# Patient Record
Sex: FEMALE | Race: WHITE | NOT HISPANIC OR LATINO | Employment: OTHER | URBAN - METROPOLITAN AREA
[De-identification: names, ages, dates, MRNs, and addresses within clinical notes are randomized per-mention and may not be internally consistent; named-entity substitution may affect disease eponyms.]

---

## 2018-07-18 NOTE — PROGRESS NOTES
Madhuri Gregorio MD, [facility]  Electrophysiology  Additional office in [facility]  [phone]  [phone]  [facility].org/[facility]  The Heart [facility] Level  [address] [facility]  [place] [address]    Patient MRN: [id number]  Date: 2018  Description: Prog Notes ([facility]) Cardiology EP  Category: Progress Notes ([facility])rovider ID: [id number] ID: [id number]  [place] ID: 001  2018  Anton Muniz M.D.  [address] [place] [address]  & [facility].  [place] [address]    Office Visit  RE: ORLY COBIAN  : 1940  Dear Dr. Muniz:  I had the pleasure of seeing Ms. Orly Cobian for follow up in the office today for followup.  I last saw her for evaluation on 2018.    PROBLEM LIST:  1. Paroxysmal atrial fibrillation diagnosed on 10/11/2010, with a recent episode in May of  2017.  2. History of Jaycob-Parkinson-White syndrome, status post catheter ablation procedure  performed in , without recurrence of arrhythmia.  3. Chronic right bundle branch block.  4. 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  5. History of thyroid disease.  6. Dyslipidemia.  7. Stress echo obtained on 2017 revealing no evidence of myocardial ischemia with  normal left ventricular systolic function with a left ventricular ejection fraction of 65-  70%.  HISTORY OF PRESENT ILLNESS: Today she is very pleased to report that she continues without recurrence of symptoms of atrial fibrillation.  She has not needed to take flecainide which she is prescribed to be taken only as needed for an episode of sustained atrial fibrillation.  She states that she is scheduled to see that ENT specialist Dr. Brantley in August since she has been having trouble  "swallowing.  She accidentally hit her left leg and has a laceration in the pretibial area.  She is using Neosporin however it is getting red around the lesion.   She denies episodes of chest pain, dizziness, syncope, near syncope, paroxysmal nocturnal dyspnea, orthopnea or pedal edema.  No hospitalizations since last seen in January 2018.    CURRENT MEDICATIONS:   Current Outpatient Prescriptions:   •  alendronate (FOSAMAX) 70 mg tablet, Take 70 mg by mouth once a week. Take on an empty stomach and do not lie down for the next 30 min., Disp: , Rfl:   •  apixaban (ELIQUIS) 5 mg tablet, Take 1 tablet (5 mg total) by mouth 2 (two) times a day., Disp: 180 tablet, Rfl: 0  •  aspirin 81 MG oral suspension, 81 mg., Disp: , Rfl:   •  atorvastatin (LIPITOR) 10 mg tablet, 10 mg., Disp: , Rfl:   •  estradiol (ESTRACE) 0.01 % (0.1 mg/gram) vaginal cream, No SIG Entered, Disp: , Rfl:   •  flecainide (TAMBOCOR) 100 mg tablet, 100 mg., Disp: , Rfl:   •  latanoprost (XALATAN) 0.005 % ophthalmic solution, 0.005 %., Disp: , Rfl:   •  lutein-zeaxanthin (OCUVITE LUTEIN 25) 25-5 mg capsule, take 1 capsule po daily, Disp: , Rfl:   •  omega 3-dha-epa-fish oil (FISH OIL) capsule, 1,200 mg., Disp: , Rfl:   •  omeprazole (PriLOSEC) 10 mg capsule, 20 mg., Disp: , Rfl:     ALLERGIES: Erythromycin.    REVIEW OF SYSTEMS: As above in the History of Present Illness; no bleeding on  anticoagulation with Eliquis.  Difficulty swallowing.    Vitals:    07/19/18 1422   BP: 116/80   BP Location: Left upper arm   Patient Position: Sitting   Pulse: 68   Resp: 16   Weight: 78.8 kg (173 lb 11.2 oz)   Height: 1.651 m (5' 5\")       PHYSICAL EXAMINATION: Pleasant and alert, in no acute distress.  Neck: No jugular venous distention or carotid bruits. Lungs were clear to auscultation without  rales or wheezes. Cardiovascular exam revealed normal S1, S2, regular rhythm with a 2/6  systolic murmur best heard at the lower left sternal border. Abdomen was soft and " nontender.  Extremities revealed a left pretibial laceration with erythema surrounding the lesion.  No edema or cyanosis. She was alert and oriented x 3 with no focal deficits  on gross neurologic exam.    DATA REVIEW: I personally reviewed the 12-lead EKG performed today, which reveals sinus  rhythm with right bundle branch block . There were no acute changes as compared to her previous EKG.      IMPRESSION/RECOMMENDATIONS:  1. Paroxysmal atrial fibrillation -She has not had recurrence of atrial fibrillation since May 2017.  She has a prescription from flecainide to be taken as needed if she has a sustained episode of atrial fibrillation.  She will alsowill continue daily low dose beta blocker therapy with metoprolol succinate 25 mg 1 po once a day.  Her [id number] is 3. Therefore I recommend that she continues long term  anticoagulation with Eliquis 5 mg 1 po every 12 hours to prevent thromboembolic complications from atrial fibrillation.    2. History of Jaycob-Parkinson-White syndrome, status post catheter ablation procedure in  1997 - She has not had recurrence of supraventricular tachycardia.  3. 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   4. Chronic right bundle branch block - Stable  5. Dyslipidemia - She will continue on atorvastatin.  6. Skin laceration: I advised her to follow up with you. She may need antibiotics for cellulitis.    I will see Ms. Cobian for follow-up in the office in six months. Please do not hesitate to contact  me if I can be of further assistance in her management.  Sincerely,    Madhuri Cardoza M.D., [facility]    cc: Anton Muniz M.D., [address] [place] [address], & [address]., [place] [address]

## 2018-07-19 ENCOUNTER — OFFICE VISIT (OUTPATIENT)
Dept: CARDIOLOGY | Facility: CLINIC | Age: 78
End: 2018-07-19
Attending: INTERNAL MEDICINE
Payer: MEDICARE

## 2018-07-19 VITALS
WEIGHT: 173.7 LBS | HEIGHT: 65 IN | BODY MASS INDEX: 28.94 KG/M2 | HEART RATE: 68 BPM | SYSTOLIC BLOOD PRESSURE: 116 MMHG | RESPIRATION RATE: 16 BRPM | DIASTOLIC BLOOD PRESSURE: 80 MMHG

## 2018-07-19 DIAGNOSIS — I45.10 RBBB: ICD-10-CM

## 2018-07-19 DIAGNOSIS — I45.6 WPW (WOLFF-PARKINSON-WHITE SYNDROME): ICD-10-CM

## 2018-07-19 DIAGNOSIS — I37.1 NONRHEUMATIC PULMONARY VALVE INSUFFICIENCY: ICD-10-CM

## 2018-07-19 DIAGNOSIS — Q22.5 EBSTEIN'S ANOMALY OF TRICUSPID VALVE: ICD-10-CM

## 2018-07-19 DIAGNOSIS — I48.0 PAROXYSMAL ATRIAL FIBRILLATION (CMS/HCC): Primary | ICD-10-CM

## 2018-07-19 DIAGNOSIS — I36.1 NON-RHEUMATIC TRICUSPID VALVE INSUFFICIENCY: ICD-10-CM

## 2018-07-19 PROCEDURE — 93000 ELECTROCARDIOGRAM COMPLETE: CPT | Performed by: INTERNAL MEDICINE

## 2018-07-19 PROCEDURE — 99214 OFFICE O/P EST MOD 30 MIN: CPT | Performed by: INTERNAL MEDICINE

## 2018-07-19 RX ORDER — LATANOPROST 50 UG/ML
1 SOLUTION/ DROPS OPHTHALMIC NIGHTLY
COMMUNITY
Start: 2012-03-27

## 2018-07-19 RX ORDER — FISH OIL/DHA/EPA 1200-144MG
CAPSULE ORAL
COMMUNITY
Start: 2012-04-12 | End: 2018-07-22

## 2018-07-19 RX ORDER — LATANOPROST 50 UG/ML
SOLUTION/ DROPS OPHTHALMIC
Refills: 0 | COMMUNITY
Start: 2018-06-27 | End: 2018-07-22

## 2018-07-19 RX ORDER — LATANOPROST 50 UG/ML
0.01 SOLUTION/ DROPS OPHTHALMIC
COMMUNITY
Start: 2010-11-03 | End: 2018-07-22

## 2018-07-19 RX ORDER — FLECAINIDE ACETATE 100 MG/1
100 TABLET ORAL 2 TIMES DAILY PRN
COMMUNITY
Start: 2017-06-07 | End: 2019-02-07 | Stop reason: SDUPTHER

## 2018-07-19 RX ORDER — ESTRADIOL 0.1 MG/G
0.5 CREAM VAGINAL 2 TIMES WEEKLY
COMMUNITY
Start: 2012-03-27 | End: 2024-07-07

## 2018-07-19 RX ORDER — ZOSTER VACCINE RECOMBINANT, ADJUVANTED 50 MCG/0.5
KIT INTRAMUSCULAR
Refills: 0 | COMMUNITY
Start: 2018-04-25 | End: 2018-07-19

## 2018-07-19 RX ORDER — ALENDRONATE SODIUM 70 MG/1
70 TABLET ORAL
COMMUNITY
Start: 2012-03-27 | End: 2018-07-22

## 2018-07-19 RX ORDER — GLUCOSAMINE/CHONDR SU A SOD 750-600 MG
1 TABLET ORAL DAILY
COMMUNITY
Start: 2012-10-18

## 2018-07-19 RX ORDER — ATORVASTATIN CALCIUM 10 MG/1
10 TABLET, FILM COATED ORAL
COMMUNITY
Start: 2018-02-08 | End: 2018-08-30 | Stop reason: SDUPTHER

## 2018-07-19 RX ORDER — ATORVASTATIN CALCIUM 10 MG/1
TABLET, FILM COATED ORAL
Refills: 0 | COMMUNITY
Start: 2018-06-30 | End: 2018-07-22

## 2018-07-19 RX ORDER — OMEPRAZOLE 10 MG/1
20 CAPSULE, DELAYED RELEASE ORAL
COMMUNITY
Start: 2012-03-27 | End: 2019-02-07 | Stop reason: ALTCHOICE

## 2018-07-19 NOTE — LETTER
2018     Anton Muniz MD  [address] [place] [address]  [place] [address]    Patient: Orly Lopez   YOB: 1940   Date of Visit: 2018       Dear Dr. Muniz:    Thank you for referring Orly Lopez to me for evaluation. Below are my notes for this consultation.    If you have questions, please do not hesitate to call me. I look forward to following your patient along with you.         Sincerely,        Madhuri Gregorio MD        CC: No Recipients  Madhuri Gregorio MD  2018 10:14 PM  Sign at close encounter  Madhuri Gregorio MD, Located within [facility]  Electrophysiology  Additional office in [facility]  [phone]  [phone]  [facility]  The Heart [facility] Level  [address] [facility]  [place] [address]    Patient MRN: [id number]  Date: 2018  Description: Prog Notes ([facility]) Cardiology EP  Category: Progress Notes ([facility])rovider ID: [id number] ID: [id number]  [place] ID: 001  2018  Anton Muniz M.D.  [address] [place] [address]  & Grzegorz Cox.  [place] [address]    Office Visit  RE: ORLY LOPEZ  : 1940  Dear Dr. Muniz:  I had the pleasure of seeing Ms. Orly Lopez for follow up in the office today for followup.  I last saw her for evaluation on 2018.    PROBLEM LIST:  1. Paroxysmal atrial fibrillation diagnosed on 10/11/2010, with a recent episode in May of  2017.  2. History of Jaycob-Parkinson-White syndrome, status post catheter ablation procedure  performed in , without recurrence of arrhythmia.  3. Chronic right bundle branch block.  4. 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  5. History of thyroid disease.  6. Dyslipidemia.  7. Stress echo obtained on 2017  revealing no evidence of myocardial ischemia with  normal left ventricular systolic function with a left ventricular ejection fraction of 65-  70%.  HISTORY OF PRESENT ILLNESS: Today she is very pleased to report that she continues without recurrence of symptoms of atrial fibrillation.  She has not needed to take flecainide which she is prescribed to be taken only as needed for an episode of sustained atrial fibrillation.  She states that she is scheduled to see that ENT specialist Dr. Brantley in August since she has been having trouble swallowing.  She accidentally hit her left leg and has a laceration in the pretibial area.  She is using Neosporin however it is getting red around the lesion.   She denies episodes of chest pain, dizziness, syncope, near syncope, paroxysmal nocturnal dyspnea, orthopnea or pedal edema.  No hospitalizations since last seen in January 2018.    CURRENT MEDICATIONS:   Current Outpatient Prescriptions:   •  alendronate (FOSAMAX) 70 mg tablet, Take 70 mg by mouth once a week. Take on an empty stomach and do not lie down for the next 30 min., Disp: , Rfl:   •  apixaban (ELIQUIS) 5 mg tablet, Take 1 tablet (5 mg total) by mouth 2 (two) times a day., Disp: 180 tablet, Rfl: 0  •  aspirin 81 MG oral suspension, 81 mg., Disp: , Rfl:   •  atorvastatin (LIPITOR) 10 mg tablet, 10 mg., Disp: , Rfl:   •  estradiol (ESTRACE) 0.01 % (0.1 mg/gram) vaginal cream, No SIG Entered, Disp: , Rfl:   •  flecainide (TAMBOCOR) 100 mg tablet, 100 mg., Disp: , Rfl:   •  latanoprost (XALATAN) 0.005 % ophthalmic solution, 0.005 %., Disp: , Rfl:   •  lutein-zeaxanthin (OCUVITE LUTEIN 25) 25-5 mg capsule, take 1 capsule po daily, Disp: , Rfl:   •  omega 3-dha-epa-fish oil (FISH OIL) capsule, 1,200 mg., Disp: , Rfl:   •  omeprazole (PriLOSEC) 10 mg capsule, 20 mg., Disp: , Rfl:     ALLERGIES: Erythromycin.    REVIEW OF SYSTEMS: As above in the History of Present Illness; no bleeding on  anticoagulation with Eliquis.   "Difficulty swallowing.    Vitals:    07/19/18 1422   BP: 116/80   BP Location: Left upper arm   Patient Position: Sitting   Pulse: 68   Resp: 16   Weight: 78.8 kg (173 lb 11.2 oz)   Height: 1.651 m (5' 5\")       PHYSICAL EXAMINATION: Pleasant and alert, in no acute distress.  Neck: No jugular venous distention or carotid bruits. Lungs were clear to auscultation without  rales or wheezes. Cardiovascular exam revealed normal S1, S2, regular rhythm with a 2/6  systolic murmur best heard at the lower left sternal border. Abdomen was soft and nontender.  Extremities revealed a left pretibial laceration with erythema surrounding the lesion.  No edema or cyanosis. She was alert and oriented x 3 with no focal deficits  on gross neurologic exam.    DATA REVIEW: I personally reviewed the 12-lead EKG performed today, which reveals sinus  rhythm with right bundle branch block . There were no acute changes as compared to her previous EKG.      IMPRESSION/RECOMMENDATIONS:  1. Paroxysmal atrial fibrillation -She has not had recurrence of atrial fibrillation since May 2017.  She has a prescription from flecainide to be taken as needed if she has a sustained episode of atrial fibrillation.  She will alsowill continue daily low dose beta blocker therapy with metoprolol succinate 25 mg 1 po once a day.  Her [id number] is 3. Therefore I recommend that she continues long term  anticoagulation with Eliquis 5 mg 1 po every 12 hours to prevent thromboembolic complications from atrial fibrillation.    2. History of Jaycob-Parkinson-White syndrome, status post catheter ablation procedure in  1997 - She has not had recurrence of supraventricular tachycardia.  3. 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   4. Chronic right bundle branch block - Stable  5. Dyslipidemia - She will continue on atorvastatin.  6. Skin " laceration: I advised her to follow up with you. She may need antibiotics for cellulitis.    I will see Ms. Cobian for follow-up in the office in six months. Please do not hesitate to contact  me if I can be of further assistance in her management.  Sincerely,    Madhuri conklin M.D., [facility]    cc: Anton Muniz M.D., [address] [place] [address], & [address] [place] [address]

## 2018-07-22 PROBLEM — Q22.5 EBSTEIN'S ANOMALY OF TRICUSPID VALVE: Status: ACTIVE | Noted: 2018-07-22

## 2018-07-22 PROBLEM — I37.1 NONRHEUMATIC PULMONARY VALVE INSUFFICIENCY: Status: ACTIVE | Noted: 2018-07-22

## 2018-07-22 RX ORDER — ALENDRONATE SODIUM 70 MG/1
70 TABLET ORAL WEEKLY
COMMUNITY
End: 2019-02-07 | Stop reason: ALTCHOICE

## 2018-07-24 ENCOUNTER — TELEPHONE (OUTPATIENT)
Dept: CARDIOLOGY | Facility: CLINIC | Age: 78
End: 2018-07-24

## 2018-07-24 NOTE — TELEPHONE ENCOUNTER
----- Message from Madhuri Gregorio MD sent at 7/22/2018  9:36 PM EDT -----  [place] call patient and ask if she is taking Aspirin in addition to Eliquis for Af? Her old med list does not have it but new list has ASA.  Should only be on Eliquis.  Thanks

## 2018-07-25 ENCOUNTER — TELEPHONE (OUTPATIENT)
Dept: CARDIOLOGY | Facility: CLINIC | Age: 78
End: 2018-07-25

## 2018-07-25 NOTE — TELEPHONE ENCOUNTER
----- Message from Madhuri Gregorio MD sent at 7/22/2018  9:36 PM EDT -----  [place] call patient and ask if she is taking Aspirin in addition to Eliquis for Af? Her old med list does not have it but new list has ASA.  Should only be on Eliquis.  Thanks

## 2018-08-03 ENCOUNTER — APPOINTMENT (OUTPATIENT)
Dept: LAB | Facility: HOSPITAL | Age: 78
End: 2018-08-03
Attending: FAMILY MEDICINE
Payer: MEDICARE

## 2018-08-03 ENCOUNTER — TRANSCRIBE ORDERS (OUTPATIENT)
Dept: LAB | Facility: HOSPITAL | Age: 78
End: 2018-08-03

## 2018-08-03 DIAGNOSIS — L03.116 CELLULITIS OF LEFT LOWER EXTREMITY: Primary | ICD-10-CM

## 2018-08-03 DIAGNOSIS — L03.116 CELLULITIS OF LEFT LOWER EXTREMITY: ICD-10-CM

## 2018-08-03 DIAGNOSIS — I48.0 PAROXYSMAL ATRIAL FIBRILLATION (CMS/HCC): ICD-10-CM

## 2018-08-03 DIAGNOSIS — R60.9 EDEMA: ICD-10-CM

## 2018-08-03 LAB
ALBUMIN SERPL-MCNC: 3.7 G/DL (ref 3.4–5)
ALP SERPL-CCNC: 66 IU/L (ref 35–126)
ALT SERPL-CCNC: 18 IU/L (ref 11–54)
ANION GAP SERPL CALC-SCNC: 7 MEQ/L (ref 3–15)
AST SERPL-CCNC: 24 IU/L (ref 15–41)
BASOPHILS # BLD: 0.04 K/UL (ref 0.01–0.1)
BASOPHILS NFR BLD: 0.9 %
BILIRUB SERPL-MCNC: 0.8 MG/DL (ref 0.3–1.2)
BUN SERPL-MCNC: 15 MG/DL (ref 8–20)
CALCIUM SERPL-MCNC: 9.1 MG/DL (ref 8.9–10.3)
CHLORIDE SERPL-SCNC: 104 MMOL/L (ref 98–109)
CHOLEST SERPL-MCNC: 175 MG/DL
CO2 SERPL-SCNC: 27 MMOL/L (ref 22–32)
CREAT SERPL-MCNC: 0.7 MG/DL (ref 0.6–1.1)
DIFFERENTIAL METHOD BLD: NORMAL
EOSINOPHIL # BLD: 0.25 K/UL (ref 0.04–0.36)
EOSINOPHIL NFR BLD: 5.5 %
ERYTHROCYTE [DISTWIDTH] IN BLOOD BY AUTOMATED COUNT: 11.9 % (ref 11.7–14.4)
FT4I SERPL CALC-MCNC: 4.77 (ref 2.1–3.8)
GFR SERPL CREATININE-BSD FRML MDRD: >60 ML/MIN/1.73M*2
GLUCOSE SERPL-MCNC: 88 MG/DL (ref 70–99)
HCT VFR BLDCO AUTO: 41.6 % (ref 35–45)
HDLC SERPL-MCNC: 46 MG/DL
HDLC SERPL: 3.8 {RATIO}
HGB BLD-MCNC: 13.6 G/DL (ref 11.8–15.7)
IMM GRANULOCYTES # BLD AUTO: 0.03 K/UL (ref 0–0.08)
IMM GRANULOCYTES NFR BLD AUTO: 0.7 %
LDLC SERPL CALC-MCNC: 115 MG/DL
LYMPHOCYTES # BLD: 1.51 K/UL (ref 1.2–3.5)
LYMPHOCYTES NFR BLD: 33 %
MCH RBC QN AUTO: 31 PG (ref 28–33.2)
MCHC RBC AUTO-ENTMCNC: 32.7 G/DL (ref 32.2–35.5)
MCV RBC AUTO: 94.8 FL (ref 83–98)
MONOCYTES # BLD: 0.47 K/UL (ref 0.28–0.8)
MONOCYTES NFR BLD: 10.3 %
NEUTROPHILS # BLD: 2.27 K/UL (ref 1.7–7)
NEUTS SEG NFR BLD: 49.6 %
NONHDLC SERPL-MCNC: 129 MG/DL
NRBC BLD-RTO: 0 %
PDW BLD AUTO: 10.4 FL (ref 9.4–12.3)
PLATELET # BLD AUTO: 261 K/UL (ref 150–369)
POTASSIUM SERPL-SCNC: 4.2 MMOL/L (ref 3.6–5.1)
PROT SERPL-MCNC: 6.3 G/DL (ref 6–8.2)
RBC # BLD AUTO: 4.39 M/UL (ref 3.93–5.22)
SODIUM SERPL-SCNC: 138 MMOL/L (ref 136–144)
T4 SERPL-MCNC: 11.6 UG/DL (ref 5.9–12.2)
TRIGL SERPL-MCNC: 68 MG/DL (ref 30–149)
TSH SERPL DL<=0.05 MIU/L-ACNC: 6.72 MIU/ML (ref 0.34–5.6)
WBC # BLD AUTO: 4.57 K/UL (ref 3.8–10.5)

## 2018-08-03 PROCEDURE — 84436 ASSAY OF TOTAL THYROXINE: CPT

## 2018-08-03 PROCEDURE — 36415 COLL VENOUS BLD VENIPUNCTURE: CPT

## 2018-08-03 PROCEDURE — 84443 ASSAY THYROID STIM HORMONE: CPT

## 2018-08-03 PROCEDURE — 85025 COMPLETE CBC W/AUTO DIFF WBC: CPT

## 2018-08-03 PROCEDURE — 80053 COMPREHEN METABOLIC PANEL: CPT

## 2018-08-03 PROCEDURE — 80061 LIPID PANEL: CPT | Mod: GZ

## 2018-08-17 RX ORDER — METOPROLOL SUCCINATE 25 MG/1
25 TABLET, EXTENDED RELEASE ORAL DAILY
Qty: 30 TABLET | Refills: 6 | Status: SHIPPED | OUTPATIENT
Start: 2018-08-17 | End: 2019-03-20 | Stop reason: SDUPTHER

## 2018-08-30 RX ORDER — ATORVASTATIN CALCIUM 10 MG/1
10 TABLET, FILM COATED ORAL DAILY
Qty: 30 TABLET | Refills: 6 | Status: SHIPPED | OUTPATIENT
Start: 2018-08-30 | End: 2019-02-07 | Stop reason: SDUPTHER

## 2018-10-24 ENCOUNTER — TRANSCRIBE ORDERS (OUTPATIENT)
Dept: SCHEDULING | Age: 78
End: 2018-10-24

## 2018-10-24 DIAGNOSIS — R13.10 DYSPHAGIA: Primary | ICD-10-CM

## 2018-11-01 ENCOUNTER — HOSPITAL ENCOUNTER (OUTPATIENT)
Dept: RADIOLOGY | Facility: HOSPITAL | Age: 78
Discharge: HOME | End: 2018-11-01
Attending: INTERNAL MEDICINE
Payer: MEDICARE

## 2018-11-01 ENCOUNTER — TRANSCRIBE ORDERS (OUTPATIENT)
Dept: RADIOLOGY | Facility: HOSPITAL | Age: 78
End: 2018-11-01

## 2018-11-01 DIAGNOSIS — R13.10 DYSPHAGIA: ICD-10-CM

## 2018-11-01 PROCEDURE — 74220 X-RAY XM ESOPHAGUS 1CNTRST: CPT

## 2018-11-02 ENCOUNTER — TELEPHONE (OUTPATIENT)
Dept: SCHEDULING | Facility: CLINIC | Age: 78
End: 2018-11-02

## 2018-11-02 NOTE — TELEPHONE ENCOUNTER
Debi @ [facility] requesting most recent OVN & most recent echo (6/6/2017) faxed.  Pt having procedure there now.     P- [phone]  [phone]

## 2018-11-02 NOTE — TELEPHONE ENCOUNTER
Office note sent. No echo in chart. There is a stress test from outside that is unable to be printed.

## 2019-02-07 ENCOUNTER — OFFICE VISIT (OUTPATIENT)
Dept: CARDIOLOGY | Facility: CLINIC | Age: 79
End: 2019-02-07
Payer: MEDICARE

## 2019-02-07 VITALS
WEIGHT: 136.9 LBS | DIASTOLIC BLOOD PRESSURE: 72 MMHG | RESPIRATION RATE: 16 BRPM | HEART RATE: 64 BPM | BODY MASS INDEX: 22.81 KG/M2 | HEIGHT: 65 IN | SYSTOLIC BLOOD PRESSURE: 112 MMHG

## 2019-02-07 DIAGNOSIS — I45.6 WPW (WOLFF-PARKINSON-WHITE SYNDROME): ICD-10-CM

## 2019-02-07 DIAGNOSIS — I48.0 PAROXYSMAL ATRIAL FIBRILLATION (CMS/HCC): Primary | ICD-10-CM

## 2019-02-07 DIAGNOSIS — R55 VASOVAGAL SYNCOPE: ICD-10-CM

## 2019-02-07 DIAGNOSIS — E78.5 DYSLIPIDEMIA: ICD-10-CM

## 2019-02-07 DIAGNOSIS — I45.10 RBBB: ICD-10-CM

## 2019-02-07 PROCEDURE — 93000 ELECTROCARDIOGRAM COMPLETE: CPT | Performed by: NURSE PRACTITIONER

## 2019-02-07 PROCEDURE — 99214 OFFICE O/P EST MOD 30 MIN: CPT | Performed by: NURSE PRACTITIONER

## 2019-02-07 RX ORDER — FLECAINIDE ACETATE 100 MG/1
100 TABLET ORAL 2 TIMES DAILY PRN
Qty: 10 TABLET | Refills: 1 | Status: SHIPPED | OUTPATIENT
Start: 2019-02-07 | End: 2020-02-13

## 2019-02-07 RX ORDER — ATORVASTATIN CALCIUM 10 MG/1
10 TABLET, FILM COATED ORAL DAILY
Qty: 30 TABLET | Refills: 6 | Status: SHIPPED | OUTPATIENT
Start: 2019-02-07 | End: 2019-09-30 | Stop reason: SDUPTHER

## 2019-02-07 NOTE — LETTER
2019     Anton Muniz MD  [address] [facility]  [place] [address]    Patient: Orly Lopez   YOB: 1940   Date of Visit: 2019       Dear Dr. Muniz:    Thank you for referring Orly Lopez to me for evaluation. Below are my notes for this consultation.    If you have questions, please do not hesitate to call me. I look forward to following your patient along with you.         Sincerely,        TYLER Lopez        CC: No Recipients  Melony Rowe CRNP  2019  4:32 PM  Signed  Melony SCOTT  Electrophysiology    [facility]  [phone]  [phone]  [facility]/heart  [facility]  The Heart [facility] Level  [address] [facility]  [place] [address]    [place] MRN: [id number]  Date: 2018  Description: Prog Notes ([facility]) Cardiology EP  Category: Progress Notes ([facility])rovider ID: [id number]  [place] ID: 001    Anton Muniz M.D.  [address] [facility]  & [facility].  [place] [address]    Office Visit  RE: ORLY LOPEZ  : 1940    Dear Dr. Muniz:  I had the pleasure of seeing Ms. Orly Lopez for follow up in the office for continued evaluation of a history of paroxysmal atrial fibrillation with chronic right bundle branch block and history of dyslipidemia.     PROBLEM LIST:  1. Paroxysmal atrial fibrillation diagnosed on 10/11/2010, with a recent episode in May of  2017.  2. History of Jaycob-Parkinson-White syndrome, status post catheter ablation procedure  performed in , without recurrence of arrhythmia.  3. Chronic right bundle branch block.  4. 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  5. History of thyroid disease.  6. Dyslipidemia.  7. Stress echo obtained on 2017  revealing no evidence of myocardial ischemia with  normal left ventricular systolic function with a left ventricular ejection fraction of 65-  70%.      HISTORY OF PRESENT ILLNESS: She continues without recurrence of symptoms of atrial fibrillation.  She has not needed to take flecainide which she is prescribed to be taken only as needed for an episode of sustained atrial fibrillation.  She underwent a barium swallow and endoscopy in the setting of dysphagia whicht showed only a slight narrowing of the upper esophagus and a small hiatal hernia with no intervention needed.    Last summer while climbing out of a hot tub she had a syncopal episode but woke quickly.   Fortunately she has had no recurrent events..       She denies episodes of chest pain, dizziness, paroxysmal nocturnal dyspnea, orthopnea or pedal edema.      CURRENT MEDICATIONS:   Current Outpatient Prescriptions:   •  apixaban (ELIQUIS) 5 mg tablet, Take 1 tablet (5 mg total) by mouth 2 (two) times a day., Disp: 180 tablet, Rfl: 1  •  atorvastatin (LIPITOR) 10 mg tablet, Take 1 tablet (10 mg total) by mouth daily., Disp: 30 tablet, Rfl: 6  •  estradiol (ESTRACE) 0.01 % (0.1 mg/gram) vaginal cream, No SIG Entered, Disp: , Rfl:   •  flecainide (TAMBOCOR) 100 mg tablet, Take 1 tablet (100 mg total) by mouth 2 (two) times a day as needed (as needed for palpitations)., Disp: 10 tablet, Rfl: 1  •  latanoprost (XALATAN) 0.005 % ophthalmic solution, 0.005 %., Disp: , Rfl:   •  lutein-zeaxanthin (OCUVITE LUTEIN 25) 25-5 mg capsule, take 1 capsule po daily, Disp: , Rfl:   •  metoprolol succinate XL (TOPROL-XL) 25 mg 24 hr tablet, Take 1 tablet (25 mg total) by mouth daily., Disp: 30 tablet, Rfl: 6  •  omega 3-dha-epa-fish oil (FISH OIL) capsule, 1,200 mg., Disp: , Rfl:     ALLERGIES: Erythromycin.    REVIEW OF SYSTEMS: As above in the History of Present Illness; no bleeding on  anticoagulation with Eliquis.      Vitals:    02/07/19 1328   BP: 112/72   BP  "Location: Left upper arm   Patient Position: Sitting   Pulse: 64   Resp: 16   Weight: 62.1 kg (136 lb 14.4 oz)   Height: 1.651 m (5' 5\")       PHYSICAL EXAMINATION: Pleasant and alert, in no acute distress.  Neck: No jugular venous distention or carotid bruits. Lungs were clear to auscultation without  rales or wheezes. Cardiovascular exam revealed normal S1, S2, regular rhythm with a 2/6  systolic murmur best heard at the lower left sternal border. Abdomen was soft and nontender.  Extremities revealed a left pretibial laceration with erythema surrounding the lesion.  No edema or cyanosis. She was alert and oriented x 3 with no focal deficits on gross neurologic exam.    DATA REVIEW: I reviewed the 12-lead EKG performed today, which reveals sinus  rhythm with right bundle branch block . There were no acute changes as compared to her previous EKG.    Her most recent lab work from August showed a total cholesterol 175, HDL 46 and LDL of 115.  She is not anemic.  Her thyroid function studies are within normal range.  Potassium is 4.2 and her creatinine is 0.7 with normal liver function studies.    IMPRESSION/RECOMMENDATIONS:  1. Paroxysmal atrial fibrillation -She has not had recurrence of atrial fibrillation since May 2017.  She has a prescription for  flecainide to be taken as needed if she has a sustained episode of atrial fibrillation.  She will also continue daily low dose beta blocker therapy with metoprolol succinate 25 mg 1 po once a day.  Her [id number] is 3. Therefore I recommend that she continues long term  anticoagulation with Eliquis 5 mg 1 po every 12 hours to prevent thromboembolic complications from atrial fibrillation.    2. History of Jaycob-Parkinson-White syndrome, status post catheter ablation procedure in  1997 - She has not had recurrence of supraventricular tachycardia.    3. 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     4. Chronic right bundle branch block - Stable    5. Dyslipidemia - She will continue on her current dose of atorvastatin.    6.  Syncope.  I suspect the syncopal episode last summer was vasovagal in origin..  Fortunately she has had no recurrent episodes.  Advised her to stay well-hydrated and to avoid prolonged exposure and hot tubs.    She will return to see Dr Gregorio in 6 months.  . Please do not hesitate to contact  me if I can be of further assistance in her management.    Sincerely,    TYLER Lopez  2/7/2019

## 2019-02-07 NOTE — PROGRESS NOTES
Melony SCOTT  Electrophysiology    [facility]  [phone]  [phone]  [email or web address]  [facility]  The Heart [address]  [address]  [place] [address]    [place] MRN: [id number]  Date: 2018  Description: Prog Notes ([facility]) Cardiology EP  Category: Progress Notes ([facility])rovider ID: [id number]  [place] ID: [id number]    Anton Muniz M.D.  [address] [place] [address]  & [facility]  [place] [address]    Office Visit  RE: ORLY COBIAN  : 1940    Dear Dr. Muniz:  I had the pleasure of seeing Ms. Orlychantel Cobian for follow up in the office for continued evaluation of a history of paroxysmal atrial fibrillation with chronic right bundle branch block and history of dyslipidemia.     PROBLEM LIST:  1. Paroxysmal atrial fibrillation diagnosed on 10/11/2010, with a recent episode in May of  2017.  2. History of Jaycob-Parkinson-White syndrome, status post catheter ablation procedure  performed in , without recurrence of arrhythmia.  3. Chronic right bundle branch block.  4. 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  5. History of thyroid disease.  6. Dyslipidemia.  7. Stress echo obtained on 2017 revealing no evidence of myocardial ischemia with  normal left ventricular systolic function with a left ventricular ejection fraction of 65-  70%.      HISTORY OF PRESENT ILLNESS: She continues without recurrence of symptoms of atrial fibrillation.  She has not needed to take flecainide which she is prescribed to be taken only as needed for an episode of sustained atrial fibrillation.  She underwent a barium swallow and endoscopy in the setting of dysphagia whicht showed only a slight narrowing of the upper esophagus and a small  "hiatal hernia with no intervention needed.    Last summer while climbing out of a hot tub she had a syncopal episode but woke quickly.   Fortunately she has had no recurrent events..       She denies episodes of chest pain, dizziness, paroxysmal nocturnal dyspnea, orthopnea or pedal edema.      CURRENT MEDICATIONS:   Current Outpatient Prescriptions:   •  apixaban (ELIQUIS) 5 mg tablet, Take 1 tablet (5 mg total) by mouth 2 (two) times a day., Disp: 180 tablet, Rfl: 1  •  atorvastatin (LIPITOR) 10 mg tablet, Take 1 tablet (10 mg total) by mouth daily., Disp: 30 tablet, Rfl: 6  •  estradiol (ESTRACE) 0.01 % (0.1 mg/gram) vaginal cream, No SIG Entered, Disp: , Rfl:   •  flecainide (TAMBOCOR) 100 mg tablet, Take 1 tablet (100 mg total) by mouth 2 (two) times a day as needed (as needed for palpitations)., Disp: 10 tablet, Rfl: 1  •  latanoprost (XALATAN) 0.005 % ophthalmic solution, 0.005 %., Disp: , Rfl:   •  lutein-zeaxanthin (OCUVITE LUTEIN 25) 25-5 mg capsule, take 1 capsule po daily, Disp: , Rfl:   •  metoprolol succinate XL (TOPROL-XL) 25 mg 24 hr tablet, Take 1 tablet (25 mg total) by mouth daily., Disp: 30 tablet, Rfl: 6  •  omega 3-dha-epa-fish oil (FISH OIL) capsule, 1,200 mg., Disp: , Rfl:     ALLERGIES: Erythromycin.    REVIEW OF SYSTEMS: As above in the History of Present Illness; no bleeding on  anticoagulation with Eliquis.      Vitals:    02/07/19 1328   BP: 112/72   BP Location: Left upper arm   Patient Position: Sitting   Pulse: 64   Resp: 16   Weight: 62.1 kg (136 lb 14.4 oz)   Height: 1.651 m (5' 5\")       PHYSICAL EXAMINATION: Pleasant and alert, in no acute distress.  Neck: No jugular venous distention or carotid bruits. Lungs were clear to auscultation without  rales or wheezes. Cardiovascular exam revealed normal S1, S2, regular rhythm with a 2/6  systolic murmur best heard at the lower left sternal border. Abdomen was soft and nontender.  Extremities revealed a left pretibial laceration with " erythema surrounding the lesion.  No edema or cyanosis. She was alert and oriented x 3 with no focal deficits on gross neurologic exam.    DATA REVIEW: I reviewed the 12-lead EKG performed today, which reveals sinus  rhythm with right bundle branch block . There were no acute changes as compared to her previous EKG.    Her most recent lab work from August showed a total cholesterol 175, HDL 46 and LDL of 115.  She is not anemic.  Her thyroid function studies are within normal range.  Potassium is 4.2 and her creatinine is 0.7 with normal liver function studies.    IMPRESSION/RECOMMENDATIONS:  1. Paroxysmal atrial fibrillation -She has not had recurrence of atrial fibrillation since May 2017.  She has a prescription for  flecainide to be taken as needed if she has a sustained episode of atrial fibrillation.  She will also continue daily low dose beta blocker therapy with metoprolol succinate 25 mg 1 po once a day.  Her [id number] is 3. Therefore I recommend that she continues long term  anticoagulation with Eliquis 5 mg 1 po every 12 hours to prevent thromboembolic complications from atrial fibrillation.    2. History of Jaycob-Parkinson-White syndrome, status post catheter ablation procedure in  1997 - She has not had recurrence of supraventricular tachycardia.    3. Ebstein anomaly - She is asymptomatic without signs or symptoms of heart failure.She has moderate tricuspid regurgitation and moderate pulmonary regurgitation.     4. Chronic right bundle branch block - Stable    5. Dyslipidemia - She will continue on her current dose of atorvastatin.    6.  Syncope.  I suspect the syncopal episode last summer was vasovagal in origin..  Fortunately she has had no recurrent episodes.  Advised her to stay well-hydrated and to avoid prolonged exposure and hot tubs.    She will return to see Dr Gregorio in 6 months.  . Please do not hesitate to contact  me if I can be of further assistance in her  management.    Sincerely,    TYLER Lopez  2/7/2019

## 2019-02-23 ENCOUNTER — HOSPITAL ENCOUNTER (EMERGENCY)
Facility: HOSPITAL | Age: 79
Discharge: HOME | End: 2019-02-24
Attending: EMERGENCY MEDICINE | Admitting: EMERGENCY MEDICINE
Payer: MEDICARE

## 2019-02-23 DIAGNOSIS — I83.891 BLEEDING FROM VARICOSE VEINS OF RIGHT LOWER EXTREMITY: Primary | ICD-10-CM

## 2019-02-23 DIAGNOSIS — I83.899 BLEEDING FROM VARICOSE VEIN: ICD-10-CM

## 2019-02-23 LAB
ALBUMIN SERPL-MCNC: 3.5 G/DL (ref 3.4–5)
ALP SERPL-CCNC: 48 IU/L (ref 35–126)
ALT SERPL-CCNC: 16 IU/L (ref 11–54)
ANION GAP SERPL CALC-SCNC: 10 MEQ/L (ref 3–15)
AST SERPL-CCNC: 27 IU/L (ref 15–41)
BASOPHILS # BLD: 0.05 K/UL (ref 0.01–0.1)
BASOPHILS NFR BLD: 0.8 %
BILIRUB SERPL-MCNC: 0.4 MG/DL (ref 0.3–1.2)
BUN SERPL-MCNC: 28 MG/DL (ref 8–20)
CALCIUM SERPL-MCNC: 8.6 MG/DL (ref 8.9–10.3)
CHLORIDE SERPL-SCNC: 106 MEQ/L (ref 98–109)
CO2 SERPL-SCNC: 23 MEQ/L (ref 22–32)
CREAT SERPL-MCNC: 0.8 MG/DL
DIFFERENTIAL METHOD BLD: NORMAL
EOSINOPHIL # BLD: 0.19 K/UL (ref 0.04–0.36)
EOSINOPHIL NFR BLD: 3.1 %
ERYTHROCYTE [DISTWIDTH] IN BLOOD BY AUTOMATED COUNT: 12.1 % (ref 11.7–14.4)
GFR SERPL CREATININE-BSD FRML MDRD: >60 ML/MIN/1.73M*2
GLUCOSE SERPL-MCNC: 135 MG/DL (ref 70–99)
HCT VFR BLDCO AUTO: 35.1 %
HGB BLD-MCNC: 11.6 G/DL
IMM GRANULOCYTES # BLD AUTO: 0.04 K/UL (ref 0–0.08)
IMM GRANULOCYTES NFR BLD AUTO: 0.6 %
LYMPHOCYTES # BLD: 2.52 K/UL (ref 1.2–3.5)
LYMPHOCYTES NFR BLD: 40.5 %
MCH RBC QN AUTO: 31.4 PG (ref 28–33.2)
MCHC RBC AUTO-ENTMCNC: 33 G/DL (ref 32.2–35.5)
MCV RBC AUTO: 94.9 FL (ref 83–98)
MONOCYTES # BLD: 0.41 K/UL (ref 0.28–0.8)
MONOCYTES NFR BLD: 6.6 %
NEUTROPHILS # BLD: 3.01 K/UL (ref 1.7–7)
NEUTS SEG NFR BLD: 48.4 %
NRBC BLD-RTO: 0 %
PDW BLD AUTO: 10.1 FL (ref 9.4–12.3)
PLATELET # BLD AUTO: 245 K/UL
POTASSIUM SERPL-SCNC: 3.9 MEQ/L (ref 3.6–5.1)
PROT SERPL-MCNC: 5.9 G/DL (ref 6–8.2)
RBC # BLD AUTO: 3.7 M/UL (ref 3.93–5.22)
SODIUM SERPL-SCNC: 139 MEQ/L (ref 136–144)
WBC # BLD AUTO: 6.22 K/UL

## 2019-02-23 PROCEDURE — 93005 ELECTROCARDIOGRAM TRACING: CPT | Performed by: EMERGENCY MEDICINE

## 2019-02-23 PROCEDURE — 80053 COMPREHEN METABOLIC PANEL: CPT | Performed by: EMERGENCY MEDICINE

## 2019-02-23 PROCEDURE — 25800000 HC PHARMACY IV SOLUTIONS: Performed by: EMERGENCY MEDICINE

## 2019-02-23 PROCEDURE — 85014 HEMATOCRIT: CPT | Mod: 59 | Performed by: EMERGENCY MEDICINE

## 2019-02-23 PROCEDURE — 36415 COLL VENOUS BLD VENIPUNCTURE: CPT | Performed by: EMERGENCY MEDICINE

## 2019-02-23 PROCEDURE — 99284 EMERGENCY DEPT VISIT MOD MDM: CPT | Mod: 25

## 2019-02-23 PROCEDURE — 85025 COMPLETE CBC W/AUTO DIFF WBC: CPT | Performed by: EMERGENCY MEDICINE

## 2019-02-23 RX ADMIN — SODIUM CHLORIDE 1000 ML: 9 INJECTION, SOLUTION INTRAVENOUS at 21:19

## 2019-02-23 ASSESSMENT — ENCOUNTER SYMPTOMS
CHILLS: 0
DYSURIA: 0
SEIZURES: 0
EYE PAIN: 0
ABDOMINAL PAIN: 0
SORE THROAT: 0
SHORTNESS OF BREATH: 0
WOUND: 1
ARTHRALGIAS: 0
BACK PAIN: 0
FEVER: 0
COUGH: 0
COLOR CHANGE: 0
PALPITATIONS: 0
HEMATURIA: 0
VOMITING: 0

## 2019-02-24 VITALS
HEART RATE: 68 BPM | OXYGEN SATURATION: 98 % | HEIGHT: 65 IN | SYSTOLIC BLOOD PRESSURE: 120 MMHG | TEMPERATURE: 97.6 F | RESPIRATION RATE: 16 BRPM | DIASTOLIC BLOOD PRESSURE: 58 MMHG | WEIGHT: 136 LBS | BODY MASS INDEX: 22.66 KG/M2

## 2019-02-24 LAB
ATRIAL RATE: 63
HCT VFR BLDCO AUTO: 33.3 %
HGB BLD-MCNC: 11.1 G/DL
P AXIS: 40
PR INTERVAL: 140
QRS DURATION: 150
QT INTERVAL: 482
QTC CALCULATION(BAZETT): 493
R AXIS: 20
T WAVE AXIS: 34
VENTRICULAR RATE: 63

## 2019-02-24 NOTE — DISCHARGE INSTRUCTIONS
You were evaluated and treated in the ER today for a varicose vein bleed in your leg.  You had blood work drawn and repeated to ensure proper hemoglobin levels.  Please limit walking and exertion for the next few days to minimize a recurrence of the bleed that was cauterized today.    As discussed, it is possible for another vein to have the same type of bleeding occur.  Please follow-up with the vascular surgeon on your discharge papers for further evaluation.  Please follow-up with your primary care provider to ensure proper healing of this recent occurrence.    Please return to the ER for recurring or any worsening of symptoms.

## 2019-02-24 NOTE — ED PROVIDER NOTES
HPI     Chief Complaint   Patient presents with   • Vericose vein bleeding       78-year-old female with a pmh A. fib on Eliquis presents today via EMS for an active varicose vein bleed on her inner right ankle.  Patient states that she was in the shower just PTA and she noticed blood in the tub.  She admits she got out and applied pressure for a bit and once she removed it, blood was still actively squirting. When EMS arrived and attempted to stand and seat the patient on the toilet, she had a syncopal episode.  Patient admits she was very panicked about the amount of blood and had prodromal feelings of full body warmth prior to synopsizing. She denies any pain. She denies headache, dizziness, lightheadedness, nausea/vomiting, chest pain, shortness of breath, abdominal pain, or any other symptoms or concerns.             Patient History     Past Medical History:   Diagnosis Date   • Alcoholism (CMS/HCC) (HCC)    • Arrhythmia    • Chronic atrial fibrillation (CMS/HCC) (HCC)    • Hyperthyroidism    • Osteoporosis        Past Surgical History:   Procedure Laterality Date   • ABLATION OF DYSRHYTHMIC FOCUS     • CHOLECYSTECTOMY     • COSMETIC SURGERY     • EYE SURGERY     • SKIN BIOPSY         Family History   Problem Relation Age of Onset   • Heart disease Mother    • Heart disease Father    • Heart attack Father    • Heart failure Father    • Anemia Sister    • Clotting disorder Sister    • Fainting Sister    • Hypertension Sister        Social History   Substance Use Topics   • Smoking status: Former Smoker     Quit date: 1962   • Smokeless tobacco: Never Used   • Alcohol use No       Systems Reviewed from Nursing Triage:          Review of Systems     Review of Systems   Constitutional: Negative for chills and fever.   HENT: Negative for ear pain and sore throat.    Eyes: Negative for pain and visual disturbance.   Respiratory: Negative for cough and shortness of breath.    Cardiovascular: Negative for chest pain  "and palpitations.   Gastrointestinal: Negative for abdominal pain and vomiting.   Genitourinary: Negative for dysuria and hematuria.   Musculoskeletal: Negative for arthralgias and back pain.   Skin: Positive for wound (right leg varicose vein bleed). Negative for color change and rash.   Neurological: Positive for syncope. Negative for seizures.   All other systems reviewed and are negative.       Physical Exam     ED Triage Vitals   Temp Heart Rate Resp BP SpO2   02/23/19 2043 02/23/19 2206 02/23/19 2043 02/23/19 2043 02/23/19 2043   36.4 °C (97.6 °F) 68 16 127/61 97 %      Temp Source Heart Rate Source Patient Position BP Location FiO2 (%) (Set)   02/23/19 2043 02/23/19 2043 02/23/19 2043 02/23/19 2043 --   Oral Monitor Lying Left upper arm                      Patient Vitals for the past 24 hrs:   BP Temp Temp src Pulse Resp SpO2 Height Weight   02/23/19 2206 (!) 124/56 - - 68 16 - - -   02/23/19 2043 127/61 36.4 °C (97.6 °F) Oral - 16 97 % 1.651 m (5' 5\") 61.7 kg (136 lb)           Physical Exam   Constitutional: She appears well-developed and well-nourished. No distress.   HENT:   Head: Normocephalic and atraumatic.   Eyes: Conjunctivae are normal.   Neck: Neck supple.   Cardiovascular: Normal rate and regular rhythm.    No murmur heard.  Pulmonary/Chest: Effort normal and breath sounds normal. No respiratory distress.   Abdominal: Soft. There is no tenderness.   Musculoskeletal: She exhibits no edema.   Neurological: She is alert.   Skin: Skin is warm and dry.   1x1 mm bleeding vein on inner right ankle   Psychiatric: She has a normal mood and affect.   Nursing note and vitals reviewed.           Procedures    ED Course & MDM     Labs Reviewed   CBC - Abnormal        Result Value    WBC 6.22      RBC 3.70 (*)     Hemoglobin 11.6 (*)     Hematocrit 35.1      MCV 94.9      MCH 31.4      MCHC 33.0      RDW 12.1      Platelets 245      MPV 10.1     CBC AND DIFFERENTIAL    Narrative:     The following orders were " created for panel order CBC and differential.  Procedure                               Abnormality         Status                     ---------                               -----------         ------                     CBC[[id number]]                           Abnormal            Final result               Diff Count[[id number]]                                        Final result                 Please view results for these tests on the individual orders.   DIFF COUNT    Differential Type Auto      nRBC 0.0      Immature Granulocytes 0.6      Neutrophils 48.4      Lymphocytes 40.5      Monocytes 6.6      Eosinophils 3.1      Basophils 0.8      Immature Granulocytes, Absolute 0.04      Neutrophils, Absolute 3.01      Lymphocytes, Absolute 2.52      Monocytes, Absolute 0.41      Eosinophils, Absolute 0.19      Basophils, Absolute 0.05     COMPREHENSIVE METABOLIC PANEL       No orders to display               MDM  Number of Diagnoses or Management Options  Bleeding from varicose veins of right lower extremity:      Amount and/or Complexity of Data Reviewed  Clinical lab tests: reviewed  Discussion of test results with the performing providers: yes  Decide to obtain previous medical records or to obtain history from someone other than the patient: yes  Discuss the patient with other providers: yes             ED Course as of Feb 23 2221   Sat Feb 23, 2019 2103 Impression: 1x1mm active vein bleed on right inner ankle  Plan: silver nitrate cautery, cbc, IVF, observe patient for a 2 hours and redraw cbc for hemoglobin    Discussed pt and plan of care with attending Wade  [BB]   8733 Attending examined patient and applied silver nitrate to bleed; pt had no adverse reactions to tx. Pt family at bedside; educated about varicose veins and possibility of it happening again in the future and following up with out patient vascular doctor. They are okay to wait and have labs redrawn in a few hours.   [BB]   3230 CBC:  hemoglobin 11.6 (13.6 six months ago)  [BB]   2202 Will order CMP and redraw CBC in 2 hours  [BB]   2202 Wrapped patients wound - applied neosporin, adaptic, gauze, cling gauze, and ace bandage. Neurovascularly intact after placement.  [BB]   2220 Pt resting comfortably; signing patient out to attending  [BB]      ED Course User Index  [BB] Neelam Ruano PA C         Clinical Impressions as of Feb 23 2221   Bleeding from varicose veins of right lower extremity        Neelam Ruano PA C  02/23/19 2221

## 2019-02-24 NOTE — ED ATTESTATION NOTE
I have personally seen and examined the patient.  I reviewed and agree with physician assistant / nurse practitioner’s assessment and plan of care, with the following exceptions: None  My examination, assessment, and plan of care of Orly Cobian is as follows:    78F in bath, draining water noticed bleeding from right medial ankle. Spurting, lasted 5 minutes. Got syncopal when medics sat her up. Came by ems, now improved. Area of right ankle varicosity, slow ooze, treated with AgNO3 cautery effectively. Foot cleaned up. Pt not tachy. H/H 11, past was 13. Will give IVF and repeat Hb in few hours.     12:50 AM  Repeat hemoglobin is 11.1.  Patient has no recurrent bleeding.  She was ambulated in the emergency department and feels much better.  We will discharge her home and have her stay off her leg, keep the right leg elevated and dressing in place.  He will follow-up with her family doctor on Monday.            I was physically present for the key/critical portions of the following procedures: None         Cem Olvera, DO  02/23/19 9546       Cem Olvera, DO  02/24/19 0051

## 2019-02-26 ENCOUNTER — TRANSCRIBE ORDERS (OUTPATIENT)
Dept: REGISTRATION | Facility: HOSPITAL | Age: 79
End: 2019-02-26

## 2019-02-26 ENCOUNTER — APPOINTMENT (OUTPATIENT)
Dept: LAB | Facility: HOSPITAL | Age: 79
End: 2019-02-26
Attending: FAMILY MEDICINE
Payer: MEDICARE

## 2019-02-26 DIAGNOSIS — I83.899 VARICOSE VEINS OF UNSPECIFIED LOWER EXTREMITY WITH OTHER COMPLICATIONS: Primary | ICD-10-CM

## 2019-02-26 DIAGNOSIS — I83.899 VARICOSE VEINS OF UNSPECIFIED LOWER EXTREMITY WITH OTHER COMPLICATIONS: ICD-10-CM

## 2019-02-26 LAB
BASOPHILS # BLD: 0.05 K/UL (ref 0.01–0.1)
BASOPHILS NFR BLD: 0.7 %
DIFFERENTIAL METHOD BLD: NORMAL
EOSINOPHIL # BLD: 0.17 K/UL (ref 0.04–0.36)
EOSINOPHIL NFR BLD: 2.4 %
ERYTHROCYTE [DISTWIDTH] IN BLOOD BY AUTOMATED COUNT: 12.4 % (ref 11.7–14.4)
HCT VFR BLDCO AUTO: 35.1 %
HGB BLD-MCNC: 11.5 G/DL
IMM GRANULOCYTES # BLD AUTO: 0.03 K/UL (ref 0–0.08)
IMM GRANULOCYTES NFR BLD AUTO: 0.4 %
LYMPHOCYTES # BLD: 2.23 K/UL (ref 1.2–3.5)
LYMPHOCYTES NFR BLD: 31.1 %
MCH RBC QN AUTO: 31.4 PG (ref 28–33.2)
MCHC RBC AUTO-ENTMCNC: 32.8 G/DL (ref 32.2–35.5)
MCV RBC AUTO: 95.9 FL (ref 83–98)
MONOCYTES # BLD: 0.6 K/UL (ref 0.28–0.8)
MONOCYTES NFR BLD: 8.4 %
NEUTROPHILS # BLD: 4.08 K/UL (ref 1.7–7)
NEUTS SEG NFR BLD: 57 %
NRBC BLD-RTO: 0 %
PDW BLD AUTO: 10.4 FL (ref 9.4–12.3)
PLATELET # BLD AUTO: 259 K/UL
RBC # BLD AUTO: 3.66 M/UL (ref 3.93–5.22)
WBC # BLD AUTO: 7.16 K/UL

## 2019-02-26 PROCEDURE — 36415 COLL VENOUS BLD VENIPUNCTURE: CPT

## 2019-02-26 PROCEDURE — 85025 COMPLETE CBC W/AUTO DIFF WBC: CPT

## 2019-03-20 ENCOUNTER — TELEPHONE (OUTPATIENT)
Dept: CARDIOLOGY | Facility: CLINIC | Age: 79
End: 2019-03-20

## 2019-03-20 RX ORDER — METOPROLOL SUCCINATE 25 MG/1
25 TABLET, EXTENDED RELEASE ORAL DAILY
Qty: 30 TABLET | Refills: 11 | Status: SHIPPED | OUTPATIENT
Start: 2019-03-20 | End: 2019-10-24 | Stop reason: SDUPTHER

## 2019-08-08 ENCOUNTER — OFFICE VISIT (OUTPATIENT)
Dept: CARDIOLOGY | Facility: CLINIC | Age: 79
End: 2019-08-08
Payer: MEDICARE

## 2019-08-08 VITALS
RESPIRATION RATE: 15 BRPM | WEIGHT: 133.13 LBS | DIASTOLIC BLOOD PRESSURE: 60 MMHG | SYSTOLIC BLOOD PRESSURE: 108 MMHG | BODY MASS INDEX: 22.18 KG/M2 | HEART RATE: 60 BPM | HEIGHT: 65 IN

## 2019-08-08 DIAGNOSIS — I36.1 NON-RHEUMATIC TRICUSPID VALVE INSUFFICIENCY: ICD-10-CM

## 2019-08-08 DIAGNOSIS — I37.1 NONRHEUMATIC PULMONARY VALVE INSUFFICIENCY: ICD-10-CM

## 2019-08-08 DIAGNOSIS — I48.0 PAF (PAROXYSMAL ATRIAL FIBRILLATION) (CMS/HCC): Primary | ICD-10-CM

## 2019-08-08 DIAGNOSIS — Q22.5 EBSTEIN'S ANOMALY OF TRICUSPID VALVE: ICD-10-CM

## 2019-08-08 DIAGNOSIS — I45.10 RBBB: ICD-10-CM

## 2019-08-08 PROCEDURE — 99214 OFFICE O/P EST MOD 30 MIN: CPT | Performed by: INTERNAL MEDICINE

## 2019-08-08 PROCEDURE — 93000 ELECTROCARDIOGRAM COMPLETE: CPT | Performed by: INTERNAL MEDICINE

## 2019-08-08 NOTE — LETTER
2019     Anton Muniz MD  [address] [facility] [place] [address]    Patient: Orly Lopez  YOB: 1940  Date of Visit: 2019      Dear Dr. Muniz:    Thank you for referring Orly Lopez to me for evaluation. Below are my notes for this consultation.    If you have questions, please do not hesitate to call me. I look forward to following your patient along with you.         Sincerely,        Madhuri Gregorio MD        CC: Madhuri Feliciano MD  2019  6:33 PM  Signed        [facility]  [phone]  [phone]  [email or web address]  [facility]  The Heart [facility] Level  [address] [facility] [address] [facility] [address] [address]    [place] MRN: [id number]  Date: 2018  Description: Prog Notes ([facility]) Cardiology EP  Category: Progress Notes ([facility])rovider ID: [id number]  [place] ID: [id number]    Anton Muniz M.D.  [address] [facility]  & [facility].  [place] [address]      2019  Office Visit  RE: ORLY LOPEZ  : 1940    Dear Dr. Muniz:    I had the pleasure of seeing Ms. Orly Lopez for follow up in the office for follow up for a history of paroxysmal atrial fibrillation with chronic right bundle branch block and history of dyslipidemia.  She is on long-term antiarrhythmic drug therapy with flecainide and anticoagulation with Eliquis.    PROBLEM LIST:  1. Paroxysmal atrial fibrillation diagnosed on 10/11/2010, with a recent episode in May of  2017.  2. History of Jaycob-Parkinson-White syndrome, status post catheter ablation procedure  performed in , without recurrence of arrhythmia.  3. Chronic right bundle branch block.  4. 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  5. History of thyroid  disease.  6. Dyslipidemia.  7. Stress echo obtained on June 6, 2017 revealing no evidence of myocardial ischemia with  normal left ventricular systolic function with a left ventricular ejection fraction of 65-  70%.  8. Vasovagal syncope      HISTORY OF PRESENT ILLNESS:    Since last seen in the office in February 2019 she has not felt any symptoms of recurrence of atrial fibrillation.  At the end of February she was taken to [facility] emergency department after she had a spontaneous bleed from a varicose vein in the right leg.  She was in the bathtub and noted blood in.  She got out of the bathtub and the vein was squirting and there was blood all over.  Her  called 911 and while there were there she had a brief episode of loss of consciousness.  In the emergency room she did her vein was treated and was referred to a vascular surgery vein specialist.  The vein was cauterized and she has not had any more problems.  Otherwise she did not has not had any cardiac symptoms and no other episodes of bleeding.No palpitations, dizziness, near syncope or syncope.  No chest pain. No dyspnea on exertion, PND, orthopnea or pedal edema.      CURRENT MEDICATIONS:   Current Outpatient Prescriptions:   •  apixaban (ELIQUIS) 5 mg tablet, Take 1 tablet (5 mg total) by mouth 2 (two) times a day., Disp: 180 tablet, Rfl: 1  •  atorvastatin (LIPITOR) 10 mg tablet, Take 1 tablet (10 mg total) by mouth daily., Disp: 30 tablet, Rfl: 6  •  estradiol (ESTRACE) 0.01 % (0.1 mg/gram) vaginal cream, No SIG Entered, Disp: , Rfl:   •  flecainide (TAMBOCOR) 100 mg tablet, Take 1 tablet (100 mg total) by mouth 2 (two) times a day as needed (as needed for palpitations)., Disp: 10 tablet, Rfl: 1  •  latanoprost (XALATAN) 0.005 % ophthalmic solution, 0.005 %., Disp: , Rfl:   •  lutein-zeaxanthin (OCUVITE LUTEIN 25) 25-5 mg capsule, take 1 capsule po daily, Disp: , Rfl:   •  metoprolol succinate XL (TOPROL-XL) 25 mg 24 hr tablet, Take 1  "tablet (25 mg total) by mouth daily., Disp: 30 tablet, Rfl: 11  •  omega 3-dha-epa-fish oil (FISH OIL) capsule, 1,200 mg., Disp: , Rfl:     ALLERGIES: Erythromycin.    REVIEW OF SYSTEMS: As above in the History of Present Illness.    Vitals:    08/08/19 1400   BP: 108/60   BP Location: Left upper arm   Patient Position: Sitting   Pulse: 60   Resp: 15   Weight: 60.4 kg (133 lb 2 oz)   Height: 1.651 m (5' 5\")       PHYSICAL EXAMINATION: Pleasant and alert, in no acute distress.  Neck: No jugular venous distention or carotid bruits. Lungs were clear to auscultation without  rales or wheezes. Cardiovascular exam revealed normal S1, S2, regular rhythm with a 2/6  systolic murmur best heard at the lower left sternal border. Abdomen was soft and nontender.  Extremities revealed .  No edema or cyanosis. She was alert and oriented x 3 with no focal deficits on gross neurologic exam.    DATA REVIEW: I reviewed the 12-lead EKG performed today, which reveals sinus  rhythm with right bundle branch block . There were no acute changes as compared to her previous EKG.    Lab Results   Component Value Date    WBC 7.16 02/26/2019    HGB 11.5 (L) 02/26/2019    HCT 35.1 02/26/2019     02/26/2019    CHOL 175 08/03/2018    TRIG 68 08/03/2018    HDL 46 (L) 08/03/2018    ALT 16 02/23/2019    AST 27 02/23/2019     02/23/2019    K 3.9 02/23/2019     02/23/2019    CREATININE 0.8 02/23/2019    BUN 28 (H) 02/23/2019    CO2 23 02/23/2019    TSH 6.72 (H) 08/03/2018           IMPRESSION/RECOMMENDATIONS:  1. Paroxysmal atrial fibrillation : No recurrence.  She has a prescription for  flecainide to be taken as needed if she has a sustained episode of atrial fibrillation. She had a stress echocardiogram in 2017 which revealed normal LV function and no myocardial ischemia. She will also continue metoprolol succinate 25 mg 1 po once a day. I recommend that she continues long term anticoagulation with Eliquis 5 mg 1 po every 12 hours " to prevent thromboembolic complications from atrial fibrillation. Her [id number] is 3.    2. History of Jaycob-Parkinson-White syndrome, status post catheter ablation procedure in  1997 - She has not had recurrence of supraventricular tachycardia.    3. 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 She has moderate tricuspid regurgitation and moderate pulmonary regurgitation.     4. Chronic right bundle branch block - Stable without progresison of conduction disease.    5.  Syncope: The clinical presentation is consistent with vasovagal syncope in the setting of bleeding.          I do not recommend further evaluation.    Thank you for allowing me to participate in the care of your patient.  Please do not hesitate to contact me if you have any questions regarding my recommendations and management.    Sincerely;    Madhuri Cardoza MD [facility]    This document was generated utilizing voice recognition technology. A reasonable attempt at proofreading has been made to minimize errors but please excuse any typographical errors which may be present. Please call with any questions.  .

## 2019-08-11 NOTE — PROGRESS NOTES
[facility] HEART GROUP  [phone]  [phone]  [email or web address]  [facility]  The Heart [facility] [address] [facility]  [address] [facility] [place] [address]    [place] MRN: [id number]  Date: 2018  Description: Prog Notes ([facility]) Cardiology EP  Category: Progress Notes ([facility])rovider ID: [id number]  [place] ID: [id number]    Anton Muniz M.D.  [address] [place] [address]  & [place] [address]      2019  Office Visit  RE: ORLY COBIAN  : 1940    Dear Dr. Muniz:    I had the pleasure of seeing Ms. Orly Cobian for follow up in the office for follow up for a history of paroxysmal atrial fibrillation with chronic right bundle branch block and history of dyslipidemia.  She is on long-term antiarrhythmic drug therapy with flecainide and anticoagulation with Eliquis.    PROBLEM LIST:  1. Paroxysmal atrial fibrillation diagnosed on 10/11/2010, with a recent episode in May of  2017.  2. History of Jaycob-Parkinson-White syndrome, status post catheter ablation procedure  performed in , without recurrence of arrhythmia.  3. Chronic right bundle branch block.  4. 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  5. History of thyroid disease.  6. Dyslipidemia.  7. Stress echo obtained on 2017 revealing no evidence of myocardial ischemia with  normal left ventricular systolic function with a left ventricular ejection fraction of 65-  70%.  8. Vasovagal syncope      HISTORY OF PRESENT ILLNESS:    Since last seen in the office in 2019 she has not felt any symptoms of recurrence of atrial fibrillation.  At the end of February she was taken to [facility] emergency department after she had a spontaneous bleed from a varicose vein in the  "right leg.  She was in the bathtub and noted blood in.  She got out of the bathtub and the vein was squirting and there was blood all over.  Her  called 911 and while there were there she had a brief episode of loss of consciousness.  In the emergency room she did her vein was treated and was referred to a vascular surgery vein specialist.  The vein was cauterized and she has not had any more problems.  Otherwise she did not has not had any cardiac symptoms and no other episodes of bleeding.No palpitations, dizziness, near syncope or syncope.  No chest pain. No dyspnea on exertion, PND, orthopnea or pedal edema.      CURRENT MEDICATIONS:   Current Outpatient Prescriptions:   •  apixaban (ELIQUIS) 5 mg tablet, Take 1 tablet (5 mg total) by mouth 2 (two) times a day., Disp: 180 tablet, Rfl: 1  •  atorvastatin (LIPITOR) 10 mg tablet, Take 1 tablet (10 mg total) by mouth daily., Disp: 30 tablet, Rfl: 6  •  estradiol (ESTRACE) 0.01 % (0.1 mg/gram) vaginal cream, No SIG Entered, Disp: , Rfl:   •  flecainide (TAMBOCOR) 100 mg tablet, Take 1 tablet (100 mg total) by mouth 2 (two) times a day as needed (as needed for palpitations)., Disp: 10 tablet, Rfl: 1  •  latanoprost (XALATAN) 0.005 % ophthalmic solution, 0.005 %., Disp: , Rfl:   •  lutein-zeaxanthin (OCUVITE LUTEIN 25) 25-5 mg capsule, take 1 capsule po daily, Disp: , Rfl:   •  metoprolol succinate XL (TOPROL-XL) 25 mg 24 hr tablet, Take 1 tablet (25 mg total) by mouth daily., Disp: 30 tablet, Rfl: 11  •  omega 3-dha-epa-fish oil (FISH OIL) capsule, 1,200 mg., Disp: , Rfl:     ALLERGIES: Erythromycin.    REVIEW OF SYSTEMS: As above in the History of Present Illness.    Vitals:    08/08/19 1400   BP: 108/60   BP Location: Left upper arm   Patient Position: Sitting   Pulse: 60   Resp: 15   Weight: 60.4 kg (133 lb 2 oz)   Height: 1.651 m (5' 5\")       PHYSICAL EXAMINATION: Pleasant and alert, in no acute distress.  Neck: No jugular venous distention or carotid " bruits. Lungs were clear to auscultation without  rales or wheezes. Cardiovascular exam revealed normal S1, S2, regular rhythm with a 2/6  systolic murmur best heard at the lower left sternal border. Abdomen was soft and nontender.  Extremities revealed .  No edema or cyanosis. She was alert and oriented x 3 with no focal deficits on gross neurologic exam.    DATA REVIEW: I reviewed the 12-lead EKG performed today, which reveals sinus  rhythm with right bundle branch block . There were no acute changes as compared to her previous EKG.    Lab Results   Component Value Date    WBC 7.16 02/26/2019    HGB 11.5 (L) 02/26/2019    HCT 35.1 02/26/2019     02/26/2019    CHOL 175 08/03/2018    TRIG 68 08/03/2018    HDL 46 (L) 08/03/2018    ALT 16 02/23/2019    AST 27 02/23/2019     02/23/2019    K 3.9 02/23/2019     02/23/2019    CREATININE 0.8 02/23/2019    BUN 28 (H) 02/23/2019    CO2 23 02/23/2019    TSH 6.72 (H) 08/03/2018           IMPRESSION/RECOMMENDATIONS:  1. Paroxysmal atrial fibrillation : No recurrence.  She has a prescription for  flecainide to be taken as needed if she has a sustained episode of atrial fibrillation. She had a stress echocardiogram in 2017 which revealed normal LV function and no myocardial ischemia. She will also continue metoprolol succinate 25 mg 1 po once a day. I recommend that she continues long term anticoagulation with Eliquis 5 mg 1 po every 12 hours to prevent thromboembolic complications from atrial fibrillation. Her [id number] is 3.    2. History of Jaycob-Parkinson-White syndrome, status post catheter ablation procedure in  1997 - She has not had recurrence of supraventricular tachycardia.    3. 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 She has moderate tricuspid regurgitation and moderate pulmonary regurgitation.     4. Chronic right bundle branch block - Stable without progresison of conduction disease.    5.   Syncope: The clinical presentation is consistent with vasovagal syncope in the setting of bleeding.          I do not recommend further evaluation.    Thank you for allowing me to participate in the care of your patient.  Please do not hesitate to contact me if you have any questions regarding my recommendations and management.    Sincerely;    Madhuri Cardoza MD [facility]    This document was generated utilizing voice recognition technology. A reasonable attempt at proofreading has been made to minimize errors but please excuse any typographical errors which may be present. Please call with any questions.  .

## 2019-08-27 ENCOUNTER — TELEPHONE (OUTPATIENT)
Dept: SCHEDULING | Facility: CLINIC | Age: 79
End: 2019-08-27

## 2019-08-27 ENCOUNTER — DOCUMENTATION (OUTPATIENT)
Dept: CARDIOLOGY | Facility: CLINIC | Age: 79
End: 2019-08-27

## 2019-08-27 NOTE — TELEPHONE ENCOUNTER
Pt scheduled for 9/18 colonoscopy and inquires if dr muniz approves her ceasing in 5 days prior.Please call to discuss, [phone].    Surgeon Dr. Tariq Rocha   p    f

## 2019-08-27 NOTE — TELEPHONE ENCOUNTER
I spoke with patient.  Typically would recommend holding Eliquis 2 days prior to procedure.  I will be sending requested clearance letter today.

## 2019-09-16 NOTE — TELEPHONE ENCOUNTER
Medicine Refill Request    Last Office Visit: 8/8/2019  Next Office Visit: 2/13/2020        Current Outpatient Prescriptions:   •  apixaban (ELIQUIS) 5 mg tablet, Take 1 tablet (5 mg total) by mouth 2 (two) times a day., Disp: 180 tablet, Rfl: 1  •  atorvastatin (LIPITOR) 10 mg tablet, Take 1 tablet (10 mg total) by mouth daily., Disp: 30 tablet, Rfl: 6  •  estradiol (ESTRACE) 0.01 % (0.1 mg/gram) vaginal cream, No SIG Entered, Disp: , Rfl:   •  flecainide (TAMBOCOR) 100 mg tablet, Take 1 tablet (100 mg total) by mouth 2 (two) times a day as needed (as needed for palpitations)., Disp: 10 tablet, Rfl: 1  •  latanoprost (XALATAN) 0.005 % ophthalmic solution, 0.005 %., Disp: , Rfl:   •  lutein-zeaxanthin (OCUVITE LUTEIN 25) 25-5 mg capsule, take 1 capsule po daily, Disp: , Rfl:   •  metoprolol succinate XL (TOPROL-XL) 25 mg 24 hr tablet, Take 1 tablet (25 mg total) by mouth daily., Disp: 30 tablet, Rfl: 11  •  omega 3-dha-epa-fish oil (FISH OIL) capsule, 1,200 mg., Disp: , Rfl:       BP Readings from Last 3 Encounters:   08/08/19 108/60   02/24/19 (!) 120/58   02/07/19 112/72       Recent Lab results:  Lab Results   Component Value Date    CHOL 175 08/03/2018   ,   Lab Results   Component Value Date    HDL 46 (L) 08/03/2018   ,   Lab Results   Component Value Date    LDLCALC 115 (H) 08/03/2018   ,   Lab Results   Component Value Date    TRIG 68 08/03/2018        Lab Results   Component Value Date    GLUCOSE 135 (H) 02/23/2019   , No results found for: HGBA1C      Lab Results   Component Value Date    CREATININE 0.8 02/23/2019       Lab Results   Component Value Date    TSH 6.72 (H) 08/03/2018

## 2019-09-30 RX ORDER — ATORVASTATIN CALCIUM 10 MG/1
10 TABLET, FILM COATED ORAL DAILY
Qty: 30 TABLET | Refills: 6 | Status: SHIPPED | OUTPATIENT
Start: 2019-09-30 | End: 2020-04-29 | Stop reason: SDUPTHER

## 2019-10-24 RX ORDER — METOPROLOL SUCCINATE 25 MG/1
25 TABLET, EXTENDED RELEASE ORAL DAILY
Qty: 90 TABLET | Refills: 2 | Status: SHIPPED | OUTPATIENT
Start: 2019-10-24 | End: 2020-07-13 | Stop reason: SDUPTHER

## 2019-10-24 NOTE — TELEPHONE ENCOUNTER
Medicine Refill Request    Last Office Visit: Visit date not found  Next Office Visit: Visit date not found    #90 refill metoprolol succinate XL (TOPROL-XL) 25 mg 24 hr tablet    Current Outpatient Medications:   •  apixaban (ELIQUIS) 5 mg tablet, Take 1 tablet (5 mg total) by mouth 2 (two) times a day., Disp: 180 tablet, Rfl: 1  •  atorvastatin (LIPITOR) 10 mg tablet, Take 1 tablet (10 mg total) by mouth daily., Disp: 30 tablet, Rfl: 6  •  estradiol (ESTRACE) 0.01 % (0.1 mg/gram) vaginal cream, No SIG Entered, Disp: , Rfl:   •  flecainide (TAMBOCOR) 100 mg tablet, Take 1 tablet (100 mg total) by mouth 2 (two) times a day as needed (as needed for palpitations)., Disp: 10 tablet, Rfl: 1  •  latanoprost (XALATAN) 0.005 % ophthalmic solution, 0.005 %., Disp: , Rfl:   •  lutein-zeaxanthin (OCUVITE LUTEIN 25) 25-5 mg capsule, take 1 capsule po daily, Disp: , Rfl:   •  metoprolol succinate XL (TOPROL-XL) 25 mg 24 hr tablet, Take 1 tablet (25 mg total) by mouth daily., Disp: 30 tablet, Rfl: 11  •  omega 3-dha-epa-fish oil (FISH OIL) capsule, 1,200 mg., Disp: , Rfl:       BP Readings from Last 3 Encounters:   08/08/19 108/60   02/24/19 (!) 120/58   02/07/19 112/72       Recent Lab results:  Lab Results   Component Value Date    CHOL 175 08/03/2018   ,   Lab Results   Component Value Date    HDL 46 (L) 08/03/2018   ,   Lab Results   Component Value Date    LDLCALC 115 (H) 08/03/2018   ,   Lab Results   Component Value Date    TRIG 68 08/03/2018        Lab Results   Component Value Date    GLUCOSE 135 (H) 02/23/2019   , No results found for: HGBA1C      Lab Results   Component Value Date    CREATININE 0.8 02/23/2019       Lab Results   Component Value Date    TSH 6.72 (H) 08/03/2018

## 2020-02-13 ENCOUNTER — OFFICE VISIT (OUTPATIENT)
Dept: CARDIOLOGY | Facility: CLINIC | Age: 80
End: 2020-02-13
Payer: MEDICARE

## 2020-02-13 VITALS
SYSTOLIC BLOOD PRESSURE: 122 MMHG | WEIGHT: 131.7 LBS | DIASTOLIC BLOOD PRESSURE: 64 MMHG | HEIGHT: 65 IN | BODY MASS INDEX: 21.94 KG/M2

## 2020-02-13 DIAGNOSIS — I36.1 NON-RHEUMATIC TRICUSPID VALVE INSUFFICIENCY: ICD-10-CM

## 2020-02-13 DIAGNOSIS — Z79.899 LONG TERM CURRENT USE OF ANTIARRHYTHMIC DRUG: ICD-10-CM

## 2020-02-13 DIAGNOSIS — I48.0 PAROXYSMAL ATRIAL FIBRILLATION (CMS/HCC): Primary | ICD-10-CM

## 2020-02-13 DIAGNOSIS — Q22.5 EBSTEIN'S ANOMALY OF TRICUSPID VALVE: ICD-10-CM

## 2020-02-13 DIAGNOSIS — I37.1 NONRHEUMATIC PULMONARY VALVE INSUFFICIENCY: ICD-10-CM

## 2020-02-13 DIAGNOSIS — I45.10 RBBB: ICD-10-CM

## 2020-02-13 PROCEDURE — 93000 ELECTROCARDIOGRAM COMPLETE: CPT | Performed by: INTERNAL MEDICINE

## 2020-02-13 PROCEDURE — 99214 OFFICE O/P EST MOD 30 MIN: CPT | Performed by: INTERNAL MEDICINE

## 2020-02-13 RX ORDER — FLECAINIDE ACETATE 100 MG/1
100 TABLET ORAL EVERY 12 HOURS
Qty: 60 TABLET | Refills: 6 | Status: SHIPPED | OUTPATIENT
Start: 2020-02-13 | End: 2020-07-13 | Stop reason: SDUPTHER

## 2020-02-13 NOTE — LETTER
"2020     Anton Muniz MD  [address] [facility] [address]    Patient: Orly Lopez  YOB: 1940  Date of Visit: 2020      Dear Dr. Muniz:    Thank you for referring Orly Lopez to me for evaluation. Below are my notes for this consultation.    If you have questions, please do not hesitate to call me. I look forward to following your patient along with you.         Sincerely,        Madhuri Gregorio MD        CC: Madhuri Feliciano MD  2020  5:21 PM  Signed        [facility] HEART GROUP  [phone]  [phone]  [email or web address]  [facility]  The Heart [facility] Level  [address] [place] [address]      Anton Muniz M.D.  [address] [facility]  & [facility].  [place] [address]      2020    Office Visit    RE: ORLY LOPEZ  : 1940    Dear Dr. Muniz:    I had the pleasure of seeing Ms. Orly Lopez for follow up in the office for follow up for a history of paroxysmal atrial fibrillation with chronic right bundle branch block and history of dyslipidemia.  She is on long-term antiarrhythmic drug therapy with flecainide and anticoagulation with Eliquis.    In 2019, she woke up in the middle of the night with an episode of atrial fibrillation.  She states that her heart was beating very fast.  She took a flecainide and the episode subsided several hours later.  Since that event, she has been feeling frequent \"extra beats\".  She sometimes will take flecainide for these episodes as well and it does help. She wants to know if it is time to take daily flecainide for prevention. She continues to take Eliquis twice daily.  She denies chest pain, shortness of breath, dizziness, or syncope.  She has not been hospitalized.    PROBLEM LIST:  1. Paroxysmal atrial fibrillation diagnosed on 10/11/2010, with a recent episode in May of  2017.  2. History of Jaycob-Parkinson-White " syndrome, status post catheter ablation procedure  performed in 1997, without recurrence of arrhythmia.  3. Chronic right bundle branch block.  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  5. History of thyroid disease.  6. Dyslipidemia.  7. Stress echo obtained on June 6, 2017 revealing no evidence of myocardial ischemia with  normal left ventricular systolic function with a left ventricular ejection fraction of 65-  70%.  8. Vasovagal syncope    Past Medical History:   Diagnosis Date   • Alcoholism (CMS/HCC)    • Arrhythmia    • Chronic atrial fibrillation    • Hyperthyroidism    • Osteoporosis      Past Surgical History:   Procedure Laterality Date   • ABLATION OF DYSRHYTHMIC FOCUS     • CHOLECYSTECTOMY     • COSMETIC SURGERY     • EYE SURGERY     • SKIN BIOPSY       CURRENT MEDICATIONS:   Current Outpatient Medications:   •  apixaban (ELIQUIS) 5 mg tablet, Take 1 tablet (5 mg total) by mouth 2 (two) times a day., Disp: 180 tablet, Rfl: 1  •  atorvastatin (LIPITOR) 10 mg tablet, Take 1 tablet (10 mg total) by mouth daily., Disp: 30 tablet, Rfl: 6  •  estradiol (ESTRACE) 0.01 % (0.1 mg/gram) vaginal cream, No SIG Entered, Disp: , Rfl:   •  flecainide (TAMBOCOR) 100 mg tablet, Take 1 tablet (100 mg total) by mouth as needed for atrial fibrillation.    •  latanoprost (XALATAN) 0.005 % ophthalmic solution, 0.005 %., Disp: , Rfl:   •  lutein-zeaxanthin (OCUVITE LUTEIN 25) 25-5 mg capsule, take 1 capsule po daily, Disp: , Rfl:   •  metoprolol succinate XL (TOPROL-XL) 25 mg 24 hr tablet, Take 1 tablet (25 mg total) by mouth daily., Disp: 90 tablet, Rfl: 2  •  omega 3-dha-epa-fish oil (FISH OIL) capsule, 1,200 mg., Disp: , Rfl:     ALLERGIES:Erythromycin base and Erythromycin    REVIEW OF SYSTEMS: As above in the History of Present  "Illness.    Vitals:    02/13/20 1356   BP: 122/64   BP Location: Left upper arm   Patient Position: Sitting   Weight: 59.7 kg (131 lb 11.2 oz)   Height: 1.651 m (5' 5\")       PHYSICAL EXAMINATION: Pleasant and alert, in no acute distress.  Neck: No jugular venous distention or carotid bruits. Lungs were clear to auscultation without  rales or wheezes. Cardiovascular exam revealed normal S1, S2, regular rhythm with a 2/6  systolic murmur best heard at the lower left sternal border. Abdomen was soft and nontender.  Extremities revealed .  No edema or cyanosis. She was alert and oriented x 3 with no focal deficits on gross neurologic exam.    DATA REVIEW: I reviewed the 12-lead EKG performed today, which reveals sinus  rhythm with right bundle branch block at 59 bpm.    Lab Results   Component Value Date    WBC 7.16 02/26/2019    HGB 11.5 (L) 02/26/2019    HCT 35.1 02/26/2019     02/26/2019    CHOL 175 08/03/2018    TRIG 68 08/03/2018    HDL 46 (L) 08/03/2018    ALT 16 02/23/2019    AST 27 02/23/2019     02/23/2019    K 3.9 02/23/2019     02/23/2019    CREATININE 0.8 02/23/2019    BUN 28 (H) 02/23/2019    CO2 23 02/23/2019    TSH 6.72 (H) 08/03/2018     IMPRESSION/RECOMMENDATIONS:  1. Paroxysmal atrial fibrillation : She recently had a recurrent episode in December that woke her up in the middle of the night.  She states that her heart was beating very fast.  The episode lasted several hours in duration.  She did take a flecainide.  Since that episode, she has been feeling frequent symptoms of \"extra beats\".  She has been taking flecainide for these and it helps.  I discussed with her switching to daily flecainide for prevention of her arrhythmias.  She is agrees to try this and will take 100 mg twice daily.  She may take an extra 100 mg x 1 during an episode of atrial fibrillation to see if it will convert her.  She will continue on Eliquis twice daily. I ordered an EKG to be obtained next week on " daily flecainide.     2. History of Jaycob-Parkinson-White syndrome, status post catheter ablation procedure in  1997 - She has not had recurrence of supraventricular tachycardia.    3. 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 She has moderate tricuspid regurgitation and moderate pulmonary regurgitation.     4. Chronic right bundle branch block - Stable without progresison of conduction disease. I will repeat EKG on daily flecainide as noted above.    Thank you for allowing me to participate in the care of your patient.  Please do not hesitate to contact me if you have any questions regarding my recommendations and management.    Sincerely;    Madhuri Cardoza MD [facility]    Crescencio CHINO PA-C, am scribing for, and in the presence of, Madhuri Cardoza MD.    I, Madhuri Cardoza MD, personally performed the services described in this documentation as described by Crescencio Santana in my presence, and it is both accurate and complete. I have seen and examined the patient.  I have reviewed the PA note and supporting documentation. The note has been amended as appropriate.

## 2020-02-13 NOTE — PROGRESS NOTES
"      [facility] HEART GROUP  [phone]  [phone]  [email or web address]  [facility]  The Heart [address]  [address] [facility] [place] [address]      Anton Muniz M.D.  [address] [place] [address]  & [facility].  [place] [address]      2020    Office Visit    RE: ORLY COBIAN  : 1940    Dear Dr. Muniz:    I had the pleasure of seeing Ms. Orly Cobian for follow up in the office for follow up for a history of paroxysmal atrial fibrillation with chronic right bundle branch block and history of dyslipidemia.  She is on long-term antiarrhythmic drug therapy with flecainide and anticoagulation with Eliquis.    In 2019, she woke up in the middle of the night with an episode of atrial fibrillation.  She states that her heart was beating very fast.  She took a flecainide and the episode subsided several hours later.  Since that event, she has been feeling frequent \"extra beats\".  She sometimes will take flecainide for these episodes as well and it does help. She wants to know if it is time to take daily flecainide for prevention. She continues to take Eliquis twice daily.  She denies chest pain, shortness of breath, dizziness, or syncope.  She has not been hospitalized.    PROBLEM LIST:  1. Paroxysmal atrial fibrillation diagnosed on 10/11/2010, with a recent episode in May of  2017.  2. History of Jaycob-Parkinson-White syndrome, status post catheter ablation procedure  performed in , without recurrence of arrhythmia.  3. Chronic right bundle branch block.  4. 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  5. History of thyroid disease.  6. Dyslipidemia.  7. Stress echo obtained on 2017 revealing no evidence of myocardial ischemia with  normal left ventricular " "systolic function with a left ventricular ejection fraction of 65-  70%.  8. Vasovagal syncope    Past Medical History:   Diagnosis Date   • Alcoholism (CMS/HCC)    • Arrhythmia    • Chronic atrial fibrillation    • Hyperthyroidism    • Osteoporosis      Past Surgical History:   Procedure Laterality Date   • ABLATION OF DYSRHYTHMIC FOCUS     • CHOLECYSTECTOMY     • COSMETIC SURGERY     • EYE SURGERY     • SKIN BIOPSY       CURRENT MEDICATIONS:   Current Outpatient Medications:   •  apixaban (ELIQUIS) 5 mg tablet, Take 1 tablet (5 mg total) by mouth 2 (two) times a day., Disp: 180 tablet, Rfl: 1  •  atorvastatin (LIPITOR) 10 mg tablet, Take 1 tablet (10 mg total) by mouth daily., Disp: 30 tablet, Rfl: 6  •  estradiol (ESTRACE) 0.01 % (0.1 mg/gram) vaginal cream, No SIG Entered, Disp: , Rfl:   •  flecainide (TAMBOCOR) 100 mg tablet, Take 1 tablet (100 mg total) by mouth as needed for atrial fibrillation.    •  latanoprost (XALATAN) 0.005 % ophthalmic solution, 0.005 %., Disp: , Rfl:   •  lutein-zeaxanthin (OCUVITE LUTEIN 25) 25-5 mg capsule, take 1 capsule po daily, Disp: , Rfl:   •  metoprolol succinate XL (TOPROL-XL) 25 mg 24 hr tablet, Take 1 tablet (25 mg total) by mouth daily., Disp: 90 tablet, Rfl: 2  •  omega 3-dha-epa-fish oil (FISH OIL) capsule, 1,200 mg., Disp: , Rfl:     ALLERGIES:Erythromycin base and Erythromycin    REVIEW OF SYSTEMS: As above in the History of Present Illness.    Vitals:    02/13/20 1356   BP: 122/64   BP Location: Left upper arm   Patient Position: Sitting   Weight: 59.7 kg (131 lb 11.2 oz)   Height: 1.651 m (5' 5\")       PHYSICAL EXAMINATION: Pleasant and alert, in no acute distress.  Neck: No jugular venous distention or carotid bruits. Lungs were clear to auscultation without  rales or wheezes. Cardiovascular exam revealed normal S1, S2, regular rhythm with a 2/6  systolic murmur best heard at the lower left sternal border. Abdomen was soft and nontender.  Extremities revealed .  No " "edema or cyanosis. She was alert and oriented x 3 with no focal deficits on gross neurologic exam.    DATA REVIEW: I reviewed the 12-lead EKG performed today, which reveals sinus  rhythm with right bundle branch block at 59 bpm.    Lab Results   Component Value Date    WBC 7.16 02/26/2019    HGB 11.5 (L) 02/26/2019    HCT 35.1 02/26/2019     02/26/2019    CHOL 175 08/03/2018    TRIG 68 08/03/2018    HDL 46 (L) 08/03/2018    ALT 16 02/23/2019    AST 27 02/23/2019     02/23/2019    K 3.9 02/23/2019     02/23/2019    CREATININE 0.8 02/23/2019    BUN 28 (H) 02/23/2019    CO2 23 02/23/2019    TSH 6.72 (H) 08/03/2018     IMPRESSION/RECOMMENDATIONS:  1. Paroxysmal atrial fibrillation : She recently had a recurrent episode in December that woke her up in the middle of the night.  She states that her heart was beating very fast.  The episode lasted several hours in duration.  She did take a flecainide.  Since that episode, she has been feeling frequent symptoms of \"extra beats\".  She has been taking flecainide for these and it helps.  I discussed with her switching to daily flecainide for prevention of her arrhythmias.  She is agrees to try this and will take 100 mg twice daily.  She may take an extra 100 mg x 1 during an episode of atrial fibrillation to see if it will convert her.  She will continue on Eliquis twice daily. I ordered an EKG to be obtained next week on daily flecainide.     2. History of Jaycob-Parkinson-White syndrome, status post catheter ablation procedure in  1997 - She has not had recurrence of supraventricular tachycardia.    3. 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 She has moderate tricuspid regurgitation and moderate pulmonary regurgitation.     4. Chronic right bundle branch block - Stable without progresison of conduction disease. I will repeat EKG on daily flecainide as noted above.    Thank you for allowing me to participate in the " care of your patient.  Please do not hesitate to contact me if you have any questions regarding my recommendations and management.    Sincerely;    Madhuri Cardoza MD [facility]    Crescencio CIHNO PA-C, am scribing for, and in the presence of, Madhuri Cardoza MD.    I, Madhuri Cardoza MD, personally performed the services described in this documentation as described by Crescencio Santana in my presence, and it is both accurate and complete. I have seen and examined the patient.  I have reviewed the PA note and supporting documentation. The note has been amended as appropriate.

## 2020-04-29 RX ORDER — ATORVASTATIN CALCIUM 10 MG/1
10 TABLET, FILM COATED ORAL DAILY
Qty: 90 TABLET | Refills: 3 | Status: SHIPPED | OUTPATIENT
Start: 2020-04-29 | End: 2020-07-29 | Stop reason: SDUPTHER

## 2020-04-29 NOTE — TELEPHONE ENCOUNTER
Medicine Refill Request    Last Office Visit: Visit date not found  Next Office Visit: Visit date not found    Atorvastatin 10 mg 90 days w refills    Current Outpatient Medications:   •  apixaban (ELIQUIS) 5 mg tablet, Take 1 tablet (5 mg total) by mouth 2 (two) times a day., Disp: 180 tablet, Rfl: 1  •  atorvastatin (LIPITOR) 10 mg tablet, Take 1 tablet (10 mg total) by mouth daily., Disp: 30 tablet, Rfl: 6  •  estradiol (ESTRACE) 0.01 % (0.1 mg/gram) vaginal cream, No SIG Entered, Disp: , Rfl:   •  flecainide (TAMBOCOR) 100 mg tablet, Take 1 tablet (100 mg total) by mouth every 12 (twelve) hours., Disp: 60 tablet, Rfl: 6  •  latanoprost (XALATAN) 0.005 % ophthalmic solution, 0.005 %., Disp: , Rfl:   •  lutein-zeaxanthin (OCUVITE LUTEIN 25) 25-5 mg capsule, take 1 capsule po daily, Disp: , Rfl:   •  metoprolol succinate XL (TOPROL-XL) 25 mg 24 hr tablet, Take 1 tablet (25 mg total) by mouth daily., Disp: 90 tablet, Rfl: 2  •  omega 3-dha-epa-fish oil (FISH OIL) capsule, 1,200 mg., Disp: , Rfl:       BP Readings from Last 3 Encounters:   02/13/20 122/64   08/08/19 108/60   02/24/19 (!) 120/58       Recent Lab results:  Lab Results   Component Value Date    CHOL 175 08/03/2018   ,   Lab Results   Component Value Date    HDL 46 (L) 08/03/2018   ,   Lab Results   Component Value Date    LDLCALC 115 (H) 08/03/2018   ,   Lab Results   Component Value Date    TRIG 68 08/03/2018        Lab Results   Component Value Date    GLUCOSE 135 (H) 02/23/2019   , No results found for: HGBA1C      Lab Results   Component Value Date    CREATININE 0.8 02/23/2019       Lab Results   Component Value Date    TSH 6.72 (H) 08/03/2018

## 2020-05-01 ENCOUNTER — CLINICAL SUPPORT (OUTPATIENT)
Dept: OTHER | Facility: CLINIC | Age: 80
End: 2020-05-01
Attending: NURSE PRACTITIONER
Payer: MEDICARE

## 2020-05-01 DIAGNOSIS — R50.9 FEVER, UNSPECIFIED FEVER CAUSE: Primary | ICD-10-CM

## 2020-05-01 DIAGNOSIS — R50.9 FEVER, UNSPECIFIED FEVER CAUSE: ICD-10-CM

## 2020-05-01 NOTE — PROGRESS NOTES
Patient was processed today at [facility] drive-up clinic.  Pt denies any sort of medical distress today.  After identifying the patient, a nasopharyngeal sample was obtained and placed in viral transport medium.  Pt was given a post-collection education sheet and encouraged to self-isolate until they receive the results.  Pt directed to [facility] website for [facility] Privacy Practices.  Sample sent to the lab noted on the order and requisition.  Pt was without additional questions or concerns and was dispositioned from the testing area to home.

## 2020-05-03 LAB — SARS-COV-2 RNA RESP QL NAA+PROBE: NOT DETECTED

## 2020-05-04 NOTE — RESULT ENCOUNTER NOTE
Contacted patient and they were notified COVID-19 testing is negative. Patient verbalized understanding.

## 2020-07-13 RX ORDER — METOPROLOL SUCCINATE 25 MG/1
25 TABLET, EXTENDED RELEASE ORAL DAILY
Qty: 90 TABLET | Refills: 3 | Status: SHIPPED | OUTPATIENT
Start: 2020-07-13 | End: 2021-07-06 | Stop reason: SDUPTHER

## 2020-07-13 RX ORDER — FLECAINIDE ACETATE 100 MG/1
100 TABLET ORAL EVERY 12 HOURS
Qty: 180 TABLET | Refills: 3 | Status: SHIPPED | OUTPATIENT
Start: 2020-07-13 | End: 2020-07-14 | Stop reason: SDUPTHER

## 2020-07-13 NOTE — TELEPHONE ENCOUNTER
Medicine Refill Request    Last Office Visit: Visit date not found  Last Telemedicine Visit: Visit date not found    Next Office Visit: Visit date not found  Next Telemedicine Visit: Visit date not found         Current Outpatient Medications:   •  apixaban (ELIQUIS) 5 mg tablet, Take 1 tablet (5 mg total) by mouth 2 (two) times a day., Disp: 180 tablet, Rfl: 1  •  atorvastatin (LIPITOR) 10 mg tablet, Take 1 tablet (10 mg total) by mouth daily., Disp: 90 tablet, Rfl: 3  •  estradiol (ESTRACE) 0.01 % (0.1 mg/gram) vaginal cream, No SIG Entered, Disp: , Rfl:   •  flecainide (TAMBOCOR) 100 mg tablet, Take 1 tablet (100 mg total) by mouth every 12 (twelve) hours., Disp: 60 tablet, Rfl: 6  •  latanoprost (XALATAN) 0.005 % ophthalmic solution, 0.005 %., Disp: , Rfl:   •  lutein-zeaxanthin (OCUVITE LUTEIN 25) 25-5 mg capsule, take 1 capsule po daily, Disp: , Rfl:   •  metoprolol succinate XL (TOPROL-XL) 25 mg 24 hr tablet, Take 1 tablet (25 mg total) by mouth daily., Disp: 90 tablet, Rfl: 2  •  omega 3-dha-epa-fish oil (FISH OIL) capsule, 1,200 mg., Disp: , Rfl:       BP Readings from Last 3 Encounters:   02/13/20 122/64   08/08/19 108/60   02/24/19 (!) 120/58       Recent Lab results:  Lab Results   Component Value Date    CHOL 175 08/03/2018   ,   Lab Results   Component Value Date    HDL 46 (L) 08/03/2018   ,   Lab Results   Component Value Date    LDLCALC 115 (H) 08/03/2018   ,   Lab Results   Component Value Date    TRIG 68 08/03/2018        Lab Results   Component Value Date    GLUCOSE 135 (H) 02/23/2019   , No results found for: HGBA1C      Lab Results   Component Value Date    CREATININE 0.8 02/23/2019       Lab Results   Component Value Date    TSH 6.72 (H) 08/03/2018

## 2020-07-14 RX ORDER — FLECAINIDE ACETATE 100 MG/1
100 TABLET ORAL EVERY 12 HOURS
Qty: 180 TABLET | Refills: 3 | Status: SHIPPED | OUTPATIENT
Start: 2020-07-14 | End: 2021-07-06 | Stop reason: SDUPTHER

## 2020-07-14 NOTE — TELEPHONE ENCOUNTER
Medicine Refill Request    Last Office Visit: 2/13/2020  Last Telemedicine Visit: Visit date not found    Next Office Visit: 8/20/2020  Next Telemedicine Visit: Visit date not found     =Tambocor 100mg=    Current Outpatient Medications:   •  apixaban (ELIQUIS) 5 mg tablet, Take 1 tablet (5 mg total) by mouth 2 (two) times a day., Disp: 180 tablet, Rfl: 1  •  atorvastatin (LIPITOR) 10 mg tablet, Take 1 tablet (10 mg total) by mouth daily., Disp: 90 tablet, Rfl: 3  •  estradiol (ESTRACE) 0.01 % (0.1 mg/gram) vaginal cream, No SIG Entered, Disp: , Rfl:   •  flecainide (TAMBOCOR) 100 mg tablet, Take 1 tablet (100 mg total) by mouth every 12 (twelve) hours., Disp: 180 tablet, Rfl: 3  •  latanoprost (XALATAN) 0.005 % ophthalmic solution, 0.005 %., Disp: , Rfl:   •  lutein-zeaxanthin (OCUVITE LUTEIN 25) 25-5 mg capsule, take 1 capsule po daily, Disp: , Rfl:   •  metoprolol succinate XL (TOPROL-XL) 25 mg 24 hr tablet, Take 1 tablet (25 mg total) by mouth daily., Disp: 90 tablet, Rfl: 3  •  omega 3-dha-epa-fish oil (FISH OIL) capsule, 1,200 mg., Disp: , Rfl:       BP Readings from Last 3 Encounters:   02/13/20 122/64   08/08/19 108/60   02/24/19 (!) 120/58       Recent Lab results:  Lab Results   Component Value Date    CHOL 175 08/03/2018   ,   Lab Results   Component Value Date    HDL 46 (L) 08/03/2018   ,   Lab Results   Component Value Date    LDLCALC 115 (H) 08/03/2018   ,   Lab Results   Component Value Date    TRIG 68 08/03/2018        Lab Results   Component Value Date    GLUCOSE 135 (H) 02/23/2019   , No results found for: HGBA1C      Lab Results   Component Value Date    CREATININE 0.8 02/23/2019       Lab Results   Component Value Date    TSH 6.72 (H) 08/03/2018

## 2020-09-01 ENCOUNTER — OFFICE VISIT (OUTPATIENT)
Dept: CARDIOLOGY | Facility: CLINIC | Age: 80
End: 2020-09-01
Payer: MEDICARE

## 2020-09-01 VITALS
SYSTOLIC BLOOD PRESSURE: 136 MMHG | DIASTOLIC BLOOD PRESSURE: 77 MMHG | BODY MASS INDEX: 21.69 KG/M2 | OXYGEN SATURATION: 97 % | HEIGHT: 65 IN | RESPIRATION RATE: 17 BRPM | TEMPERATURE: 98.4 F | HEART RATE: 55 BPM | WEIGHT: 130.2 LBS

## 2020-09-01 DIAGNOSIS — I36.1 NON-RHEUMATIC TRICUSPID VALVE INSUFFICIENCY: ICD-10-CM

## 2020-09-01 DIAGNOSIS — I48.0 PAROXYSMAL ATRIAL FIBRILLATION (CMS/HCC): Primary | ICD-10-CM

## 2020-09-01 DIAGNOSIS — Z79.899 LONG TERM CURRENT USE OF ANTIARRHYTHMIC DRUG: ICD-10-CM

## 2020-09-01 DIAGNOSIS — I37.1 NONRHEUMATIC PULMONARY VALVE INSUFFICIENCY: ICD-10-CM

## 2020-09-01 DIAGNOSIS — Q22.5 EBSTEIN'S ANOMALY OF TRICUSPID VALVE: ICD-10-CM

## 2020-09-01 DIAGNOSIS — I45.6 WPW (WOLFF-PARKINSON-WHITE SYNDROME): ICD-10-CM

## 2020-09-01 PROCEDURE — 93000 ELECTROCARDIOGRAM COMPLETE: CPT | Performed by: INTERNAL MEDICINE

## 2020-09-01 PROCEDURE — 99214 OFFICE O/P EST MOD 30 MIN: CPT | Performed by: INTERNAL MEDICINE

## 2020-09-01 NOTE — LETTER
"2020     Anton Muniz MD  [address] [facility] [address]    Patient: Orly Lopez  YOB: 1940  Date of Visit: 2020      Dear Dr. Muniz:    Thank you for referring Orly Lopez to me for evaluation. Below are my notes for this consultation.    If you have questions, please do not hesitate to call me. I look forward to following your patient along with you.         Sincerely,        Madhuri Gregorio MD        CC: Madhuri Feliciano MD  2020 10:27 PM  Signed        [facility]  [phone]  [phone]  [email or web address]  [facility]  The Heart [facility] [address]  [address] [place] [address]      Anton Muniz M.D.  [address] [facility]  & [facility].  [place] [address]      2020    Office Visit    RE: ORLY LOPEZ  : 1940    Dear Dr. Muniz:    I had the pleasure of seeing Ms. Orly Lopez for follow up in the office for follow up for a history of paroxysmal atrial fibrillation with chronic right bundle branch block and history of dyslipidemia.  She is on long-term antiarrhythmic drug therapy with flecainide and anticoagulation with Eliquis.    In 2019, she woke up in the middle of the night with an episode of atrial fibrillation.  She stated that her heart was beating very fast. She took a flecainide and the episode subsided several hours later.  When I last saw her in the office on 2019, she had been feeling frequent \"extra beats\".  Therefore the patient agreed to start taking flecainide 100 mg p.o. every 12 hours and to continue to take an extra dose of flecainide as needed.    Today, she is doing generally well with no cardiac complaints. She has recently been able to go on walks outside her home. She also states that she is extremely happy and has been feeling very well since I started her on fleccanide.  No recurrence of atrial fibrillation.  She " reports she had a fall in June, 2020 resulting in a fractured nose.  No loss of consciousness.  She also injured her knee; however, the x-ray showed no evidence of a break or displacement, only arthritis and swelling. She also saw you last week for hip pain. No palpitations, dizziness, near syncope or syncope.  No chest pain. No dyspnea on exertion, PND, orthopnea or pedal edema. No bleeding on anticoagulation medication.    PROBLEM LIST:  1. Paroxysmal atrial fibrillation diagnosed on 10/11/2010, with a recent episode in May of  2017.  2. History of Jaycob-Parkinson-White syndrome, status post catheter ablation procedure  performed in 1997, without recurrence of arrhythmia.  3. Chronic right bundle branch block.  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  5. History of thyroid disease.  6. Dyslipidemia.  7. Stress echo obtained on June 6, 2017 revealing no evidence of myocardial ischemia with  normal left ventricular systolic function with a left ventricular ejection fraction of 65-  70%.  8. Vasovagal syncope    Patient Active Problem List   Diagnosis   • Paroxysmal atrial fibrillation (CMS/HCC)   • RBBB   • Non-rheumatic tricuspid valve insufficiency   • WPW (Jaycob-Parkinson-White syndrome)   • Ebstein's anomaly of tricuspid valve   • Nonrheumatic pulmonary valve insufficiency   • Vasovagal syncope   • Dyslipidemia       Past Medical History:   Diagnosis Date   • Alcoholism (CMS/HCC)    • Arrhythmia    • Chronic atrial fibrillation (CMS/HCC)    • Hyperthyroidism    • Osteoporosis      Past Surgical History:   Procedure Laterality Date   • ABLATION OF DYSRHYTHMIC FOCUS     • CHOLECYSTECTOMY     • COSMETIC SURGERY     • EYE SURGERY     • SKIN BIOPSY       Current Outpatient Medications   Medication Sig Dispense Refill   • atorvastatin  "(LIPITOR) 10 mg tablet Take 1 tablet (10 mg total) by mouth daily. 90 tablet 3   • estradiol (ESTRACE) 0.01 % (0.1 mg/gram) vaginal cream No SIG Entered     • flecainide (TAMBOCOR) 100 mg tablet Take 1 tablet (100 mg total) by mouth every 12 (twelve) hours. 180 tablet 3   • latanoprost (XALATAN) 0.005 % ophthalmic solution 0.005 %.     • lutein-zeaxanthin (OCUVITE LUTEIN 25) 25-5 mg capsule take 1 capsule po daily     • metoprolol succinate XL (TOPROL-XL) 25 mg 24 hr tablet Take 1 tablet (25 mg total) by mouth daily. 90 tablet 3   • omega 3-dha-epa-fish oil (FISH OIL) capsule 1,200 mg.     • apixaban (ELIQUIS) 5 mg tablet Take 1 tablet (5 mg total) by mouth 2 (two) times a day. 180 tablet 1     No current facility-administered medications for this visit.      ALLERGIES:Erythromycin base and Erythromycin    REVIEW OF SYSTEMS:     As in HPI.  All other other systems were reviewed and are negative.    Constitution: Negative for fever and malaise/fatigue.   HENT: Negative for hearing loss.    Eyes: Negative for visual disturbance.   Cardiovascular: Negative for chest pain, dyspnea on exertion, near-syncope, palpitations and syncope.   Respiratory: Negative for cough and wheezing.    Hematologic/Lymphatic: Does not bruise/bleed easily. No bleeding.  Skin: Negative for rash.   Musculoskeletal: Positive for left hip pain.  Gastrointestinal: Negative for abdominal pain and change in bowel habit.   Neurological: Negative for headaches and light-headedness.       Vitals:    09/01/20 1317   BP: 136/77   BP Location: Right upper arm   Patient Position: Sitting   Pulse: (!) 55   Resp: 17   Temp: 36.9 °C (98.4 °F)   TempSrc: Oral   SpO2: 97%   Weight: 59.1 kg (130 lb 3.2 oz)   Height: 1.651 m (5' 5\")       PHYSICAL EXAMINATION: Pleasant and alert, in no acute distress.  Neck: No jugular venous distention or carotid bruits. Lungs were clear to auscultation without rales or wheezes. Cardiovascular exam revealed normal S1, S2, " regular rhythm with a 2/6 systolic murmur best heard at the lower left sternal border. Abdomen was soft and nontender.Extremities revealed no edema or cyanosis. She was alert and oriented x 3 with no focal deficits on gross neurologic exam.    DATA REVIEW:    ECG 9/1/2020: I personally reviewed her 12-lead EKG performed today which reveals sinus rhythm with right bundle branch block .Prolonged QTc      Lab Results   Component Value Date    WBC 7.16 02/26/2019    HGB 11.5 (L) 02/26/2019    HCT 35.1 02/26/2019     02/26/2019    CHOL 175 08/03/2018    TRIG 68 08/03/2018    HDL 46 (L) 08/03/2018    ALT 16 02/23/2019    AST 27 02/23/2019     02/23/2019    K 3.9 02/23/2019     02/23/2019    CREATININE 0.8 02/23/2019    BUN 28 (H) 02/23/2019    CO2 23 02/23/2019    TSH 6.72 (H) 08/03/2018     Assessment/Plan     WPW (Jaycob-Parkinson-White syndrome)  Her status post catheter ablation procedure in  1997 - She has not had recurrence of supraventricular tachycardia.    Paroxysmal atrial fibrillation (CMS/HCC) (HCC)  No recurrence of atrial fibrillation since she was started on flecainide 100 mg p.o. every 12 hours in February 2020.  I recommend that she continue the current dose of flecainide.  She is tolerating the medication without side effects.  She may take an extra 100 mg x 1 during an episode of atrial fibrillation. She will continue on Eliquis 5 mg p.o. every 12 hours.    I ordered a CBC, Comprehensive metabolic, Magnesium, and TSH blood test since she has not had recent blood tests..      Ebstein's anomaly of tricuspid valve  She is asymptomatic without signs or symptoms of heart failure. She has moderate tricuspid regurgitation and moderate pulmonary regurgitation.       RBBB  Stable without progresison of conduction disease.  Her QRS duration has increased after adding flecainide which increased her QTc interval.  I will continue to monitor.    I will see her for follow-up in the office in 6  months.  I will contact her with the results of the blood tests.    Thank you for allowing me to participate in the care of your patient.  Please do not hesitate to contact me if you have any questions regarding my recommendations and management.    Sincerely;    Madhuri Cardoza MD [facility]    By signing my name below, I, Macey Mckenzie, attest that this documentation has been prepared under the direction and in the presence of Madhuri Gregorio MD Electronically signed: Pippa Dang. 9/1/2020 12:57 PM     I, Madhuri Gregorio MD, personally performed the services described in this documentation. All medical record entries made by the scribe were at my direction and in my presence. I have reviewed the chart and agree that the record reflects my personal performance and is accurate and complete. Madhuri Gregorio MD. 9/1/2020. 12:57 PM.

## 2020-09-01 NOTE — ASSESSMENT & PLAN NOTE
Her status post catheter ablation procedure in  1997 - She has not had recurrence of supraventricular tachycardia.

## 2020-09-01 NOTE — ASSESSMENT & PLAN NOTE
No recurrence of atrial fibrillation since she was started on flecainide 100 mg p.o. every 12 hours in February 2020.  I recommend that she continue the current dose of flecainide.  She is tolerating the medication without side effects.  She may take an extra 100 mg x 1 during an episode of atrial fibrillation. She will continue on Eliquis 5 mg p.o. every 12 hours.    I ordered a CBC, Comprehensive metabolic, Magnesium, and TSH blood test since she has not had recent blood tests..

## 2020-09-01 NOTE — ASSESSMENT & PLAN NOTE
She is asymptomatic without signs or symptoms of heart failure. She has moderate tricuspid regurgitation and moderate pulmonary regurgitation.

## 2020-09-01 NOTE — ASSESSMENT & PLAN NOTE
Stable without progresison of conduction disease.  Her QRS duration has increased after adding flecainide which increased her QTc interval.  I will continue to monitor.

## 2020-09-01 NOTE — PROGRESS NOTES
"      [facility] HEART GROUP  [phone]  [phone]  [email or web address]  [facility]  The Heart [address]  [address] [facility] [place] [address]      Anton Muniz M.D.  [address] [place] [address]  & [place] [facility].  [place] [address]      2020    Office Visit    RE: ORLY COBIAN  : 1940    Dear Dr. Muniz:    I had the pleasure of seeing Ms. Orly Cobian for follow up in the office for follow up for a history of paroxysmal atrial fibrillation with chronic right bundle branch block and history of dyslipidemia.  She is on long-term antiarrhythmic drug therapy with flecainide and anticoagulation with Eliquis.    In 2019, she woke up in the middle of the night with an episode of atrial fibrillation.  She stated that her heart was beating very fast. She took a flecainide and the episode subsided several hours later.  When I last saw her in the office on 2019, she had been feeling frequent \"extra beats\".  Therefore the patient agreed to start taking flecainide 100 mg p.o. every 12 hours and to continue to take an extra dose of flecainide as needed.    Today, she is doing generally well with no cardiac complaints. She has recently been able to go on walks outside her home. She also states that she is extremely happy and has been feeling very well since I started her on fleccanide.  No recurrence of atrial fibrillation.  She reports she had a fall in  resulting in a fractured nose.  No loss of consciousness.  She also injured her knee; however, the x-ray showed no evidence of a break or displacement, only arthritis and swelling. She also saw you last week for hip pain. No palpitations, dizziness, near syncope or syncope.  No chest pain. No dyspnea on exertion, PND, orthopnea or pedal edema. No bleeding on anticoagulation medication.    PROBLEM LIST:  1. Paroxysmal atrial fibrillation diagnosed on 10/11/2010, with a recent episode in May " of 2017.  2. History of Jaycob-Parkinson-White syndrome, status post catheter ablation procedure  performed in 1997, without recurrence of arrhythmia.  3. Chronic right bundle branch block.  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  5. History of thyroid disease.  6. Dyslipidemia.  7. Stress echo obtained on June 6, 2017 revealing no evidence of myocardial ischemia with  normal left ventricular systolic function with a left ventricular ejection fraction of 65-  70%.  8. Vasovagal syncope    Patient Active Problem List   Diagnosis   • Paroxysmal atrial fibrillation (CMS/HCC)   • RBBB   • Non-rheumatic tricuspid valve insufficiency   • WPW (Jaycob-Parkinson-White syndrome)   • Ebstein's anomaly of tricuspid valve   • Nonrheumatic pulmonary valve insufficiency   • Vasovagal syncope   • Dyslipidemia       Past Medical History:   Diagnosis Date   • Alcoholism (CMS/HCC)    • Arrhythmia    • Chronic atrial fibrillation (CMS/HCC)    • Hyperthyroidism    • Osteoporosis      Past Surgical History:   Procedure Laterality Date   • ABLATION OF DYSRHYTHMIC FOCUS     • CHOLECYSTECTOMY     • COSMETIC SURGERY     • EYE SURGERY     • SKIN BIOPSY       Current Outpatient Medications   Medication Sig Dispense Refill   • atorvastatin (LIPITOR) 10 mg tablet Take 1 tablet (10 mg total) by mouth daily. 90 tablet 3   • estradiol (ESTRACE) 0.01 % (0.1 mg/gram) vaginal cream No SIG Entered     • flecainide (TAMBOCOR) 100 mg tablet Take 1 tablet (100 mg total) by mouth every 12 (twelve) hours. 180 tablet 3   • latanoprost (XALATAN) 0.005 % ophthalmic solution 0.005 %.     • lutein-zeaxanthin (OCUVITE LUTEIN 25) 25-5 mg capsule take 1 capsule po daily     • metoprolol succinate XL (TOPROL-XL) 25 mg 24 hr tablet Take 1 tablet (25 mg total) by mouth daily. 90 tablet 3  "  • omega 3-dha-epa-fish oil (FISH OIL) capsule 1,200 mg.     • apixaban (ELIQUIS) 5 mg tablet Take 1 tablet (5 mg total) by mouth 2 (two) times a day. 180 tablet 1     No current facility-administered medications for this visit.      ALLERGIES:Erythromycin base and Erythromycin    REVIEW OF SYSTEMS:     As in HPI.  All other other systems were reviewed and are negative.    Constitution: Negative for fever and malaise/fatigue.   HENT: Negative for hearing loss.    Eyes: Negative for visual disturbance.   Cardiovascular: Negative for chest pain, dyspnea on exertion, near-syncope, palpitations and syncope.   Respiratory: Negative for cough and wheezing.    Hematologic/Lymphatic: Does not bruise/bleed easily. No bleeding.  Skin: Negative for rash.   Musculoskeletal: Positive for left hip pain.  Gastrointestinal: Negative for abdominal pain and change in bowel habit.   Neurological: Negative for headaches and light-headedness.       Vitals:    09/01/20 1317   BP: 136/77   BP Location: Right upper arm   Patient Position: Sitting   Pulse: (!) 55   Resp: 17   Temp: 36.9 °C (98.4 °F)   TempSrc: Oral   SpO2: 97%   Weight: 59.1 kg (130 lb 3.2 oz)   Height: 1.651 m (5' 5\")       PHYSICAL EXAMINATION: Pleasant and alert, in no acute distress.  Neck: No jugular venous distention or carotid bruits. Lungs were clear to auscultation without rales or wheezes. Cardiovascular exam revealed normal S1, S2, regular rhythm with a 2/6 systolic murmur best heard at the lower left sternal border. Abdomen was soft and nontender.Extremities revealed no edema or cyanosis. She was alert and oriented x 3 with no focal deficits on gross neurologic exam.    DATA REVIEW:    ECG 9/1/2020: I personally reviewed her 12-lead EKG performed today which reveals sinus rhythm with right bundle branch block .Prolonged QTc      Lab Results   Component Value Date    WBC 7.16 02/26/2019    HGB 11.5 (L) 02/26/2019    HCT 35.1 02/26/2019     02/26/2019    " CHOL 175 08/03/2018    TRIG 68 08/03/2018    HDL 46 (L) 08/03/2018    ALT 16 02/23/2019    AST 27 02/23/2019     02/23/2019    K 3.9 02/23/2019     02/23/2019    CREATININE 0.8 02/23/2019    BUN 28 (H) 02/23/2019    CO2 23 02/23/2019    TSH 6.72 (H) 08/03/2018     Assessment/Plan     WPW (Jaycob-Parkinson-White syndrome)  Her status post catheter ablation procedure in  1997 - She has not had recurrence of supraventricular tachycardia.    Paroxysmal atrial fibrillation (CMS/HCC) (HCC)  No recurrence of atrial fibrillation since she was started on flecainide 100 mg p.o. every 12 hours in February 2020.  I recommend that she continue the current dose of flecainide.  She is tolerating the medication without side effects.  She may take an extra 100 mg x 1 during an episode of atrial fibrillation. She will continue on Eliquis 5 mg p.o. every 12 hours.    I ordered a CBC, Comprehensive metabolic, Magnesium, and TSH blood test since she has not had recent blood tests..      Ebstein's anomaly of tricuspid valve  She is asymptomatic without signs or symptoms of heart failure. She has moderate tricuspid regurgitation and moderate pulmonary regurgitation.       RBBB  Stable without progresison of conduction disease.  Her QRS duration has increased after adding flecainide which increased her QTc interval.  I will continue to monitor.    I will see her for follow-up in the office in 6 months.  I will contact her with the results of the blood tests.    Thank you for allowing me to participate in the care of your patient.  Please do not hesitate to contact me if you have any questions regarding my recommendations and management.    Sincerely;    Madhuri Cardoza MD [facility]    By signing my name below, I, Macey Mckenzie, attest that this documentation has been prepared under the direction and in the presence of Madhuri Gregorio MD Electronically signed: Pippa Dang. 9/1/2020 12:57 PM     Madhuri CHINO  MD, personally performed the services described in this documentation. All medical record entries made by the scribe were at my direction and in my presence. I have reviewed the chart and agree that the record reflects my personal performance and is accurate and complete. Madhuri Gregorio MD. 9/1/2020. 12:57 PM.

## 2020-09-09 NOTE — TELEPHONE ENCOUNTER
Medicine Refill Request    Last Office Visit: Visit date not found  Last Telemedicine Visit: Visit date not found    Next Office Visit: Visit date not found  Next Telemedicine Visit: Visit date not found         Current Outpatient Medications:   •  apixaban (ELIQUIS) 5 mg tablet, Take 1 tablet (5 mg total) by mouth 2 (two) times a day., Disp: 180 tablet, Rfl: 1  •  atorvastatin (LIPITOR) 10 mg tablet, Take 1 tablet (10 mg total) by mouth daily., Disp: 90 tablet, Rfl: 3  •  estradiol (ESTRACE) 0.01 % (0.1 mg/gram) vaginal cream, No SIG Entered, Disp: , Rfl:   •  flecainide (TAMBOCOR) 100 mg tablet, Take 1 tablet (100 mg total) by mouth every 12 (twelve) hours., Disp: 180 tablet, Rfl: 3  •  latanoprost (XALATAN) 0.005 % ophthalmic solution, 0.005 %., Disp: , Rfl:   •  lutein-zeaxanthin (OCUVITE LUTEIN 25) 25-5 mg capsule, take 1 capsule po daily, Disp: , Rfl:   •  metoprolol succinate XL (TOPROL-XL) 25 mg 24 hr tablet, Take 1 tablet (25 mg total) by mouth daily., Disp: 90 tablet, Rfl: 3  •  omega 3-dha-epa-fish oil (FISH OIL) capsule, 1,200 mg., Disp: , Rfl:       BP Readings from Last 3 Encounters:   09/01/20 136/77   02/13/20 122/64   08/08/19 108/60       Recent Lab results:  Lab Results   Component Value Date    CHOL 175 08/03/2018   ,   Lab Results   Component Value Date    HDL 46 (L) 08/03/2018   ,   Lab Results   Component Value Date    LDLCALC 115 (H) 08/03/2018   ,   Lab Results   Component Value Date    TRIG 68 08/03/2018        Lab Results   Component Value Date    GLUCOSE 135 (H) 02/23/2019   , No results found for: HGBA1C      Lab Results   Component Value Date    CREATININE 0.8 02/23/2019       Lab Results   Component Value Date    TSH 6.72 (H) 08/03/2018

## 2020-09-18 ENCOUNTER — APPOINTMENT (OUTPATIENT)
Dept: LAB | Facility: HOSPITAL | Age: 80
End: 2020-09-18
Attending: INTERNAL MEDICINE
Payer: MEDICARE

## 2020-09-18 DIAGNOSIS — I48.0 PAROXYSMAL ATRIAL FIBRILLATION (CMS/HCC): ICD-10-CM

## 2020-09-18 LAB
ALBUMIN SERPL-MCNC: 4 G/DL (ref 3.4–5)
ALP SERPL-CCNC: 63 IU/L (ref 35–126)
ALT SERPL-CCNC: 26 IU/L (ref 11–54)
ANION GAP SERPL CALC-SCNC: 11 MEQ/L (ref 3–15)
AST SERPL-CCNC: 28 IU/L (ref 15–41)
BASOPHILS # BLD: 0.05 K/UL (ref 0.01–0.1)
BASOPHILS NFR BLD: 1 %
BILIRUB SERPL-MCNC: 1.1 MG/DL (ref 0.3–1.2)
BUN SERPL-MCNC: 17 MG/DL (ref 8–20)
CALCIUM SERPL-MCNC: 9.5 MG/DL (ref 8.9–10.3)
CHLORIDE SERPL-SCNC: 103 MEQ/L (ref 98–109)
CO2 SERPL-SCNC: 25 MEQ/L (ref 22–32)
CREAT SERPL-MCNC: 0.7 MG/DL (ref 0.6–1.1)
DIFFERENTIAL METHOD BLD: ABNORMAL
EOSINOPHIL # BLD: 0.19 K/UL (ref 0.04–0.36)
EOSINOPHIL NFR BLD: 3.6 %
ERYTHROCYTE [DISTWIDTH] IN BLOOD BY AUTOMATED COUNT: 12.6 % (ref 11.7–14.4)
GFR SERPL CREATININE-BSD FRML MDRD: >60 ML/MIN/1.73M*2
GLUCOSE SERPL-MCNC: 86 MG/DL (ref 70–99)
HCT VFR BLDCO AUTO: 41.4 % (ref 35–45)
HGB BLD-MCNC: 13.4 G/DL (ref 11.8–15.7)
IMM GRANULOCYTES # BLD AUTO: 0.02 K/UL (ref 0–0.08)
IMM GRANULOCYTES NFR BLD AUTO: 0.4 %
LYMPHOCYTES # BLD: 1.5 K/UL (ref 1.2–3.5)
LYMPHOCYTES NFR BLD: 28.5 %
MAGNESIUM SERPL-MCNC: 2 MG/DL (ref 1.8–2.5)
MCH RBC QN AUTO: 32.3 PG (ref 28–33.2)
MCHC RBC AUTO-ENTMCNC: 32.4 G/DL (ref 32.2–35.5)
MCV RBC AUTO: 99.8 FL (ref 83–98)
MONOCYTES # BLD: 0.5 K/UL (ref 0.28–0.8)
MONOCYTES NFR BLD: 9.5 %
NEUTROPHILS # BLD: 3 K/UL (ref 1.7–7)
NEUTS SEG NFR BLD: 57 %
NRBC BLD-RTO: 0 %
PDW BLD AUTO: 10.3 FL (ref 9.4–12.3)
PLATELET # BLD AUTO: 254 K/UL (ref 150–369)
POTASSIUM SERPL-SCNC: 4.5 MEQ/L (ref 3.6–5.1)
PROT SERPL-MCNC: 6.6 G/DL (ref 6–8.2)
RBC # BLD AUTO: 4.15 M/UL (ref 3.93–5.22)
SODIUM SERPL-SCNC: 139 MEQ/L (ref 136–144)
T4 FREE SERPL-MCNC: 0.84 NG/DL (ref 0.58–1.64)
TSH SERPL DL<=0.05 MIU/L-ACNC: 7.56 MIU/L (ref 0.34–5.6)
WBC # BLD AUTO: 5.26 K/UL (ref 3.8–10.5)

## 2020-09-18 PROCEDURE — 80053 COMPREHEN METABOLIC PANEL: CPT

## 2020-09-18 PROCEDURE — 85025 COMPLETE CBC W/AUTO DIFF WBC: CPT

## 2020-09-18 PROCEDURE — 36415 COLL VENOUS BLD VENIPUNCTURE: CPT

## 2020-09-18 PROCEDURE — 84443 ASSAY THYROID STIM HORMONE: CPT

## 2020-09-18 PROCEDURE — 83735 ASSAY OF MAGNESIUM: CPT

## 2020-09-18 PROCEDURE — 84439 ASSAY OF FREE THYROXINE: CPT

## 2020-09-23 ENCOUNTER — TELEPHONE (OUTPATIENT)
Dept: CARDIOLOGY | Facility: CLINIC | Age: 80
End: 2020-09-23

## 2020-09-23 NOTE — TELEPHONE ENCOUNTER
Please call the patient.  Her blood tests are normal except for slightly elevated TSH but normal free T4.  I will asked the office staff to send a copy of her blood test results to her primary care physician Dr. Muniz.

## 2021-01-22 ENCOUNTER — IMMUNIZATIONS (OUTPATIENT)
Dept: FAMILY MEDICINE CLINIC | Facility: HOSPITAL | Age: 81
End: 2021-01-22

## 2021-01-22 DIAGNOSIS — Z23 ENCOUNTER FOR IMMUNIZATION: Primary | ICD-10-CM

## 2021-01-22 PROCEDURE — 0001A SARS-COV-2 / COVID-19 MRNA VACCINE (PFIZER-BIONTECH) 30 MCG: CPT

## 2021-01-22 PROCEDURE — 91300 SARS-COV-2 / COVID-19 MRNA VACCINE (PFIZER-BIONTECH) 30 MCG: CPT

## 2021-02-11 ENCOUNTER — IMMUNIZATIONS (OUTPATIENT)
Dept: FAMILY MEDICINE CLINIC | Facility: HOSPITAL | Age: 81
End: 2021-02-11

## 2021-02-11 DIAGNOSIS — Z23 ENCOUNTER FOR IMMUNIZATION: Primary | ICD-10-CM

## 2021-02-11 PROCEDURE — 91300 SARS-COV-2 / COVID-19 MRNA VACCINE (PFIZER-BIONTECH) 30 MCG: CPT

## 2021-02-11 PROCEDURE — 0002A SARS-COV-2 / COVID-19 MRNA VACCINE (PFIZER-BIONTECH) 30 MCG: CPT

## 2021-03-02 ENCOUNTER — OFFICE VISIT (OUTPATIENT)
Dept: CARDIOLOGY | Facility: CLINIC | Age: 81
End: 2021-03-02
Payer: MEDICARE

## 2021-03-02 VITALS
SYSTOLIC BLOOD PRESSURE: 130 MMHG | HEART RATE: 63 BPM | HEIGHT: 65 IN | WEIGHT: 126.8 LBS | DIASTOLIC BLOOD PRESSURE: 80 MMHG | RESPIRATION RATE: 16 BRPM | BODY MASS INDEX: 21.13 KG/M2

## 2021-03-02 DIAGNOSIS — Z79.899 LONG TERM CURRENT USE OF ANTIARRHYTHMIC DRUG: ICD-10-CM

## 2021-03-02 DIAGNOSIS — I45.6 WPW (WOLFF-PARKINSON-WHITE SYNDROME): ICD-10-CM

## 2021-03-02 DIAGNOSIS — I45.10 RBBB: ICD-10-CM

## 2021-03-02 DIAGNOSIS — I48.0 PAROXYSMAL ATRIAL FIBRILLATION (CMS/HCC): Primary | ICD-10-CM

## 2021-03-02 DIAGNOSIS — I36.1 NON-RHEUMATIC TRICUSPID VALVE INSUFFICIENCY: ICD-10-CM

## 2021-03-02 DIAGNOSIS — Q22.5 EBSTEIN'S ANOMALY OF TRICUSPID VALVE: ICD-10-CM

## 2021-03-02 DIAGNOSIS — E78.5 DYSLIPIDEMIA: ICD-10-CM

## 2021-03-02 PROCEDURE — 99214 OFFICE O/P EST MOD 30 MIN: CPT | Performed by: INTERNAL MEDICINE

## 2021-03-02 PROCEDURE — 93000 ELECTROCARDIOGRAM COMPLETE: CPT | Performed by: INTERNAL MEDICINE

## 2021-03-02 NOTE — LETTER
"2021     Anton Mnuiz MD  [address] [facility] [address]    Patient: Orly Lopez  YOB: 1940  Date of Visit: 3/2/2021      Dear Dr. Muniz:    Thank you for referring Orly Lopez to me for evaluation. Below are my notes for this consultation.    If you have questions, please do not hesitate to call me. I look forward to following your patient along with you.         Sincerely,        Madhuri Gregorio MD        CC: Madhuri Feliciano MD  3/8/2021  4:33 PM  Signed   Madhuri Gregorio MD, [facility]  Cardiac Electrophysiology    [facility]  [facility]  [address] [place] [address]    TEL  [phone]  [facility]     2021    Anton Muniz M.D.  [address] [facility]  & [facility].  [place] [address]    Electrophysiology Progress Note    RE: ORLY LOPEZ  : 1940    Dear Dr. Muniz:    Orly Lopez is a 80 y.o. female who comes to the office today for follow up for a history of paroxysmal atrial fibrillation with chronic right bundle branch block and history of dyslipidemia.  She is on long-term antiarrhythmic drug therapy with flecainide and anticoagulation with Eliquis.    In 2019, she woke up in the middle of the night with an episode of atrial fibrillation.  She stated that her heart was beating very fast. She took a flecainide and the episode subsided several hours later.  When I last saw her in the office on 2019, she had been feeling frequent \"extra beats\". Therefore, the patient agreed to start taking flecainide 100 mg p.o. every 12 hours and to continue to take an extra dose of flecainide as needed.    At her last office visit on 2020 she had been feeling well from a cardiovascular standpoint.     Today, she is feeling generally well. She reports that she is feeling very well from a cardiovascular standpoint and has not had symptoms " of recurrence of atrial fibrillation. She states that she received both doses of the COVID-19 vaccine. She notes that she has been stressed with her 's recent medical problems. No recent hospitalizations. No new medical problems. No palpitations, dizziness, near syncope or syncope. No chest pain. No dyspnea on exertion, PND, orthopnea or pedal edema. No bleeding complications on anticoagulation medication with Eliquis.    PROBLEM LIST:  1. Paroxysmal atrial fibrillation diagnosed on 10/11/2010, with a recent episode in May of  2017.  2. History of Jaycob-Parkinson-White syndrome, status post catheter ablation procedure  performed in 1997, without recurrence of arrhythmia.  3. Chronic right bundle branch block.  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  5. History of thyroid disease.  6. Dyslipidemia.  7. Stress echo obtained on June 6, 2017 revealing no evidence of myocardial ischemia with  normal left ventricular systolic function with a left ventricular ejection fraction of 65-  70%.  8. Vasovagal syncope    Patient Active Problem List   Diagnosis   • Paroxysmal atrial fibrillation (CMS/HCC)   • RBBB   • Non-rheumatic tricuspid valve insufficiency   • WPW (Jaycob-Parkinson-White syndrome)   • Ebstein's anomaly of tricuspid valve   • Nonrheumatic pulmonary valve insufficiency   • Vasovagal syncope   • Dyslipidemia   • Long term current use of antiarrhythmic drug     Past Medical History:   Diagnosis Date   • Alcoholism (CMS/HCC)    • Arrhythmia    • Chronic atrial fibrillation (CMS/HCC)    • Hyperthyroidism    • Osteoporosis      Past Surgical History:   Procedure Laterality Date   • ABLATION OF DYSRHYTHMIC FOCUS     • CHOLECYSTECTOMY     • COSMETIC SURGERY     • EYE SURGERY     • SKIN BIOPSY       Current Outpatient Medications  "  Medication Sig Dispense Refill   • apixaban (ELIQUIS) 5 mg tablet Take 1 tablet (5 mg total) by mouth 2 (two) times a day. 180 tablet 3   • atorvastatin (LIPITOR) 10 mg tablet Take 1 tablet (10 mg total) by mouth daily. 90 tablet 3   • estradiol (ESTRACE) 0.01 % (0.1 mg/gram) vaginal cream No SIG Entered     • flecainide (TAMBOCOR) 100 mg tablet Take 1 tablet (100 mg total) by mouth every 12 (twelve) hours. 180 tablet 3   • latanoprost (XALATAN) 0.005 % ophthalmic solution 0.005 %.     • lutein-zeaxanthin (OCUVITE LUTEIN 25) 25-5 mg capsule take 1 capsule po daily     • metoprolol succinate XL (TOPROL-XL) 25 mg 24 hr tablet Take 1 tablet (25 mg total) by mouth daily. 90 tablet 3   • omega 3-dha-epa-fish oil (FISH OIL) capsule 1,200 mg.       No current facility-administered medications for this visit.      ALLERGIES:Erythromycin base and Erythromycin    REVIEW OF SYSTEMS:     As in HPI.  All other other systems were reviewed and are negative.    Constitution: Negative for fever and malaise/fatigue.   HENT: Negative for hearing loss.    Eyes: Negative for visual disturbance.   Cardiovascular: Negative for chest pain, dyspnea on exertion, near-syncope, palpitations and syncope.   Respiratory: Negative for cough and wheezing.    Hematologic/Lymphatic: Does not bruise/bleed easily. No bleeding.  Skin: Negative for rash.   Musculoskeletal: Positive for left hip pain.  Gastrointestinal: Negative for abdominal pain and change in bowel habit.   Neurological: Negative for headaches and light-headedness.     Vitals:    03/02/21 1304   BP: 130/80   BP Location: Left upper arm   Patient Position: Sitting   Pulse: 63   Resp: 16   Weight: 57.5 kg (126 lb 12.8 oz)   Height: 1.651 m (5' 5\")     PHYSICAL EXAMINATION: Pleasant and alert, in no acute distress.  Neck: No jugular venous distention or carotid bruits. Lungs were clear to auscultation without rales or wheezes. Cardiovascular exam revealed normal S1, S2, regular rhythm " with a 2/6 systolic murmur best heard at the lower left sternal border. Abdomen was soft and nontender.Extremities revealed no edema or cyanosis. She was alert and oriented x 3 with no focal deficits on gross neurologic exam.    DATA REVIEW:    ECG 3/2/2021: I personally reviewed her 12-lead EKG performed today which reveals Sinus  Rhythm   -Right bundle branch block.    -Left atrial enlargement. Prolonged QTc     ECG 9/1/2020: I personally reviewed her 12-lead EKG performed today which reveals sinus rhythm with right bundle branch block .Prolonged QTc      Lab Results   Component Value Date    WBC 5.26 09/18/2020    HGB 13.4 09/18/2020    HCT 41.4 09/18/2020     09/18/2020    CHOL 175 08/03/2018    TRIG 68 08/03/2018    HDL 46 (L) 08/03/2018    ALT 26 09/18/2020    AST 28 09/18/2020     09/18/2020    K 4.5 09/18/2020     09/18/2020    CREATININE 0.7 09/18/2020    BUN 17 09/18/2020    CO2 25 09/18/2020    TSH 7.56 (H) 09/18/2020     Assessment/Plan     Paroxysmal atrial fibrillation (CMS/HCC) (HCC)  She is asymptomatic and without  recurrence of atrial fibrillation  on flecainide 100 mg p.o. every 12 hours in February 2020.  I recommend that she continue the current dose of flecainide.  She is tolerating the medication without side effects.    She may take an extra 100 mg x 1 during an episode of atrial fibrillation.   She will continue on Eliquis 5 mg p.o. every 12 hours. The LDL3CA0-XUYk score is 3.  I order routine blood tests including CBC, comprehensive metabolic panel, TSH and magnesium level.        WPW (Jaycob-Parkinson-White syndrome)  Her status post catheter ablation procedure in  1997 - She has not had recurrence of supraventricular tachycardia.    Ebstein's anomaly of tricuspid valve  She is asymptomatic without signs or symptoms of heart failure. She has moderate tricuspid regurgitation and moderate pulmonary regurgitation.   I will order an echocardiogram to evaluate her tricuspid  valve.    RBBB  It remains stable without progresison of conduction disease.  Her QRS duration has increased after adding flecainide which increased her QTc interval.    I will continue to monitor.    Dyslipidemia  She will continue atorvastatin 10 mg p.o. once a day.  I ordered a fasting lipid profile.    Long term current use of antiarrhythmic drug  She is on long-term antiarrhythmic drug therapy with flecainide.  Is tolerating the medication without side effects.  I ordered routine blood test to check her renal function and electrolytes.    Return in about 6 months (around 9/2/2021).      Thank you for allowing me to participate in the care of your patient.  Please do not hesitate to contact me if you have any questions regarding my recommendations and management.    Sincerely;    Madhuri Cardoza MD [facility]    By signing my name below, I, Macey Mckenzie, attest that this documentation has been prepared under the direction and in the presence of Madhuri Gregorio MD Electronically signed: Pippa Dang. 3/2/2021 1:00 PM     I, Madhuri Gregorio MD, personally performed the services described in this documentation. All medical record entries made by the scribe were at my direction and in my presence. I have reviewed the chart and agree that the record reflects my personal performance and is accurate and complete. Madhuri Gregorio MD. 3/2/2021. 1:00 PM.

## 2021-03-02 NOTE — ASSESSMENT & PLAN NOTE
She is asymptomatic and without  recurrence of atrial fibrillation  on flecainide 100 mg p.o. every 12 hours in February 2020.  I recommend that she continue the current dose of flecainide.  She is tolerating the medication without side effects.    She may take an extra 100 mg x 1 during an episode of atrial fibrillation.   She will continue on Eliquis 5 mg p.o. every 12 hours. The LBE1RO0-HPSe score is 3.  I order routine blood tests including CBC, comprehensive metabolic panel, TSH and magnesium level.

## 2021-03-02 NOTE — ASSESSMENT & PLAN NOTE
She is asymptomatic without signs or symptoms of heart failure. She has moderate tricuspid regurgitation and moderate pulmonary regurgitation.   I will order an echocardiogram to evaluate her tricuspid valve.

## 2021-03-02 NOTE — ASSESSMENT & PLAN NOTE
It remains stable without progresison of conduction disease.  Her QRS duration has increased after adding flecainide which increased her QTc interval.    I will continue to monitor.

## 2021-03-02 NOTE — PROGRESS NOTES
" Madhuri Gregorio MD, [facility]  Cardiac Electrophysiology    [facility]    [facility]  The [facility] [address] Level  [address] [place] [address]    TEL  [phone]  [facility]     2021    Anton Muniz M.D.  [address] [facility]  [facility].  [place] [address]    Electrophysiology Progress Note    RE: ORLY COBIAN  : 1940    Dear Dr. Muniz:    Orly Cobian is a 80 y.o. female who comes to the office today for follow up for a history of paroxysmal atrial fibrillation with chronic right bundle branch block and history of dyslipidemia.  She is on long-term antiarrhythmic drug therapy with flecainide and anticoagulation with Eliquis.    In 2019, she woke up in the middle of the night with an episode of atrial fibrillation.  She stated that her heart was beating very fast. She took a flecainide and the episode subsided several hours later.  When I last saw her in the office on 2019, she had been feeling frequent \"extra beats\". Therefore, the patient agreed to start taking flecainide 100 mg p.o. every 12 hours and to continue to take an extra dose of flecainide as needed.    At her last office visit on 2020 she had been feeling well from a cardiovascular standpoint.     Today, she is feeling generally well. She reports that she is feeling very well from a cardiovascular standpoint and has not had symptoms of recurrence of atrial fibrillation. She states that she received both doses of the COVID-19 vaccine. She notes that she has been stressed with her 's recent medical problems. No recent hospitalizations. No new medical problems. No palpitations, dizziness, near syncope or syncope. No chest pain. No dyspnea on exertion, PND, orthopnea or pedal edema. No bleeding complications on anticoagulation medication with Eliquis.    PROBLEM LIST:  1. Paroxysmal atrial fibrillation diagnosed on 10/11/2010, with a recent " episode in May of  2017.  2. History of Jaycob-Parkinson-White syndrome, status post catheter ablation procedure  performed in 1997, without recurrence of arrhythmia.  3. Chronic right bundle branch block.  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  5. History of thyroid disease.  6. Dyslipidemia.  7. Stress echo obtained on June 6, 2017 revealing no evidence of myocardial ischemia with  normal left ventricular systolic function with a left ventricular ejection fraction of 65-  70%.  8. Vasovagal syncope    Patient Active Problem List   Diagnosis   • Paroxysmal atrial fibrillation (CMS/HCC)   • RBBB   • Non-rheumatic tricuspid valve insufficiency   • WPW (Jaycob-Parkinson-White syndrome)   • Ebstein's anomaly of tricuspid valve   • Nonrheumatic pulmonary valve insufficiency   • Vasovagal syncope   • Dyslipidemia   • Long term current use of antiarrhythmic drug     Past Medical History:   Diagnosis Date   • Alcoholism (CMS/HCC)    • Arrhythmia    • Chronic atrial fibrillation (CMS/HCC)    • Hyperthyroidism    • Osteoporosis      Past Surgical History:   Procedure Laterality Date   • ABLATION OF DYSRHYTHMIC FOCUS     • CHOLECYSTECTOMY     • COSMETIC SURGERY     • EYE SURGERY     • SKIN BIOPSY       Current Outpatient Medications   Medication Sig Dispense Refill   • apixaban (ELIQUIS) 5 mg tablet Take 1 tablet (5 mg total) by mouth 2 (two) times a day. 180 tablet 3   • atorvastatin (LIPITOR) 10 mg tablet Take 1 tablet (10 mg total) by mouth daily. 90 tablet 3   • estradiol (ESTRACE) 0.01 % (0.1 mg/gram) vaginal cream No SIG Entered     • flecainide (TAMBOCOR) 100 mg tablet Take 1 tablet (100 mg total) by mouth every 12 (twelve) hours. 180 tablet 3   • latanoprost (XALATAN) 0.005 % ophthalmic solution 0.005 %.     • lutein-zeaxanthin (OCUVITE  "LUTEIN 25) 25-5 mg capsule take 1 capsule po daily     • metoprolol succinate XL (TOPROL-XL) 25 mg 24 hr tablet Take 1 tablet (25 mg total) by mouth daily. 90 tablet 3   • omega 3-dha-epa-fish oil (FISH OIL) capsule 1,200 mg.       No current facility-administered medications for this visit.      ALLERGIES:Erythromycin base and Erythromycin    REVIEW OF SYSTEMS:     As in HPI.  All other other systems were reviewed and are negative.    Constitution: Negative for fever and malaise/fatigue.   HENT: Negative for hearing loss.    Eyes: Negative for visual disturbance.   Cardiovascular: Negative for chest pain, dyspnea on exertion, near-syncope, palpitations and syncope.   Respiratory: Negative for cough and wheezing.    Hematologic/Lymphatic: Does not bruise/bleed easily. No bleeding.  Skin: Negative for rash.   Musculoskeletal: Positive for left hip pain.  Gastrointestinal: Negative for abdominal pain and change in bowel habit.   Neurological: Negative for headaches and light-headedness.     Vitals:    03/02/21 1304   BP: 130/80   BP Location: Left upper arm   Patient Position: Sitting   Pulse: 63   Resp: 16   Weight: 57.5 kg (126 lb 12.8 oz)   Height: 1.651 m (5' 5\")     PHYSICAL EXAMINATION: Pleasant and alert, in no acute distress.  Neck: No jugular venous distention or carotid bruits. Lungs were clear to auscultation without rales or wheezes. Cardiovascular exam revealed normal S1, S2, regular rhythm with a 2/6 systolic murmur best heard at the lower left sternal border. Abdomen was soft and nontender.Extremities revealed no edema or cyanosis. She was alert and oriented x 3 with no focal deficits on gross neurologic exam.    DATA REVIEW:    ECG 3/2/2021: I personally reviewed her 12-lead EKG performed today which reveals Sinus  Rhythm   -Right bundle branch block.    -Left atrial enlargement. Prolonged QTc     ECG 9/1/2020: I personally reviewed her 12-lead EKG performed today which reveals sinus rhythm with right " bundle branch block .Prolonged QTc      Lab Results   Component Value Date    WBC 5.26 09/18/2020    HGB 13.4 09/18/2020    HCT 41.4 09/18/2020     09/18/2020    CHOL 175 08/03/2018    TRIG 68 08/03/2018    HDL 46 (L) 08/03/2018    ALT 26 09/18/2020    AST 28 09/18/2020     09/18/2020    K 4.5 09/18/2020     09/18/2020    CREATININE 0.7 09/18/2020    BUN 17 09/18/2020    CO2 25 09/18/2020    TSH 7.56 (H) 09/18/2020     Assessment/Plan     Paroxysmal atrial fibrillation (CMS/HCC) (HCC)  She is asymptomatic and without  recurrence of atrial fibrillation  on flecainide 100 mg p.o. every 12 hours in February 2020.  I recommend that she continue the current dose of flecainide.  She is tolerating the medication without side effects.    She may take an extra 100 mg x 1 during an episode of atrial fibrillation.   She will continue on Eliquis 5 mg p.o. every 12 hours. The DEI1CQ0-HAJa score is 3.  I order routine blood tests including CBC, comprehensive metabolic panel, TSH and magnesium level.        WPW (Jaycob-Parkinson-White syndrome)  Her status post catheter ablation procedure in  1997 - She has not had recurrence of supraventricular tachycardia.    Ebstein's anomaly of tricuspid valve  She is asymptomatic without signs or symptoms of heart failure. She has moderate tricuspid regurgitation and moderate pulmonary regurgitation.   I will order an echocardiogram to evaluate her tricuspid valve.    RBBB  It remains stable without progresison of conduction disease.  Her QRS duration has increased after adding flecainide which increased her QTc interval.    I will continue to monitor.    Dyslipidemia  She will continue atorvastatin 10 mg p.o. once a day.  I ordered a fasting lipid profile.    Long term current use of antiarrhythmic drug  She is on long-term antiarrhythmic drug therapy with flecainide.  Is tolerating the medication without side effects.  I ordered routine blood test to check her renal  function and electrolytes.    Return in about 6 months (around 9/2/2021).      Thank you for allowing me to participate in the care of your patient.  Please do not hesitate to contact me if you have any questions regarding my recommendations and management.    Sincerely;    Madhuri Cardoza MD [facility]    By signing my name below, I, Macey Mckenzie, attest that this documentation has been prepared under the direction and in the presence of Madhuri Gregorio MD Electronically signed: Pippa Dang. 3/2/2021 1:00 PM     I, Madhuri Gregorio MD, personally performed the services described in this documentation. All medical record entries made by the scribe were at my direction and in my presence. I have reviewed the chart and agree that the record reflects my personal performance and is accurate and complete. Madhuri Gregorio MD. 3/2/2021. 1:00 PM.

## 2021-03-08 ENCOUNTER — TELEPHONE (OUTPATIENT)
Dept: CARDIOLOGY | Facility: CLINIC | Age: 81
End: 2021-03-08

## 2021-03-08 NOTE — TELEPHONE ENCOUNTER
The patient was seen at  [facility] office March 2, 2020.  Please call the patient and let her know that reviewing her records show that she has not had an echocardiogram in almost 5 years.  I order an echocardiogram to evaluate her tricuspid valve since she has a history of Ebstein TV Anomaly and tricuspid regurgitation.  I recommend that the echocardiogram is performed at [facility] in the next few months.  Once the echocardiogram is completed I will contact her with the results.

## 2021-03-08 NOTE — ASSESSMENT & PLAN NOTE
She is on long-term antiarrhythmic drug therapy with flecainide.  Is tolerating the medication without side effects.  I ordered routine blood test to check her renal function and electrolytes.

## 2021-03-10 ENCOUNTER — APPOINTMENT (OUTPATIENT)
Dept: LAB | Facility: HOSPITAL | Age: 81
End: 2021-03-10
Attending: INTERNAL MEDICINE
Payer: MEDICARE

## 2021-03-10 DIAGNOSIS — Z79.899 LONG TERM CURRENT USE OF ANTIARRHYTHMIC DRUG: ICD-10-CM

## 2021-03-10 DIAGNOSIS — I48.0 PAROXYSMAL ATRIAL FIBRILLATION (CMS/HCC): ICD-10-CM

## 2021-03-10 DIAGNOSIS — E78.5 DYSLIPIDEMIA: ICD-10-CM

## 2021-03-10 LAB
ALBUMIN SERPL-MCNC: 4.1 G/DL (ref 3.4–5)
ALP SERPL-CCNC: 63 IU/L (ref 35–126)
ALT SERPL-CCNC: 26 IU/L (ref 11–54)
ANION GAP SERPL CALC-SCNC: 11 MEQ/L (ref 3–15)
AST SERPL-CCNC: 28 IU/L (ref 15–41)
BASOPHILS # BLD: 0.07 K/UL (ref 0.01–0.1)
BASOPHILS NFR BLD: 1.3 %
BILIRUB SERPL-MCNC: 1 MG/DL (ref 0.3–1.2)
BUN SERPL-MCNC: 16 MG/DL (ref 8–20)
CALCIUM SERPL-MCNC: 9.5 MG/DL (ref 8.9–10.3)
CHLORIDE SERPL-SCNC: 104 MEQ/L (ref 98–109)
CHOLEST SERPL-MCNC: 157 MG/DL
CO2 SERPL-SCNC: 26 MEQ/L (ref 22–32)
CREAT SERPL-MCNC: 0.8 MG/DL (ref 0.6–1.1)
DIFFERENTIAL METHOD BLD: ABNORMAL
EOSINOPHIL # BLD: 0.18 K/UL (ref 0.04–0.36)
EOSINOPHIL NFR BLD: 3.4 %
ERYTHROCYTE [DISTWIDTH] IN BLOOD BY AUTOMATED COUNT: 12.3 % (ref 11.7–14.4)
GFR SERPL CREATININE-BSD FRML MDRD: >60 ML/MIN/1.73M*2
GLUCOSE SERPL-MCNC: 83 MG/DL (ref 70–99)
HCT VFR BLDCO AUTO: 42.8 % (ref 35–45)
HDLC SERPL-MCNC: 63 MG/DL
HDLC SERPL: 2.5 {RATIO}
HGB BLD-MCNC: 14.2 G/DL (ref 11.8–15.7)
IMM GRANULOCYTES # BLD AUTO: 0.02 K/UL (ref 0–0.08)
IMM GRANULOCYTES NFR BLD AUTO: 0.4 %
LDLC SERPL CALC-MCNC: 81 MG/DL
LYMPHOCYTES # BLD: 1.73 K/UL (ref 1.2–3.5)
LYMPHOCYTES NFR BLD: 32.9 %
MAGNESIUM SERPL-MCNC: 2.1 MG/DL (ref 1.8–2.5)
MCH RBC QN AUTO: 32.6 PG (ref 28–33.2)
MCHC RBC AUTO-ENTMCNC: 33.2 G/DL (ref 32.2–35.5)
MCV RBC AUTO: 98.4 FL (ref 83–98)
MONOCYTES # BLD: 0.44 K/UL (ref 0.28–0.8)
MONOCYTES NFR BLD: 8.4 %
NEUTROPHILS # BLD: 2.82 K/UL (ref 1.7–7)
NEUTS SEG NFR BLD: 53.6 %
NONHDLC SERPL-MCNC: 94 MG/DL
NRBC BLD-RTO: 0 %
PDW BLD AUTO: 10.9 FL (ref 9.4–12.3)
PLATELET # BLD AUTO: 238 K/UL (ref 150–369)
POTASSIUM SERPL-SCNC: 4.2 MEQ/L (ref 3.6–5.1)
PROT SERPL-MCNC: 7 G/DL (ref 6–8.2)
RBC # BLD AUTO: 4.35 M/UL (ref 3.93–5.22)
SODIUM SERPL-SCNC: 141 MEQ/L (ref 136–144)
T4 FREE SERPL-MCNC: 0.84 NG/DL (ref 0.58–1.64)
TRIGL SERPL-MCNC: 67 MG/DL (ref 30–149)
TSH SERPL DL<=0.05 MIU/L-ACNC: 11.77 MIU/L (ref 0.34–5.6)
WBC # BLD AUTO: 5.26 K/UL (ref 3.8–10.5)

## 2021-03-10 PROCEDURE — 36415 COLL VENOUS BLD VENIPUNCTURE: CPT

## 2021-03-10 PROCEDURE — 84439 ASSAY OF FREE THYROXINE: CPT

## 2021-03-10 PROCEDURE — 83735 ASSAY OF MAGNESIUM: CPT

## 2021-03-10 PROCEDURE — 85025 COMPLETE CBC W/AUTO DIFF WBC: CPT

## 2021-03-10 PROCEDURE — 84443 ASSAY THYROID STIM HORMONE: CPT

## 2021-03-10 PROCEDURE — 80053 COMPREHEN METABOLIC PANEL: CPT

## 2021-03-10 PROCEDURE — 80061 LIPID PANEL: CPT

## 2021-03-11 ENCOUNTER — TELEPHONE (OUTPATIENT)
Dept: CARDIOLOGY | Facility: CLINIC | Age: 81
End: 2021-03-11

## 2021-03-11 NOTE — TELEPHONE ENCOUNTER
Please call the patient and let her know that her blood tests are within normal limits except that her TSH level is elevated and.  She needs follow-up with either Dr. Muniz or an endocrinologist if she sees an endocrinologist.  Her fasting lipid profile shows that her LDL level is mildly elevated as compared to her previous.  She should follow a low carbohydrate low-cholesterol diet.  I will asked the front office staff to send her primary care a copy of the blood tests.

## 2021-03-12 ENCOUNTER — TELEPHONE (OUTPATIENT)
Dept: CARDIOLOGY | Facility: CLINIC | Age: 81
End: 2021-03-12

## 2021-04-14 ENCOUNTER — HOSPITAL ENCOUNTER (OUTPATIENT)
Dept: CARDIOLOGY | Facility: HOSPITAL | Age: 81
Discharge: HOME | End: 2021-04-14
Attending: INTERNAL MEDICINE
Payer: MEDICARE

## 2021-04-14 VITALS
BODY MASS INDEX: 20.99 KG/M2 | WEIGHT: 126 LBS | SYSTOLIC BLOOD PRESSURE: 116 MMHG | HEIGHT: 65 IN | DIASTOLIC BLOOD PRESSURE: 80 MMHG | HEART RATE: 50 BPM

## 2021-04-14 DIAGNOSIS — Q22.5 EBSTEIN'S ANOMALY OF TRICUSPID VALVE: ICD-10-CM

## 2021-04-14 DIAGNOSIS — I36.1 NON-RHEUMATIC TRICUSPID VALVE INSUFFICIENCY: ICD-10-CM

## 2021-04-14 LAB
AORTIC ROOT ANNULUS - M-MODE: 2.86 CM
BSA FOR ECHO PROCEDURE: 1.62 M2
CUSP SEPARATION: 1.76 CM
DOP CALC LVOT STROKE VOLUME: 57.4 ML
DOP CALC RVOT VTI: 18.33 CM
E WAVE DECELERATION TIME: 175.69 MS
E/A RATIO: 1.2
E/E' RATIO: 11.61
E/LAT E' RATIO: 8.89
EDV (BP): 65.5 MILLILITER
EF (A4C): 62.09 PERCENT
EF A2C: 51.57 PERCENT
EJECTION FRACTION: 56.96 PERCENT
EST RIGHT VENT SYSTOLIC PRESSURE BY TRICUSPID REGURGITATION JET: 30 MMHG
ESV (BP): 28.19 MILLILITER
INTERVENTRICULAR SEPTUM: 0.97 CM
LA ESV (BP): 63.22 MILLILITER
LAAS-AP2: 18.67 SQUARE CENTIMETER
LAAS-AP4: 23.14 SQUARE CENTIMETER
LAD 2D - M-MODE: 3.5 CM
LAV-S: 50.9 MILLILITER
LEFT ATRIUM VOLUME: 77.75 MILLILITER
LEFT INTERNAL DIMENSION IN SYSTOLE: 2.41 CM (ref 2.32–3.51)
LEFT VENTRICLE DIASTOLIC VOLUME: 67 MILLILITER
LEFT VENTRICLE SYSTOLIC VOLUME: 25.4 MILLILITER
LEFT VENTRICULAR INTERNAL DIMENSION IN DIASTOLE: 4.11 CM (ref 3.9–5.42)
LEFT VENTRICULAR MASS: 112.98 GRAM
LEFT VENTRICULAR POSTERIOR WALL IN END DIASTOLE: 0.82 CM (ref 0.51–0.94)
LV DIASTOLIC VOLUME: 60.5 MILLILITER
LV ESV (APICAL 2 CHAMBER): 29.3 MILLILITER
LVCI: 1.27 LITERS PER MINUTE PER SQUARE METER
LVCO: 2.07 LITERS PER MINUTE
LVOT 2D: 1.88 CM
LVOT A: 2.77 SQUARE CENTIMETER
LVOT MG: 1.35 MMHG
LVOT MV: 0.54 METERS PER SECOND
LVOT PEAK VELOCITY: 0.82 METERS PER SECOND
LVOT VTI: 20.69 CM
MV E'TISSUE VEL-LAT: 0.08 METERS PER SECOND
MV E'TISSUE VEL-MED: 0.06 METERS PER SECOND
MV PEAK A VEL: 0.57 METERS PER SECOND
MV PEAK E VEL: 0.68 METERS PER SECOND
MV VALVE AREA P 1/2 METHOD: 4.32 SQUARE CENTIMETER
PI PEAK VELOCITY: 1.98 METERS PER SECOND
PULM VEIN S/D RATIO: 1.21
PULMONARY REGURGITATION LATE DIASTOLIC VELOCITY: 0.9 METERS PER SECOND
PV PEAK D VEL: 0.39 METERS PER SECOND
PV PEAK GRADIENT: 3.24 MMHG
PV PEAK S VEL: 0.48 METERS PER SECOND
TRICUSPID VALVE PEAK REGURGITATION VELOCITY: 2.53 METERS PER SECOND
Z-SCORE OF LEFT VENTRICULAR DIMENSION IN END DIASTOLE: -1.12
Z-SCORE OF LEFT VENTRICULAR DIMENSION IN END SYSTOLE: -1.33
Z-SCORE OF LEFT VENTRICULAR POSTERIOR WALL IN END DIASTOLE: 0.91

## 2021-04-14 PROCEDURE — 93306 TTE W/DOPPLER COMPLETE: CPT

## 2021-04-14 PROCEDURE — 93306 TTE W/DOPPLER COMPLETE: CPT | Mod: 26 | Performed by: INTERNAL MEDICINE

## 2021-04-15 ENCOUNTER — TELEPHONE (OUTPATIENT)
Dept: CARDIOLOGY | Facility: CLINIC | Age: 81
End: 2021-04-15

## 2021-04-15 NOTE — TELEPHONE ENCOUNTER
Please call the patient.  Her echocardiogram shows normal left ventricular systolic function and tricuspid regurgitation remains moderate, has not progressed.  She has a history of Ebstein anomaly of the tricuspid valve which is stable.

## 2021-04-16 ENCOUNTER — TRANSCRIBE ORDERS (OUTPATIENT)
Dept: REGISTRATION | Facility: HOSPITAL | Age: 81
End: 2021-04-16

## 2021-04-16 ENCOUNTER — APPOINTMENT (OUTPATIENT)
Dept: LAB | Facility: HOSPITAL | Age: 81
End: 2021-04-16
Attending: FAMILY MEDICINE
Payer: MEDICARE

## 2021-04-16 DIAGNOSIS — E03.9 HYPOTHYROIDISM, UNSPECIFIED: Primary | ICD-10-CM

## 2021-04-16 DIAGNOSIS — E03.9 HYPOTHYROIDISM, UNSPECIFIED: ICD-10-CM

## 2021-04-16 LAB
FT4I SERPL CALC-MCNC: 4.3 (ref 2.1–3.8)
T3RU NFR SERPL: 45.3 % (ref 32–48.4)
T4 FREE SERPL-MCNC: 0.78 NG/DL (ref 0.58–1.64)
TSH SERPL DL<=0.05 MIU/L-ACNC: 12.24 MIU/L (ref 0.34–5.6)

## 2021-04-16 PROCEDURE — 84479 ASSAY OF THYROID (T3 OR T4): CPT

## 2021-04-16 PROCEDURE — 84439 ASSAY OF FREE THYROXINE: CPT

## 2021-04-16 PROCEDURE — 36415 COLL VENOUS BLD VENIPUNCTURE: CPT

## 2021-04-16 PROCEDURE — 84443 ASSAY THYROID STIM HORMONE: CPT

## 2021-04-16 PROCEDURE — 84442 ASSAY OF THYROID ACTIVITY: CPT

## 2021-04-20 LAB — T4BG SERPL-MCNC: 30.2 MCG/ML (ref 13.5–30.9)

## 2021-04-27 ENCOUNTER — HOSPITAL ENCOUNTER (EMERGENCY)
Facility: HOSPITAL | Age: 81
Discharge: HOME | End: 2021-04-27
Attending: EMERGENCY MEDICINE
Payer: MEDICARE

## 2021-04-27 ENCOUNTER — APPOINTMENT (EMERGENCY)
Dept: RADIOLOGY | Facility: HOSPITAL | Age: 81
End: 2021-04-27
Attending: STUDENT IN AN ORGANIZED HEALTH CARE EDUCATION/TRAINING PROGRAM
Payer: MEDICARE

## 2021-04-27 VITALS
DIASTOLIC BLOOD PRESSURE: 82 MMHG | OXYGEN SATURATION: 98 % | BODY MASS INDEX: 21.16 KG/M2 | TEMPERATURE: 98.5 F | HEIGHT: 65 IN | SYSTOLIC BLOOD PRESSURE: 190 MMHG | HEART RATE: 61 BPM | WEIGHT: 127 LBS | RESPIRATION RATE: 18 BRPM
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
## 2021-04-27 PROCEDURE — 73130 X-RAY EXAM OF HAND: CPT | Mod: LT

## 2021-04-27 PROCEDURE — 63700000 HC SELF-ADMINISTRABLE DRUG: Performed by: STUDENT IN AN ORGANIZED HEALTH CARE EDUCATION/TRAINING PROGRAM

## 2021-04-27 PROCEDURE — 99283 EMERGENCY DEPT VISIT LOW MDM: CPT | Mod: 25

## 2021-04-27 RX ORDER — IBUPROFEN 600 MG/1
600 TABLET ORAL ONCE
Status: COMPLETED | OUTPATIENT
Start: 2021-04-27 | End: 2021-04-27

## 2021-04-27 RX ORDER — LEVOTHYROXINE SODIUM 75 UG/1
75 TABLET ORAL
COMMUNITY

## 2021-04-27 RX ADMIN — IBUPROFEN 600 MG: 600 TABLET, FILM COATED ORAL at 16:40

## 2021-04-27 ASSESSMENT — ENCOUNTER SYMPTOMS
WEAKNESS: 0
VOMITING: 0
ABDOMINAL PAIN: 0
FEVER: 0
CHILLS: 0
WOUND: 1
NUMBNESS: 0
SHORTNESS OF BREATH: 0

## 2021-04-27 NOTE — DISCHARGE INSTRUCTIONS
You were evaluated in the Emergency Department (ED) for your complaint of cat bite, hand and arm pain. Imaging were done in addition to your physical examination, and we did not find a life-threatening condition that would require hospitalization, new medication, or specific treatment such as a procedure done at this point.    10 you to take your Augmentin twice a day for the full course.  You can take ibuprofen 600 mg every 6 hours (or no more than 3,200 mg  total in 24 hours), or acetaminophen 1,00 mg (no more than 4,000 mg total in 24 hours) for your pain.    Please follow-up with your primary care physician (PCP) for further evaluation. If you do not have one, please call the  number for [facility] number provided below to set-up an appointment.    If your condition worsens after 24 to 48 hours of starting antibiotics, please return to the ED.

## 2021-04-27 NOTE — ED PROVIDER NOTES
Emergency Medicine Note  HPI   HISTORY OF PRESENT ILLNESS     HPI     81-year-old female with PMH of atrial fibrillation on Eliquis, hypothyroidism presenting with cat bite this morning now with worsening left hand pain.  Patient's cat who is fully immunized bit her this morning, patient went to urgent care and was prescribed Augmentin.  Patient took Augmentin first dose this morning, however patient's pain has worsened and has started to radiate to her left forearm.  She has taken Tylenol 1000 mg 2 hours ago without relief so she went to the ED.  Patient denies any fevers.      Patient History   PAST HISTORY     Reviewed from Nursing Triage:      Past Medical History:   Diagnosis Date   • Alcoholism (CMS/HCC)    • Arrhythmia    • Chronic atrial fibrillation (CMS/HCC)    • Hypothyroidism    • Osteoporosis        Past Surgical History:   Procedure Laterality Date   • ABLATION OF DYSRHYTHMIC FOCUS     • CHOLECYSTECTOMY     • COSMETIC SURGERY     • EYE SURGERY     • SKIN BIOPSY         Family History   Problem Relation Age of Onset   • Heart disease Biological Mother    • Heart disease Biological Father    • Heart attack Biological Father    • Heart failure Biological Father    • Anemia Biological Sister    • Clotting disorder Biological Sister    • Fainting Biological Sister    • Hypertension Biological Sister        Social History     Tobacco Use   • Smoking status: Former Smoker     Quit date:      Years since quittin.3   • Smokeless tobacco: Never Used   Vaping Use   • Vaping Use: Never used   Substance Use Topics   • Alcohol use: No   • Drug use: No         Review of Systems   REVIEW OF SYSTEMS     Review of Systems   Constitutional: Negative for chills and fever.   Respiratory: Negative for shortness of breath.    Cardiovascular: Negative for chest pain.   Gastrointestinal: Negative for abdominal pain and vomiting.   Skin: Positive for wound.   Neurological: Negative for weakness and numbness.   All  other systems reviewed and are negative.        VITALS     ED Vitals    Date/Time Temp Pulse Resp BP SpO2 [facility]   04/27/21 1601 36.9 °C (98.5 °F) 61 18 190/82 98 % [facility]                       Physical Exam   PHYSICAL EXAM     Physical Exam  Vitals and nursing note reviewed.   Constitutional:       General: She is not in acute distress.     Appearance: She is well-developed. She is not ill-appearing.   HENT:      Head: Normocephalic and atraumatic.   Eyes:      Conjunctiva/sclera: Conjunctivae normal.   Cardiovascular:      Rate and Rhythm: Normal rate and regular rhythm.      Heart sounds: No murmur heard.        Comments: Radial pulses symmetric, 2+  Pulmonary:      Effort: Pulmonary effort is normal. No respiratory distress.      Breath sounds: Normal breath sounds.   Abdominal:      Palpations: Abdomen is soft.      Tenderness: There is no abdominal tenderness.   Musculoskeletal:      Cervical back: Neck supple.      Comments: Swelling of left dorsal hand with 2 puncture wounds.   strength difficult to assess given pain elicited however strength is distally intact.  Sensation grossly intact.   Skin:     General: Skin is warm and dry.   Neurological:      Mental Status: She is alert and oriented to person, place, and time.           PROCEDURES     Procedures     DATA     Results     None          Imaging Results    None         No orders to display       Scoring tools                                 ED Course & MDM   MDM / ED COURSE and CLINICAL IMPRESSIONS     MDM    81-year-old female with PMH of atrial fibrillation on Eliquis, hypothyroidism presenting with cat bite this morning now with worsening left hand pain.  Patient already on Augmentin.  Will get x-ray of left hand to ensure no retained foreign object from the cat bite fractures, give patient ibuprofen.  Renetta VALDERRAMA.    Please refer to work-up tab for further MDM.    Supervised by Dr. Weaver.    ABNER Talbert, PGY2      ED Course as of Apr 27 2145   Tue  Apr 27, 2021   1721 IMPRESSION:  Osteopenia.  No acute fracture or dislocation identified about the left hand.  Soft tissue swelling overlying the dorsum of the hand and wrist.  No radiopaque  foreign body appreciated.   X-RAY HAND LEFT 3+ VIEWS [JL]      ED Course User Index  [JL] Louis Talbert MD         Clinical Impressions as of Apr 27 2145   Cat bite, initial encounter            Louis Talbert MD  Resident  04/27/21 2145

## 2021-04-27 NOTE — ED ATTESTATION NOTE
The patient was evaluated and managed by the PA/NP.  I have discussed the case with the PA/NP and I have reviewed the patients imaging studies.  I am in agreement with the ED workup and treatment plan.  I will continue to be available for consultation as needed.     Godshall, Duane K, MD  04/27/21 8623

## 2021-06-04 ENCOUNTER — APPOINTMENT (OUTPATIENT)
Dept: LAB | Facility: HOSPITAL | Age: 81
End: 2021-06-04
Attending: FAMILY MEDICINE
Payer: MEDICARE

## 2021-06-04 ENCOUNTER — TRANSCRIBE ORDERS (OUTPATIENT)
Dept: REGISTRATION | Facility: HOSPITAL | Age: 81
End: 2021-06-04

## 2021-06-04 DIAGNOSIS — E03.9 HYPOTHYROIDISM, UNSPECIFIED: ICD-10-CM

## 2021-06-04 DIAGNOSIS — E03.9 HYPOTHYROIDISM, UNSPECIFIED: Primary | ICD-10-CM

## 2021-06-04 LAB
FT4I SERPL CALC-MCNC: 4.92 (ref 2.1–3.8)
T4 SERPL-MCNC: 11.4 UG/DL (ref 5.9–12.2)
TSH SERPL DL<=0.05 MIU/L-ACNC: 8.1 MIU/L (ref 0.34–5.6)

## 2021-06-04 PROCEDURE — 84443 ASSAY THYROID STIM HORMONE: CPT

## 2021-06-04 PROCEDURE — 84436 ASSAY OF TOTAL THYROXINE: CPT

## 2021-06-04 PROCEDURE — 36415 COLL VENOUS BLD VENIPUNCTURE: CPT

## 2021-07-02 ENCOUNTER — OFFICE VISIT (OUTPATIENT)
Dept: SURGERY | Facility: CLINIC | Age: 81
End: 2021-07-02
Payer: MEDICARE

## 2021-07-02 VITALS
OXYGEN SATURATION: 98 % | TEMPERATURE: 98.2 F | HEIGHT: 65 IN | WEIGHT: 123.6 LBS | SYSTOLIC BLOOD PRESSURE: 120 MMHG | HEART RATE: 86 BPM | BODY MASS INDEX: 20.59 KG/M2 | DIASTOLIC BLOOD PRESSURE: 60 MMHG

## 2021-07-02 DIAGNOSIS — N63.20 MASS OF LEFT BREAST: Primary | ICD-10-CM

## 2021-07-02 PROCEDURE — 99203 OFFICE O/P NEW LOW 30 MIN: CPT | Mod: 25 | Performed by: SURGERY

## 2021-07-02 PROCEDURE — 88361 TUMOR IMMUNOHISTOCHEM/COMPUT: CPT | Mod: 59 | Performed by: SURGERY

## 2021-07-02 PROCEDURE — 19083 BX BREAST 1ST LESION US IMAG: CPT | Performed by: SURGERY

## 2021-07-02 ASSESSMENT — ENCOUNTER SYMPTOMS
EYES NEGATIVE: 1
GASTROINTESTINAL NEGATIVE: 1
PALPITATIONS: 1
NEUROLOGICAL NEGATIVE: 1
RESPIRATORY NEGATIVE: 1
ENDOCRINE NEGATIVE: 1
MUSCULOSKELETAL NEGATIVE: 1
PSYCHIATRIC NEGATIVE: 1
CONSTITUTIONAL NEGATIVE: 1
BRUISES/BLEEDS EASILY: 1

## 2021-07-02 NOTE — PROGRESS NOTES
Patient ID: Orly Cobian                              : 1940    Visit Date: 2021  Referring Provider: Anton Muniz MD  PCP: Anton Muniz MD  GYN: No care team member to display      Chief Complaint: Breast Pain    Orly Cobian is a 81 y.o. old female presenting today for left breast pain.  Marine states about 3 weeks ago, after starting her levothyroxine, she started feeling left breast pain.  The pain is more lateral and sometimes medial to her nipple.  It is getting worse.  She thought it was related to her levothyroxine, but her endocrinologist recommended she see a breast surgeon.  She states that on her self-exam, she feels that her breast tissue is thicker in the lateral and retroareolar region.  No nipple discharge, no skin changes.    She has had 2 prior biopsies in the left breast in  as well as early s, both were benign.    She does have a history of Jaycob-Parkinson-White syndrome and A. fib, currently on Eliquis.    Breast Health:  Age at menarche: 10  Age at first live birth: 28   Age at menopause: 48  Breastfeeding? yes, 6 weeks  HRT: Was on hormone replacement therapy for about 5 years, and currently using estrogen cream twice weekly for 6 years.  Fertility Tx: no  OCP: no     Medications: has a current medication list which includes the following prescription(s): apixaban, atorvastatin, estradiol, flecainide, latanoprost, levothyroxine, lutein-zeaxanthin, metoprolol succinate xl, and omega 3-dha-epa-fish oil.    Allergies: is allergic to erythromycin base and erythromycin.    Family History: family history includes Anemia in her biological sister; Clotting disorder in her biological sister; Fainting in her biological sister; Heart attack in her biological father; Heart disease in her biological father and biological mother; Heart failure in her biological father; Hypertension in her biological sister.  Past Medical History:  has a past medical history of  "Alcoholism (CMS/HCC), Arrhythmia, Chronic atrial fibrillation (CMS/HCC), Hypothyroidism, and Osteoporosis. She also has no past medical history of Hyperthyroidism.  Past Surgical History:  has a past surgical history that includes Cholecystectomy; Cosmetic surgery; Eye surgery; Skin biopsy; Ablation of dysrhythmic focus; and Breast biopsy.  Social History:  reports that she quit smoking about 59 years ago. She has never used smokeless tobacco. She reports that she does not drink alcohol and does not use drugs.       Review of Systems   Constitutional: Negative.    HENT: Negative.    Eyes: Negative.    Respiratory: Negative.    Cardiovascular: Positive for palpitations.   Gastrointestinal: Negative.    Endocrine: Negative.    Genitourinary: Negative.    Musculoskeletal: Negative.    Skin: Negative.    Neurological: Negative.    Hematological: Bruises/bleeds easily.   Psychiatric/Behavioral: Negative.    All other systems reviewed and are negative.    The following have been reviewed and updated as appropriate in this visit:         Objective   Vitals:   Visit Vitals  /60   Pulse 86   Temp 36.8 °C (98.2 °F)   Ht 1.651 m (5' 5\")   Wt 56.1 kg (123 lb 9.6 oz)   SpO2 98%   BMI 20.57 kg/m²       Physical Exam  Vitals reviewed.   Constitutional:       Appearance: Normal appearance. She is normal weight.   HENT:      Head: Normocephalic and atraumatic.      Nose: Nose normal.      Mouth/Throat:      Mouth: Mucous membranes are moist.      Pharynx: Oropharynx is clear.   Eyes:      Extraocular Movements: Extraocular movements intact.      Conjunctiva/sclera: Conjunctivae normal.      Pupils: Pupils are equal, round, and reactive to light.   Cardiovascular:      Rate and Rhythm: Normal rate and regular rhythm.      Pulses: Normal pulses.      Heart sounds: Normal heart sounds.   Pulmonary:      Effort: Pulmonary effort is normal.      Breath sounds: Normal breath sounds.   Chest:      Breasts: Breasts are symmetrical.    "      Right: Normal. No inverted nipple, mass, nipple discharge, skin change or tenderness.         Left: Mass present. No inverted nipple, nipple discharge, skin change or tenderness.          Comments: There is a large palpable mass in the left lateral breast extending to the retroareolar region.  On external exam there is some skin dimpling in the inferior region.  On ultrasound there is a irregular mass mesenteric measuring about 3 cm at the 3 o'clock position approximately 4 cm from the nipple..  Abdominal:      General: Abdomen is flat. Bowel sounds are normal.      Palpations: Abdomen is soft.   Musculoskeletal:         General: Normal range of motion.      Cervical back: Normal range of motion and neck supple.   Lymphadenopathy:      Upper Body:      Right upper body: No supraclavicular, axillary or pectoral adenopathy.      Left upper body: No supraclavicular, axillary or pectoral adenopathy.   Skin:     General: Skin is warm and dry.      Capillary Refill: Capillary refill takes less than 2 seconds.   Neurological:      General: No focal deficit present.      Mental Status: She is alert and oriented to person, place, and time.   Psychiatric:         Mood and Affect: Mood normal.         Behavior: Behavior normal.            Breast Imaging:   Screening mammogram in January 2021 done at [facility] showed no suspicious findings.    Ultrasound done in the office today showed a very irregular mass of around 3cm located at the 3 o'clock position approximately 4 cm from the nipple.    Assessment/Plan   Problem List Items Addressed This Visit        Other    Mass of left breast - Primary        The films were reviewed personally in the office, and the options for management were discussed with the patient.   The mass on ultrasound is very concerning for malignancy. An u/s guided vacuum assisted core biopsy was recommended and the patient was comfortable with this option. The procedure, risks and  benefits were discussed. Risks including bleeding, infection, hematoma, miss biopsy, bruising, discomfort, and numbness were discussed. We reviewed that on a rare occasion operative intervention is needed for persistent bleeding from the biopsy site. The patient was made aware of the marker that would be placed to jeimy the site of the biopsy. The marker is made of titanium or titanium and bovine collagen. The patient is aware that this marker is used in the event that further surgical excision is recommended based on biopsy results. Potential indications for further surgical excision include cancer, precancerous lesions, indeterminate lesions and discordant biopsy results. All of her questions and concerns were addressed and she will be scheduled as soon as possible.     All of the questions were answered.  The patient is in agreement with the treatment plan.      Iglesia Avila MD

## 2021-07-02 NOTE — PROGRESS NOTES
The left breast was scanned with ultrasound in the area of concern at 3:00, 4 cm from the nipple.  A lesion was identified that measured approximately 3cm.     With the patient's permission, we proceeded with an ultrasound-guided core biopsy.  The skin was prepped with alcohol, infiltrated with  and a 12 [place] vacuum assisted core biopsy needle was advanced up to the mass.  The biopsy needle was fired through the lesion under ultrasound guidance and the tissue sample was removed.  This was repeated 3 times and the pathology sent to the lab in formalin.    There was no significant bleeding at the end of the procedure.  A clip was deployed within the mass under direct ultrasound visualization.  Benzoin and Steri-Strips were applied to the mammotomy.  Standard postprocedure instructions were given to the patient.

## 2021-07-04 RX ORDER — TRAMADOL HYDROCHLORIDE 50 MG/1
50 TABLET ORAL EVERY 6 HOURS PRN
Qty: 4 TABLET | Refills: 0 | Status: SHIPPED | OUTPATIENT
Start: 2021-07-04 | End: 2021-07-14

## 2021-07-04 RX ORDER — AMOXICILLIN AND CLAVULANATE POTASSIUM 875; 125 MG/1; MG/1
1 TABLET, FILM COATED ORAL
Qty: 14 TABLET | Refills: 0 | Status: SHIPPED | OUTPATIENT
Start: 2021-07-04 | End: 2021-07-11

## 2021-07-04 NOTE — PROGRESS NOTES
Spoke to Orly over the phone. She states she spiked a fever of 100.5. She states her breast is still soft but it is a little warm to touch and slightly red. I ordered some augmentin. I instructed Orly to go to ED if her fevers persist on tylenol, or if her breasts get more red/hot, or gets hard to the touch.

## 2021-07-04 NOTE — PROGRESS NOTES
Patient called for pain at biopsy site. She denies any swelling of the breast, denies increased redness/bruising, denies any hardness to the breast, denies fevers, denies bleeding from biopsy site. She does state that she has had pain all night and could not sleep and got chills this morning. She takes tylenol, but cannot take motrin due to blood thinner.  Since she cannot take motrin, I will call in a small dose of tramadol for pain control. I only called in four pills. If she still needs pain meds by Tuesday, I instructed her to call the office and come in to be seen. She is concerned about covid symptoms. I instructed her that if the chills does not resolve with the pain control, she should go into ED to get tested.

## 2021-07-06 RX ORDER — FLECAINIDE ACETATE 100 MG/1
100 TABLET ORAL EVERY 12 HOURS
Qty: 180 TABLET | Refills: 3 | Status: SHIPPED | OUTPATIENT
Start: 2021-07-06 | End: 2022-07-05 | Stop reason: SDUPTHER

## 2021-07-06 RX ORDER — METOPROLOL SUCCINATE 25 MG/1
25 TABLET, EXTENDED RELEASE ORAL DAILY
Qty: 90 TABLET | Refills: 3 | Status: SHIPPED | OUTPATIENT
Start: 2021-07-06 | End: 2022-06-17 | Stop reason: ENTERED-IN-ERROR

## 2021-07-06 NOTE — TELEPHONE ENCOUNTER
Medicine Refill Request    Last Office Visit: Visit date not found  Last Telemedicine Visit: Visit date not found    Next Office Visit: Visit date not found  Next Telemedicine Visit: Visit date not found     Pt requesting refill for metoprolol and flecainide. #90 with refills      Current Outpatient Medications:   •  amoxicillin-pot clavulanate (AUGMENTIN) 875-125 mg per tablet, Take 1 tablet by mouth every 12 (twelve) hours for 7 days., Disp: 14 tablet, Rfl: 0  •  apixaban (ELIQUIS) 5 mg tablet, Take 1 tablet (5 mg total) by mouth 2 (two) times a day., Disp: 180 tablet, Rfl: 3  •  atorvastatin (LIPITOR) 10 mg tablet, Take 1 tablet (10 mg total) by mouth daily., Disp: 90 tablet, Rfl: 3  •  estradiol (ESTRACE) 0.01 % (0.1 mg/gram) vaginal cream, No SIG Entered, Disp: , Rfl:   •  flecainide (TAMBOCOR) 100 mg tablet, Take 1 tablet (100 mg total) by mouth every 12 (twelve) hours., Disp: 180 tablet, Rfl: 3  •  latanoprost (XALATAN) 0.005 % ophthalmic solution, 0.005 %., Disp: , Rfl:   •  levothyroxine (SYNTHROID) 25 mcg tablet, Take 25 mcg by mouth daily., Disp: , Rfl:   •  lutein-zeaxanthin (OCUVITE LUTEIN 25) 25-5 mg capsule, take 1 capsule po daily, Disp: , Rfl:   •  metoprolol succinate XL (TOPROL-XL) 25 mg 24 hr tablet, Take 1 tablet (25 mg total) by mouth daily., Disp: 90 tablet, Rfl: 3  •  omega 3-dha-epa-fish oil (FISH OIL) capsule, 1,200 mg., Disp: , Rfl:   •  traMADoL (ULTRAM) 50 mg tablet, Take 1 tablet (50 mg total) by mouth every 6 (six) hours as needed for moderate pain for up to 10 days., Disp: 4 tablet, Rfl: 0      BP Readings from Last 3 Encounters:   07/02/21 120/60   04/27/21 (!) 190/82   04/14/21 116/80       Recent Lab results:  Lab Results   Component Value Date    CHOL 157 03/10/2021   ,   Lab Results   Component Value Date    HDL 63 03/10/2021   ,   Lab Results   Component Value Date    LDLCALC 81 03/10/2021   ,   Lab Results   Component Value Date    TRIG 67 03/10/2021        Lab Results    Component Value Date    GLUCOSE 83 03/10/2021   , No results found for: HGBA1C      Lab Results   Component Value Date    CREATININE 0.8 03/10/2021       Lab Results   Component Value Date    TSH 8.10 (H) 06/04/2021

## 2021-07-07 PROBLEM — C50.112 MALIGNANT NEOPLASM OF CENTRAL PORTION OF LEFT FEMALE BREAST (CMS/HCC): Status: ACTIVE | Noted: 2021-07-07

## 2021-07-08 ENCOUNTER — TELEPHONE (OUTPATIENT)
Dept: SURGERY | Facility: CLINIC | Age: 81
End: 2021-07-08

## 2021-07-08 NOTE — TELEPHONE ENCOUNTER
10 : 17 AM - Per patient's request I have faxed mammogram and ultrasound script to [facility] - outpatient imaging .     Fax # [phone]

## 2021-07-12 ENCOUNTER — TELEPHONE (OUTPATIENT)
Dept: SURGERY | Facility: CLINIC | Age: 81
End: 2021-07-12

## 2021-07-12 NOTE — TELEPHONE ENCOUNTER
I faxed 's office note & path report to Pilar from [facility] @ [place]. Patient is scheduled to have mammogram & ultrasound there . [facility] was requesting films from biopsy however I explained that it was done under ultrasound in office therefore we do not have imaging only office note & path report.     Fax # [phone]

## 2021-07-14 ENCOUNTER — CONSULT (OUTPATIENT)
Dept: SURGERY | Facility: CLINIC | Age: 81
End: 2021-07-14
Payer: MEDICARE

## 2021-07-14 VITALS
BODY MASS INDEX: 20.43 KG/M2 | OXYGEN SATURATION: 98 % | SYSTOLIC BLOOD PRESSURE: 132 MMHG | HEIGHT: 65 IN | WEIGHT: 122.6 LBS | TEMPERATURE: 98.2 F | HEART RATE: 61 BPM | DIASTOLIC BLOOD PRESSURE: 78 MMHG

## 2021-07-14 DIAGNOSIS — Z17.0 MALIGNANT NEOPLASM OF CENTRAL PORTION OF LEFT BREAST IN FEMALE, ESTROGEN RECEPTOR POSITIVE (CMS/HCC): Primary | ICD-10-CM

## 2021-07-14 DIAGNOSIS — C50.112 MALIGNANT NEOPLASM OF CENTRAL PORTION OF LEFT BREAST IN FEMALE, ESTROGEN RECEPTOR POSITIVE (CMS/HCC): Primary | ICD-10-CM

## 2021-07-14 PROCEDURE — 99215 OFFICE O/P EST HI 40 MIN: CPT | Performed by: SURGERY

## 2021-07-17 NOTE — PROGRESS NOTES
Patient ID: Orly Cobian                              : 1940    Visit Date: 2021  Referring Provider: No ref. provider found  PCP: Anton Muniz MD  GYN: No care team member to display    Subjective     Chief Complaint: Pre-op Exam      Orly Cobian is a 81 y.o. old female presenting today for discussion of treatment options for left breast cancer.  50 minutes was spent in reviewing her pathology, medical history, imaging and developing a treatment plan.    The patient presented with the left breast pain.  Initially she thought this may be related to initiation of levothyroxine therapy.  When the pain persisted more than a few days she presented for clinical evaluation.  In the office there was a 2 and half centimeter palpable mass in the retroareolar position of the left breast.  Ultrasound the office suggested a regular hypoechoic shadowing mass for which core needle biopsy was recommended and subsequently performed.  Pathology revealed invasive ductal carcinoma.  This was ER positive at 99%, and IA positive at 95%, HER-2 negative (0), and Ki-67 at 2%.    We had ordered diagnostic imaging including ultrasound which performed at [place].  I did not have access to the images but the radiology report identified a 1.6 x 1.7 x 2.1 cm at the 3:00 4 cm from the nipple.  The biopsy clip is identified.  Right breast showed no suspicious findings and there were no enlarged axillary lymph nodes to suggest metastatic disease.    We described her tumor as low proliferative index and potentially low risk cancer based on her pathology profile.  We discussed the treatment options.  While a mastectomy would be appropriate, she would be a candidate for a lumpectomy.  The tumor is close to the skin at the 3 o'clock position and to improve margin small amount of skin would be taken en bloc with the specimen, but there is no clinical indication of dermal involvement.    With a negative clinical exam and no  enlargement of lymph nodes by ultrasound, I have suggested that ask her sample could be avoided and I have not recommended a sentinel lymph node biopsy.  As the tumor is T2N0 (clinical) and close to the skin, consultation with postoperative radiation therapy would be indicated.  Radiation may be targeted partial breast radiation lasting 1 week or hypofractionated whole breast radiation over 4 weeks.    Lastly we talked about systemic therapy.  She would be candidate for aromatase inhibitor therapy but I do not think she would be a candidate for postoperative adjuvant chemotherapy.  We did describe Oncotype DX testing as the standard but with her presentation and demographic I believe the aromatase inhibitor therapy would be adequate and appropriate.    She does have a history of A. fib after ablation for Jaycob-Parkinson-White, but otherwise should tolerate a lumpectomy without difficulty.       Review of Systems    Oncology History    No history exists.        Medications: has a current medication list which includes the following prescription(s): apixaban, atorvastatin, estradiol, flecainide, latanoprost, levothyroxine, lutein-zeaxanthin, metoprolol succinate xl, and omega 3-dha-epa-fish oil.      Allergies: is allergic to erythromycin base and erythromycin.    Family History:   Family History   Problem Relation Age of Onset   • Heart disease Biological Mother    • Heart disease Biological Father    • Heart attack Biological Father    • Heart failure Biological Father    • Anemia Biological Sister    • Clotting disorder Biological Sister    • Fainting Biological Sister    • Hypertension Biological Sister        Past Medical History:  has a past medical history of Alcoholism (CMS/HCC), Arrhythmia, Chronic atrial fibrillation (CMS/HCC), Hypothyroidism, and Osteoporosis. She also has no past medical history of Hyperthyroidism.    Past Surgical History:  has a past surgical history that includes Cholecystectomy;  "Cosmetic surgery; Eye surgery; Skin biopsy; Ablation of dysrhythmic focus; and Breast biopsy.    Social History:  reports that she quit smoking about 59 years ago. She has never used smokeless tobacco. She reports that she does not drink alcohol and does not use drugs.       The following have been reviewed and updated as appropriate in this visit:  Allergies  Meds  Problems         Objective   Vitals:   Visit Vitals  /78   Pulse 61   Temp 36.8 °C (98.2 °F)   Ht 1.651 m (5' 5\")   Wt 55.6 kg (122 lb 9.6 oz)   SpO2 98%   BMI 20.40 kg/m²       Physical Exam         Assessment/Plan   Problem List Items Addressed This Visit        Other    Malignant neoplasm of central portion of left female breast (CMS/HCC) - Primary        We described the recommendation for a left breast lumpectomy with a margin of skin as primary treatment.  This should be followed with consideration for radiotherapy and aromatase inhibitor antiestrogen therapy for minimum of 5 years.    She will be obtain a second opinion from [facility].  We stand ready to assist if requested.       No follow-ups on file.      Nitin Caballero MD          "

## 2021-07-19 ENCOUNTER — SOCIAL WORK (OUTPATIENT)
Dept: HEMATOLOGY/ONCOLOGY | Facility: HOSPITAL | Age: 81
End: 2021-07-19

## 2021-07-19 NOTE — PROGRESS NOTES
Distress Screening Follow Up Note    Orly ZAK Mango  1940    July 19, 2021    Distress Score: (!) 5 (7/19/2021  1:32 PM)  Practical Concerns: Treatment decisions (7/19/2021  1:32 PM)  Family Concerns: None (7/19/2021  1:32 PM)  Emotional Concerns: Nervousness/anxiety;Worry/fears (7/19/2021  1:32 PM)  Spiritual/Caodaism Concerns: No (7/19/2021  1:32 PM)  Physical Concerns: Concentration/memory (7/19/2021  1:32 PM)      Please see above results of Distress Screening Tool.  The following plan will be implemented based on the results of the screening:    I contacted pt to discuss distress screening. Left a message with my name and contact information and requested pt call back at her convenience.

## 2021-07-22 ENCOUNTER — TELEPHONE (OUTPATIENT)
Dept: SCHEDULING | Facility: CLINIC | Age: 81
End: 2021-07-22

## 2021-07-22 LAB
CASE RPRT: NORMAL
CLINICAL INFO: NORMAL
PATH REPORT.ADDENDUM SPEC: NORMAL
PATH REPORT.ADDENDUM SPEC: NORMAL
PATH REPORT.FINAL DX SPEC: NORMAL
PATH REPORT.FINAL DX SPEC: NORMAL
PATH REPORT.GROSS SPEC: NORMAL

## 2021-07-22 NOTE — TELEPHONE ENCOUNTER
Cardiac Clearance     Name of caller: Misty    Relationship to patient: Dr Joe-[facility]     Name of patient: Orly Cobian    Name of physician: Madhuri Gregorio MD    Date of Surgery: 8/3/21    Type of Surgery: left breast partial mastectomy     Name of surgeon: Dr Joe    Office contact number: [phone]    Office fax number: [phone]    Addendums  Is patient able to be cleared based on last appt on 3/2/21    Additional notes: Misty states they will also be faxing CCV form to our office

## 2021-07-23 NOTE — TELEPHONE ENCOUNTER
Pls let pt know Dr Gregorio recommends holding Eliquis 2 days prior to surgery with restart ASAP at the discretion of her surgeon.

## 2021-07-23 NOTE — TELEPHONE ENCOUNTER
Patient received message that cc letter has been faxed, but wants to know when she should d/c [facility]    [phone]

## 2021-07-26 ENCOUNTER — APPOINTMENT (OUTPATIENT)
Dept: LAB | Facility: HOSPITAL | Age: 81
End: 2021-07-26
Attending: FAMILY MEDICINE
Payer: MEDICARE

## 2021-07-26 ENCOUNTER — TRANSCRIBE ORDERS (OUTPATIENT)
Dept: LAB | Facility: HOSPITAL | Age: 81
End: 2021-07-26

## 2021-07-26 DIAGNOSIS — E03.9 HYPOTHYROIDISM, UNSPECIFIED: Primary | ICD-10-CM

## 2021-07-26 DIAGNOSIS — E03.9 HYPOTHYROIDISM, UNSPECIFIED: ICD-10-CM

## 2021-07-26 LAB
T4 FREE SERPL-MCNC: 1.19 NG/DL (ref 0.58–1.64)
TSH SERPL DL<=0.05 MIU/L-ACNC: 4.54 MIU/L (ref 0.34–5.6)

## 2021-07-26 PROCEDURE — 84439 ASSAY OF FREE THYROXINE: CPT

## 2021-07-26 PROCEDURE — 36415 COLL VENOUS BLD VENIPUNCTURE: CPT

## 2021-07-26 PROCEDURE — 84443 ASSAY THYROID STIM HORMONE: CPT

## 2021-07-26 RX ORDER — ATORVASTATIN CALCIUM 10 MG/1
10 TABLET, FILM COATED ORAL DAILY
Qty: 90 TABLET | Refills: 3 | Status: SHIPPED | OUTPATIENT
Start: 2021-07-26 | End: 2022-06-17 | Stop reason: ENTERED-IN-ERROR

## 2021-07-26 NOTE — TELEPHONE ENCOUNTER
Medicine Refill Request    Last Office Visit: Visit date not found  Last Telemedicine Visit: Visit date not found    Next Office Visit: Visit date not found  Next Telemedicine Visit: Visit date not found     atorvastatin (LIPITOR) 10 mg tablet        Current Outpatient Medications:   •  apixaban (ELIQUIS) 5 mg tablet, Take 1 tablet (5 mg total) by mouth 2 (two) times a day., Disp: 180 tablet, Rfl: 3  •  atorvastatin (LIPITOR) 10 mg tablet, Take 1 tablet (10 mg total) by mouth daily., Disp: 90 tablet, Rfl: 3  •  estradiol (ESTRACE) 0.01 % (0.1 mg/gram) vaginal cream, No SIG Entered, Disp: , Rfl:   •  flecainide (TAMBOCOR) 100 mg tablet, Take 1 tablet (100 mg total) by mouth every 12 (twelve) hours., Disp: 180 tablet, Rfl: 3  •  latanoprost (XALATAN) 0.005 % ophthalmic solution, 0.005 %., Disp: , Rfl:   •  levothyroxine (SYNTHROID) 25 mcg tablet, Take 25 mcg by mouth daily., Disp: , Rfl:   •  lutein-zeaxanthin (OCUVITE LUTEIN 25) 25-5 mg capsule, take 1 capsule po daily, Disp: , Rfl:   •  metoprolol succinate XL (TOPROL-XL) 25 mg 24 hr tablet, Take 1 tablet (25 mg total) by mouth daily., Disp: 90 tablet, Rfl: 3      BP Readings from Last 3 Encounters:   07/14/21 132/78   07/02/21 120/60   04/27/21 (!) 190/82       Recent Lab results:  Lab Results   Component Value Date    CHOL 157 03/10/2021   ,   Lab Results   Component Value Date    HDL 63 03/10/2021   ,   Lab Results   Component Value Date    LDLCALC 81 03/10/2021   ,   Lab Results   Component Value Date    TRIG 67 03/10/2021        Lab Results   Component Value Date    GLUCOSE 83 03/10/2021   , No results found for: HGBA1C      Lab Results   Component Value Date    CREATININE 0.8 03/10/2021       Lab Results   Component Value Date    TSH 8.10 (H) 06/04/2021

## 2021-09-07 ENCOUNTER — OFFICE VISIT (OUTPATIENT)
Dept: CARDIOLOGY | Facility: CLINIC | Age: 81
End: 2021-09-07
Payer: MEDICARE

## 2021-09-07 VITALS
DIASTOLIC BLOOD PRESSURE: 78 MMHG | BODY MASS INDEX: 20.16 KG/M2 | SYSTOLIC BLOOD PRESSURE: 100 MMHG | HEIGHT: 65 IN | RESPIRATION RATE: 14 BRPM | TEMPERATURE: 97.4 F | HEART RATE: 59 BPM | OXYGEN SATURATION: 99 % | WEIGHT: 121.02 LBS

## 2021-09-07 DIAGNOSIS — I48.0 PAROXYSMAL ATRIAL FIBRILLATION (CMS/HCC): Primary | ICD-10-CM

## 2021-09-07 DIAGNOSIS — Z79.899 LONG TERM CURRENT USE OF ANTIARRHYTHMIC DRUG: ICD-10-CM

## 2021-09-07 DIAGNOSIS — I36.1 NON-RHEUMATIC TRICUSPID VALVE INSUFFICIENCY: ICD-10-CM

## 2021-09-07 DIAGNOSIS — I45.6 WPW (WOLFF-PARKINSON-WHITE SYNDROME): ICD-10-CM

## 2021-09-07 DIAGNOSIS — I45.10 RBBB: ICD-10-CM

## 2021-09-07 DIAGNOSIS — Q22.5 EBSTEIN'S ANOMALY OF TRICUSPID VALVE: ICD-10-CM

## 2021-09-07 DIAGNOSIS — Z79.01 CURRENT USE OF LONG TERM ANTICOAGULATION: ICD-10-CM

## 2021-09-07 PROCEDURE — 93000 ELECTROCARDIOGRAM COMPLETE: CPT | Performed by: INTERNAL MEDICINE

## 2021-09-07 PROCEDURE — 99214 OFFICE O/P EST MOD 30 MIN: CPT | Performed by: INTERNAL MEDICINE

## 2021-09-07 RX ORDER — ANASTROZOLE 1 MG/1
1 TABLET ORAL
COMMUNITY
Start: 2021-08-27

## 2021-09-07 NOTE — LETTER
"September 7, 2021     Anton Muniz MD  [address] [facility] [address]    Patient: Orly Cobian  YOB: 1940  Date of Visit: 9/7/2021      Dear Dr. Muniz:    Thank you for referring Orly Cobian to me for evaluation. Below are my notes for this consultation.    If you have questions, please do not hesitate to call me. I look forward to following your patient along with you.         Sincerely,        Madhuri Gregorio MD        CC: Madhuri Feliciano MD  9/11/2021 12:39 PM  Signed   Madhuri Gregorio MD, [facility]  Cardiac Electrophysiology    [facility]  [facility]  [address] [place] [address]    TEL  [phone]  [facility]           Electrophysiology Progress Note               9/7/2021    Dear Dr. Muniz:    Orly Cobian is a 81 y.o. female who comes to the office today for follow up for a history of paroxysmal atrial fibrillation with chronic right bundle branch block and history of dyslipidemia.  She is on long-term antiarrhythmic drug therapy with flecainide and anticoagulation with Eliquis.    In December 2019, she woke up in the middle of the night with an episode of atrial fibrillation.  She stated that her heart was beating very fast. She took a flecainide and the episode subsided several hours later.  When I last saw her in the office on 2/13/2019, she had been feeling frequent \"extra beats\". Therefore, the patient agreed to start taking flecainide 100 mg p.o. every 12 hours and to continue to take an extra dose of flecainide as needed.    At her last office visit on 3/2/2021 she reported that she was feeling very well from a cardiovascular standpoint and has not had symptoms of recurrence of atrial fibrillation.     Today, she is feeling generally well. She reports that she is feeling very well from a cardiovascular standpoint. She states that she underwent a left breast " lumpectomy for stage II breast cancer on August 3, 2021. Fortunately, she is now cancer free and does not require further treatment with chemotherapy or radiation. She states that she walks on the treadmill frequently for exercise. No palpitations, dizziness, near syncope or syncope. No chest pain. No dyspnea on exertion, PND, orthopnea or pedal edema. No bleeding complications on anticoagulation medication with Eliquis.    PROBLEM LIST:  1. Paroxysmal atrial fibrillation diagnosed on 10/11/2010, with a recent episode in May of  2017.  2. History of Jaycob-Parkinson-White syndrome, status post catheter ablation procedure  performed in 1997, without recurrence of arrhythmia.  3. Chronic right bundle branch block.  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  5. History of thyroid disease.  6. Dyslipidemia.  7. Stress echo obtained on June 6, 2017 revealing no evidence of myocardial ischemia with  normal left ventricular systolic function with a left ventricular ejection fraction of 65-  70%.  8. Vasovagal syncope    Patient Active Problem List   Diagnosis   • Paroxysmal atrial fibrillation (CMS/HCC)   • RBBB   • Non-rheumatic tricuspid valve insufficiency   • WPW (Jaycob-Parkinson-White syndrome)   • Ebstein's anomaly of tricuspid valve   • Nonrheumatic pulmonary valve insufficiency   • Vasovagal syncope   • Dyslipidemia   • Long term current use of antiarrhythmic drug   • Mass of left breast   • Malignant neoplasm of central portion of left female breast (CMS/HCC)   • Current use of long term anticoagulation     Past Medical History:   Diagnosis Date   • Alcoholism (CMS/HCC)    • Arrhythmia    • Chronic atrial fibrillation (CMS/HCC)    • Hypothyroidism    • Osteoporosis      Past Surgical History:   Procedure Laterality Date   • ABLATION OF  "DYSRHYTHMIC FOCUS     • BREAST BIOPSY      2 breast biospy benign   • BREAST LUMPECTOMY Left 08/03/2021   • CHOLECYSTECTOMY     • COSMETIC SURGERY     • EYE SURGERY     • SKIN BIOPSY       Current Outpatient Medications   Medication Sig Dispense Refill   • anastrozole (ARIMIDEX) 1 mg tablet Take  1 mg once daily     • apixaban (ELIQUIS) 5 mg tablet Take 1 tablet (5 mg total) by mouth 2 (two) times a day. 180 tablet 3   • atorvastatin (LIPITOR) 10 mg tablet Take 1 tablet (10 mg total) by mouth daily. 90 tablet 3   • estradiol (ESTRACE) 0.01 % (0.1 mg/gram) vaginal cream No SIG Entered     • flecainide (TAMBOCOR) 100 mg tablet Take 1 tablet (100 mg total) by mouth every 12 (twelve) hours. 180 tablet 3   • latanoprost (XALATAN) 0.005 % ophthalmic solution 0.005 %.     • levothyroxine (SYNTHROID) 25 mcg tablet Take 25 mcg by mouth daily.     • lutein-zeaxanthin (OCUVITE LUTEIN 25) 25-5 mg capsule take 1 capsule po daily     • metoprolol succinate XL (TOPROL-XL) 25 mg 24 hr tablet Take 1 tablet (25 mg total) by mouth daily. 90 tablet 3     No current facility-administered medications for this visit.     ALLERGIES:Erythromycin base and Erythromycin    REVIEW OF SYSTEMS:     As in HPI.  All other other systems were reviewed and are negative.    Vitals:    09/07/21 1249   BP: 100/78   BP Location: Left upper arm   Patient Position: Sitting   Pulse: (!) 59   Resp: 14   Temp: 36.3 °C (97.4 °F)   TempSrc: Temporal   SpO2: 99%   Weight: 54.9 kg (121 lb 0.3 oz)   Height: 1.651 m (5' 5\")     PHYSICAL EXAMINATION: Pleasant and alert, in no acute distress.  Neck: No jugular venous distention or carotid bruits. Lungs were clear to auscultation without rales or wheezes. Cardiovascular exam revealed normal S1, S2, regular rhythm with a 2/6 systolic murmur best heard at the lower left sternal border. Abdomen was soft and nontender.Extremities revealed no edema or cyanosis. She was alert and oriented x 3 with no focal deficits on gross " neurologic exam.    DATA REVIEW:    ECG 9/7/2021: I personally reviewed her 12-lead EKG performed today which reveals Sinus  Bradycardia   -Right bundle branch block.   QTc 508 msec    ECG 3/2/2021: I personally reviewed her 12-lead EKG performed today which reveals Sinus  Rhythm   -Right bundle branch block.    -Left atrial enlargement. Prolonged QTc    Echocardiogram:   Cardiac Imaging    TRANSTHORACIC ECHO (TTE) COMPLETE 04/14/2021    Interpretation Summary  · Normal-sized LV. Normal LV wall thickness. Preserved LV systolic function. Estimated EF 60%. No regional wall motion abnormalities.  · Tricuspid aortic valve. Sclerotic aortic valve leaflets. Trace aortic valve regurgitation. Aortic root sclerotic.  · Trace mitral valve regurgitation. Moderately dilated LA.  · Mildly dilated RV. Normal RV systolic function. Mild to moderate tricuspid valve regurgitation. Estimated RVSP = 30 mmHg. Moderately dilated RA.  · Normal-sized IVC. IVC collapses >50% during inspiration.  · Compared with June 2017, the RV is now mildly dilated.      Lab Results   Component Value Date    WBC 5.26 03/10/2021    HGB 14.2 03/10/2021    HCT 42.8 03/10/2021     03/10/2021    CHOL 157 03/10/2021    TRIG 67 03/10/2021    HDL 63 03/10/2021    ALT 26 03/10/2021    AST 28 03/10/2021     03/10/2021    K 4.2 03/10/2021     03/10/2021    CREATININE 0.8 03/10/2021    BUN 16 03/10/2021    CO2 26 03/10/2021    TSH 4.54 07/26/2021    LDLCALC 81 03/10/2021     Assessment/Plan     Paroxysmal atrial fibrillation (CMS/HCC) (HCC)  No recurrence of atrial fibrillation  on flecainide 100 mg p.o. every 12 hours.  I recommend that she continue the current dose of flecainide.  She is tolerating the medication without side effects.   She may take an extra 100 mg x 1 during an episode of atrial fibrillation.   She will continue on Eliquis 5 mg p.o. every 12 hours. The [id number] score is 3.      RBBB  It remains stable without progresison of  conduction disease.  Her QRS duration has increased after adding flecainide which increased her QTc interval. I will continue to monitor.    WPW (Jaycob-Parkinson-White syndrome)  Her status post catheter ablation procedure in 1997 - She has not had recurrence of supraventricular tachycardia.    Ebstein's anomaly of tricuspid valve  She is asymptomatic without signs or symptoms of heart failure.   Her most recent echcoadiogram in April 2021 revealed mild to  moderate tricuspid regurgitation. Stable.     Dyslipidemia  She will continue atorvastatin 10 mg p.o. once a day.    Long term current use of antiarrhythmic drug  She is on long-term antiarrhythmic drug therapy with flecainide. She is tolerating the medication without side effects. Her electrolytes and renal function are normal.    Non-rheumatic tricuspid valve insufficiency  Mild to moderate. Asymptomatic.    Current use of long term anticoagulation  She is tolerating anticoagulaltion with Eliquis without bleeding complications. Her hemoglobin level is normal.    Return in about 6 months (around 3/7/2022).    Thank you for allowing me to participate in the care of your patient.  Please do not hesitate to contact me if you have any questions regarding my recommendations and management.    Sincerely;    Madhuri Cardoza MD [facility]    By signing my name below, I, Macey Mckenzie, attest that this documentation has been prepared under the direction and in the presence of Madhuri Gregorio MD Electronically signed: Pippa Dang. 9/7/2021 12:55 PM     IMadhuri MD, personally performed the services described in this documentation. All medical record entries made by the scribe were at my direction and in my presence. I have reviewed the chart and agree that the record reflects my personal performance and is accurate and complete. Madhuri Gregorio MD. 9/7/2021. 12:55 PM.

## 2021-09-07 NOTE — ASSESSMENT & PLAN NOTE
She is on long-term antiarrhythmic drug therapy with flecainide. She is tolerating the medication without side effects. Her electrolytes and renal function are normal.

## 2021-09-07 NOTE — ASSESSMENT & PLAN NOTE
She is asymptomatic without signs or symptoms of heart failure.   Her most recent echcoadiogram in April 2021 revealed mild to  moderate tricuspid regurgitation. Stable.

## 2021-09-07 NOTE — ASSESSMENT & PLAN NOTE
No recurrence of atrial fibrillation  on flecainide 100 mg p.o. every 12 hours.  I recommend that she continue the current dose of flecainide.  She is tolerating the medication without side effects.   She may take an extra 100 mg x 1 during an episode of atrial fibrillation.   She will continue on Eliquis 5 mg p.o. every 12 hours. The [id number] score is 3.

## 2021-09-07 NOTE — PROGRESS NOTES
" Madhuri Gregorio MD, [facility]  Cardiac Electrophysiology    [facility]    [facility]  [facility]  [address] [place] [address]    TEL  [phone]  [facility]           Electrophysiology Progress Note               9/7/2021    Dear Dr. Muniz:    Orly Cobian is a 81 y.o. female who comes to the office today for follow up for a history of paroxysmal atrial fibrillation with chronic right bundle branch block and history of dyslipidemia.  She is on long-term antiarrhythmic drug therapy with flecainide and anticoagulation with Eliquis.    In December 2019, she woke up in the middle of the night with an episode of atrial fibrillation.  She stated that her heart was beating very fast. She took a flecainide and the episode subsided several hours later.  When I last saw her in the office on 2/13/2019, she had been feeling frequent \"extra beats\". Therefore, the patient agreed to start taking flecainide 100 mg p.o. every 12 hours and to continue to take an extra dose of flecainide as needed.    At her last office visit on 3/2/2021 she reported that she was feeling very well from a cardiovascular standpoint and has not had symptoms of recurrence of atrial fibrillation.     Today, she is feeling generally well. She reports that she is feeling very well from a cardiovascular standpoint. She states that she underwent a left breast lumpectomy for stage II breast cancer on August 3, 2021. Fortunately, she is now cancer free and does not require further treatment with chemotherapy or radiation. She states that she walks on the treadmill frequently for exercise. No palpitations, dizziness, near syncope or syncope. No chest pain. No dyspnea on exertion, PND, orthopnea or pedal edema. No bleeding complications on anticoagulation medication with Eliquis.    PROBLEM LIST:  1. Paroxysmal atrial fibrillation diagnosed on 10/11/2010, with a recent episode in " May of  2017.  2. History of Jaycob-Parkinson-White syndrome, status post catheter ablation procedure  performed in 1997, without recurrence of arrhythmia.  3. Chronic right bundle branch block.  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  5. History of thyroid disease.  6. Dyslipidemia.  7. Stress echo obtained on June 6, 2017 revealing no evidence of myocardial ischemia with  normal left ventricular systolic function with a left ventricular ejection fraction of 65-  70%.  8. Vasovagal syncope    Patient Active Problem List   Diagnosis   • Paroxysmal atrial fibrillation (CMS/HCC)   • RBBB   • Non-rheumatic tricuspid valve insufficiency   • WPW (Jaycob-Parkinson-White syndrome)   • Ebstein's anomaly of tricuspid valve   • Nonrheumatic pulmonary valve insufficiency   • Vasovagal syncope   • Dyslipidemia   • Long term current use of antiarrhythmic drug   • Mass of left breast   • Malignant neoplasm of central portion of left female breast (CMS/HCC)   • Current use of long term anticoagulation     Past Medical History:   Diagnosis Date   • Alcoholism (CMS/HCC)    • Arrhythmia    • Chronic atrial fibrillation (CMS/HCC)    • Hypothyroidism    • Osteoporosis      Past Surgical History:   Procedure Laterality Date   • ABLATION OF DYSRHYTHMIC FOCUS     • BREAST BIOPSY      2 breast biospy benign   • BREAST LUMPECTOMY Left 08/03/2021   • CHOLECYSTECTOMY     • COSMETIC SURGERY     • EYE SURGERY     • SKIN BIOPSY       Current Outpatient Medications   Medication Sig Dispense Refill   • anastrozole (ARIMIDEX) 1 mg tablet Take  1 mg once daily     • apixaban (ELIQUIS) 5 mg tablet Take 1 tablet (5 mg total) by mouth 2 (two) times a day. 180 tablet 3   • atorvastatin (LIPITOR) 10 mg tablet Take 1 tablet (10 mg total) by mouth daily. 90 tablet 3   •  "estradiol (ESTRACE) 0.01 % (0.1 mg/gram) vaginal cream No SIG Entered     • flecainide (TAMBOCOR) 100 mg tablet Take 1 tablet (100 mg total) by mouth every 12 (twelve) hours. 180 tablet 3   • latanoprost (XALATAN) 0.005 % ophthalmic solution 0.005 %.     • levothyroxine (SYNTHROID) 25 mcg tablet Take 25 mcg by mouth daily.     • lutein-zeaxanthin (OCUVITE LUTEIN 25) 25-5 mg capsule take 1 capsule po daily     • metoprolol succinate XL (TOPROL-XL) 25 mg 24 hr tablet Take 1 tablet (25 mg total) by mouth daily. 90 tablet 3     No current facility-administered medications for this visit.     ALLERGIES:Erythromycin base and Erythromycin    REVIEW OF SYSTEMS:     As in HPI.  All other other systems were reviewed and are negative.    Vitals:    09/07/21 1249   BP: 100/78   BP Location: Left upper arm   Patient Position: Sitting   Pulse: (!) 59   Resp: 14   Temp: 36.3 °C (97.4 °F)   TempSrc: Temporal   SpO2: 99%   Weight: 54.9 kg (121 lb 0.3 oz)   Height: 1.651 m (5' 5\")     PHYSICAL EXAMINATION: Pleasant and alert, in no acute distress.  Neck: No jugular venous distention or carotid bruits. Lungs were clear to auscultation without rales or wheezes. Cardiovascular exam revealed normal S1, S2, regular rhythm with a 2/6 systolic murmur best heard at the lower left sternal border. Abdomen was soft and nontender.Extremities revealed no edema or cyanosis. She was alert and oriented x 3 with no focal deficits on gross neurologic exam.    DATA REVIEW:    ECG 9/7/2021: I personally reviewed her 12-lead EKG performed today which reveals Sinus  Bradycardia   -Right bundle branch block.   QTc 508 msec    ECG 3/2/2021: I personally reviewed her 12-lead EKG performed today which reveals Sinus  Rhythm   -Right bundle branch block.    -Left atrial enlargement. Prolonged QTc    Echocardiogram:   Cardiac Imaging    TRANSTHORACIC ECHO (TTE) COMPLETE 04/14/2021    Interpretation Summary  · Normal-sized LV. Normal LV wall thickness. Preserved " LV systolic function. Estimated EF 60%. No regional wall motion abnormalities.  · Tricuspid aortic valve. Sclerotic aortic valve leaflets. Trace aortic valve regurgitation. Aortic root sclerotic.  · Trace mitral valve regurgitation. Moderately dilated LA.  · Mildly dilated RV. Normal RV systolic function. Mild to moderate tricuspid valve regurgitation. Estimated RVSP = 30 mmHg. Moderately dilated RA.  · Normal-sized IVC. IVC collapses >50% during inspiration.  · Compared with June 2017, the RV is now mildly dilated.      Lab Results   Component Value Date    WBC 5.26 03/10/2021    HGB 14.2 03/10/2021    HCT 42.8 03/10/2021     03/10/2021    CHOL 157 03/10/2021    TRIG 67 03/10/2021    HDL 63 03/10/2021    ALT 26 03/10/2021    AST 28 03/10/2021     03/10/2021    K 4.2 03/10/2021     03/10/2021    CREATININE 0.8 03/10/2021    BUN 16 03/10/2021    CO2 26 03/10/2021    TSH 4.54 07/26/2021    LDLCALC 81 03/10/2021     Assessment/Plan     Paroxysmal atrial fibrillation (CMS/HCC) (HCC)  No recurrence of atrial fibrillation  on flecainide 100 mg p.o. every 12 hours.  I recommend that she continue the current dose of flecainide.  She is tolerating the medication without side effects.   She may take an extra 100 mg x 1 during an episode of atrial fibrillation.   She will continue on Eliquis 5 mg p.o. every 12 hours. The [id number] score is 3.      RBBB  It remains stable without progresison of conduction disease.  Her QRS duration has increased after adding flecainide which increased her QTc interval. I will continue to monitor.    WPW (Jaycob-Parkinson-White syndrome)  Her status post catheter ablation procedure in 1997 - She has not had recurrence of supraventricular tachycardia.    Ebstein's anomaly of tricuspid valve  She is asymptomatic without signs or symptoms of heart failure.   Her most recent echcoadiogram in April 2021 revealed mild to  moderate tricuspid regurgitation. Stable.      Dyslipidemia  She will continue atorvastatin 10 mg p.o. once a day.    Long term current use of antiarrhythmic drug  She is on long-term antiarrhythmic drug therapy with flecainide. She is tolerating the medication without side effects. Her electrolytes and renal function are normal.    Non-rheumatic tricuspid valve insufficiency  Mild to moderate. Asymptomatic.    Current use of long term anticoagulation  She is tolerating anticoagulaltion with Eliquis without bleeding complications. Her hemoglobin level is normal.    Return in about 6 months (around 3/7/2022).    Thank you for allowing me to participate in the care of your patient.  Please do not hesitate to contact me if you have any questions regarding my recommendations and management.    Sincerely;    Madhuri Cardoza MD Located within [facility]    By signing my name below, I, Macey Mckenzie, attest that this documentation has been prepared under the direction and in the presence of Madhuri Gregorio MD Electronically signed: Pippa Dang. 9/7/2021 12:55 PM     IMadhuri MD, personally performed the services described in this documentation. All medical record entries made by the scribe were at my direction and in my presence. I have reviewed the chart and agree that the record reflects my personal performance and is accurate and complete. Madhuri Gregorio MD. 9/7/2021. 12:55 PM.

## 2021-09-11 PROBLEM — Z79.01 CURRENT USE OF LONG TERM ANTICOAGULATION: Status: ACTIVE | Noted: 2021-09-11

## 2021-09-11 ASSESSMENT — CHADS2 SCORE
SEX: FEMALE (+1PT.)
DIABETES: NO
AGE: 75+ (+2 PT.)
CHF: NO
HYPERTENSION: NO
PRIOR STROKE OR TIA OR THROMBOEMBOLISM: NO
VASCULAR DISEASE: NO
CHADS2 SCORE: 3

## 2021-09-11 NOTE — ASSESSMENT & PLAN NOTE
She is tolerating anticoagulaltion with Eliquis without bleeding complications. Her hemoglobin level is normal.

## 2022-03-15 ENCOUNTER — OFFICE VISIT (OUTPATIENT)
Dept: CARDIOLOGY | Facility: CLINIC | Age: 82
End: 2022-03-15
Payer: MEDICARE

## 2022-03-15 VITALS
WEIGHT: 113 LBS | HEIGHT: 65 IN | DIASTOLIC BLOOD PRESSURE: 100 MMHG | RESPIRATION RATE: 16 BRPM | HEART RATE: 63 BPM | SYSTOLIC BLOOD PRESSURE: 160 MMHG | OXYGEN SATURATION: 99 % | BODY MASS INDEX: 18.83 KG/M2

## 2022-03-15 DIAGNOSIS — Z79.01 CURRENT USE OF LONG TERM ANTICOAGULATION: ICD-10-CM

## 2022-03-15 DIAGNOSIS — I36.1 NON-RHEUMATIC TRICUSPID VALVE INSUFFICIENCY: ICD-10-CM

## 2022-03-15 DIAGNOSIS — Z79.899 LONG TERM CURRENT USE OF ANTIARRHYTHMIC DRUG: ICD-10-CM

## 2022-03-15 DIAGNOSIS — I48.0 PAROXYSMAL ATRIAL FIBRILLATION (CMS/HCC): Primary | ICD-10-CM

## 2022-03-15 DIAGNOSIS — I45.6 WPW (WOLFF-PARKINSON-WHITE SYNDROME): ICD-10-CM

## 2022-03-15 DIAGNOSIS — I45.10 RBBB: ICD-10-CM

## 2022-03-15 DIAGNOSIS — Q22.5 EBSTEIN'S ANOMALY OF TRICUSPID VALVE: ICD-10-CM

## 2022-03-15 DIAGNOSIS — R03.0 SINGLE EPISODE OF ELEVATED BLOOD PRESSURE: ICD-10-CM

## 2022-03-15 DIAGNOSIS — J90 PLEURAL EFFUSION ON RIGHT: ICD-10-CM

## 2022-03-15 PROCEDURE — 99215 OFFICE O/P EST HI 40 MIN: CPT | Performed by: INTERNAL MEDICINE

## 2022-03-15 PROCEDURE — 93000 ELECTROCARDIOGRAM COMPLETE: CPT | Performed by: INTERNAL MEDICINE

## 2022-03-15 RX ORDER — DORZOLAMIDE HCL 20 MG/ML
1 SOLUTION/ DROPS OPHTHALMIC 2 TIMES DAILY
COMMUNITY
Start: 2022-02-02

## 2022-03-15 NOTE — ASSESSMENT & PLAN NOTE
She remains stable without recurrence of atrial fibrillation  on flecainide 100 mg p.o. every 12 hours.  I recommend that she continue the current dose of flecainide.  She is tolerating the medication without side effects.     She may take an extra 100 mg x 1 during an episode of atrial fibrillation.   She will continue on Eliquis 5 mg p.o. every 12 hours.   The [id number] score is 3.

## 2022-03-15 NOTE — ASSESSMENT & PLAN NOTE
She is status post catheter ablation procedure in 1997 - She has not had recurrence of supraventricular tachycardia.

## 2022-03-15 NOTE — ASSESSMENT & PLAN NOTE
Her most recent echcoadiogram in April 2021 revealed mild to  moderate tricuspid regurgitation. Stable. Since she has a new right pleural effusion, I korey repeat her echocardiogram to evlaute severity of tricuspid regurgitation.  .She is asymptomatic without signs or symptoms of heart failure.

## 2022-03-15 NOTE — PROGRESS NOTES
" Madhuri Gregorio MD, [facility]  Cardiac Electrophysiology    [facility]    [facility]  The Heart [address]  [address] [place] [address]    TEL  [phone]  [facility]         Electrophysiology Progress Note           March 15, 2022  Dear Dr. Muniz:    Orly Cobian is a 82 y.o. female who comes to the office today for follow up for a history of paroxysmal atrial fibrillation with chronic right bundle branch block and history of dyslipidemia.  She is on long-term antiarrhythmic drug therapy with flecainide and anticoagulation with Eliquis.    In December 2019, she woke up in the middle of the night with an episode of atrial fibrillation.  She stated that her heart was beating very fast. She took a flecainide and the episode subsided several hours later.  When I last saw her in the office on 2/13/2019, she had been feeling frequent \"extra beats\". Therefore, the patient agreed to start taking flecainide 100 mg p.o. every 12 hours and to continue to take an extra dose of flecainide as needed.    At her last office visit on 9/7/2021 she reported that she was feeling very well from a cardiovascular standpoint.     Today, she is feeling generally well. She reports that she is feeling very well from a cardiovascular standpoint. No symptoms of recurrence of atrial fibrillation. She reports that since undergoing the lumpectomy procedure she has had elevated blood pressure. She notes that she has been under a significant amount of stress in her personal life. She completed a CT scan of her chest at [facility] due to a reflex cough that she was having at night when she would reach to turn the light off in bed. The CT scan with contrast revealed that she had fluid build up around the right lung. She notes that she has not had significant symptoms of shortness of breath. She is scheduled to have the fluid removed at [facility]. She " states that she continues to cough when she bends over or turns to the side. She states that she does not have difficulty breathing while lying down. She states that she walks on the treadmill frequently for exercise. No palpitations, dizziness, near syncope or syncope. No chest pain. No dyspnea on exertion, PND, orthopnea or pedal edema. No bleeding complications on anticoagulation medication with Eliquis.    PROBLEM LIST:  1. Paroxysmal atrial fibrillation diagnosed on 10/11/2010, with a recent episode in May of  2017.  2. History of Jaycob-Parkinson-White syndrome, status post catheter ablation procedure  performed in 1997, without recurrence of arrhythmia.  3. Chronic right bundle branch block.  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  5. History of thyroid disease.  6. Dyslipidemia.  7. Stress echo obtained on June 6, 2017 revealing no evidence of myocardial ischemia with  normal left ventricular systolic function with a left ventricular ejection fraction of 65-  70%.  8. Vasovagal syncope    Patient Active Problem List   Diagnosis   • Paroxysmal atrial fibrillation (CMS/HCC)   • RBBB   • Non-rheumatic tricuspid valve insufficiency   • WPW (Jaycob-Parkinson-White syndrome)   • Ebstein's anomaly of tricuspid valve   • Nonrheumatic pulmonary valve insufficiency   • Vasovagal syncope   • Dyslipidemia   • Long term current use of antiarrhythmic drug   • Mass of left breast   • Malignant neoplasm of central portion of left female breast (CMS/HCC)   • Current use of long term anticoagulation   • Pleural effusion on right   • Single episode of elevated blood pressure     Past Medical History:   Diagnosis Date   • Alcoholism (CMS/HCC)    • Arrhythmia    • Chronic atrial fibrillation (CMS/HCC)    • Hypothyroidism    • Osteoporosis      Past  "Surgical History:   Procedure Laterality Date   • ABLATION OF DYSRHYTHMIC FOCUS     • BREAST BIOPSY      2 breast biospy benign   • BREAST LUMPECTOMY Left 08/03/2021   • CHOLECYSTECTOMY     • COSMETIC SURGERY     • EYE SURGERY     • SKIN BIOPSY       Current Outpatient Medications   Medication Sig Dispense Refill   • anastrozole (ARIMIDEX) 1 mg tablet Take  1 mg once daily     • atorvastatin (LIPITOR) 10 mg tablet Take 1 tablet (10 mg total) by mouth daily. 90 tablet 3   • dorzolamide (TRUSOPT) 2 % ophthalmic solution      • estradiol (ESTRACE) 0.01 % (0.1 mg/gram) vaginal cream No SIG Entered     • flecainide (TAMBOCOR) 100 mg tablet Take 1 tablet (100 mg total) by mouth every 12 (twelve) hours. 180 tablet 3   • latanoprost (XALATAN) 0.005 % ophthalmic solution 0.005 %.     • levothyroxine (SYNTHROID) 25 mcg tablet Take 25 mcg by mouth daily.     • lutein-zeaxanthin (OCUVITE LUTEIN 25) 25-5 mg capsule take 1 capsule po daily     • metoprolol succinate XL (TOPROL-XL) 25 mg 24 hr tablet Take 1 tablet (25 mg total) by mouth daily. 90 tablet 3   • apixaban (ELIQUIS) 5 mg tablet Take 1 tablet (5 mg total) by mouth 2 (two) times a day. 180 tablet 1     No current facility-administered medications for this visit.     ALLERGIES:Erythromycin base and Erythromycin    REVIEW OF SYSTEMS: As in HPI.  All other other systems were reviewed and are negative.    Vitals:    03/15/22 1234   BP: (!) 160/100   BP Location: Left upper arm   Patient Position: Sitting   Pulse: 63   Resp: 16   SpO2: 99%   Weight: 51.3 kg (113 lb)   Height: 1.651 m (5' 5\")   Blood Pressure Left Upper Arm Sitting (taken by me): 160/90    PHYSICAL EXAMINATION: Pleasant and alert, in no acute distress.  Neck: No jugular venous distention or carotid bruits. Lungs were clear to auscultation without rales or wheezes. Cardiovascular exam revealed normal S1, S2, regular rhythm with a 2/6 systolic murmur best heard at the lower left sternal border. Abdomen was soft " and nontender.Extremities revealed no edema or cyanosis. She was alert and oriented x 3 with no focal deficits on gross neurologic exam.    DATA REVIEW:    ECG 3/15/2022: I personally reviewed her 12-lead EKG performed today which reveals Sinus rhythm RBBB    ECG 9/7/2021: I personally reviewed her 12-lead EKG performed today which reveals Sinus  Bradycardia   -Right bundle branch block.   QTc 508 msec    Chest Xray PA and Lateral March 1, 2022-[facility]    IMPRESSION:  Small-moderate right pleural effusion.    Echocardiogram:   Cardiac Imaging    TRANSTHORACIC ECHO (TTE) COMPLETE 04/14/2021    Interpretation Summary  · Normal-sized LV. Normal LV wall thickness. Preserved LV systolic function. Estimated EF 60%. No regional wall motion abnormalities.  · Tricuspid aortic valve. Sclerotic aortic valve leaflets. Trace aortic valve regurgitation. Aortic root sclerotic.  · Trace mitral valve regurgitation. Moderately dilated LA.  · Mildly dilated RV. Normal RV systolic function. Mild to moderate tricuspid valve regurgitation. Estimated RVSP = 30 mmHg. Moderately dilated RA.  · Normal-sized IVC. IVC collapses >50% during inspiration.  · Compared with June 2017, the RV is now mildly dilated.    Lab Results   Component Value Date    WBC 5.26 03/10/2021    HGB 14.2 03/10/2021    HCT 42.8 03/10/2021     03/10/2021    CHOL 157 03/10/2021    TRIG 67 03/10/2021    HDL 63 03/10/2021    ALT 26 03/10/2021    AST 28 03/10/2021     03/10/2021    K 4.2 03/10/2021     03/10/2021    CREATININE 0.8 03/10/2021    BUN 16 03/10/2021    CO2 26 03/10/2021    TSH 4.54 07/26/2021    LDLCALC 81 03/10/2021     Assessment/Plan     Paroxysmal atrial fibrillation (CMS/HCC) (HCC)  She remains stable without recurrence of atrial fibrillation  on flecainide 100 mg p.o. every 12 hours.  I recommend that she continue the current dose of flecainide.  She is tolerating the medication without side effects.     She may take an extra 100 mg x 1  during an episode of atrial fibrillation.   She will continue on Eliquis 5 mg p.o. every 12 hours.   The [id number] score is 3.    Non-rheumatic tricuspid valve insufficiency  Mild to moderate. Asymptomatic.  I will order an echocardiogram to monitor severity.    RBBB  Stable without progresison of conduction disease.  Her QRS duration has increased after adding flecainide which increased her QTc interval.   I will continue to monitor.    WPW (Jaycob-Parkinson-White syndrome)  She is status post catheter ablation procedure in 1997 - She has not had recurrence of supraventricular tachycardia.    Dyslipidemia  She will continue atorvastatin 10 mg p.o. once a day.    Current use of long term anticoagulation  She is tolerating anticoagulaltion with Eliquis without bleeding complications.    Long term current use of antiarrhythmic drug  She is on long-term antiarrhythmic drug therapy with flecainide. She is tolerating the medication without side effects. Her electrolytes and renal function are normal.    Ebstein's anomaly of tricuspid valve    Her most recent echcoadiogram in April 2021 revealed mild to  moderate tricuspid regurgitation. Stable. Since she has a new right pleural effusion, I korey repeat her echocardiogram to evlaute severity of tricuspid regurgitation.  .She is asymptomatic without signs or symptoms of heart failure.     Pleural effusion on right  She has a history of carcinoma of the breast. No signs or symptoms of heart failure.  I ordered an echocardiogram to evaluate severity of tricuspid regurgitation and LV function.    Single episode of elevated blood pressure  I advise her to recheck her blood pressure when not at time of stress and follow up eith you.  I also couuneled her regarding low salt diet.        Return in about 6 months (around 9/15/2022).    I will call the patient with the results of the echocardiogram upon completion.     Thank you for allowing me to participate in the care of your  patient. Please do not hesitate to contact me if you have any questions regarding my recommendations and management.    Sincerely;    Madhuri Cardoza MD [facility]    By signing my name below, I, Macey Mckenzie, attest that this documentation has been prepared under the direction and in the presence of Madhuri Gregorio MD Electronically signed: Pippa Dang. 3/15/2022 12:41 PM     I, Madhuri Gregorio MD, personally performed the services described in this documentation. All medical record entries made by the scribe were at my direction and in my presence. I have reviewed the chart and agree that the record reflects my personal performance and is accurate and complete. Madhuri Gregorio MD. 3/15/2022. 12:41 PM.

## 2022-03-15 NOTE — LETTER
"March 11, 2022     Anton Muniz MD  [address] [facility] [address]    Patient: Orly Cobian  YOB: 1940  Date of Visit: 3/15/2022      Dear Dr. Muniz:    Thank you for referring Orly Cobian to me for evaluation. Below are my notes for this consultation.    If you have questions, please do not hesitate to call me. I look forward to following your patient along with you.         Sincerely,        Madhuri Gregorio MD        CC: Madhuri Feliciano MD  3/27/2022  2:52 PM  Signed   Madhuri Gregorio MD, [facility]  Cardiac Electrophysiology    [facility]  The Heart [facility] Level  [address] [place] [address]    TEL  [phone]  [facility]         Electrophysiology Progress Note           March 15, 2022  Dear Dr. Muniz:    Orly Cobian is a 82 y.o. female who comes to the office today for follow up for a history of paroxysmal atrial fibrillation with chronic right bundle branch block and history of dyslipidemia.  She is on long-term antiarrhythmic drug therapy with flecainide and anticoagulation with Eliquis.    In December 2019, she woke up in the middle of the night with an episode of atrial fibrillation.  She stated that her heart was beating very fast. She took a flecainide and the episode subsided several hours later.  When I last saw her in the office on 2/13/2019, she had been feeling frequent \"extra beats\". Therefore, the patient agreed to start taking flecainide 100 mg p.o. every 12 hours and to continue to take an extra dose of flecainide as needed.    At her last office visit on 9/7/2021 she reported that she was feeling very well from a cardiovascular standpoint.     Today, she is feeling generally well. She reports that she is feeling very well from a cardiovascular standpoint. No symptoms of recurrence of atrial fibrillation. She reports that since undergoing the lumpectomy procedure " she has had elevated blood pressure. She notes that she has been under a significant amount of stress in her personal life. She completed a CT scan of her chest at [facility] due to a reflex cough that she was having at night when she would reach to turn the light off in bed. The CT scan with contrast revealed that she had fluid build up around the right lung. She notes that she has not had significant symptoms of shortness of breath. She is scheduled to have the fluid removed at [facility]. She states that she continues to cough when she bends over or turns to the side. She states that she does not have difficulty breathing while lying down. She states that she walks on the treadmill frequently for exercise. No palpitations, dizziness, near syncope or syncope. No chest pain. No dyspnea on exertion, PND, orthopnea or pedal edema. No bleeding complications on anticoagulation medication with Eliquis.    PROBLEM LIST:  1. Paroxysmal atrial fibrillation diagnosed on 10/11/2010, with a recent episode in May of  2017.  2. History of Jaycob-Parkinson-White syndrome, status post catheter ablation procedure  performed in 1997, without recurrence of arrhythmia.  3. Chronic right bundle branch block.  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  5. History of thyroid disease.  6. Dyslipidemia.  7. Stress echo obtained on June 6, 2017 revealing no evidence of myocardial ischemia with  normal left ventricular systolic function with a left ventricular ejection fraction of 65-  70%.  8. Vasovagal syncope    Patient Active Problem List   Diagnosis   • Paroxysmal atrial fibrillation (CMS/HCC)   • RBBB   • Non-rheumatic tricuspid valve insufficiency   • WPW (Jaycob-Parkinson-White syndrome)   • Ebstein's anomaly of tricuspid valve   •  Nonrheumatic pulmonary valve insufficiency   • Vasovagal syncope   • Dyslipidemia   • Long term current use of antiarrhythmic drug   • Mass of left breast   • Malignant neoplasm of central portion of left female breast (CMS/HCC)   • Current use of long term anticoagulation   • Pleural effusion on right   • Single episode of elevated blood pressure     Past Medical History:   Diagnosis Date   • Alcoholism (CMS/HCC)    • Arrhythmia    • Chronic atrial fibrillation (CMS/HCC)    • Hypothyroidism    • Osteoporosis      Past Surgical History:   Procedure Laterality Date   • ABLATION OF DYSRHYTHMIC FOCUS     • BREAST BIOPSY      2 breast biospy benign   • BREAST LUMPECTOMY Left 08/03/2021   • CHOLECYSTECTOMY     • COSMETIC SURGERY     • EYE SURGERY     • SKIN BIOPSY       Current Outpatient Medications   Medication Sig Dispense Refill   • anastrozole (ARIMIDEX) 1 mg tablet Take  1 mg once daily     • atorvastatin (LIPITOR) 10 mg tablet Take 1 tablet (10 mg total) by mouth daily. 90 tablet 3   • dorzolamide (TRUSOPT) 2 % ophthalmic solution      • estradiol (ESTRACE) 0.01 % (0.1 mg/gram) vaginal cream No SIG Entered     • flecainide (TAMBOCOR) 100 mg tablet Take 1 tablet (100 mg total) by mouth every 12 (twelve) hours. 180 tablet 3   • latanoprost (XALATAN) 0.005 % ophthalmic solution 0.005 %.     • levothyroxine (SYNTHROID) 25 mcg tablet Take 25 mcg by mouth daily.     • lutein-zeaxanthin (OCUVITE LUTEIN 25) 25-5 mg capsule take 1 capsule po daily     • metoprolol succinate XL (TOPROL-XL) 25 mg 24 hr tablet Take 1 tablet (25 mg total) by mouth daily. 90 tablet 3   • apixaban (ELIQUIS) 5 mg tablet Take 1 tablet (5 mg total) by mouth 2 (two) times a day. 180 tablet 1     No current facility-administered medications for this visit.     ALLERGIES:Erythromycin base and Erythromycin    REVIEW OF SYSTEMS: As in HPI.  All other other systems were reviewed and are negative.    Vitals:    03/15/22 1234   BP: (!) 160/100   BP  "Location: Left upper arm   Patient Position: Sitting   Pulse: 63   Resp: 16   SpO2: 99%   Weight: 51.3 kg (113 lb)   Height: 1.651 m (5' 5\")   Blood Pressure Left Upper Arm Sitting (taken by me): 160/90    PHYSICAL EXAMINATION: Pleasant and alert, in no acute distress.  Neck: No jugular venous distention or carotid bruits. Lungs were clear to auscultation without rales or wheezes. Cardiovascular exam revealed normal S1, S2, regular rhythm with a 2/6 systolic murmur best heard at the lower left sternal border. Abdomen was soft and nontender.Extremities revealed no edema or cyanosis. She was alert and oriented x 3 with no focal deficits on gross neurologic exam.    DATA REVIEW:    ECG 3/15/2022: I personally reviewed her 12-lead EKG performed today which reveals Sinus rhythm RBBB    ECG 9/7/2021: I personally reviewed her 12-lead EKG performed today which reveals Sinus  Bradycardia   -Right bundle branch block.   QTc 508 msec    Chest Xray PA and Lateral March 1, 2022-[facility]    IMPRESSION:  Small-moderate right pleural effusion.    Echocardiogram:   Cardiac Imaging    TRANSTHORACIC ECHO (TTE) COMPLETE 04/14/2021    Interpretation Summary  · Normal-sized LV. Normal LV wall thickness. Preserved LV systolic function. Estimated EF 60%. No regional wall motion abnormalities.  · Tricuspid aortic valve. Sclerotic aortic valve leaflets. Trace aortic valve regurgitation. Aortic root sclerotic.  · Trace mitral valve regurgitation. Moderately dilated LA.  · Mildly dilated RV. Normal RV systolic function. Mild to moderate tricuspid valve regurgitation. Estimated RVSP = 30 mmHg. Moderately dilated RA.  · Normal-sized IVC. IVC collapses >50% during inspiration.  · Compared with June 2017, the RV is now mildly dilated.    Lab Results   Component Value Date    WBC 5.26 03/10/2021    HGB 14.2 03/10/2021    HCT 42.8 03/10/2021     03/10/2021    CHOL 157 03/10/2021    TRIG 67 03/10/2021    HDL 63 03/10/2021    ALT 26 03/10/2021    " AST 28 03/10/2021     03/10/2021    K 4.2 03/10/2021     03/10/2021    CREATININE 0.8 03/10/2021    BUN 16 03/10/2021    CO2 26 03/10/2021    TSH 4.54 07/26/2021    LDLCALC 81 03/10/2021     Assessment/Plan     Paroxysmal atrial fibrillation (CMS/HCC) (HCC)  She remains stable without recurrence of atrial fibrillation  on flecainide 100 mg p.o. every 12 hours.  I recommend that she continue the current dose of flecainide.  She is tolerating the medication without side effects.     She may take an extra 100 mg x 1 during an episode of atrial fibrillation.   She will continue on Eliquis 5 mg p.o. every 12 hours.   The [id number] score is 3.    Non-rheumatic tricuspid valve insufficiency  Mild to moderate. Asymptomatic.  I will order an echocardiogram to monitor severity.    RBBB  Stable without progresison of conduction disease.  Her QRS duration has increased after adding flecainide which increased her QTc interval.   I will continue to monitor.    WPW (Jaycob-Parkinson-White syndrome)  She is status post catheter ablation procedure in 1997 - She has not had recurrence of supraventricular tachycardia.    Dyslipidemia  She will continue atorvastatin 10 mg p.o. once a day.    Current use of long term anticoagulation  She is tolerating anticoagulaltion with Eliquis without bleeding complications.    Long term current use of antiarrhythmic drug  She is on long-term antiarrhythmic drug therapy with flecainide. She is tolerating the medication without side effects. Her electrolytes and renal function are normal.    Ebstein's anomaly of tricuspid valve    Her most recent echcoadiogram in April 2021 revealed mild to  moderate tricuspid regurgitation. Stable. Since she has a new right pleural effusion, I korey repeat her echocardiogram to evlaute severity of tricuspid regurgitation.  .She is asymptomatic without signs or symptoms of heart failure.     Pleural effusion on right  She has a history of carcinoma of the  breast. No signs or symptoms of heart failure.  I ordered an echocardiogram to evaluate severity of tricuspid regurgitation and LV function.    Single episode of elevated blood pressure  I advise her to recheck her blood pressure when not at time of stress and follow up eith you.  I also couuneled her regarding low salt diet.        Return in about 6 months (around 9/15/2022).    I will call the patient with the results of the echocardiogram upon completion.     Thank you for allowing me to participate in the care of your patient. Please do not hesitate to contact me if you have any questions regarding my recommendations and management.    Sincerely;    Madhuri Cardoza MD [facility]    By signing my name below, I, Macey Mckenzie, attest that this documentation has been prepared under the direction and in the presence of Madhuri Gregorio MD Electronically signed: Pippa Dang. 3/15/2022 12:41 PM     I, Madhuri Gregorio MD, personally performed the services described in this documentation. All medical record entries made by the scribe were at my direction and in my presence. I have reviewed the chart and agree that the record reflects my personal performance and is accurate and complete. Madhuri Gregorio MD. 3/15/2022. 12:41 PM.

## 2022-03-16 ENCOUNTER — TELEPHONE (OUTPATIENT)
Dept: SCHEDULING | Facility: CLINIC | Age: 82
End: 2022-03-16
Payer: MEDICARE

## 2022-03-16 DIAGNOSIS — I48.0 PAROXYSMAL ATRIAL FIBRILLATION (CMS/HCC): ICD-10-CM

## 2022-03-16 NOTE — TELEPHONE ENCOUNTER
Medicine Refill Request    Last Office: Visit date not found   Last Consult Visit: Visit date not found  Last Telemedicine Visit: Visit date not found    Next Appointment: Visit date not found    Eliquis 5 mg  OUT OF MEDS      Current Outpatient Medications:   •  anastrozole (ARIMIDEX) 1 mg tablet, Take  1 mg once daily, Disp: , Rfl:   •  apixaban (ELIQUIS) 5 mg tablet, Take 1 tablet (5 mg total) by mouth 2 (two) times a day., Disp: 180 tablet, Rfl: 3  •  atorvastatin (LIPITOR) 10 mg tablet, Take 1 tablet (10 mg total) by mouth daily., Disp: 90 tablet, Rfl: 3  •  dorzolamide (TRUSOPT) 2 % ophthalmic solution, , Disp: , Rfl:   •  estradiol (ESTRACE) 0.01 % (0.1 mg/gram) vaginal cream, No SIG Entered, Disp: , Rfl:   •  flecainide (TAMBOCOR) 100 mg tablet, Take 1 tablet (100 mg total) by mouth every 12 (twelve) hours., Disp: 180 tablet, Rfl: 3  •  latanoprost (XALATAN) 0.005 % ophthalmic solution, 0.005 %., Disp: , Rfl:   •  levothyroxine (SYNTHROID) 25 mcg tablet, Take 25 mcg by mouth daily., Disp: , Rfl:   •  lutein-zeaxanthin (OCUVITE LUTEIN 25) 25-5 mg capsule, take 1 capsule po daily, Disp: , Rfl:   •  metoprolol succinate XL (TOPROL-XL) 25 mg 24 hr tablet, Take 1 tablet (25 mg total) by mouth daily., Disp: 90 tablet, Rfl: 3      BP Readings from Last 3 Encounters:   03/15/22 (!) 160/100   09/07/21 100/78   07/14/21 132/78       Recent Lab results:  Lab Results   Component Value Date    CHOL 157 03/10/2021   ,   Lab Results   Component Value Date    HDL 63 03/10/2021   ,   Lab Results   Component Value Date    LDLCALC 81 03/10/2021   ,   Lab Results   Component Value Date    TRIG 67 03/10/2021        Lab Results   Component Value Date    GLUCOSE 83 03/10/2021   , No results found for: HGBA1C      Lab Results   Component Value Date    CREATININE 0.8 03/10/2021       Lab Results   Component Value Date    TSH 4.54 07/26/2021

## 2022-03-21 ENCOUNTER — HOSPITAL ENCOUNTER (OUTPATIENT)
Dept: CARDIOLOGY | Facility: CLINIC | Age: 82
Discharge: HOME | End: 2022-03-21
Payer: MEDICARE

## 2022-03-21 ENCOUNTER — TELEPHONE (OUTPATIENT)
Dept: CARDIOLOGY | Facility: CLINIC | Age: 82
End: 2022-03-21
Payer: MEDICARE

## 2022-03-21 VITALS
SYSTOLIC BLOOD PRESSURE: 160 MMHG | WEIGHT: 113 LBS | BODY MASS INDEX: 18.83 KG/M2 | HEIGHT: 65 IN | DIASTOLIC BLOOD PRESSURE: 90 MMHG

## 2022-03-21 DIAGNOSIS — I48.0 PAROXYSMAL ATRIAL FIBRILLATION (CMS/HCC): ICD-10-CM

## 2022-03-21 DIAGNOSIS — I36.1 NON-RHEUMATIC TRICUSPID VALVE INSUFFICIENCY: ICD-10-CM

## 2022-03-21 LAB
AORTIC ROOT ANNULUS: 2.7 CM
ASCENDING AORTA: 3.2 CM
AV PEAK GRADIENT: 7 MMHG
AV PEAK VELOCITY-S: 1.33 M/S
AV VALVE AREA: 1.72 CM2
BSA FOR ECHO PROCEDURE: 1.53 M2
CUSP SEPARATION: 1.4 CM
E WAVE DECELERATION TIME: 285 MS
E/A RATIO: 1.1
E/E' RATIO: 14.3
E/LAT E' RATIO: 12.7
EDV (BP): 70.8 CM3
EF (A4C): 60.8 %
EF A2C: 73.3 %
EJECTION FRACTION: 67.9 %
ESV (BP): 22.7 CM3
FRACTIONAL SHORTENING: 37.7 %
INTERVENTRICULAR SEPTUM: 0.82 CM
LA ESV (BP): 36.6 CM3
LA ESV INDEX (A2C): 20.39 CM3/M2
LA ESV INDEX (BP): 23.92 CM3/M2
LA/AORTA RATIO: 1.11
LAAS-AP2: 14.5 CM2
LAAS-AP4: 16.8 CM2
LAD 2D: 3 CM
LALD A4C: 5.16 CM
LALD A4C: 5.44 CM
LAV-S: 31.2 CM3
LEFT ATRIUM VOLUME INDEX: 27.06 CM3/M2
LEFT ATRIUM VOLUME: 41.4 CM3
LEFT INTERNAL DIMENSION IN SYSTOLE: 2.51 CM (ref 2.26–3.43)
LEFT VENTRICLE DIASTOLIC VOLUME INDEX: 50.72 CM3/M2
LEFT VENTRICLE DIASTOLIC VOLUME: 77.6 CM3
LEFT VENTRICLE SYSTOLIC VOLUME INDEX: 19.87 CM3/M2
LEFT VENTRICLE SYSTOLIC VOLUME: 30.4 CM3
LEFT VENTRICULAR INTERNAL DIMENSION IN DIASTOLE: 4.03 CM (ref 3.81–5.29)
LEFT VENTRICULAR POSTERIOR WALL IN END DIASTOLE: 0.59 CM (ref 0.49–0.91)
LV DIASTOLIC VOLUME: 60.6 CM3
LV ESV (APICAL 2 CHAMBER): 16.2 CM3
LVAD-AP2: 21.4 CM2
LVAD-AP4: 25.9 CM2
LVAS-AP2: 9.71 CM2
LVAS-AP4: 13.6 CM2
LVEDVI(A2C): 39.61 CM3/M2
LVEDVI(BP): 46.27 CM3/M2
LVESVI(A2C): 10.59 CM3/M2
LVESVI(BP): 14.84 CM3/M2
LVLD-AP2: 6.49 CM
LVLD-AP4: 6.97 CM
LVLS-AP2: 4.99 CM
LVLS-AP4: 5.26 CM
LVOT 2D: 2 CM
LVOT A: 3.14 CM2
LVOT PEAK VELOCITY: 0.73 M/S
MV E'TISSUE VEL-LAT: 0.05 M/S
MV E'TISSUE VEL-MED: 0.05 M/S
MV PEAK A VEL: 0.61 M/S
MV PEAK E VEL: 0.69 M/S
MV VALVE AREA P 1/2 METHOD: 2.62 CM2
POSTERIOR WALL: 0.59 CM
PULMONARY REGURGITATION LATE DIASTOLIC VELOCITY: 1.35 M/S
PV PEAK GRADIENT: 3 MMHG
PV PV: 0.85 M/S
RVOT VMAX: 0.6 M/S
SEPTAL TISSUE DOPPLER FREE WALL LATE DIA VELOCITY (APICAL 4 CHAMBER VIEW): 0.12 M/S
TR MAX PG: 32 MMHG
TRICUSPID VALVE PEAK REGURGITATION VELOCITY: 2.82 M/S
Z-SCORE OF LEFT VENTRICULAR DIMENSION IN END DIASTOLE: -1.07
Z-SCORE OF LEFT VENTRICULAR DIMENSION IN END SYSTOLE: -0.82
Z-SCORE OF LEFT VENTRICULAR POSTERIOR WALL IN END DIASTOLE: -0.62

## 2022-03-21 PROCEDURE — 93306 TTE W/DOPPLER COMPLETE: CPT | Performed by: INTERNAL MEDICINE

## 2022-03-21 NOTE — TELEPHONE ENCOUNTER
Please call the patient. Echocardiogram shows normal LV function. No change from previous echocardiogram.

## 2022-03-27 ENCOUNTER — TELEPHONE (OUTPATIENT)
Dept: CARDIOLOGY | Facility: CLINIC | Age: 82
End: 2022-03-27
Payer: MEDICARE

## 2022-03-27 PROBLEM — J90 PLEURAL EFFUSION ON RIGHT: Status: ACTIVE | Noted: 2022-03-27

## 2022-03-27 PROBLEM — R03.0 SINGLE EPISODE OF ELEVATED BLOOD PRESSURE: Status: ACTIVE | Noted: 2022-03-27

## 2022-03-27 NOTE — ASSESSMENT & PLAN NOTE
She has a history of carcinoma of the breast. No signs or symptoms of heart failure.  I ordered an echocardiogram to evaluate severity of tricuspid regurgitation and LV function.

## 2022-03-27 NOTE — TELEPHONE ENCOUNTER
Please call the patient to find out if she had the right pleural effusion drained and etiology for the fluid.

## 2022-03-27 NOTE — ASSESSMENT & PLAN NOTE
I advise her to recheck her blood pressure when not at time of stress and follow up eith you.  I also couuneled her regarding low salt diet.

## 2022-03-28 NOTE — TELEPHONE ENCOUNTER
I spoke with patient.  She states that she is scheduled on Wednesday at [facility].  She will keep us posted on results.

## 2022-06-17 ENCOUNTER — APPOINTMENT (INPATIENT)
Dept: RADIOLOGY | Facility: HOSPITAL | Age: 82
DRG: 551 | End: 2022-06-17
Attending: PHYSICIAN ASSISTANT
Payer: MEDICARE

## 2022-06-17 ENCOUNTER — APPOINTMENT (EMERGENCY)
Dept: RADIOLOGY | Facility: HOSPITAL | Age: 82
DRG: 551 | End: 2022-06-17
Attending: EMERGENCY MEDICINE
Payer: MEDICARE

## 2022-06-17 ENCOUNTER — HOSPITAL ENCOUNTER (INPATIENT)
Facility: HOSPITAL | Age: 82
LOS: 1 days | Discharge: HOME HEALTH | DRG: 551 | End: 2022-06-17
Attending: EMERGENCY MEDICINE | Admitting: SURGERY
Payer: MEDICARE

## 2022-06-17 VITALS
TEMPERATURE: 99.3 F | WEIGHT: 110 LBS | DIASTOLIC BLOOD PRESSURE: 81 MMHG | OXYGEN SATURATION: 97 % | HEART RATE: 62 BPM | SYSTOLIC BLOOD PRESSURE: 194 MMHG | HEIGHT: 65 IN | BODY MASS INDEX: 18.33 KG/M2 | RESPIRATION RATE: 15 BRPM
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
## 2022-06-17 DIAGNOSIS — U07.1 COVID-19: ICD-10-CM

## 2022-06-17 DIAGNOSIS — S12.101A CLOSED NONDISPLACED FRACTURE OF SECOND CERVICAL VERTEBRA, UNSPECIFIED FRACTURE MORPHOLOGY, INITIAL ENCOUNTER (CMS/HCC): ICD-10-CM

## 2022-06-17 DIAGNOSIS — S12.100A TRAUMATIC CLOSED FRACTURE OF C2 VERTEBRA WITH MINIMAL DISPLACEMENT, INITIAL ENCOUNTER (CMS/HCC): Primary | ICD-10-CM

## 2022-06-17 LAB
ABO + RH BLD: NORMAL
ALBUMIN SERPL-MCNC: 3.9 G/DL (ref 3.4–5)
ALP SERPL-CCNC: 68 IU/L (ref 35–126)
ALT SERPL-CCNC: 22 IU/L (ref 11–54)
ANION GAP SERPL CALC-SCNC: 12 MEQ/L (ref 3–15)
ANION GAP SERPL CALC-SCNC: 9 MEQ/L (ref 3–15)
AST SERPL-CCNC: 28 IU/L (ref 15–41)
BASOPHILS # BLD: 0.02 K/UL (ref 0.01–0.1)
BASOPHILS # BLD: 0.02 K/UL (ref 0.01–0.1)
BASOPHILS NFR BLD: 0.3 %
BASOPHILS NFR BLD: 0.3 %
BILIRUB SERPL-MCNC: 1.3 MG/DL (ref 0.3–1.2)
BLD GP AB SCN SERPL QL: NEGATIVE
BUN SERPL-MCNC: 10 MG/DL (ref 8–20)
BUN SERPL-MCNC: 11 MG/DL (ref 8–20)
CALCIUM SERPL-MCNC: 8.2 MG/DL (ref 8.9–10.3)
CALCIUM SERPL-MCNC: 8.4 MG/DL (ref 8.9–10.3)
CHLORIDE SERPL-SCNC: 90 MEQ/L (ref 98–109)
CHLORIDE SERPL-SCNC: 91 MEQ/L (ref 98–109)
CO2 SERPL-SCNC: 22 MEQ/L (ref 22–32)
CO2 SERPL-SCNC: 24 MEQ/L (ref 22–32)
CREAT SERPL-MCNC: 0.5 MG/DL (ref 0.6–1.1)
CREAT SERPL-MCNC: 0.5 MG/DL (ref 0.6–1.1)
D AG BLD QL: POSITIVE
DIFFERENTIAL METHOD BLD: ABNORMAL
DIFFERENTIAL METHOD BLD: ABNORMAL
EOSINOPHIL # BLD: 0 K/UL (ref 0.04–0.36)
EOSINOPHIL # BLD: 0 K/UL (ref 0.04–0.36)
EOSINOPHIL NFR BLD: 0 %
EOSINOPHIL NFR BLD: 0 %
ERYTHROCYTE [DISTWIDTH] IN BLOOD BY AUTOMATED COUNT: 12.2 % (ref 11.7–14.4)
ERYTHROCYTE [DISTWIDTH] IN BLOOD BY AUTOMATED COUNT: 12.3 % (ref 11.7–14.4)
GFR SERPL CREATININE-BSD FRML MDRD: >60 ML/MIN/1.73M*2
GFR SERPL CREATININE-BSD FRML MDRD: >60 ML/MIN/1.73M*2
GLUCOSE SERPL-MCNC: 103 MG/DL (ref 70–99)
GLUCOSE SERPL-MCNC: 108 MG/DL (ref 70–99)
HCT VFR BLDCO AUTO: 38 % (ref 35–45)
HCT VFR BLDCO AUTO: 38.3 % (ref 35–45)
HGB BLD-MCNC: 13.3 G/DL (ref 11.8–15.7)
HGB BLD-MCNC: 13.3 G/DL (ref 11.8–15.7)
IMM GRANULOCYTES # BLD AUTO: 0.03 K/UL (ref 0–0.08)
IMM GRANULOCYTES # BLD AUTO: 0.05 K/UL (ref 0–0.08)
IMM GRANULOCYTES NFR BLD AUTO: 0.5 %
IMM GRANULOCYTES NFR BLD AUTO: 0.7 %
LABORATORY COMMENT REPORT: NORMAL
LYMPHOCYTES # BLD: 0.88 K/UL (ref 1.2–3.5)
LYMPHOCYTES # BLD: 0.91 K/UL (ref 1.2–3.5)
LYMPHOCYTES NFR BLD: 11.9 %
LYMPHOCYTES NFR BLD: 13.9 %
MCH RBC QN AUTO: 31.9 PG (ref 28–33.2)
MCH RBC QN AUTO: 32.2 PG (ref 28–33.2)
MCHC RBC AUTO-ENTMCNC: 34.7 G/DL (ref 32.2–35.5)
MCHC RBC AUTO-ENTMCNC: 35 G/DL (ref 32.2–35.5)
MCV RBC AUTO: 91.8 FL (ref 83–98)
MCV RBC AUTO: 92 FL (ref 83–98)
MONOCYTES # BLD: 0.58 K/UL (ref 0.28–0.8)
MONOCYTES # BLD: 0.67 K/UL (ref 0.28–0.8)
MONOCYTES NFR BLD: 8.8 %
MONOCYTES NFR BLD: 9.1 %
NEUTROPHILS # BLD: 4.84 K/UL (ref 1.7–7)
NEUTROPHILS # BLD: 5.99 K/UL (ref 1.7–7)
NEUTS SEG NFR BLD: 76.2 %
NEUTS SEG NFR BLD: 78.3 %
NRBC BLD-RTO: 0 %
NRBC BLD-RTO: 0 %
PDW BLD AUTO: 10 FL (ref 9.4–12.3)
PDW BLD AUTO: 9.6 FL (ref 9.4–12.3)
PLATELET # BLD AUTO: 219 K/UL (ref 150–369)
PLATELET # BLD AUTO: 232 K/UL (ref 150–369)
POTASSIUM SERPL-SCNC: 3.6 MEQ/L (ref 3.6–5.1)
POTASSIUM SERPL-SCNC: 3.6 MEQ/L (ref 3.6–5.1)
PROT SERPL-MCNC: 7 G/DL (ref 6–8.2)
RBC # BLD AUTO: 4.13 M/UL (ref 3.93–5.22)
RBC # BLD AUTO: 4.17 M/UL (ref 3.93–5.22)
SODIUM SERPL-SCNC: 124 MEQ/L (ref 136–144)
SODIUM SERPL-SCNC: 124 MEQ/L (ref 136–144)
SPECIMEN EXP DATE BLD: NORMAL
WBC # BLD AUTO: 6.35 K/UL (ref 3.8–10.5)
WBC # BLD AUTO: 7.64 K/UL (ref 3.8–10.5)

## 2022-06-17 PROCEDURE — 63700000 HC SELF-ADMINISTRABLE DRUG: Performed by: PHYSICIAN ASSISTANT

## 2022-06-17 PROCEDURE — 80048 BASIC METABOLIC PNL TOTAL CA: CPT | Performed by: PHYSICIAN ASSISTANT

## 2022-06-17 PROCEDURE — 63600105 HC IODINE BASED CONTRAST: Mod: JW | Performed by: PHYSICIAN ASSISTANT

## 2022-06-17 PROCEDURE — 86850 RBC ANTIBODY SCREEN: CPT

## 2022-06-17 PROCEDURE — 85025 COMPLETE CBC W/AUTO DIFF WBC: CPT | Performed by: EMERGENCY MEDICINE

## 2022-06-17 PROCEDURE — 12000000 HC ROOM AND CARE MED/SURG

## 2022-06-17 PROCEDURE — 72125 CT NECK SPINE W/O DYE: CPT | Mod: ME

## 2022-06-17 PROCEDURE — 85025 COMPLETE CBC W/AUTO DIFF WBC: CPT | Mod: 91 | Performed by: PHYSICIAN ASSISTANT

## 2022-06-17 PROCEDURE — 71045 X-RAY EXAM CHEST 1 VIEW: CPT

## 2022-06-17 PROCEDURE — 97162 PT EVAL MOD COMPLEX 30 MIN: CPT | Mod: GP

## 2022-06-17 PROCEDURE — 99221 1ST HOSP IP/OBS SF/LOW 40: CPT | Performed by: NEUROLOGICAL SURGERY

## 2022-06-17 PROCEDURE — 99284 EMERGENCY DEPT VISIT MOD MDM: CPT | Mod: 25

## 2022-06-17 PROCEDURE — 25800000 HC PHARMACY IV SOLUTIONS: Performed by: PHYSICIAN ASSISTANT

## 2022-06-17 PROCEDURE — G1004 CDSM NDSC: HCPCS

## 2022-06-17 PROCEDURE — 63600000 HC DRUGS/DETAIL CODE: Performed by: PHYSICIAN ASSISTANT

## 2022-06-17 PROCEDURE — 80053 COMPREHEN METABOLIC PANEL: CPT | Performed by: EMERGENCY MEDICINE

## 2022-06-17 PROCEDURE — 36415 COLL VENOUS BLD VENIPUNCTURE: CPT | Performed by: EMERGENCY MEDICINE

## 2022-06-17 PROCEDURE — 93005 ELECTROCARDIOGRAM TRACING: CPT | Performed by: EMERGENCY MEDICINE

## 2022-06-17 PROCEDURE — 70498 CT ANGIOGRAPHY NECK: CPT | Mod: ME

## 2022-06-17 PROCEDURE — 70450 CT HEAD/BRAIN W/O DYE: CPT | Mod: ME

## 2022-06-17 RX ORDER — IBUPROFEN 200 MG
16-32 TABLET ORAL AS NEEDED
Status: DISCONTINUED | OUTPATIENT
Start: 2022-06-17 | End: 2022-06-17 | Stop reason: HOSPADM

## 2022-06-17 RX ORDER — POTASSIUM CHLORIDE 750 MG/1
20 TABLET, EXTENDED RELEASE ORAL AS NEEDED
Status: DISCONTINUED | OUTPATIENT
Start: 2022-06-17 | End: 2022-06-17 | Stop reason: HOSPADM

## 2022-06-17 RX ORDER — ASPIRIN 81 MG/1
81 TABLET ORAL DAILY
Qty: 30 TABLET | Refills: 0 | Status: SHIPPED | OUTPATIENT
Start: 2022-06-18 | End: 2023-07-11

## 2022-06-17 RX ORDER — DEXTROSE 40 %
15-30 GEL (GRAM) ORAL AS NEEDED
Status: DISCONTINUED | OUTPATIENT
Start: 2022-06-17 | End: 2022-06-17 | Stop reason: HOSPADM

## 2022-06-17 RX ORDER — ASPIRIN 81 MG/1
81 TABLET ORAL DAILY
Status: DISCONTINUED | OUTPATIENT
Start: 2022-06-18 | End: 2022-06-17 | Stop reason: HOSPADM

## 2022-06-17 RX ORDER — TRAMADOL HYDROCHLORIDE 50 MG/1
25 TABLET ORAL EVERY 6 HOURS PRN
Status: DISCONTINUED | OUTPATIENT
Start: 2022-06-17 | End: 2022-06-17 | Stop reason: HOSPADM

## 2022-06-17 RX ORDER — ASPIRIN 325 MG
325 TABLET, DELAYED RELEASE (ENTERIC COATED) ORAL DAILY
Status: DISCONTINUED | OUTPATIENT
Start: 2022-06-17 | End: 2022-06-17

## 2022-06-17 RX ORDER — LEVOTHYROXINE SODIUM 50 UG/1
25 TABLET ORAL DAILY
Status: DISCONTINUED | OUTPATIENT
Start: 2022-06-17 | End: 2022-06-17

## 2022-06-17 RX ORDER — HYDROMORPHONE HYDROCHLORIDE 1 MG/ML
0.25 INJECTION, SOLUTION INTRAMUSCULAR; INTRAVENOUS; SUBCUTANEOUS
Status: DISCONTINUED | OUTPATIENT
Start: 2022-06-17 | End: 2022-06-17

## 2022-06-17 RX ORDER — DIPHENHYDRAMINE HCL 25 MG
25 CAPSULE ORAL EVERY 6 HOURS PRN
Status: DISCONTINUED | OUTPATIENT
Start: 2022-06-17 | End: 2022-06-17 | Stop reason: HOSPADM

## 2022-06-17 RX ORDER — TRAMADOL HYDROCHLORIDE 50 MG/1
25 TABLET ORAL EVERY 6 HOURS PRN
Qty: 10 TABLET | Refills: 0 | Status: SHIPPED | OUTPATIENT
Start: 2022-06-17 | End: 2022-06-22

## 2022-06-17 RX ORDER — AMOXICILLIN 250 MG
1 CAPSULE ORAL 2 TIMES DAILY
Status: DISCONTINUED | OUTPATIENT
Start: 2022-06-17 | End: 2022-06-17 | Stop reason: HOSPADM

## 2022-06-17 RX ORDER — ACETAMINOPHEN 325 MG/1
650 TABLET ORAL EVERY 6 HOURS
Status: DISCONTINUED | OUTPATIENT
Start: 2022-06-17 | End: 2022-06-17 | Stop reason: HOSPADM

## 2022-06-17 RX ORDER — DEXTROSE 50 % IN WATER (D50W) INTRAVENOUS SYRINGE
25 AS NEEDED
Status: DISCONTINUED | OUTPATIENT
Start: 2022-06-17 | End: 2022-06-17 | Stop reason: HOSPADM

## 2022-06-17 RX ORDER — IODIXANOL 320 MG/ML
75 INJECTION, SOLUTION INTRAVASCULAR ONCE
Status: COMPLETED | OUTPATIENT
Start: 2022-06-17 | End: 2022-06-17

## 2022-06-17 RX ORDER — ATORVASTATIN CALCIUM 10 MG/1
10 TABLET, FILM COATED ORAL
Status: DISCONTINUED | OUTPATIENT
Start: 2022-06-17 | End: 2022-06-17 | Stop reason: HOSPADM

## 2022-06-17 RX ORDER — ATORVASTATIN CALCIUM 10 MG/1
10 TABLET, FILM COATED ORAL EVERY EVENING
COMMUNITY
End: 2022-07-05 | Stop reason: SDUPTHER

## 2022-06-17 RX ORDER — ONDANSETRON HYDROCHLORIDE 2 MG/ML
4 INJECTION, SOLUTION INTRAVENOUS EVERY 8 HOURS PRN
Status: DISCONTINUED | OUTPATIENT
Start: 2022-06-17 | End: 2022-06-17 | Stop reason: HOSPADM

## 2022-06-17 RX ORDER — FLECAINIDE ACETATE 100 MG/1
100 TABLET ORAL EVERY 12 HOURS
Status: DISCONTINUED | OUTPATIENT
Start: 2022-06-17 | End: 2022-06-17 | Stop reason: HOSPADM

## 2022-06-17 RX ORDER — CALCIUM GLUCONATE 20 MG/ML
1 INJECTION, SOLUTION INTRAVENOUS AS NEEDED
Status: DISCONTINUED | OUTPATIENT
Start: 2022-06-17 | End: 2022-06-17 | Stop reason: HOSPADM

## 2022-06-17 RX ORDER — METOPROLOL SUCCINATE 25 MG/1
25 TABLET, EXTENDED RELEASE ORAL DAILY
Status: DISCONTINUED | OUTPATIENT
Start: 2022-06-17 | End: 2022-06-17 | Stop reason: HOSPADM

## 2022-06-17 RX ORDER — ANASTROZOLE 1 MG/1
1 TABLET ORAL
Status: DISCONTINUED | OUTPATIENT
Start: 2022-06-17 | End: 2022-06-17 | Stop reason: HOSPADM

## 2022-06-17 RX ORDER — SODIUM CHLORIDE 9 MG/ML
INJECTION, SOLUTION INTRAVENOUS CONTINUOUS
Status: DISCONTINUED | OUTPATIENT
Start: 2022-06-17 | End: 2022-06-17 | Stop reason: HOSPADM

## 2022-06-17 RX ORDER — CHOLECALCIFEROL (VITAMIN D3) 25 MCG
1000 TABLET ORAL
COMMUNITY

## 2022-06-17 RX ORDER — POTASSIUM CHLORIDE 750 MG/1
40 TABLET, EXTENDED RELEASE ORAL AS NEEDED
Status: DISCONTINUED | OUTPATIENT
Start: 2022-06-17 | End: 2022-06-17 | Stop reason: HOSPADM

## 2022-06-17 RX ORDER — METOPROLOL SUCCINATE 25 MG/1
25 TABLET, EXTENDED RELEASE ORAL EVERY EVENING
COMMUNITY
End: 2022-07-11

## 2022-06-17 RX ORDER — LEVOTHYROXINE SODIUM 50 UG/1
50 TABLET ORAL DAILY
Status: DISCONTINUED | OUTPATIENT
Start: 2022-06-17 | End: 2022-06-17 | Stop reason: HOSPADM

## 2022-06-17 RX ORDER — DORZOLAMIDE HCL 20 MG/ML
1 SOLUTION/ DROPS OPHTHALMIC 3 TIMES DAILY
Status: DISCONTINUED | OUTPATIENT
Start: 2022-06-17 | End: 2022-06-17 | Stop reason: HOSPADM

## 2022-06-17 RX ADMIN — DORZOLAMIDE HYDROCHLORIDE 1 DROP: 20 SOLUTION/ DROPS OPHTHALMIC at 10:33

## 2022-06-17 RX ADMIN — LEVOTHYROXINE SODIUM 50 MCG: 0.05 TABLET ORAL at 13:34

## 2022-06-17 RX ADMIN — ACETAMINOPHEN 650 MG: 325 TABLET ORAL at 06:07

## 2022-06-17 RX ADMIN — IODIXANOL 75 ML: 320 INJECTION, SOLUTION INTRAVASCULAR at 03:56

## 2022-06-17 RX ADMIN — HYDROMORPHONE HYDROCHLORIDE 0.25 MG: 1 INJECTION, SOLUTION INTRAMUSCULAR; INTRAVENOUS; SUBCUTANEOUS at 06:07

## 2022-06-17 RX ADMIN — FLECAINIDE ACETATE 100 MG: 100 TABLET ORAL at 13:34

## 2022-06-17 RX ADMIN — SODIUM CHLORIDE: 9 INJECTION, SOLUTION INTRAVENOUS at 03:29

## 2022-06-17 RX ADMIN — ANASTROZOLE 1 MG: 1 TABLET, COATED ORAL at 13:35

## 2022-06-17 RX ADMIN — ASPIRIN 325 MG: 325 TABLET, COATED ORAL at 13:35

## 2022-06-17 RX ADMIN — METOPROLOL SUCCINATE 25 MG: 25 TABLET, EXTENDED RELEASE ORAL at 13:34

## 2022-06-17 ASSESSMENT — COGNITIVE AND FUNCTIONAL STATUS - GENERAL
STANDING UP FROM CHAIR USING ARMS: 3 - A LITTLE
AFFECT: WFL
CLIMB 3 TO 5 STEPS WITH RAILING: 3 - A LITTLE
WALKING IN HOSPITAL ROOM: 3 - A LITTLE
MOVING TO AND FROM BED TO CHAIR: 3 - A LITTLE

## 2022-06-17 NOTE — HOSPITAL COURSE
Orly is a 82 y.o. female admitted on 6/17/2022 with Traumatic closed fracture of C2 vertebra with minimal displacement, initial encounter (CMS/[facility]) [S12.100A]. Principal problem is Traumatic closed fracture of C2 vertebra with minimal displacement, initial encounter (CMS/[facility]).    Past Medical History  Orly has a past medical history of Alcoholism (CMS/[facility]), Arrhythmia, Breast cancer (CMS/[facility]), Chronic atrial fibrillation (CMS/[facility]), Hypothyroidism, and Osteoporosis.    History of Present Illness   Fall resulting in C2 fx; hard cervical collar at all times; WBAT BUE/ BLE

## 2022-06-17 NOTE — ED PROVIDER NOTES
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 Denies LOC. Reports L sided neck pain. Did not try to ambulate since fall. No CP or SOB. Recently diagnosed with COVID a few days ago. + cough. No numbness or weakness or vision changes. Tier 3 trauma activated. GCS=15 on arrival.             Patient History   PAST HISTORY     Reviewed from Nursing Triage:         Past Medical History:   Diagnosis Date   • Alcoholism (CMS/HCC)    • Arrhythmia    • Breast cancer (CMS/HCC)    • Chronic atrial fibrillation (CMS/HCC)    • Hypothyroidism    • Osteoporosis        Past Surgical History:   Procedure Laterality Date   • ABLATION OF DYSRHYTHMIC FOCUS     • BREAST BIOPSY      2 breast biospy benign   • BREAST LUMPECTOMY Left 2021   • CHOLECYSTECTOMY     • COSMETIC SURGERY     • EYE SURGERY     • SKIN BIOPSY         Family History   Problem Relation Age of Onset   • Heart disease Biological Mother    • Heart disease Biological Father    • Heart attack Biological Father    • Heart failure Biological Father    • Anemia Biological Sister    • Clotting disorder Biological Sister    • Fainting Biological Sister    • Hypertension Biological Sister        Social History     Tobacco Use   • Smoking status: Former Smoker     Quit date:      Years since quittin.4   • Smokeless tobacco: Never Used   Vaping Use   • Vaping Use: Never used   Substance Use Topics   • Alcohol use: No   • Drug use: No         Review of Systems   REVIEW OF SYSTEMS     Review of Systems   Constitutional: Negative for fever.   Eyes: Negative for visual disturbance.   Respiratory: Positive for cough.    Cardiovascular: Negative.    Musculoskeletal: Positive for neck pain.   Neurological: Negative for weakness and numbness.   All other systems reviewed and are negative.        VITALS     ED Vitals    Date/Time Temp  Pulse Resp BP SpO2 [facility]   06/17/22 0659 -- 56 20 181/82 98 % [facility]   06/17/22 0615 -- 72 -- -- -- [facility]   06/17/22 0515 -- 70 20 191/83 -- [facility]   06/17/22 0339 -- 70 20 194/95 98 % [facility]   06/17/22 0329 -- 70 21 193/91 98 % [facility]   06/17/22 0159 -- 78 20 -- 98 % [facility]   06/17/22 0026 37.4 °C (99.3 °F) 69 18 185/89 97 % hospitals                       Physical Exam   PHYSICAL EXAM     Physical Exam  Vitals and nursing note reviewed.   Constitutional:       General: She is in acute distress.      Appearance: Normal appearance. She is normal weight. She is not toxic-appearing.   HENT:      Head: Normocephalic.      Mouth/Throat:      Mouth: Mucous membranes are moist.   Eyes:      Extraocular Movements: Extraocular movements intact.      Pupils: Pupils are equal, round, and reactive to light.   Neck:      Comments: + C-spine TTP, no stepoff, C-collar in place  Cardiovascular:      Rate and Rhythm: Normal rate and regular rhythm.      Pulses: Normal pulses.      Heart sounds: Normal heart sounds. No murmur heard.  Pulmonary:      Effort: Pulmonary effort is normal.      Breath sounds: Normal breath sounds.   Abdominal:      General: Abdomen is flat.      Palpations: Abdomen is soft.      Tenderness: There is no abdominal tenderness. There is no guarding or rebound.   Musculoskeletal:         General: No deformity. Normal range of motion.      Cervical back: Tenderness present.   Skin:     General: Skin is warm and dry.      Capillary Refill: Capillary refill takes less than 2 seconds.   Neurological:      General: No focal deficit present.      Mental Status: She is alert and oriented to person, place, and time. Mental status is at baseline.   Psychiatric:         Mood and Affect: Mood normal.         Behavior: Behavior normal.           PROCEDURES     Procedures     DATA     Results     Procedure Component Value Units Date/Time    Type and screen [[id number]] Collected: 06/17/22 0236    Specimen: Blood, Venous Updated: 06/17/22 0335      Specimen Expiration 06/20/2022     Antibody Screen Negative     ABO O     Rh Factor Positive     History Check No type on file    Comprehensive metabolic panel [[id number]]  (Abnormal) Collected: 06/17/22 0236    Specimen: Blood, Venous Updated: 06/17/22 0328     Sodium 124 mEQ/L      Potassium 3.6 mEQ/L      Comment: Results obtained on plasma. Plasma Potassium values may be up to 0.4 mEQ/L less than serum values. The differences may be greater for patients with high platelet or white cell counts.        Chloride 91 mEQ/L      CO2 24 mEQ/L      BUN 11 mg/dL      Creatinine 0.5 mg/dL      Glucose 108 mg/dL      Calcium 8.4 mg/dL      AST (SGOT) 28 IU/L      ALT (SGPT) 22 IU/L      Alkaline Phosphatase 68 IU/L      Total Protein 7.0 g/dL      Comment: Test performed on plasma which typically contains approximately 0.4 g/dL more protein than serum.        Albumin 3.9 g/dL      Bilirubin, Total 1.3 mg/dL      eGFR >60.0 mL/min/1.73m*2      Anion Gap 9 mEQ/L     CBC and differential [[id number]]  (Abnormal) Collected: 06/17/22 0236    Specimen: Blood, Venous Updated: 06/17/22 0244     WBC 7.64 K/uL      RBC 4.17 M/uL      Hemoglobin 13.3 g/dL      Hematocrit 38.3 %      MCV 91.8 fL      MCH 31.9 pg      MCHC 34.7 g/dL      RDW 12.3 %      Platelets 232 K/uL      MPV 9.6 fL      Differential Type Auto     nRBC 0.0 %      Immature Granulocytes 0.7 %      Neutrophils 78.3 %      Lymphocytes 11.9 %      Monocytes 8.8 %      Eosinophils 0.0 %      Basophils 0.3 %      Immature Granulocytes, Absolute 0.05 K/uL      Neutrophils, Absolute 5.99 K/uL      Lymphocytes, Absolute 0.91 K/uL      Monocytes, Absolute 0.67 K/uL      Eosinophils, Absolute 0.00 K/uL      Basophils, Absolute 0.02 K/uL     RAINBOW LT BLUE [[id number]] Collected: 06/17/22 0236    Specimen: Blood, Venous Updated: 06/17/22 0240    RAINBOW RED [[id number]] Collected: 06/17/22 0236    Specimen: Blood, Venous Updated: 06/17/22 0240    [place] Draw Panel [[id number]]  Collected: 06/17/22 0236    Specimen: Blood, Venous Updated: 06/17/22 0240    Narrative:      The following orders were created for panel order [place] Draw Panel.  Procedure                               Abnormality         Status                     ---------                               -----------         ------                     RAINBOW RED[[id number]]                                      In process                 RAINBOW LT BLUE[[id number]]                                  In process                 RAINBOW KILEY[[id number]]                                     In process                   Please view results for these tests on the individual orders.    RAINBOW GOLD [[id number]] Collected: 06/17/22 0236    Specimen: Blood, Venous Updated: 06/17/22 0240          Imaging Results    None         No orders to display       Scoring tools                                 ED Course & MDM   MDM / ED COURSE / CLINICAL IMPRESSIONS / DISPO     MDM  Number of Diagnoses or Management Options  Closed nondisplaced fracture of second cervical vertebra, unspecified fracture morphology, initial encounter (CMS/[facility])  COVID-19  Fall, initial encounter  Diagnosis management comments: 83 yo F with history of WPW, afib on eliquis p/w mechanical fall/ head trauma. GCS=15 on arrival. ABCs intact. + c-spine TTP. nonfocal neuro exam. Mod susp for head/neck injury, fracture. Doubt cord compression, CVA, syncope. Plan Head/ c-spine CT, CXR. Consider d/c if trauma workup is wnl and pt can ambulate safely. Otherwise , pt may warrant admission. Will reassess once workup is complete.       Amount and/or Complexity of Data Reviewed  Clinical lab tests: reviewed  Tests in the radiology section of CPT®: reviewed        ED Course as of 06/19/22 2328 Fri Jun 17, 2022 0215 CT showing C2 fracture. Trauma team made aware and will see pt.  [JAN]      ED Course User Index  [JAN] Jasen Nick MD         Clinical Impressions as of 06/19/22 2328    Fall, initial encounter   Closed nondisplaced fracture of second cervical vertebra, unspecified fracture morphology, initial encounter (CMS/[facility])   COVID-19     Admit / Observation         Jasen Nick MD  06/17/22 2432       Jasen Nick MD  06/19/22 0147

## 2022-06-17 NOTE — H&P
TRAUMA SURGERY HISTORY & PHYSICAL     PATIENT NAME:  Orly Cobian YOB: 1940    AGE:  82 y.o.  GENDER: female   MRN:  [id number]  PATIENT #: [id number]     HISTORY OF PRESENT ILLNESS/INJURY     The patient is a 82 y.o. female s/p mechanical fall. Denies LOC. Patient remembers the accident, she denies any dizziness or lighteners prior the fall but she state that she feels weak because she hasn't been eating much due to loss appetite. GCS 15, moving 4 extremities     Pharmacological Risk Factors:   · Oral Anti-Coagulation: Eliquis   · Antiplatelet Therapy: NONE    REVIEW OF SYSTEMS     Poor appetite   Feels weak  PAST MEDICAL HISTORY     Past Medical History:   Diagnosis Date   • Alcoholism (CMS/HCC)    • Arrhythmia    • Breast cancer (CMS/HCC)    • Chronic atrial fibrillation (CMS/HCC)    • Hypothyroidism    • Osteoporosis        PAST SURGICAL HISTORY     Past Surgical History:   Procedure Laterality Date   • ABLATION OF DYSRHYTHMIC FOCUS     • BREAST BIOPSY      2 breast biospy benign   • BREAST LUMPECTOMY Left 2021   • CHOLECYSTECTOMY     • COSMETIC SURGERY     • EYE SURGERY     • SKIN BIOPSY          FAMILY HISTORY     Non-contributory    SOCIAL HISTORY     Social History     Socioeconomic History   • Marital status:      Spouse name: Not on file   • Number of children: Not on file   • Years of education: Not on file   • Highest education level: Not on file   Occupational History   • Not on file   Tobacco Use   • Smoking status: Former Smoker     Quit date:      Years since quittin.4   • Smokeless tobacco: Never Used   Vaping Use   • Vaping Use: Never used   Substance and Sexual Activity   • Alcohol use: No   • Drug use: No   • Sexual activity: Defer   Other Topics Concern   • Not on file   Social History Narrative   • Not on file     Social Determinants of Health     Financial Resource Strain: Not on file   Food Insecurity: No Food Insecurity   • Worried About Running Out  of Food in the Last Year: Never true   • Ran Out of Food in the Last Year: Never true   Transportation Needs: Not on file   Physical Activity: Not on file   Stress: Not on file   Social Connections: Not on file   Intimate Partner Violence: Not on file   Housing Stability: Not on file       HOME MEDICATIONS     Not in a hospital admission.    ALLERGIES     Allergies   Allergen Reactions   • Erythromycin Base Anaphylaxis     Erythromycin   • Erythromycin      Imported from external source.       PRIMARY CARE PHYSICIAN     Anton Muniz MD    PRIMARY SURVEY   Airway: Patent  Breathing: Spontaneous, unlabored  Circulation: Central and peripheral pulses present. NSR.  Disability: GCS 15; Alert and moving all extremities      SECONDARY SURVEY     Vitals:    06/17/22 0659   BP: (!) 181/82   Pulse: (!) 56   Resp: 20   Temp:    SpO2: 98%       NEURO: A&Ox3, No focal neuro deficit.   HEENT: Face symmetric  SPINE: . C collar in place. T/L/S spine nontender, no stepoffs/deformities.   CHEST: Symmetric expansion, nontender, no crepitus  LUNGS: Clear to auscultation bilaterally. No use of accessory muscles or respiratory distress.   HEART: rate controlkled  ABDOMEN: Soft, nontender, nondistended.  PELVIS: Stable, nontender.   : Genitalia unremarkable.    RECTAL:Anal sensation intact.  EXTREMITIES: No gross deformities.SKIN: warm, dry        DIAGNOSTIC DATA     LABS:  Recent Results (from the past 24 hour(s))   CBC and differential    Collection Time: 06/17/22  2:36 AM   Result Value Ref Range    WBC 7.64 3.80 - 10.50 K/uL    RBC 4.17 3.93 - 5.22 M/uL    Hemoglobin 13.3 11.8 - 15.7 g/dL    Hematocrit 38.3 35.0 - 45.0 %    MCV 91.8 83.0 - 98.0 fL    MCH 31.9 28.0 - 33.2 pg    MCHC 34.7 32.2 - 35.5 g/dL    RDW 12.3 11.7 - 14.4 %    Platelets 232 150 - 369 K/uL    MPV 9.6 9.4 - 12.3 fL    Differential Type Auto     nRBC 0.0 <=0.0 %    Immature Granulocytes 0.7 %    Neutrophils 78.3 %    Lymphocytes 11.9 %    Monocytes 8.8 %     Eosinophils 0.0 %    Basophils 0.3 %    Immature Granulocytes, Absolute 0.05 0.00 - 0.08 K/uL    Neutrophils, Absolute 5.99 1.70 - 7.00 K/uL    Lymphocytes, Absolute 0.91 (L) 1.20 - 3.50 K/uL    Monocytes, Absolute 0.67 0.28 - 0.80 K/uL    Eosinophils, Absolute 0.00 (L) 0.04 - 0.36 K/uL    Basophils, Absolute 0.02 0.01 - 0.10 K/uL   Comprehensive metabolic panel    Collection Time: 06/17/22  2:36 AM   Result Value Ref Range    Sodium 124 (LL) 136 - 144 mEQ/L    Potassium 3.6 3.6 - 5.1 mEQ/L    Chloride 91 (L) 98 - 109 mEQ/L    CO2 24 22 - 32 mEQ/L    BUN 11 8 - 20 mg/dL    Creatinine 0.5 (L) 0.6 - 1.1 mg/dL    Glucose 108 (H) 70 - 99 mg/dL    Calcium 8.4 (L) 8.9 - 10.3 mg/dL    AST (SGOT) 28 15 - 41 IU/L    ALT (SGPT) 22 11 - 54 IU/L    Alkaline Phosphatase 68 35 - 126 IU/L    Total Protein 7.0 6.0 - 8.2 g/dL    Albumin 3.9 3.4 - 5.0 g/dL    Bilirubin, Total 1.3 (H) 0.3 - 1.2 mg/dL    eGFR >60.0 >=60.0 mL/min/1.73m*2    Anion Gap 9 3 - 15 mEQ/L   Type and screen    Collection Time: 06/17/22  2:36 AM   Result Value Ref Range    Specimen Expiration 06/20/2022     Antibody Screen Negative     ABO O     Rh Factor Positive     History Check No type on file    CBC and differential    Collection Time: 06/17/22  5:28 AM   Result Value Ref Range    WBC 6.35 3.80 - 10.50 K/uL    RBC 4.13 3.93 - 5.22 M/uL    Hemoglobin 13.3 11.8 - 15.7 g/dL    Hematocrit 38.0 35.0 - 45.0 %    MCV 92.0 83.0 - 98.0 fL    MCH 32.2 28.0 - 33.2 pg    MCHC 35.0 32.2 - 35.5 g/dL    RDW 12.2 11.7 - 14.4 %    Platelets 219 150 - 369 K/uL    MPV 10.0 9.4 - 12.3 fL    Differential Type Auto     nRBC 0.0 <=0.0 %    Immature Granulocytes 0.5 %    Neutrophils 76.2 %    Lymphocytes 13.9 %    Monocytes 9.1 %    Eosinophils 0.0 %    Basophils 0.3 %    Immature Granulocytes, Absolute 0.03 0.00 - 0.08 K/uL    Neutrophils, Absolute 4.84 1.70 - 7.00 K/uL    Lymphocytes, Absolute 0.88 (L) 1.20 - 3.50 K/uL    Monocytes, Absolute 0.58 0.28 - 0.80 K/uL    Eosinophils,  Absolute 0.00 (L) 0.04 - 0.36 K/uL    Basophils, Absolute 0.02 0.01 - 0.10 K/uL   Basic metabolic panel    Collection Time: 06/17/22  5:28 AM   Result Value Ref Range    Sodium 124 (LL) 136 - 144 mEQ/L    Potassium 3.6 3.6 - 5.1 mEQ/L    Chloride 90 (L) 98 - 109 mEQ/L    CO2 22 22 - 32 mEQ/L    BUN 10 8 - 20 mg/dL    Creatinine 0.5 (L) 0.6 - 1.1 mg/dL    Glucose 103 (H) 70 - 99 mg/dL    Calcium 8.2 (L) 8.9 - 10.3 mg/dL    eGFR >60.0 >=60.0 mL/min/1.73m*2    Anion Gap 12 3 - 15 mEQ/L        Serum creatinine: 0.5 mg/dL (L) 06/17/22 0528  Estimated creatinine clearance: 41.2 mL/min (A)    IMAGINGS:      IMPRESSION/PLAN     Patient is a 82 y.o. female s/p mechanical fall     CT 2 fracture   Follow final read of CTA  C collar in place  Neurosurgery eval     Hyponatremia   Most likely hypovolemic hyponatremia   NS hydration   F/u NA in the am     Hold Eliquis         DISPOSITION:  PCU     Emi Tobin MD

## 2022-06-17 NOTE — PROGRESS NOTES
Patient: Orly Cobian  Location:  [facility] Emergency Department ED15  MRN:  [id number]  Today's date:  6/17/2022  Discussed with RN prior to therapy session and RN reports pt medically stable to participate. Pt left in bed alarmed w/ phone and call bell w/in reach. RN informed of pts function and aware of pt current status.    Orly is a 82 y.o. female admitted on 6/17/2022 with Traumatic closed fracture of C2 vertebra with minimal displacement, initial encounter (CMS/[facility]) [S12.100A]. Principal problem is Traumatic closed fracture of C2 vertebra with minimal displacement, initial encounter (CMS/[facility]).    Past Medical History  Orly has a past medical history of Alcoholism (CMS/[facility]), Arrhythmia, Breast cancer (CMS/[facility]), Chronic atrial fibrillation (CMS/[facility]), Hypothyroidism, and Osteoporosis.    History of Present Illness   Fall resulting in C2 fx; hard cervical collar at all times; WBAT BUE/ BLE      PT Vitals    Date/Time Pulse HR Source SpO2 Pt Activity O2 Therapy BP BP Location BP Method Pt Position [facility]   06/17/22 1558 61 Monitor 99 % At rest None (Room air) 169/96 Right upper arm Automatic Lying NM   06/17/22 1606 62 Monitor 99 % Walking None (Room air) 195/81 Right upper arm Automatic Lying NM      PT Pain    Date/Time Pain Type Acceptable Pain Level Rating: [facility]   06/17/22 1558 Pain Assessment 0 0 NM          Prior Living Environment    Flowsheet Row Most Recent Value   Current Living Arrangements home   Living Environment Comment pt reports living in 3SH w/ . no JOSH. able to do 1st fl setup. 7+7 steps w/ B rail to each floor. walkin shower w/ SC and grab bars.        Prior Level of Function    Flowsheet Row Most Recent Value   Dominant Hand left   Ambulation independent   Transferring independent   Toileting independent   Bathing independent   Dressing independent   Eating independent   Communication understands/communicates without difficulty   Prior Level of Function Comment pt  reports amb IND w/o AD, IND in ADLs. (+) .   Assistive Device Currently Used at Home none           PT Evaluation and Treatment - 06/17/22 1556        PT Time Calculation    Start Time 1556     Stop Time 1608     Time Calculation (min) 12 min        Session Details    Document Type initial evaluation     Mode of Treatment physical therapy        General Information    Patient Profile Reviewed yes     Patient/Family/Caregiver Comments/Observations RN cleared     General Observations of Patient pt rec'd in ED bed, agreeable to therapy session     Existing Precautions/Restrictions fall;contact;droplet;brace worn at all times;weight bearing   COVID (+), hard cervical collar at all times    Limitations/Impairments safety/cognitive        Weight-bearing Status    Left UE Weight-Bearing Status weight-bearing as tolerated (WBAT)     Right UE Weight-Bearing Status weight-bearing as tolerated (WBAT)     Left LE Weight-Bearing Status weight-bearing as tolerated (WBAT)     Right LE Weight-Bearing Status weight-bearing as tolerated (WBAT)        Cognition/Psychosocial    Affect/Mental Status (Cognition) WFL     Orientation Status (Cognition) oriented x 3     Follows Commands (Cognition) WFL     Cognitive Function WFL     Comment, Cognition cooperative t/o session        Sensory    Hearing Status WFL        Vision Assessment/Intervention    Visual Impairment/Limitations corrective lenses for distance        Sensory Assessment (Somatosensory)    Sensory Assessment (Somatosensory) LE sensation intact;bilateral LE   pt denies BLE N/T       Range of Motion (ROM)    Range of Motion ROM is WFL;bilateral lower extremities        Strength (Manual Muscle Testing)    Strength (Manual Muscle Testing) strength is WFL;bilateral lower extremities   grossly 4-/5       Bed Mobility    [place], Supine to Sit supervision     [place], Sit to Supine supervision     Assistive Device head of bed elevated     Comment (Bed Mobility) OOB  to left. HOB 30 deg.        Sit to Stand Transfer    Hand, Sit to Stand Transfer close supervision;verbal cues     Verbal Cues safety;technique     Assistive Device none     Comment to EOB        Stand to Sit Transfer    Hand, Stand to Sit Transfer close supervision;verbal cues     Verbal Cues preparatory posture;proper use of assistive device;safety;technique     Assistive Device walker, front-wheeled     Comment to EOB        Gait Training    Hand, Gait close supervision;minimum assist (75% or more patient effort);1 person assist;verbal cues     Assistive Device none;walker, front-wheeled     Distance in Feet 45 feet     Pattern (Gait) step-through     Deviations/Abnormal Patterns (Gait) base of support, narrow;gait speed decreased;step length decreased;stride length decreased;weight shifting decreased     Maintains Weight-bearing Status (Gait) able to maintain     Comment (Gait/Stairs) pt amb w/in room 45 ft. initially using no AD and req CS-Brandi d/t drifting to R side. pt reports she is unaware. trialed use of RW and pt with improved balance req decreased assist, however req vcs due to still demonstrating R drift during gait        Stairs Training    Hand, Stairs not tested     Comment pt deferred, stating she is going to stay on 1st fl setup upon return home and has no JOSH.        Safety Issues, Functional Mobility    Safety Issues Affecting Function (Mobility) insight into deficits/self-awareness;judgment;problem-solving     Impairments Affecting Function (Mobility) balance;endurance/activity tolerance;strength        Balance    Balance Assessment sit to stand dynamic balance     Static Sitting Balance WFL     Dynamic Sitting Balance mild impairment     Sit to Stand Dynamic Balance mild impairment     Static Standing Balance mild impairment     Dynamic Standing Balance mild impairment     Comment, Balance supported initially w/o AD, improved balance w/ RW. PT educated pt on PT  rec of use of RW upon return home.        Motor Skills    Functional Endurance good(-), elevated BP post ambulation. RN informed and aware. pt asymptomatic        Orthotics & Prosthetics Management    Orthosis Location spinal orthosis        Spinal Orthosis Management    Type (Spinal Orthosis) cervical collar, hard     Functional Design (Spinal Orthosis) static orthosis     Therapeutic Indications Spinal Orthosis stabilization and support;fracture immobilization     Wearing Schedule (Spinal Orthosis) wear full-time     Orthosis Training (Spinal Orthosis) patient;donning/doffing orthosis;orthosis adjustment;purpose/goals of orthosis;wearing schedule;able to verbalize training        AM-PAC (TM) - Mobility (Current Function)    Turning from your back to your side while in a flat bed without using bedrails? 4 - None     Moving from lying on your back to sitting on the side of a flat bed without using bedrails? 4 - None     Moving to and from a bed to a chair? 3 - A Little     Standing up from a chair using your arms? 3 - A Little     To walk in a hospital room? 3 - A Little     Climbing 3-5 steps with a railing? 3 - A Little     AM-PAC (TM) Mobility Score 20        Assessment/Plan (PT)    Daily Outcome Statement PT eval complete. pt supervision for bed mobility, CS for STS, and CS-Brandi for ambulation. pt with improved balance w/ use of RW, only req CS. PT educated pt on PT recommendation of use of RW at this time. PT also educated pt on PT rec of staying on 1st fl setup upon return home. pt verbalized understanding and agreement. rec d/c home w/ home health and assist.     Rehab Potential good, to achieve stated therapy goals     Therapy Frequency 3-5 times/wk     Planned Therapy Interventions balance training;bed mobility training;gait training;stair training;strengthening;transfer training               PT Assessment/Plan    Flowsheet Row Most Recent Value   PT Recommended Discharge Disposition home with home health,  home with assistance at 06/17/2022 1556   Anticipated Equipment Needs at Discharge (PT) none  [pt reports owning RW] at 06/17/2022 1556   Patient/Family Therapy Goals Statement go home at 06/17/2022 1556                    Education Documentation  Joint Mobility/Strength, taught by Elisa Ann, PT at 6/17/2022  4:21 PM.  Learner: Patient  Readiness: Acceptance  Method: Explanation  Response: Verbalizes Understanding  Comment: pt educated on PT role, POC, safety during transfers/ambulation, proper use of RW, WBAT BUE/BLE, hard cervical collar at all times    Home Safety, taught by Elisa Ann, PT at 6/17/2022  4:21 PM.  Learner: Patient  Readiness: Acceptance  Method: Explanation  Response: Verbalizes Understanding  Comment: pt educated on PT role, POC, safety during transfers/ambulation, proper use of RW, WBAT BUE/BLE, hard cervical collar at all times          PT Goals    Flowsheet Row Most Recent Value   Bed Mobility Goal 1    Activity/Assistive Device bed mobility activities, all at 06/17/2022 1556   [place] independent at 06/17/2022 1556   Time Frame by discharge at 06/17/2022 1556   Progress/Outcome goal ongoing at 06/17/2022 1556   Transfer Goal 1    Activity/Assistive Device sit-to-stand/stand-to-sit, bed-to-chair/chair-to-bed, walker, front-wheeled at 06/17/2022 1556   [place] modified independence at 06/17/2022 1556   Time Frame by discharge at 06/17/2022 1556   Progress/Outcome goal ongoing at 06/17/2022 1556   Gait Training Goal 1    Activity/Assistive Device gait (walking locomotion), walker, front-wheeled at 06/17/2022 1556   [place] modified independence at 06/17/2022 1556   Distance 150 at 06/17/2022 1556   Time Frame by discharge at 06/17/2022 1556   Progress/Outcome goal ongoing at 06/17/2022 1556   Stairs Goal 1    Activity/Assistive Device ascending stairs, descending stairs, using handrail, left, using handrail, right, step-over step, step-to-step at 06/17/2022 1556    [place] modified independence at 06/17/2022 1556   Number of Stairs 14 at 06/17/2022 1556   Time Frame by discharge at 06/17/2022 1556   Progress/Outcome goal ongoing at 06/17/2022 1551

## 2022-06-17 NOTE — PATIENT CARE CONFERENCE
Care Progression Rounds Note  Date: 6/17/2022  Time: 9:32 AM     Patient Name: Orly Cobian     Medical Record Number: [id number]   YOB: 1940  Sex: Female      Room/Bed: ED15    Admitting Diagnosis: Traumatic closed fracture of C2 vertebra with minimal displacement, initial encounter (CMS/[facility]) [S12.100A]   Admit Date/Time: 6/17/2022 12:23 AM    Primary Diagnosis: Traumatic closed fracture of C2 vertebra with minimal displacement, initial encounter (CMS/[facility])  Principal Problem: Traumatic closed fracture of C2 vertebra with minimal displacement, initial encounter (CMS/[facility])    GMLOS: pending  Anticipated Discharge Date: 6/20/2022    AM-PAC:  Mobility Score:      Discharge Planning:  Anticipated Discharge Disposition: acute rehab/Inpatient Rehab Facility, skilled nursing facility, home with home health    Barriers to Discharge:  Medical issues not resolved    Comments:       Participants:  advanced practice provider, nursing, social work/services

## 2022-06-17 NOTE — PROGRESS NOTES
Sw informed that patient was seen & patient is cleared for discharge to home today & will need home care. Patient was seen by servando & recommendation is home with home care. Sw unable to see or speak to patient since she is covid isolation & does not have her cell phone. Sw spoke to patient's sposue to go over above & also choice/preference for home care agencies. Per sposue he would be agreeable to [facility] when choice was offered. Sw made referral to [facility] for home rn/pt/ot & advised that patient is covid postive to [facility]. Sw spoke to [facility] to verify that they are aware of covid status. Plan is for d/c to home today with [facility]. Sw made patient's spouse aware of above. Romi gold, msw [id number]  Patient and/or patient guardian/advocate was made aware of their right to choose a provider. A list of eligible providers was presented and reviewed with the patient and/or patient guardian/advocate in written and/or verbal form. The list delineates providers in the patient’s desired geographic area who are participating in the Medicare program and/or providers contracted with the patient’s primary insurance. The Medicare list and quality ratings were obtained from the Medicare.gov [medicare.gov] website.

## 2022-06-17 NOTE — CONSULTS
Neurosurgery Consultation     Requesting Provider:  Dr. Pacheco Tobin     CC: Fall, left sided neck pain     Seen and examined on 22 at 1240     Reason for Consultation:  C2 unilateral hangmans fracture     History of Present Illness:   Orly Cobian is a 82 y.o. F with PMH of atrial fibrillation on eliquis, breast ca, hypothyroidism, alcoholism, and osteoporosis presented to  ED last evening after suffering a fall. She reported that it was mechanical in nature and did not experience any preceding symptoms such as lightheaded or dizziness. She has COVID and lost her appetite and generally feels weak. She had new onset of left sided neck pain after the fall without radiculopathy.     Currently she has no neck pain in the collar. She denies radiculopathy, numbness, paresthesias, or weakness in her extremities. She would like to go home.     Medical History:   Past Medical History:   Diagnosis Date   • Alcoholism (CMS/HCC)    • Arrhythmia    • Breast cancer (CMS/HCC)    • Chronic atrial fibrillation (CMS/HCC)    • Hypothyroidism    • Osteoporosis        Surgical History:   Past Surgical History:   Procedure Laterality Date   • ABLATION OF DYSRHYTHMIC FOCUS     • BREAST BIOPSY      2 breast biospy benign   • BREAST LUMPECTOMY Left 2021   • CHOLECYSTECTOMY     • COSMETIC SURGERY     • EYE SURGERY     • SKIN BIOPSY         Social History:   Social History     Socioeconomic History   • Marital status:      Spouse name: None   • Number of children: None   • Years of education: None   • Highest education level: None   Tobacco Use   • Smoking status: Former Smoker     Quit date:      Years since quittin.4   • Smokeless tobacco: Never Used   Vaping Use   • Vaping Use: Never used   Substance and Sexual Activity   • Alcohol use: No   • Drug use: No   • Sexual activity: Defer     Social Determinants of Health     Food Insecurity: No Food Insecurity   • Worried About Running Out of Food in the Last  Year: Never true   • Ran Out of Food in the Last Year: Never true       Family History:   Family History   Problem Relation Age of Onset   • Heart disease Biological Mother    • Heart disease Biological Father    • Heart attack Biological Father    • Heart failure Biological Father    • Anemia Biological Sister    • Clotting disorder Biological Sister    • Fainting Biological Sister    • Hypertension Biological Sister        Allergies: Erythromycin base and Erythromycin    Home Medications:  Not in a hospital admission.    Current Medications:  •  acetaminophen, 650 mg, oral, q6h HEIDI  •  anastrozole, 1 mg, oral, Daily (6a)  •  atorvastatin, 10 mg, oral, Daily (6p)  •  calcium gluconate, 1 g, intravenous, PRN  •  glucose, 16-32 g of dextrose, oral, PRN **OR** dextrose, 15-30 g of dextrose, oral, PRN **OR** glucagon, 1 mg, intramuscular, PRN **OR** dextrose 50 % in water (D50), 25 mL, intravenous, PRN  •  diphenhydrAMINE, 25 mg, oral, q6h PRN  •  dorzolamide, 1 drop, Both Eyes, TID  •  flecainide, 100 mg, oral, q12h HEIDI  •  HYDROmorphone, 0.25 mg, intravenous, q3h PRN  •  levothyroxine, 25 mcg, oral, Daily  •  magnesium sulfate, 1 g, intravenous, PRN **OR** magnesium sulfate, 2 g, intravenous, PRN  •  metoprolol succinate XL, 25 mg, oral, Daily  •  ondansetron, 4 mg, intravenous, q8h PRN  •  potassium chloride, 20 mEq, oral, PRN  •  potassium chloride, 40 mEq, oral, PRN  •  sennosides-docusate sodium, 1 tablet, oral, BID  •  sodium chloride 0.9 %, , intravenous, Continuous  •  anastrozole  •  apixaban  •  atorvastatin  •  dorzolamide  •  estradioL  •  flecainide  •  latanoprost  •  levothyroxine  •  lutein-zeaxanthin  •  metoprolol succinate XL    Review of Systems: 14 point review of systems are negative except for that listed in HPI.    Objective     Vital Signs for the last 24 hours:  Temp:  [37.4 °C (99.3 °F)] 37.4 °C (99.3 °F)  Heart Rate:  [56-78] 58  Resp:  [18-21] 18  BP: (167-194)/(81-95) 167/81       General  physical examination:  Awake, alert, oriented to person, place, time and situation; speech and fund of knowledge normal.  PERRL, extra-ocular movements intact, face symmetric, tongue protrudes to midline, no nystagmus or diplopia. Neck is supple and immobilized in [place] collar. Breathing comfortably without distress, tachypnea, wheezing or use of accessory muscles.  Heart has a regular rate and rhythm. Abdomen soft, NT, ND. Skin is warm, well perfused, with good capillary refill.  Limbs show no deformities or edema.     On neurological examination, motor power is 5/5 in muscle groups in the upper and lower extremities. Normoreflexive without Townsend's or Babinski signs or clonus. Sensation grossly intact to LT and JPS. Did not assess gait.       Labs  Lab Results   Component Value Date    GLUCOSE 103 (H) 06/17/2022    CALCIUM 8.2 (L) 06/17/2022     (LL) 06/17/2022    K 3.6 06/17/2022    CO2 22 06/17/2022    CL 90 (L) 06/17/2022    BUN 10 06/17/2022    CREATININE 0.5 (L) 06/17/2022     Lab Results   Component Value Date    WBC 6.35 06/17/2022    HGB 13.3 06/17/2022    HCT 38.0 06/17/2022    MCV 92.0 06/17/2022     06/17/2022     Lab Results   Component Value Date    ALBUMIN 3.9 06/17/2022    BILITOT 1.3 (H) 06/17/2022    ALKPHOS 68 06/17/2022    BILIDIR 0.2 02/03/2017    AST 28 06/17/2022    ALT 22 06/17/2022    PROTEIN 7.0 06/17/2022       Imaging    Independent review of most recent imaging and all relevant previous imaging was compared with the reading of the attending radiologist. Significant findings are as below:    Left unilateral C2 lateral mass fracture extending into TF and TP. Multilevel cervical spondylotic changes degenerative changes without other fracture subluxation.       CTA shows likely intimal injury of left dominant vert at level of fracture with redemonstration distally    X-RAY CHEST 1 VIEW    Result Date: 6/17/2022  CLINICAL HISTORY:   Covid COMMENT: Comparison:  Chest radiographs  dating back to May 2, 2017. Single frontal AP view of the chest was obtained. The lungs are clear without evidence of focal consolidation.  There is no evidence of pleural effusion or pneumothorax. Cardiac silhouette appears stable in size..     IMPRESSION: No focal airspace consolidation..         Assessment/Plan     In summary this is an 82-year-old female who had presented with diffuse weakness and cough in the setting of acute COVID-19 with a mechanical fall last evening hitting her head and neck.  This resulted in a unilateral, slightly displaced, left-sided C2 lateral mass fracture extending into the transverse foramen.  This has resulted in an intimal injury of the dominant left vert which reconstitutes distal to the fracture.  There is no subluxation or significant malalignment.  We will plan to treat this with hard collar immobilization without surgical intervention.  Fractures of this type typically take 3 months for healing.    In regards to the vertebral intimal injury, she should be started on aspirin and complete a 3-month course. We will plan to see her in our office as an outpatient in 3-4 weeks with new cervical xrays.     She is currently fit in the [place] collar. This should be worn at all times x 3months. As she is clinically stable and her pain is controlled, she is cleared from our standpoint to be discharged to home when approved by primary team.         Code Status: Full Code    LANCE Maher  6/17/2022 8:37 AM

## 2022-06-17 NOTE — DISCHARGE INSTRUCTIONS
Trauma Spine Neurosurgery Discharge Instructions:     You were treated for: C2 fracture (Left lateral mass fracture of C2 vertebral body)      Please follow-up with Dr. Luis Shah's office in 4 weeks   Contact our office at [facility]: [phone] to schedule your appointment     Medications:   - Pain medications and muscle relaxants can be helpful in the acute time period. Please contact our office if you are having new neck pain or spasms.     - Aspirin: the fracture has caused a small injury to the left vertebral artery. Please start aspirin and take this once per day for 3months. This helps to avoid clots in the injured vessel.     Imaging:   - Please obtain a follow-up xray of the cervical spine in 4 weeks prior to your appointment   - The Rx for this image will be in the [employer]  - You can call [phone] to schedule this in advance    - We typically obtain new imaging once per month for around 2-3 months     Bracing:  Please wear the Aspen collar (neck brace) at all times for your fracture.  This can be removed for meals if you are struggling to eat with it on.  You can replace the pads after showering or use a shower collar if you would prefer. Please contact our office if you would like a secondary collar and we can have a representative come to your house to fit you.     Activity:   Avoid strenuous activity. No heavy lifting >5lb and no straining.   Do not drive or operate machinery until cleared by Neurosurgery. Do not drive while taking narcotic pain medication.   Walk as much as possible if you can tolerate it. You goal should be 150ft at least 3 times per day.  No alcohol.   DO NOT smoke. Smoking delays healing and can increase the risk of complications.     Call your doctor or return to the Emergency Room of you develop any of the following:   Persistent nausea and vomiting   Increasing headache pain   Chest Pain   Shortness of Breath   Drainage from incision or wound    Increased redness around incision or wound   Foul odor from incision or wound   Fever above 101 degrees or chills   Difficulty or inability to urinate   Intense pain not relieved with pain medication   New weakness, numbness, or tingling of your extremities   Change in mental status   Difficulty or decreased ability performing activities of daily living.     Trauma  Discharge Instructions    Patient Active Problem List   Diagnosis    Paroxysmal atrial fibrillation (CMS/[facility])    RBBB    Non-rheumatic tricuspid valve insufficiency    WPW (Jaycob-Parkinson-White syndrome)    Ebstein's anomaly of tricuspid valve    Nonrheumatic pulmonary valve insufficiency    Vasovagal syncope    Dyslipidemia    Long term current use of antiarrhythmic drug    Mass of left breast    Malignant neoplasm of central portion of left female breast (CMS/HCC)    Current use of long term anticoagulation    Pleural effusion on right    Single episode of elevated blood pressure    Traumatic closed fracture of C2 vertebra with minimal displacement, initial encounter (CMS/[facility])       ACTIVITY:    No vigorous activities for 8-12 weeks, you are at high risk for re-injury or worsened injury    Walk as much as possible. Your goal should be 150 feet 3 times a day.    Hold pillow against chest wall/abdomen when coughing.    Use Incentive Spirometer as instructed 10 times an hour.    DO NOT SMOKE! Smoking delays healing and increases the risk of complications.       MEDICATIONS:                 **See electronic medication reconciliation form**                 **Please be aware, the TRAUMA team does not refill prescriptions without an in-person follow up visit**                 **Routine prescriptions should be resume and refilled by your Primary Care Team, please make the your primary pharmacy aware**    If you were given a prescription for pain medication, take your pain medicine as prescribed with food if possible. You can expect to have pain during the  healing process and it will improve.     DO NOT DRIVE WHILE TAKING NARCOTIC PAIN MEDICATION       DO NOT take Motrin, Advil, Ibuprofen, Aspirin (other than 81mg of aspirin for 3 months), or any other NSAID.  Ok to restart your eliquis today.     Tylenol every 4 - 6 hours as needed for pain. DO NOT EXCEED 4000 MG OR 4 GRAMS OF TYLENOL IN 1 DAY.  Some patients find that they have some difficulty with constipation. This is due to a combination of things but mostly due to the pain medication you are taking. The pain medications, along with a decrease in activity, can sometimes lead to discomfort from constipation. You should eat plenty of fresh fruits, vegetables, drink fruit juices and drink plenty of water.    You may take Colace 1 tablet 2 times a day as need for constipation. DO NOT TAKE IF DIARRHEA DEVELOPS.    You may take Senokot 1 tablet 2 times a day as needed for constipation. DO NOT TAKE IF DIARRHEA DEVELOPS.      DRESSING CARE:    If you have Steri-Strips, they will fall off on their own. DO NOT remove.    Wash wounds using light pressure.    It is important to have a friend or family member check your incision/wound for the first week. Call our office immediately if you develop signs and symptoms of infection, including drainage, redness, swelling and/or fever greater than 101 degrees.     No tub baths, swimming, Jacuzzi tubs, hot tubs or any other activity that would require submersion of your incision/wound in water for 2 weeks.    You may shower. Let the water run over the incision and dab dry.     Do not apply ointments, antibacterial salves, lotions, powders, creams or scar-reducing   medications to the incision/wound unless given directed by physician.    Return for the staples/sutures to be removed in 5-7 days from the face or scalp and 10-14 days from everywhere else, as needed      FOLLOW-UP:    Call Marco Antonio Marvin MD (Trauma) at [phone] to make an appointment in 2 weeks or, if you have  primary care, only as needed    Call your family doctor in 1-2 weeks for general follow up of your injuries and admission to the hospital.      Call your doctor or return to the Emergency Room of you develop any of the following:    Persistent nausea and vomiting  Increasing headache pain  Chest Pain  Shortness of Breath  Drainage from incision or wound  Increased redness around incision or wound  Foul odor from incision or wound  Fever above 101 degrees or chills  Difficulty or inability to urinate  Intense pain not relieved with pain medication  Change in mental status  Difficulty or decreased ability performing activities of daily living

## 2022-06-17 NOTE — PLAN OF CARE
Problem: Adult Inpatient Plan of Care  Goal: Plan of Care Review  Outcome: Progressing  Flowsheets (Taken 6/17/2022 1616)  Progress: improving  Plan of Care Reviewed With: patient  Outcome Summary: PT eval complete

## 2022-06-17 NOTE — PLAN OF CARE
Problem: Adult Inpatient Plan of Care  Goal: Readiness for Transition of Care  Intervention: Mutually Develop Transition Plan  Flowsheets (Taken 6/17/2022 1018)  Anticipated Discharge Disposition:   acute rehab/Inpatient Rehab Facility   skilled nursing facility  Assistive Device/Animal Currently Used at Home: none  Readmission Within the Last 30 Days: no previous admission in last 30 days  Patient/Family Anticipates Transition to: home with family  Concerns to be Addressed: discharge planning    1018 SW met with patient at bedside. SW introduced herself and explained [facility] role. Patient stated that she lives in a three story home with . Patient reported that she did not use assistive devices prior to hospitalization. Patient is independent with ADL's. SW confirmed patients primary care provider, Dr. Anton Muniz. Patient uses [facility] in [place]. Patient does not have a history of SNF, inpatient rehab, or in home nursing. Patients  is POA. Patient has a living will. Patient is not a . Care coordination will continue to follow for ongoing needs and appropriate aftercare services. -JUAN Marks

## 2022-06-18 LAB
ATRIAL RATE: 67
P AXIS: 86
PR INTERVAL: 166
QRS DURATION: 186
QT INTERVAL: 526
QTC CALCULATION(BAZETT): 555
R AXIS: -1
T WAVE AXIS: 35
VENTRICULAR RATE: 67

## 2022-06-18 NOTE — DISCHARGE SUMMARY
Inpatient Discharge Summary    BRIEF OVERVIEW  Admitting Provider: Emi Tobin MD  Discharge Provider: No att. providers found  Primary Care Physician at Discharge: Anton Muniz [phone]     Admission Date: 6/17/2022     Discharge Date: 6/17/2022    Primary Discharge Diagnosis  Traumatic closed fracture of C2 vertebra with minimal displacement, initial encounter (CMS/[facility])      Discharge Disposition  Home   Code Status at Discharge: Prior    Discharge Medications     Medication List      START taking these medications    aspirin 81 mg enteric coated tablet  Start taking on: June 18, 2022  Take 1 tablet (81 mg total) by mouth daily.  Dose: 81 mg     traMADoL 50 mg tablet  Commonly known as: ULTRAM  Take 0.5 tablets (25 mg total) by mouth every 6 (six) hours as needed for moderate pain or severe pain for up to 5 days.  Dose: 25 mg        CONTINUE taking these medications    anastrozole 1 mg tablet  Commonly known as: ARIMIDEX  Take  1 mg daily with lunch  Dose: 1 mg     apixaban 5 mg tablet  Commonly known as: ELIQUIS  Take 1 tablet (5 mg total) by mouth 2 (two) times a day.  Dose: 5 mg     atorvastatin 10 mg tablet  Commonly known as: LIPITOR  Take 10 mg by mouth every evening.  Dose: 10 mg     cholecalciferol (vitamin D3) 1,000 unit (25 mcg) tablet  Take 1,000 Units by mouth daily with lunch.  Dose: 1,000 Units     dorzolamide 2 % ophthalmic solution  Commonly known as: TRUSOPT  Administer 1 drop into the right eye every morning.  Dose: 1 drop     estradioL 0.01 % (0.1 mg/gram) vaginal cream  Commonly known as: ESTRACE  Insert 0.5 g into the vagina 2 (two) times a week (Sun, Wed).  Dose: 0.5 g     flecainide 100 mg tablet  Commonly known as: TAMBOCOR  Take 1 tablet (100 mg total) by mouth every 12 (twelve) hours.  Dose: 100 mg     latanoprost 0.005 % ophthalmic solution  Commonly known as: XALATAN  Administer 1 drop into both eyes nightly.  Dose: 1 drop     levothyroxine 50 mcg  tablet  Commonly known as: SYNTHROID  Take 50 mcg by mouth daily.  Dose: 50 mcg     lutein-zeaxanthin 25-5 mg capsule  Take 1 capsule by mouth daily.  Dose: 1 capsule     metoprolol succinate XL 25 mg 24 hr tablet  Commonly known as: TOPROL-XL  Take 25 mg by mouth every evening.  Dose: 25 mg                DETAILS OF HOSPITAL STAY    Presenting Problem/History of Present Illness  Traumatic closed fracture of C2 vertebra with minimal displacement, initial encounter (CMS/[facility]) [S12.100A]      Hospital Course  The patient is an 82 year old female who presents after a fall. She is found to have a C2 fracture and a vertebral artery dissection. She was managed non-operatively and treated with ASA. She was intact. She was deemed stable for discharge and discharged without incident.

## 2022-06-19 ASSESSMENT — ENCOUNTER SYMPTOMS
NUMBNESS: 0
COUGH: 1
CARDIOVASCULAR NEGATIVE: 1
FEVER: 0
WEAKNESS: 0
NECK PAIN: 1

## 2022-06-20 NOTE — UM PHYSICIAN REVIEW NOTE
Post discharge review.    Outpatient services are appropriate for 1 MN stay for C2 fracture and vertebral artery dissection from fall.    Julia Espana DO

## 2022-06-20 NOTE — UM PHYSICIAN REVIEW NOTE
Utilization Secondary Review Note      Patient Name: Orly Cobian    MRN: [id number]    Insurance:  MEDICARE  Date of Admission:  6/17/2022  Initial order:    Inpatient  Planned admission: No  Post Discharge Review: Yes            Outpatient services are appropriate for this 82 y.o. year old female who presents with Traumatic closed fracture of C2 vertebra with minimal displacement, initial encounter (CMS/[facility]) [S12.100A].      She was admitted and discharged on 6/17.      Susan Babin,   06/20/22

## 2022-07-05 ENCOUNTER — OFFICE VISIT (OUTPATIENT)
Dept: CARDIOLOGY | Facility: CLINIC | Age: 82
End: 2022-07-05
Payer: MEDICARE

## 2022-07-05 VITALS
BODY MASS INDEX: 17.83 KG/M2 | OXYGEN SATURATION: 99 % | HEART RATE: 66 BPM | HEIGHT: 65 IN | SYSTOLIC BLOOD PRESSURE: 130 MMHG | DIASTOLIC BLOOD PRESSURE: 80 MMHG | WEIGHT: 107 LBS | RESPIRATION RATE: 16 BRPM

## 2022-07-05 DIAGNOSIS — I45.6 WPW (WOLFF-PARKINSON-WHITE SYNDROME): ICD-10-CM

## 2022-07-05 DIAGNOSIS — I36.1 NON-RHEUMATIC TRICUSPID VALVE INSUFFICIENCY: ICD-10-CM

## 2022-07-05 DIAGNOSIS — Q22.5 EBSTEIN'S ANOMALY OF TRICUSPID VALVE: ICD-10-CM

## 2022-07-05 DIAGNOSIS — I45.10 RBBB: ICD-10-CM

## 2022-07-05 DIAGNOSIS — E78.5 DYSLIPIDEMIA: ICD-10-CM

## 2022-07-05 DIAGNOSIS — I48.0 PAROXYSMAL ATRIAL FIBRILLATION (CMS/HCC): Primary | ICD-10-CM

## 2022-07-05 DIAGNOSIS — Z79.899 LONG TERM CURRENT USE OF ANTIARRHYTHMIC DRUG: ICD-10-CM

## 2022-07-05 PROCEDURE — 99215 OFFICE O/P EST HI 40 MIN: CPT | Performed by: INTERNAL MEDICINE

## 2022-07-05 PROCEDURE — 93000 ELECTROCARDIOGRAM COMPLETE: CPT | Performed by: INTERNAL MEDICINE

## 2022-07-05 RX ORDER — ESTRADIOL 0.1 MG/G
CREAM VAGINAL
COMMUNITY
Start: 2022-05-02 | End: 2022-08-08

## 2022-07-05 RX ORDER — LORAZEPAM 0.5 MG/1
TABLET ORAL
COMMUNITY
Start: 2022-06-22

## 2022-07-05 RX ORDER — ATORVASTATIN CALCIUM 10 MG/1
10 TABLET, FILM COATED ORAL EVERY EVENING
Qty: 90 TABLET | Refills: 3 | Status: SHIPPED | OUTPATIENT
Start: 2022-07-05 | End: 2023-07-18 | Stop reason: SDUPTHER

## 2022-07-05 RX ORDER — FLECAINIDE ACETATE 100 MG/1
100 TABLET ORAL EVERY 12 HOURS
Qty: 180 TABLET | Refills: 3 | Status: SHIPPED | OUTPATIENT
Start: 2022-07-05 | End: 2023-06-26 | Stop reason: SDUPTHER

## 2022-07-05 NOTE — PROGRESS NOTES
" Madhuri Gregorio MD, [facility]  Cardiac Electrophysiology    [facility]    [facility]  [facility] [address]  [address] [place] [address]    TEL  [phone]  [facility]         Electrophysiology Progress Note           July 05, 2022  Dear Dr. Muniz:    Orly Cobian is a 82 y.o. female who comes to the office today for follow up for a history of paroxysmal atrial fibrillation with chronic right bundle branch block and history of dyslipidemia.  She is on long-term antiarrhythmic drug therapy with flecainide and anticoagulation with Eliquis.    In December 2019, she woke up in the middle of the night with an episode of atrial fibrillation.  She stated that her heart was beating very fast. She took a flecainide and the episode subsided several hours later.  When I last saw her in the office on 2/13/2019, she had been feeling frequent \"extra beats\". Therefore, the patient agreed to start taking flecainide 100 mg p.o. every 12 hours and to continue to take an extra dose of flecainide as needed.    At her last office visit on 03/15/2022, she reported that she is feeling very well from a cardiovascular standpoint. No symptoms of recurrence of atrial fibrillation. She reported that since undergoing the lumpectomy procedure she had elevated blood pressure.   She had an echocardiogram 3/21/2022 which revealed normal LV function.    Today, she  reports that she is feeling very well from cardiovascular standpoint. No recurrence of symptoms of atrial fibrillation. No palpitations.  She reports her recent fell incident that she was going to the bed and takes the stair where she lost her balance as a result she hit her head on the wall. She states that her  called the EMT and then she presented to [facility] ED where she underwent for CT scan which revealed that she had a  fracture her C2 vertebra. She states that during her visit she was treated " with pain medication however she experienced the hallucination as side effect so she discontinued that medication and stared the tylenol extra strength fro pain management. In addition, she states that her ankle is slightly swollen and it was before her fall incident. No bleeding complications on anticoagulation medication with Eliquis.     No dizziness, near syncope or syncope. No chest pain. No dyspnea on exertion, PND, orthopnea or pedal edema.     PROBLEM LIST:  1. Paroxysmal atrial fibrillation diagnosed on 10/11/2010, with a recent episode in May of  2017.  2. History of Jaycob-Parkinson-White syndrome, status post catheter ablation procedure  performed in 1997, without recurrence of arrhythmia.  3. Chronic right bundle branch block.  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  5. History of thyroid disease.  6. Dyslipidemia.  7. Stress echo obtained on June 6, 2017 revealing no evidence of myocardial ischemia with  normal left ventricular systolic function with a left ventricular ejection fraction of 65-  70%.  8. Vasovagal syncope    Patient Active Problem List   Diagnosis   • Paroxysmal atrial fibrillation (CMS/[facility])   • RBBB   • Non-rheumatic tricuspid valve insufficiency   • WPW (Jaycob-Parkinson-White syndrome)   • Ebstein's anomaly of tricuspid valve   • Nonrheumatic pulmonary valve insufficiency   • Vasovagal syncope   • Dyslipidemia   • Long term current use of antiarrhythmic drug   • Mass of left breast   • Malignant neoplasm of central portion of left female breast (CMS/[facility])   • Current use of long term anticoagulation   • Pleural effusion on right   • Single episode of elevated blood pressure   • Traumatic closed fracture of C2 vertebra with minimal displacement, initial encounter (CMS/[facility])     Past Medical History:    Diagnosis Date   • Alcoholism (CMS/HCC)    • Arrhythmia    • Breast cancer (CMS/HCC)    • Chronic atrial fibrillation (CMS/HCC)    • Hypothyroidism    • Osteoporosis      Past Surgical History:   Procedure Laterality Date   • ABLATION OF DYSRHYTHMIC FOCUS     • BREAST BIOPSY      2 breast biospy benign   • BREAST LUMPECTOMY Left 08/03/2021   • CHOLECYSTECTOMY     • COSMETIC SURGERY     • EYE SURGERY     • SKIN BIOPSY       Current Outpatient Medications   Medication Sig Dispense Refill   • anastrozole (ARIMIDEX) 1 mg tablet Take  1 mg daily with lunch     • apixaban (ELIQUIS) 5 mg tablet Take 1 tablet (5 mg total) by mouth 2 (two) times a day. 180 tablet 1   • aspirin 81 mg enteric coated tablet Take 1 tablet (81 mg total) by mouth daily. 30 tablet 0   • atorvastatin (LIPITOR) 10 mg tablet Take 1 tablet (10 mg total) by mouth every evening. 90 tablet 3   • cholecalciferol, vitamin D3, 1,000 unit (25 mcg) tablet Take 1,000 Units by mouth daily with lunch.     • dorzolamide (TRUSOPT) 2 % ophthalmic solution Administer 1 drop into the right eye every morning.     • estradioL (ESTRACE) 0.01 % (0.1 mg/gram) vaginal cream Insert 0.5 g into the vagina 2 (two) times a week (Sun, Wed).     • flecainide (TAMBOCOR) 100 mg tablet Take 1 tablet (100 mg total) by mouth every 12 (twelve) hours. 180 tablet 3   • latanoprost (XALATAN) 0.005 % ophthalmic solution Administer 1 drop into both eyes nightly.     • levothyroxine (SYNTHROID) 50 mcg tablet Take 50 mcg by mouth daily.     • lutein-zeaxanthin 25-5 mg capsule Take 1 capsule by mouth daily.     • LORazepam (ATIVAN) 0.5 mg tablet take 1 tablet by mouth once daily if needed     • metoprolol succinate XL (TOPROL-XL) 25 mg 24 hr tablet Take 1 tablet (25 mg total) by mouth nightly. 90 tablet 3     No current facility-administered medications for this visit.     ALLERGIES:Erythromycin and Erythromycin base    REVIEW OF SYSTEMS: As in HPI.  All other other systems were reviewed and  "are negative. She states that she did followed-up with her pulmonologist for pleural effusion and underwent for a CT-scan which was in normal test limits.    Vitals:    07/05/22 1317   BP: 130/80   BP Location: Left upper arm   Patient Position: Sitting   Pulse: 66   Resp: 16   SpO2: 99%   Weight: 48.5 kg (107 lb)   Height: 1.651 m (5' 5\")       PHYSICAL EXAMINATION: Pleasant and alert, in no acute distress.  Neck: No jugular venous distention or carotid bruits. Lungs were clear to auscultation without rales or wheezes. Cardiovascular exam revealed normal S1, S2, regular rhythm with a 2/6 systolic murmur best heard at the lower left sternal border. Abdomen was soft and nontender.Extremities revealed no edema or cyanosis. She was alert and oriented x 3 with no focal deficits on gross neurologic exam.    DATA REVIEW:    ECG 07/05/2022: I personally reviewed her 12-lead EKG performed today which reveals   Sinus rhythm with rate variation. RBBB.    ECG 3/15/2022: I personally reviewed her 12-lead EKG performed today which reveals Sinus rhythm RBBB    Chest Xray PA and Lateral March 1, 2022-[facility]    IMPRESSION:  Small-moderate right pleural effusion.    Echocardiogram:   Cardiac Imaging    TRANSTHORACIC ECHO (TTE) COMPLETE 03/21/2022    Interpretation Summary  1.  Normal left ventricular function with an estimated ejection fraction of 60%.  2.  No segmental wall motion abnormalities.  3.  Thickened aortic root.  4.  Thickened aortic valve leaflets without any restriction of motion.  5.  Mild aortic insufficiency.  6.  Thickened mitral valve leaflets without any restriction of motion.  7.  Mild mitral regurgitation.  8.  Mild tricuspid insufficiency with normal pulmonary pressures.  9.  Mildly dilated right atrium and right ventricle.  10.  Unchanged from April 2021    Cardiac Imaging    TRANSTHORACIC ECHO (TTE) COMPLETE 04/14/2021    Interpretation Summary  · Normal-sized LV. Normal LV wall thickness. Preserved LV systolic " function. Estimated EF 60%. No regional wall motion abnormalities.  · Tricuspid aortic valve. Sclerotic aortic valve leaflets. Trace aortic valve regurgitation. Aortic root sclerotic.  · Trace mitral valve regurgitation. Moderately dilated LA.  · Mildly dilated RV. Normal RV systolic function. Mild to moderate tricuspid valve regurgitation. Estimated RVSP = 30 mmHg. Moderately dilated RA.  · Normal-sized IVC. IVC collapses >50% during inspiration.  · Compared with June 2017, the RV is now mildly dilated.    Lab Results   Component Value Date    WBC 6.35 06/17/2022    HGB 13.3 06/17/2022    HCT 38.0 06/17/2022     06/17/2022    ALT 22 06/17/2022    AST 28 06/17/2022     (LL) 06/17/2022    K 3.6 06/17/2022    CL 90 (L) 06/17/2022    CREATININE 0.5 (L) 06/17/2022    BUN 10 06/17/2022    CO2 22 06/17/2022    TSH 4.54 07/26/2021     Assessment/Plan      WPW (Jaycob-Parkinson-White syndrome)  She is status post catheter ablation procedure in 1997 - She has not had recurrence of supraventricular tachycardia.    Paroxysmal atrial fibrillation (CMS/HCC) (HCC)  No recurrence of atrial fibrillation on flecainide 100 mg p.o. every 12 hours.  I recommend that she continue the current dose of flecainide.    She is tolerating the medication without side effects.   She may take an extra 100 mg x 1 during an episode of atrial fibrillation.     She will continue on Eliquis 5 mg p.o. every 12 hours. No bleeding complications despite an accidental fall.  The [id number] score is 3.    Ebstein's anomaly of tricuspid valve  She has a history of Nimo anomaly although it was not mentioned in the last 2 echocardiograms it was documented in the past.  I will have the noninvasive cardiologist to review the echocardiogram.  Her repeat echocardiogram shows only mild tricuspid regurgitation with normal pulmonary artery pressures.    Non-rheumatic tricuspid valve insufficiency  In the past she had mild to moderate tricuspid  regurgitation but her echocardiogram March 21, 2022 only notes mild tricuspid regurgitation.  I will continue to monitor.    Long term current use of antiarrhythmic drug   She is tolerating the flecainide without side effects.   Her twelve-lead EKG is stable with chronic right bundle branch block without progression of conduction disease.  Her electrolytes and renal function are normal.    RBBB  Stable.   Her QRS duration had previously  increased after adding flecainide which increased her QTc interval but remains stable.   I will continue to monitor.    Dyslipidemia  She will continue atorvastatin 10 mg p.o. once a day.  I will order a fasting lipid profile      Return in about 4 months (around 11/5/2022).     I will call the patient with the results of lipid panel upon completion.     Thank you for allowing me to participate in the care of your patient. Please do not hesitate to contact me if you have any questions regarding my recommendations and management.    Sincerely;    Madhuri Cardoza MD Located within [facility]    By signing my name below, [employer], attest that this documentation has been prepared under the direction and in the presence of Madhuri Gregorio MD Electronically signed: Pippa Meyers. 7/5/2022 1:04 PM     IMadhuri MD, personally performed the services described in this documentation. All medical record entries made by the scribe were at my direction and in my presence. I have reviewed the chart and agree that the record reflects my personal performance and is accurate and complete. Madhuri Gregorio MD. 7/5/2022. 1:04 PM.

## 2022-07-05 NOTE — ASSESSMENT & PLAN NOTE
No recurrence of atrial fibrillation on flecainide 100 mg p.o. every 12 hours.  I recommend that she continue the current dose of flecainide.    She is tolerating the medication without side effects.   She may take an extra 100 mg x 1 during an episode of atrial fibrillation.     She will continue on Eliquis 5 mg p.o. every 12 hours. No bleeding complications despite an accidental fall.  The [id number] score is 3.

## 2022-07-05 NOTE — LETTER
"July 5, 2022     Anton Muniz MD  [address] [facility] [address]    Patient: Orly Cobian  YOB: 1940  Date of Visit: 7/5/2022      Dear Dr. Muniz:    Thank you for referring Orly Cobian to me for evaluation. Below are my notes for this consultation.    If you have questions, please do not hesitate to call me. I look forward to following your patient along with you.         Sincerely,        Madhuri Gregorio MD        CC: Madhuri Feliciano MD  8/8/2022  2:11 PM  Signed   Madhuri Gregorio MD, [facility]  Cardiac Electrophysiology    [facility]  [facility]  [address] [place] [address]    TEL  [phone]  [facility]         Electrophysiology Progress Note           July 05, 2022  Dear Dr. Muniz:    Orly Cobian is a 82 y.o. female who comes to the office today for follow up for a history of paroxysmal atrial fibrillation with chronic right bundle branch block and history of dyslipidemia.  She is on long-term antiarrhythmic drug therapy with flecainide and anticoagulation with Eliquis.    In December 2019, she woke up in the middle of the night with an episode of atrial fibrillation.  She stated that her heart was beating very fast. She took a flecainide and the episode subsided several hours later.  When I last saw her in the office on 2/13/2019, she had been feeling frequent \"extra beats\". Therefore, the patient agreed to start taking flecainide 100 mg p.o. every 12 hours and to continue to take an extra dose of flecainide as needed.    At her last office visit on 03/15/2022, she reported that she is feeling very well from a cardiovascular standpoint. No symptoms of recurrence of atrial fibrillation. She reported that since undergoing the lumpectomy procedure she had elevated blood pressure.   She had an echocardiogram 3/21/2022 which revealed normal LV function.    Today, she  " reports that she is feeling very well from cardiovascular standpoint. No recurrence of symptoms of atrial fibrillation. No palpitations.  She reports her recent fell incident that she was going to the bed and takes the stair where she lost her balance as a result she hit her head on the wall. She states that her  called the EMT and then she presented to [facility] ED where she underwent for CT scan which revealed that she had a  fracture her C2 vertebra. She states that during her visit she was treated with pain medication however she experienced the hallucination as side effect so she discontinued that medication and stared the tylenol extra strength fro pain management. In addition, she states that her ankle is slightly swollen and it was before her fall incident. No bleeding complications on anticoagulation medication with Eliquis.     No dizziness, near syncope or syncope. No chest pain. No dyspnea on exertion, PND, orthopnea or pedal edema.     PROBLEM LIST:  1. Paroxysmal atrial fibrillation diagnosed on 10/11/2010, with a recent episode in May of  2017.  2. History of Jaycob-Parkinson-White syndrome, status post catheter ablation procedure  performed in 1997, without recurrence of arrhythmia.  3. Chronic right bundle branch block.  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  5. History of thyroid disease.  6. Dyslipidemia.  7. Stress echo obtained on June 6, 2017 revealing no evidence of myocardial ischemia with  normal left ventricular systolic function with a left ventricular ejection fraction of 65-  70%.  8. Vasovagal syncope    Patient Active Problem List   Diagnosis   • Paroxysmal atrial fibrillation (CMS/HCC)   • RBBB   • Non-rheumatic tricuspid valve insufficiency   • WPW (Jaycob-Parkinson-White syndrome)   •  Ebstein's anomaly of tricuspid valve   • Nonrheumatic pulmonary valve insufficiency   • Vasovagal syncope   • Dyslipidemia   • Long term current use of antiarrhythmic drug   • Mass of left breast   • Malignant neoplasm of central portion of left female breast (CMS/HCC)   • Current use of long term anticoagulation   • Pleural effusion on right   • Single episode of elevated blood pressure   • Traumatic closed fracture of C2 vertebra with minimal displacement, initial encounter (CMS/HCC)     Past Medical History:   Diagnosis Date   • Alcoholism (CMS/HCC)    • Arrhythmia    • Breast cancer (CMS/HCC)    • Chronic atrial fibrillation (CMS/HCC)    • Hypothyroidism    • Osteoporosis      Past Surgical History:   Procedure Laterality Date   • ABLATION OF DYSRHYTHMIC FOCUS     • BREAST BIOPSY      2 breast biospy benign   • BREAST LUMPECTOMY Left 08/03/2021   • CHOLECYSTECTOMY     • COSMETIC SURGERY     • EYE SURGERY     • SKIN BIOPSY       Current Outpatient Medications   Medication Sig Dispense Refill   • anastrozole (ARIMIDEX) 1 mg tablet Take  1 mg daily with lunch     • apixaban (ELIQUIS) 5 mg tablet Take 1 tablet (5 mg total) by mouth 2 (two) times a day. 180 tablet 1   • aspirin 81 mg enteric coated tablet Take 1 tablet (81 mg total) by mouth daily. 30 tablet 0   • atorvastatin (LIPITOR) 10 mg tablet Take 1 tablet (10 mg total) by mouth every evening. 90 tablet 3   • cholecalciferol, vitamin D3, 1,000 unit (25 mcg) tablet Take 1,000 Units by mouth daily with lunch.     • dorzolamide (TRUSOPT) 2 % ophthalmic solution Administer 1 drop into the right eye every morning.     • estradioL (ESTRACE) 0.01 % (0.1 mg/gram) vaginal cream Insert 0.5 g into the vagina 2 (two) times a week (Sun, Wed).     • flecainide (TAMBOCOR) 100 mg tablet Take 1 tablet (100 mg total) by mouth every 12 (twelve) hours. 180 tablet 3   • latanoprost (XALATAN) 0.005 % ophthalmic solution Administer 1 drop into both eyes nightly.     • levothyroxine  "(SYNTHROID) 50 mcg tablet Take 50 mcg by mouth daily.     • lutein-zeaxanthin 25-5 mg capsule Take 1 capsule by mouth daily.     • LORazepam (ATIVAN) 0.5 mg tablet take 1 tablet by mouth once daily if needed     • metoprolol succinate XL (TOPROL-XL) 25 mg 24 hr tablet Take 1 tablet (25 mg total) by mouth nightly. 90 tablet 3     No current facility-administered medications for this visit.     ALLERGIES:Erythromycin and Erythromycin base    REVIEW OF SYSTEMS: As in HPI.  All other other systems were reviewed and are negative. She states that she did followed-up with her pulmonologist for pleural effusion and underwent for a CT-scan which was in normal test limits.    Vitals:    07/05/22 1317   BP: 130/80   BP Location: Left upper arm   Patient Position: Sitting   Pulse: 66   Resp: 16   SpO2: 99%   Weight: 48.5 kg (107 lb)   Height: 1.651 m (5' 5\")       PHYSICAL EXAMINATION: Pleasant and alert, in no acute distress.  Neck: No jugular venous distention or carotid bruits. Lungs were clear to auscultation without rales or wheezes. Cardiovascular exam revealed normal S1, S2, regular rhythm with a 2/6 systolic murmur best heard at the lower left sternal border. Abdomen was soft and nontender.Extremities revealed no edema or cyanosis. She was alert and oriented x 3 with no focal deficits on gross neurologic exam.    DATA REVIEW:    ECG 07/05/2022: I personally reviewed her 12-lead EKG performed today which reveals   Sinus rhythm with rate variation. RBBB.    ECG 3/15/2022: I personally reviewed her 12-lead EKG performed today which reveals Sinus rhythm RBBB    Chest Xray PA and Lateral March 1, 2022-[facility]    IMPRESSION:  Small-moderate right pleural effusion.    Echocardiogram:   Cardiac Imaging    TRANSTHORACIC ECHO (TTE) COMPLETE 03/21/2022    Interpretation Summary  1.  Normal left ventricular function with an estimated ejection fraction of 60%.  2.  No segmental wall motion abnormalities.  3.  Thickened aortic root.  4.  " Thickened aortic valve leaflets without any restriction of motion.  5.  Mild aortic insufficiency.  6.  Thickened mitral valve leaflets without any restriction of motion.  7.  Mild mitral regurgitation.  8.  Mild tricuspid insufficiency with normal pulmonary pressures.  9.  Mildly dilated right atrium and right ventricle.  10.  Unchanged from April 2021    Cardiac Imaging    TRANSTHORACIC ECHO (TTE) COMPLETE 04/14/2021    Interpretation Summary  · Normal-sized LV. Normal LV wall thickness. Preserved LV systolic function. Estimated EF 60%. No regional wall motion abnormalities.  · Tricuspid aortic valve. Sclerotic aortic valve leaflets. Trace aortic valve regurgitation. Aortic root sclerotic.  · Trace mitral valve regurgitation. Moderately dilated LA.  · Mildly dilated RV. Normal RV systolic function. Mild to moderate tricuspid valve regurgitation. Estimated RVSP = 30 mmHg. Moderately dilated RA.  · Normal-sized IVC. IVC collapses >50% during inspiration.  · Compared with June 2017, the RV is now mildly dilated.    Lab Results   Component Value Date    WBC 6.35 06/17/2022    HGB 13.3 06/17/2022    HCT 38.0 06/17/2022     06/17/2022    ALT 22 06/17/2022    AST 28 06/17/2022     (LL) 06/17/2022    K 3.6 06/17/2022    CL 90 (L) 06/17/2022    CREATININE 0.5 (L) 06/17/2022    BUN 10 06/17/2022    CO2 22 06/17/2022    TSH 4.54 07/26/2021     Assessment/Plan      WPW (Jaycob-Parkinson-White syndrome)  She is status post catheter ablation procedure in 1997 - She has not had recurrence of supraventricular tachycardia.    Paroxysmal atrial fibrillation (CMS/HCC) (HCC)  No recurrence of atrial fibrillation on flecainide 100 mg p.o. every 12 hours.  I recommend that she continue the current dose of flecainide.    She is tolerating the medication without side effects.   She may take an extra 100 mg x 1 during an episode of atrial fibrillation.     She will continue on Eliquis 5 mg p.o. every 12 hours. No bleeding  complications despite an accidental fall.  The [id number] score is 3.    Ebstein's anomaly of tricuspid valve  She has a history of Nimo anomaly although it was not mentioned in the last 2 echocardiograms it was documented in the past.  I will have the noninvasive cardiologist to review the echocardiogram.  Her repeat echocardiogram shows only mild tricuspid regurgitation with normal pulmonary artery pressures.    Non-rheumatic tricuspid valve insufficiency  In the past she had mild to moderate tricuspid regurgitation but her echocardiogram March 21, 2022 only notes mild tricuspid regurgitation.  I will continue to monitor.    Long term current use of antiarrhythmic drug   She is tolerating the flecainide without side effects.   Her twelve-lead EKG is stable with chronic right bundle branch block without progression of conduction disease.  Her electrolytes and renal function are normal.    RBBB  Stable.   Her QRS duration had previously  increased after adding flecainide which increased her QTc interval but remains stable.   I will continue to monitor.    Dyslipidemia  She will continue atorvastatin 10 mg p.o. once a day.  I will order a fasting lipid profile      Return in about 4 months (around 11/5/2022).     I will call the patient with the results of lipid panel upon completion.     Thank you for allowing me to participate in the care of your patient. Please do not hesitate to contact me if you have any questions regarding my recommendations and management.    Sincerely;    Madhuri Cardoza MD [facility]    By signing my name below, [employer], attest that this documentation has been prepared under the direction and in the presence of Madhuri Gregorio MD Electronically signed: Pippa Meyers. 7/5/2022 1:04 PM     Madhuri CHINO MD, personally performed the services described in this documentation. All medical record entries made by the scribe were at my direction and in my presence. I  have reviewed the chart and agree that the record reflects my personal performance and is accurate and complete. Madhuri Gregorio MD. 7/5/2022. 1:04 PM.

## 2022-07-05 NOTE — ASSESSMENT & PLAN NOTE
....Her most recent echcoadiogram in April 2021 revealed mild to  moderate tricuspid regurgitation. Stable. Since she has a new right pleural effusion, I korey repeat her echocardiogram to evlaute severity of tricuspid regurgitation.  .She is asymptomatic without signs or symptoms of heart failure.

## 2022-07-05 NOTE — ASSESSMENT & PLAN NOTE
....Stable without progresison of conduction disease.  Her QRS duration has increased after adding flecainide which increased her QTc interval.   I will continue to monitor.

## 2022-07-08 ENCOUNTER — HOSPITAL ENCOUNTER (OUTPATIENT)
Dept: RADIOLOGY | Facility: HOSPITAL | Age: 82
Discharge: HOME | End: 2022-07-08
Attending: PHYSICIAN ASSISTANT
Payer: MEDICARE

## 2022-07-08 DIAGNOSIS — S12.100A TRAUMATIC CLOSED FRACTURE OF C2 VERTEBRA WITH MINIMAL DISPLACEMENT, INITIAL ENCOUNTER (CMS/HCC): ICD-10-CM

## 2022-07-08 PROCEDURE — 72040 X-RAY EXAM NECK SPINE 2-3 VW: CPT

## 2022-07-11 RX ORDER — METOPROLOL SUCCINATE 25 MG/1
25 TABLET, EXTENDED RELEASE ORAL NIGHTLY
Qty: 90 TABLET | Refills: 3 | Status: CANCELLED | OUTPATIENT
Start: 2022-07-11

## 2022-07-11 NOTE — TELEPHONE ENCOUNTER
Medicine Refill Request    Last Office: Visit date not found   Last Consult Visit: Visit date not found  Last Telemedicine Visit: Visit date not found    Pt req refill on Metoprolol      Current Outpatient Medications:   •  anastrozole (ARIMIDEX) 1 mg tablet, Take  1 mg daily with lunch, Disp: , Rfl:   •  apixaban (ELIQUIS) 5 mg tablet, Take 1 tablet (5 mg total) by mouth 2 (two) times a day., Disp: 180 tablet, Rfl: 1  •  aspirin 81 mg enteric coated tablet, Take 1 tablet (81 mg total) by mouth daily., Disp: 30 tablet, Rfl: 0  •  atorvastatin (LIPITOR) 10 mg tablet, Take 1 tablet (10 mg total) by mouth every evening., Disp: 90 tablet, Rfl: 3  •  cholecalciferol, vitamin D3, 1,000 unit (25 mcg) tablet, Take 1,000 Units by mouth daily with lunch., Disp: , Rfl:   •  dorzolamide (TRUSOPT) 2 % ophthalmic solution, Administer 1 drop into the right eye every morning., Disp: , Rfl:   •  estradioL (ESTRACE) 0.01 % (0.1 mg/gram) vaginal cream, Insert 0.5 g into the vagina 2 (two) times a week (Sun, Wed)., Disp: , Rfl:   •  estradioL (ESTRACE) 0.01 % (0.1 mg/gram) vaginal cream, INSERT 0.5 GRAMS VAGINALLY TWICE A WEEK AS DIRECTED - STORE AT ROOM TEMPERATURE IN A DRY PLACE, Disp: , Rfl:   •  flecainide (TAMBOCOR) 100 mg tablet, Take 1 tablet (100 mg total) by mouth every 12 (twelve) hours., Disp: 180 tablet, Rfl: 3  •  latanoprost (XALATAN) 0.005 % ophthalmic solution, Administer 1 drop into both eyes nightly., Disp: , Rfl:   •  levothyroxine (SYNTHROID) 50 mcg tablet, Take 50 mcg by mouth daily., Disp: , Rfl:   •  LORazepam (ATIVAN) 0.5 mg tablet, take 1 tablet by mouth once daily if needed, Disp: , Rfl:   •  lutein-zeaxanthin 25-5 mg capsule, Take 1 capsule by mouth daily., Disp: , Rfl:   •  metoprolol succinate XL (TOPROL-XL) 25 mg 24 hr tablet, Take 1 tablet (25 mg total) by mouth nightly., Disp: 90 tablet, Rfl: 3      BP Readings from Last 3 Encounters:   07/05/22 130/80   06/17/22 (!) 194/81   03/21/22 (!) 160/90        Recent Lab results:  Lab Results   Component Value Date    CHOL 157 03/10/2021   ,   Lab Results   Component Value Date    HDL 63 03/10/2021   ,   Lab Results   Component Value Date    LDLCALC 81 03/10/2021   ,   Lab Results   Component Value Date    TRIG 67 03/10/2021        Lab Results   Component Value Date    GLUCOSE 103 (H) 06/17/2022   , No results found for: HGBA1C      Lab Results   Component Value Date    CREATININE 0.5 (L) 06/17/2022       Lab Results   Component Value Date    TSH 4.54 07/26/2021

## 2022-07-15 ENCOUNTER — OFFICE VISIT (OUTPATIENT)
Dept: NEUROSURGERY | Facility: CLINIC | Age: 82
End: 2022-07-15
Payer: MEDICARE

## 2022-07-15 VITALS
SYSTOLIC BLOOD PRESSURE: 145 MMHG | HEIGHT: 65 IN | TEMPERATURE: 97.4 F | BODY MASS INDEX: 18.16 KG/M2 | DIASTOLIC BLOOD PRESSURE: 71 MMHG | OXYGEN SATURATION: 94 % | WEIGHT: 109 LBS | RESPIRATION RATE: 14 BRPM | HEART RATE: 71 BPM

## 2022-07-15 DIAGNOSIS — S12.100A TRAUMATIC CLOSED FRACTURE OF C2 VERTEBRA WITH MINIMAL DISPLACEMENT, INITIAL ENCOUNTER (CMS/HCC): Primary | ICD-10-CM

## 2022-07-15 PROCEDURE — 99213 OFFICE O/P EST LOW 20 MIN: CPT | Performed by: PHYSICIAN ASSISTANT

## 2022-07-15 NOTE — PROGRESS NOTES
Dear Dr. Muniz,     The pleasure of seeing a patient of yours in the office today for hospital follow-up.  As you may recall, Mrs. Orly Cobian is a sri 82-year-old female who had suffered a mechanical fall on 6/17/2022 which is resulted in a unilateral C2 lateral mass fracture extending into the transverse foramen with injury of the vertebral artery.  She had had COVID-19 and was feeling diffusely weak with a cough when she had suffered her fall, striking her head and neck.  She did have neck pain at the time and has been immobilized in the hard collar since her hospitalization.  Her neck pain has resolved.      In regards to the vertebral intimal injury on the left side at the level of her fracture, she was started on aspirin 81 mg and should complete a 3-month course.  She has been compliant with the aspirin use.      She has been wearing her Aspen collar at all times except for showering. She finds that this fits well and is not painful to her.  She is having difficulty with comfort during sleep.  She does not have any associated radiculopathy, numbness, or weakness at this time.    Imaging:     Left unilateral C2 lateral mass fracture extending into TF and TP. Multilevel cervical spondylotic changes degenerative changes without other fracture subluxation.        We obtained 1 month follow-up x-rays with demonstrate stability in her alignment.      Review of Systems: Negative except listed in HPI    Medical History:   Past Medical History:   Diagnosis Date   • Alcoholism (CMS/HCC)    • Arrhythmia    • Breast cancer (CMS/HCC)    • Chronic atrial fibrillation (CMS/HCC)    • Hypothyroidism    • Osteoporosis        Surgical History:   Past Surgical History:   Procedure Laterality Date   • ABLATION OF DYSRHYTHMIC FOCUS     • BREAST BIOPSY      2 breast biospy benign   • BREAST LUMPECTOMY Left 08/03/2021   • CHOLECYSTECTOMY     • COSMETIC SURGERY     • EYE SURGERY     • SKIN BIOPSY         Social History:    Social History     Social History Narrative   • Not on file       Family History:   Family History   Problem Relation Age of Onset   • Heart disease Biological Mother    • Heart disease Biological Father    • Heart attack Biological Father    • Heart failure Biological Father    • Anemia Biological Sister    • Clotting disorder Biological Sister    • Fainting Biological Sister    • Hypertension Biological Sister        Allergies: Erythromycin and Erythromycin base    Home Medications:  Not in a hospital admission.    Current Medications:  •  anastrozole  •  apixaban  •  aspirin  •  atorvastatin  •  cholecalciferol (vitamin D3)  •  dorzolamide  •  estradioL  •  estradioL  •  flecainide  •  latanoprost  •  levothyroxine  •  LORazepam  •  lutein-zeaxanthin  •  metoprolol succinate XL      Physicial Exam    Vital Signs   Vitals:    07/15/22 1317   BP: (!) 145/71   Pulse: 71   Resp: 14   Temp: 36.3 °C (97.4 °F)   SpO2: 94%     She is awake and alert and oriented x3.  She is ambulating without any assistance or difficulty.  There is no appreciated focal neurologic deficit.    She is wearing the Aspen collar, and I have personally evaluated the fit of the collar.  This appears to be snug with the appropriate jaw height.  She is not able to flex, extend, or rotate her neck.     Assessment/Plan     In summary this is an 82-year-old female with normal bone density who suffered a mechanical fall resulting in a comminuted left-sided lateral mass fracture extending into the transverse foramen with associated blunt cerebrovascular injury of the left vertebral artery.  There is not significant displacement of any of the fracture fragments, and her alignment of the cervical spine is stable on follow-up x-ray.  Fortunately this is typically a stable type of fracture that does not require surgical intervention.    These types of fractures are treated with hard collar immobilization for a period of 8 to 12 weeks.  She should  continue to wear the hard collar almost at all times.  She may remove the collar for eating and for showering.  I discussed ways to assist with comfort measures at night and sleep.     We will plan to obtain a CT of the cervical spine in 1 month to look for interval healing across the fracture fragments.  In addition, she may require a CTA of the neck at 3 months after injury to assess the caliber of the vertebral artery had to determine safety and having to stop aspirin therapy. Overall, I believe that she is doing quite well. She will follow-up with us as needed over the next month and we will plan to see her in August to review her follow-up imaging.  All questions were answered.     Should any questions or problems arise, please do not hesitate to call our office.  Thank you for allowing us to take part in the care of your patient.     Sincerely,     Soumya Rowe PA-C

## 2022-07-29 ENCOUNTER — APPOINTMENT (OUTPATIENT)
Dept: LAB | Facility: HOSPITAL | Age: 82
End: 2022-07-29
Attending: INTERNAL MEDICINE
Payer: MEDICARE

## 2022-07-29 ENCOUNTER — TELEPHONE (OUTPATIENT)
Dept: CARDIOLOGY | Facility: CLINIC | Age: 82
End: 2022-07-29
Payer: MEDICARE

## 2022-07-29 DIAGNOSIS — E78.5 DYSLIPIDEMIA: ICD-10-CM

## 2022-07-29 LAB
CHOLEST SERPL-MCNC: 147 MG/DL
HDLC SERPL-MCNC: 59 MG/DL
HDLC SERPL: 2.5 {RATIO}
LDLC SERPL CALC-MCNC: 78 MG/DL
NONHDLC SERPL-MCNC: 88 MG/DL
TRIGL SERPL-MCNC: 52 MG/DL (ref 30–149)

## 2022-07-29 PROCEDURE — 80061 LIPID PANEL: CPT

## 2022-07-29 PROCEDURE — 36415 COLL VENOUS BLD VENIPUNCTURE: CPT

## 2022-07-29 NOTE — TELEPHONE ENCOUNTER
Please call the patient. Her lipids are normal and LDL is at target level. She should continue the current dose of statin.

## 2022-08-02 ENCOUNTER — HOSPITAL ENCOUNTER (OUTPATIENT)
Dept: RADIOLOGY | Facility: HOSPITAL | Age: 82
Discharge: HOME | End: 2022-08-02
Attending: PHYSICIAN ASSISTANT
Payer: MEDICARE

## 2022-08-02 DIAGNOSIS — S12.100A TRAUMATIC CLOSED FRACTURE OF C2 VERTEBRA WITH MINIMAL DISPLACEMENT, INITIAL ENCOUNTER (CMS/HCC): ICD-10-CM

## 2022-08-02 PROCEDURE — G1004 CDSM NDSC: HCPCS

## 2022-08-08 NOTE — ASSESSMENT & PLAN NOTE
She is tolerating the flecainide without side effects.   Her twelve-lead EKG is stable with chronic right bundle branch block without progression of conduction disease.  Her electrolytes and renal function are normal.

## 2022-08-08 NOTE — ASSESSMENT & PLAN NOTE
Stable.   Her QRS duration had previously  increased after adding flecainide which increased her QTc interval but remains stable.   I will continue to monitor.

## 2022-08-08 NOTE — ASSESSMENT & PLAN NOTE
In the past she had mild to moderate tricuspid regurgitation but her echocardiogram March 21, 2022 only notes mild tricuspid regurgitation.  I will continue to monitor.

## 2022-08-08 NOTE — ASSESSMENT & PLAN NOTE
She has a history of Nimo anomaly although it was not mentioned in the last 2 echocardiograms it was documented in the past.  I will have the noninvasive cardiologist to review the echocardiogram.  Her repeat echocardiogram shows only mild tricuspid regurgitation with normal pulmonary artery pressures.

## 2022-08-12 ENCOUNTER — OFFICE VISIT (OUTPATIENT)
Dept: NEUROSURGERY | Facility: CLINIC | Age: 82
End: 2022-08-12
Payer: MEDICARE

## 2022-08-12 VITALS
DIASTOLIC BLOOD PRESSURE: 80 MMHG | HEART RATE: 60 BPM | HEIGHT: 65 IN | WEIGHT: 108 LBS | TEMPERATURE: 96.7 F | OXYGEN SATURATION: 98 % | RESPIRATION RATE: 16 BRPM | SYSTOLIC BLOOD PRESSURE: 130 MMHG | BODY MASS INDEX: 17.99 KG/M2

## 2022-08-12 DIAGNOSIS — S12.100A TRAUMATIC CLOSED FRACTURE OF C2 VERTEBRA WITH MINIMAL DISPLACEMENT, INITIAL ENCOUNTER (CMS/HCC): Primary | ICD-10-CM

## 2022-08-12 DIAGNOSIS — I65.01 STENOSIS OF RIGHT VERTEBRAL ARTERY: ICD-10-CM

## 2022-08-12 PROCEDURE — 99213 OFFICE O/P EST LOW 20 MIN: CPT | Performed by: PHYSICIAN ASSISTANT

## 2022-08-12 NOTE — PROGRESS NOTES
Dear Dr. Muniz,     The pleasure of seeing a patient of yours in the office today for hospital follow-up.  As you may recall, Mrs. Orly Cobian is a sri 82-year-old female who had suffered a mechanical fall on 6/17/2022 which is resulted in a unilateral C2 lateral mass fracture extending into the transverse foramen with injury of the vertebral artery.  She had had COVID-19 and was feeling diffusely weak with a cough when she had suffered her fall, striking her head and neck.  She did have neck pain at the time and has been immobilized in the hard collar since her hospitalization.  Her neck pain has resolved.      In regards to the vertebral intimal injury on the left side at the level of her fracture, she was started on aspirin 81 mg and should complete a 3-month course.  She has been compliant with the aspirin use.      As per last month, she continues to use the Aspen collar at all times. She finds that this fits well and is not painful to her.  She is having difficulty with comfort during sleep.  She continues to deny associated radiculopathy, numbness, or weakness at this time.     Imaging:     She has a left unilateral C2 lateral mass fracture extending into TF and TP. I ordered a CT to assess for interval healing and there does appear to be healing across the more medial aspect of the lateral mass and sclerosis. The lateral portion of the foramen remain segmented.       Review of Systems: Negative except listed in HPI    Medical History:   Past Medical History:   Diagnosis Date   • Alcoholism (CMS/HCC)    • Arrhythmia    • Breast cancer (CMS/HCC)    • Chronic atrial fibrillation (CMS/HCC)    • Hypothyroidism    • Osteoporosis        Surgical History:   Past Surgical History:   Procedure Laterality Date   • ABLATION OF DYSRHYTHMIC FOCUS     • BREAST BIOPSY      2 breast biospy benign   • BREAST LUMPECTOMY Left 08/03/2021   • CHOLECYSTECTOMY     • COSMETIC SURGERY     • EYE SURGERY     • SKIN BIOPSY          Social History:   Social History     Social History Narrative   • Not on file       Family History:   Family History   Problem Relation Age of Onset   • Heart disease Biological Mother    • Heart disease Biological Father    • Heart attack Biological Father    • Heart failure Biological Father    • Anemia Biological Sister    • Clotting disorder Biological Sister    • Fainting Biological Sister    • Hypertension Biological Sister        Allergies: Erythromycin and Erythromycin base    Home Medications:  Not in a hospital admission.    Current Medications:  •  anastrozole  •  apixaban  •  aspirin  •  atorvastatin  •  cholecalciferol (vitamin D3)  •  dorzolamide  •  estradioL  •  flecainide  •  latanoprost  •  levothyroxine  •  LORazepam  •  lutein-zeaxanthin  •  metoprolol succinate XL      Physicial Exam    Vital Signs   Vitals:    08/12/22 1305   BP: 130/80   Pulse: 60   Resp: 16   Temp: (!) 35.9 °C (96.7 °F)   SpO2: 98%     She is awake and alert and oriented x3.  She is ambulating without any assistance or difficulty.  There is no appreciated focal neurologic deficit.    She is wearing the Aspen collar.  This appears to be snug with the appropriate jaw height.  She is not able to flex, extend, or rotate her neck.     Assessment/Plan     In summary this is an 82-year-old female with normal bone density who suffered a mechanical fall resulting in a comminuted left-sided lateral mass fracture extending into the transverse foramen with associated blunt cerebrovascular injury of the left vertebral artery.  There is not significant displacement of any of the fracture fragments, and her alignment of the cervical spine is stable. There has been interval healing across the medial aspect of the lateral mass and possibly across the inverior border. The fracture does not extend fully across the more weight bearing portion of the lateral mass, fortunately.      We will obtain flexion/extension cervical xrays in 2 weeks  (3mo after injury) to look for instability. As long as there is no new instability, she can wean out of the cervical collar.  I provided her with instructions on how to do so.  She may also use a soft collar as needed to assist with the wean. She will be cleared to start PT as an outpatient once the collar has been removed.     In addition, she suffered a vertebral artery injury and is currently on ASA. We will obtain a CTA of the neck at 3 months after injury to assess the caliber of the vertebral artery had to determine safety and having to stop aspirin therapy. She will require labwork prior to the CTA which I ordered.  Overall, I believe that she is doing quite well. She will follow-up with us as needed, but we will reach out once the cervical xrays and CTA are completed. I am hopeful that she will make a full recovery in the upcoming weeks.     Should any questions or problems arise, please do not hesitate to call our office.  Thank you for allowing us to take part in the care of your patient.     Sincerely,     Soumya Rowe PA-C

## 2022-08-12 NOTE — LETTER
August 12, 2022     Orly Cobian    Patient: Orly Cobian  YOB: 1940  Date of Visit: 8/12/2022    Instructions from office visit:     Cervical fracture:  1. Please continue hard collar for 2 weeks.   2. After 2 week we will need to obtain dynamica xrays. The Rx for the xrays is in the [facility] system. Once these are reviewed, you will likely be able to wean out of the cervical collar.   3. Please remove the hard collar for sleep at night.  You will want to purchase a soft/foam cervical collar to help with your transition. Many people use this at night for sleep or when weaning for comfort measures.   4.  When able to wean out of the collar, please remove for a few hours at a time 2-3x per day.  If you are not having pain or fatigue of the neck you can keep the collar off, otherwise you will want to replace the collar until your symptoms improve.  The goal will be to completely wean out of the hard collar over the course of a week.  Please give us a call if you have questions during the wean process.   5. After xrays completed, you can start PT. I have provided a referral to do so.     Vertebral artery injury  1. Please continue aspirin for now.   2. Will need to get lab work (BUN/Creat) to check your levels prior to the next study.   3. If the BUN/Cr levels are within normal range you will proceed with a Cervical angiogram (CT with dye to look at vessels). This is a picture.   4. Please call the outpatient radiology department to schedule this study.  It can be completed anytime after the labwork is done.   5. We will review the CTA. If the vertebral artery looking improved, you can stop the aspirin but we will call you with these results.        Sincerely,        LANCE Maher

## 2022-08-16 ENCOUNTER — APPOINTMENT (OUTPATIENT)
Dept: LAB | Facility: HOSPITAL | Age: 82
End: 2022-08-16
Attending: PHYSICIAN ASSISTANT
Payer: MEDICARE

## 2022-08-16 DIAGNOSIS — I65.01 STENOSIS OF RIGHT VERTEBRAL ARTERY: ICD-10-CM

## 2022-08-16 DIAGNOSIS — S12.100A TRAUMATIC CLOSED FRACTURE OF C2 VERTEBRA WITH MINIMAL DISPLACEMENT, INITIAL ENCOUNTER (CMS/HCC): ICD-10-CM

## 2022-08-16 LAB
BUN SERPL-MCNC: 17 MG/DL (ref 8–20)
CREAT SERPL-MCNC: 0.8 MG/DL (ref 0.6–1.1)
GFR SERPL CREATININE-BSD FRML MDRD: >60 ML/MIN/1.73M*2

## 2022-08-16 PROCEDURE — 82565 ASSAY OF CREATININE: CPT

## 2022-08-16 PROCEDURE — 36415 COLL VENOUS BLD VENIPUNCTURE: CPT

## 2022-08-16 PROCEDURE — 84520 ASSAY OF UREA NITROGEN: CPT

## 2022-08-17 ENCOUNTER — TELEPHONE (OUTPATIENT)
Dept: NEUROSURGERY | Facility: CLINIC | Age: 82
End: 2022-08-17
Payer: MEDICARE

## 2022-08-17 NOTE — TELEPHONE ENCOUNTER
Patient called stating that she completed labwork we ordered yesterday and is looking for the results and to see what the next steps are. Please advise the patient.

## 2022-08-17 NOTE — TELEPHONE ENCOUNTER
I spent time with her in the office and provided her with patient instructions that we printed. The labwork was to make sure she could proceed with the CTA (look at vertebral artery). She is cleared to proceed with that. That's the one that's due next month (3 months from injury)

## 2022-08-25 ENCOUNTER — HOSPITAL ENCOUNTER (OUTPATIENT)
Dept: RADIOLOGY | Facility: HOSPITAL | Age: 82
Discharge: HOME | End: 2022-08-25
Attending: PHYSICIAN ASSISTANT
Payer: MEDICARE

## 2022-08-25 DIAGNOSIS — S12.100A TRAUMATIC CLOSED FRACTURE OF C2 VERTEBRA WITH MINIMAL DISPLACEMENT, INITIAL ENCOUNTER (CMS/HCC): ICD-10-CM

## 2022-08-25 DIAGNOSIS — I65.01 STENOSIS OF RIGHT VERTEBRAL ARTERY: ICD-10-CM

## 2022-08-25 PROCEDURE — 72050 X-RAY EXAM NECK SPINE 4/5VWS: CPT

## 2022-08-25 PROCEDURE — G1004 CDSM NDSC: HCPCS

## 2022-08-25 PROCEDURE — 63600105 HC IODINE BASED CONTRAST: Performed by: PHYSICIAN ASSISTANT

## 2022-08-25 RX ADMIN — IOHEXOL 100 ML: 350 INJECTION, SOLUTION INTRAVENOUS at 16:33

## 2022-08-26 ENCOUNTER — TELEPHONE (OUTPATIENT)
Dept: NEUROSURGERY | Facility: CLINIC | Age: 82
End: 2022-08-26
Payer: MEDICARE

## 2022-08-26 DIAGNOSIS — I65.09 VERTEBRAL ARTERY STENOSIS, UNSPECIFIED LATERALITY: Primary | ICD-10-CM

## 2022-08-26 NOTE — TELEPHONE ENCOUNTER
I reviewed Mrs. Cobian's x-rays as well as her CTA results.    Her x-rays show stable alignment without any instability around C1-2 level in the region of her fracture.  I discussed in detail that she may now wean out of her cervical collar.  She did purchase a soft collar which she may use over the next week to help her with the wean.  She does not require any collar for sleep and that she finds that the soft collar is comfortable.     I provided her with a referral for physical therapy at her last office visit.  When she is out of the cervical collar if she is not experiencing any pain, then she is cleared from our standpoint to start PT.    In regards to her CTA.  There is focal narrowing of the left vertebral artery, which is her dominant vert, at the level of the fracture. There is proximal and distal flow. I have reviewed this study with our Neurovascular team. They recommend continued ASA 81mg use through a year with a repeat CTA at 1 year post-injury. I will place an order and have her follow-up with the Neurovascular team in 2023.

## 2022-09-16 DIAGNOSIS — I48.0 PAROXYSMAL ATRIAL FIBRILLATION (CMS/HCC): ICD-10-CM

## 2022-09-16 NOTE — TELEPHONE ENCOUNTER
Medicine Refill Request    Last Office: Visit date not found   Last Consult Visit: Visit date not found  Last Telemedicine Visit: Visit date not found    Pt requesting a refill of eliquis 5mg.    Current Outpatient Medications:     anastrozole (ARIMIDEX) 1 mg tablet, Take  1 mg daily with lunch, Disp: , Rfl:     apixaban (ELIQUIS) 5 mg tablet, Take 1 tablet (5 mg total) by mouth 2 (two) times a day., Disp: 180 tablet, Rfl: 1    aspirin 81 mg enteric coated tablet, Take 1 tablet (81 mg total) by mouth daily., Disp: 30 tablet, Rfl: 0    atorvastatin (LIPITOR) 10 mg tablet, Take 1 tablet (10 mg total) by mouth every evening., Disp: 90 tablet, Rfl: 3    cholecalciferol, vitamin D3, 1,000 unit (25 mcg) tablet, Take 1,000 Units by mouth daily with lunch., Disp: , Rfl:     dorzolamide (TRUSOPT) 2 % ophthalmic solution, Administer 1 drop into the right eye every morning., Disp: , Rfl:     estradioL (ESTRACE) 0.01 % (0.1 mg/gram) vaginal cream, Insert 0.5 g into the vagina 2 (two) times a week (Sun, Wed)., Disp: , Rfl:     flecainide (TAMBOCOR) 100 mg tablet, Take 1 tablet (100 mg total) by mouth every 12 (twelve) hours., Disp: 180 tablet, Rfl: 3    latanoprost (XALATAN) 0.005 % ophthalmic solution, Administer 1 drop into both eyes nightly., Disp: , Rfl:     levothyroxine (SYNTHROID) 50 mcg tablet, Take 50 mcg by mouth daily., Disp: , Rfl:     LORazepam (ATIVAN) 0.5 mg tablet, take 1 tablet by mouth once daily if needed, Disp: , Rfl:     lutein-zeaxanthin 25-5 mg capsule, Take 1 capsule by mouth daily., Disp: , Rfl:     metoprolol succinate XL (TOPROL-XL) 25 mg 24 hr tablet, Take 1 tablet (25 mg total) by mouth nightly., Disp: 90 tablet, Rfl: 3      BP Readings from Last 3 Encounters:   08/12/22 130/80   07/15/22 (!) 145/71   07/05/22 130/80       Recent Lab results:  Lab Results   Component Value Date    CHOL 147 07/29/2022   ,   Lab Results   Component Value Date    HDL 59 07/29/2022   ,   Lab Results    Component Value Date    LDLCALC 78 07/29/2022   ,   Lab Results   Component Value Date    TRIG 52 07/29/2022        Lab Results   Component Value Date    GLUCOSE 103 (H) 06/17/2022   , No results found for: HGBA1C      Lab Results   Component Value Date    CREATININE 0.8 08/16/2022       Lab Results   Component Value Date    TSH 4.54 07/26/2021

## 2023-01-03 ENCOUNTER — OFFICE VISIT (OUTPATIENT)
Dept: CARDIOLOGY | Facility: CLINIC | Age: 83
End: 2023-01-03
Payer: MEDICARE

## 2023-01-03 VITALS
RESPIRATION RATE: 16 BRPM | BODY MASS INDEX: 17.99 KG/M2 | HEART RATE: 62 BPM | SYSTOLIC BLOOD PRESSURE: 158 MMHG | HEIGHT: 65 IN | WEIGHT: 108 LBS | OXYGEN SATURATION: 97 % | DIASTOLIC BLOOD PRESSURE: 80 MMHG

## 2023-01-03 DIAGNOSIS — E78.5 DYSLIPIDEMIA: ICD-10-CM

## 2023-01-03 DIAGNOSIS — I48.0 PAROXYSMAL ATRIAL FIBRILLATION (CMS/HCC): Primary | ICD-10-CM

## 2023-01-03 DIAGNOSIS — I45.10 RBBB: ICD-10-CM

## 2023-01-03 DIAGNOSIS — Q22.5 EBSTEIN'S ANOMALY OF TRICUSPID VALVE: ICD-10-CM

## 2023-01-03 DIAGNOSIS — Z79.899 LONG TERM CURRENT USE OF ANTIARRHYTHMIC DRUG: ICD-10-CM

## 2023-01-03 DIAGNOSIS — I45.6 WPW (WOLFF-PARKINSON-WHITE SYNDROME): ICD-10-CM

## 2023-01-03 DIAGNOSIS — I36.1 NON-RHEUMATIC TRICUSPID VALVE INSUFFICIENCY: ICD-10-CM

## 2023-01-03 DIAGNOSIS — R03.0 ELEVATED BLOOD PRESSURE READING WITHOUT DIAGNOSIS OF HYPERTENSION: ICD-10-CM

## 2023-01-03 PROCEDURE — 93000 ELECTROCARDIOGRAM COMPLETE: CPT | Performed by: INTERNAL MEDICINE

## 2023-01-03 PROCEDURE — 99214 OFFICE O/P EST MOD 30 MIN: CPT | Performed by: INTERNAL MEDICINE

## 2023-01-03 NOTE — ASSESSMENT & PLAN NOTE
.....In the past she had mild to moderate tricuspid regurgitation but her echocardiogram March 21, 2022 only notes mild tricuspid regurgitation.  I will continue to monitor.

## 2023-01-03 NOTE — ASSESSMENT & PLAN NOTE
No symptoms of recurrence of atrial fibrillation on flecainide 100 mg p.o. every 12 hours. She is tolerating the medication without side effects.   She may take an extra 100 mg x 1 during an episode of atrial fibrillation.      She will continue on Eliquis 5 mg p.o. every 12 hours. No bleeding complications.The [id number]-VCOz score is 4.

## 2023-01-03 NOTE — ASSESSMENT & PLAN NOTE
Her twelve-lead EKG on flecainide is stable with chronic right bundle branch block without progression of conduction disease.  Her electrolytes and renal function are normal.

## 2023-01-03 NOTE — PROGRESS NOTES
" Madhuri Gregorio MD, [facility]  Cardiac Electrophysiology    [facility]    [facility]  [facility] [address]  [address] [place] [address]    TEL  [phone]  [facility]         Electrophysiology Progress Note           January 03, 2023  Dear Dr. Muniz:    Orly Cobian is a 82 y.o. female who comes to the office today for follow up for a history of paroxysmal atrial fibrillation with chronic right bundle branch block and history of dyslipidemia.  She is on long-term antiarrhythmic drug therapy with flecainide and anticoagulation with Eliquis.    In December 2019, she woke up in the middle of the night with an episode of atrial fibrillation.  She stated that her heart was beating very fast. She took a flecainide and the episode subsided several hours later.  When I last saw her in the office on 2/13/2019, she had been feeling frequent \"extra beats\". Therefore, the patient agreed to start taking flecainide 100 mg p.o. every 12 hours and to continue to take an extra dose of flecainide as needed.    She had an echocardiogram 3/21/2022 which revealed normal LV function.    At her last office visit on 07/05/2022, she reported that she was feeling very well from cardiovascular standpoint and had no recurrence of symptoms of atrial fibrillation. No palpitations. Her recent fell incident that she was going to the bed and takes the stair where she lost her balance as a result she hit her head on the wall. She stated that her  called the EMT and then she presented to [facility] ED where she underwent for CT scan which revealed that she had a  fracture her C2 vertebra. She  was treated with pain medication; however, she experienced the hallucination as side effect so, she discontinued that medication and stared the tylenol extra strength for pain management.     Today, she reports that she is stable from a cardiovascular standpoint and she has " no recurrence of symptoms of atrial fibrillation. She has no recent episodes of arrhthymias. She has no cardiac symptoms such as no chest pain, palpitation, shortness of breath and syncope. She exercises daily on treadmill atleast 2 miles daily. Her blood pressure is mildly elevated in the office today and she is only taking the metoprolol succinates 25-mg. She has no pedal edema. Her neurologist prescribed the baby aspirin along with the Eliquis. She notices that her left hand has a temors sensation (shakes) especially when she writes. I recommended her to see the neurologist for evaluatiton.  No dizziness, near syncope or syncope. No chest pain. No dyspnea on exertion, PND, orthopnea or pedal edema. No bleeding complications on anticoagulation medication with Eliquis.      PROBLEM LIST:  1. Paroxysmal atrial fibrillation diagnosed on 10/11/2010, with a recent episode in May of  2017.  2. History of Jaycob-Parkinson-White syndrome, status post catheter ablation procedure  performed in 1997, without recurrence of arrhythmia.  3. Chronic right bundle branch block.  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  5. History of thyroid disease.  6. Dyslipidemia.  7. Stress echo obtained on June 6, 2017 revealing no evidence of myocardial ischemia with  normal left ventricular systolic function with a left ventricular ejection fraction of 65-  70%.  8. Vasovagal syncope    Patient Active Problem List   Diagnosis   • Paroxysmal atrial fibrillation (CMS/HCC)   • RBBB   • Non-rheumatic tricuspid valve insufficiency   • WPW (Jacyob-Parkinson-White syndrome)   • Ebstein's anomaly of tricuspid valve   • Nonrheumatic pulmonary valve insufficiency   • Vasovagal syncope   • Dyslipidemia   • Long term current use of antiarrhythmic drug   • Mass of left breast    • Malignant neoplasm of central portion of left female breast (CMS/HCC)   • Current use of long term anticoagulation   • Pleural effusion on right   • Elevated blood pressure reading without diagnosis of hypertension   • Traumatic closed fracture of C2 vertebra with minimal displacement, initial encounter (CMS/[facility])     Past Medical History:   Diagnosis Date   • Alcoholism (CMS/HCC)    • Arrhythmia    • Breast cancer (CMS/HCC)    • Chronic atrial fibrillation (CMS/HCC)    • Hypothyroidism    • Osteoporosis      Past Surgical History:   Procedure Laterality Date   • ABLATION OF DYSRHYTHMIC FOCUS     • BREAST BIOPSY      2 breast biospy benign   • BREAST LUMPECTOMY Left 08/03/2021   • CHOLECYSTECTOMY     • COSMETIC SURGERY     • EYE SURGERY     • SKIN BIOPSY       Current Outpatient Medications   Medication Sig Dispense Refill   • anastrozole (ARIMIDEX) 1 mg tablet Take  1 mg daily with lunch     • apixaban (ELIQUIS) 5 mg tablet Take 1 tablet (5 mg total) by mouth 2 (two) times a day. 180 tablet 3   • aspirin 81 mg enteric coated tablet Take 1 tablet (81 mg total) by mouth daily. 30 tablet 0   • atorvastatin (LIPITOR) 10 mg tablet Take 1 tablet (10 mg total) by mouth every evening. 90 tablet 3   • cholecalciferol, vitamin D3, 1,000 unit (25 mcg) tablet Take 1,000 Units by mouth daily with lunch.     • dorzolamide (TRUSOPT) 2 % ophthalmic solution Administer 1 drop into the right eye every morning.     • estradioL (ESTRACE) 0.01 % (0.1 mg/gram) vaginal cream Insert 0.5 g into the vagina 2 (two) times a week (Sun, Wed).     • flecainide (TAMBOCOR) 100 mg tablet Take 1 tablet (100 mg total) by mouth every 12 (twelve) hours. 180 tablet 3   • latanoprost (XALATAN) 0.005 % ophthalmic solution Administer 1 drop into both eyes nightly.     • levothyroxine (SYNTHROID) 50 mcg tablet Take 50 mcg by mouth daily.     • LORazepam (ATIVAN) 0.5 mg tablet take 1 tablet by mouth once daily if needed     • lutein-zeaxanthin 25-5 mg  "capsule Take 1 capsule by mouth daily.     • metoprolol succinate XL (TOPROL-XL) 25 mg 24 hr tablet Take 1 tablet (25 mg total) by mouth nightly. 90 tablet 3     No current facility-administered medications for this visit.     ALLERGIES:Erythromycin and Erythromycin base    REVIEW OF SYSTEMS: As in HPI.  All other other systems were reviewed and are negative. She states that she did followed-up with her pulmonologist for pleural effusion and underwent for a CT-scan which was in normal test limits.    Vitals:    01/03/23 1317   BP: (!) 158/80   BP Location: Left upper arm   Patient Position: Sitting   Pulse: 62   Resp: 16   SpO2: 97%   Weight: 49 kg (108 lb)   Height: 1.651 m (5' 5\")       PHYSICAL EXAMINATION: Pleasant and alert, in no acute distress.  Neck: No jugular venous distention or carotid bruits. Lungs were clear to auscultation without rales or wheezes. Cardiovascular exam revealed normal S1, S2, regular rhythm with a 2/6 systolic murmur best heard at the lower left sternal border. Abdomen was soft and nontender.Extremities revealed no edema or cyanosis. She was alert and oriented x 3 with no focal deficits on gross neurologic exam.    Lab Results   Component Value Date    HGB 13.3 06/17/2022     06/17/2022    CHOL 147 07/29/2022    TRIG 52 07/29/2022    HDL 59 07/29/2022    ALT 22 06/17/2022    AST 28 06/17/2022     (LL) 06/17/2022    K 3.6 06/17/2022    CREATININE 0.8 08/16/2022    TSH 4.54 07/26/2021    LDLCALC 78 07/29/2022    MG 2.1 03/10/2021     Labs from 11/25/2022 Lex:   Glucose: 82  Creatinine: 0.70  Na: 136  K: 4.3  WBC: 6.6  RBC: 4.19  HGB: 13.4  HCT: 41    DATA REVIEW:    ECG 01/03/2023: I personally reviewed her 12-lead EKG performed today which reveals   Sinus rhythm. RBBB    ECG 07/05/2022: I personally reviewed her 12-lead EKG performed today which reveals   Sinus rhythm with rate variation. RBBB.    Chest Xray PA and Lateral March 1, 2022-[facility]    IMPRESSION:  Small-moderate " right pleural effusion.    Echocardiogram:   Cardiac Imaging    TRANSTHORACIC ECHO (TTE) COMPLETE 03/21/2022    Interpretation Summary  1.  Normal left ventricular function with an estimated ejection fraction of 60%.  2.  No segmental wall motion abnormalities.  3.  Thickened aortic root.  4.  Thickened aortic valve leaflets without any restriction of motion.  5.  Mild aortic insufficiency.  6.  Thickened mitral valve leaflets without any restriction of motion.  7.  Mild mitral regurgitation.  8.  Mild tricuspid insufficiency with normal pulmonary pressures.  9.  Mildly dilated right atrium and right ventricle.  10.  Unchanged from April 2021    Cardiac Imaging    TRANSTHORACIC ECHO (TTE) COMPLETE 04/14/2021    Interpretation Summary  · Normal-sized LV. Normal LV wall thickness. Preserved LV systolic function. Estimated EF 60%. No regional wall motion abnormalities.  · Tricuspid aortic valve. Sclerotic aortic valve leaflets. Trace aortic valve regurgitation. Aortic root sclerotic.  · Trace mitral valve regurgitation. Moderately dilated LA.  · Mildly dilated RV. Normal RV systolic function. Mild to moderate tricuspid valve regurgitation. Estimated RVSP = 30 mmHg. Moderately dilated RA.  · Normal-sized IVC. IVC collapses >50% during inspiration.  · Compared with June 2017, the RV is now mildly dilated.    Assessment/Plan      WPW (Jaycob-Parkinson-White syndrome)  She is status post catheter ablation procedure in 1997 - She has not had recurrence of supraventricular tachycardia.    Paroxysmal atrial fibrillation (CMS/HCC) (HCC)  No symptoms of recurrence of atrial fibrillation on flecainide 100 mg p.o. every 12 hours. She is tolerating the medication without side effects.   She may take an extra 100 mg x 1 during an episode of atrial fibrillation.      She will continue on Eliquis 5 mg p.o. every 12 hours. No bleeding complications.The [id number] score is 4.    Ebstein's anomaly of tricuspid valve  I ordered a repeat  echocardiogram.  She has a history of Nimo anomaly although it was not mentioned in the last 2 echocardiograms it was documented in the past. Her echocardiogram last year  shows only mild tricuspid regurgitation with normal pulmonary artery pressures.    Long term current use of antiarrhythmic drug     Her twelve-lead EKG on flecainide is stable with chronic right bundle branch block without progression of conduction disease.  Her electrolytes and renal function are normal.    RBBB     Her QRS duration had previously  increased after adding flecainide which increased her QTc interval but remains stable.   I will continue to monitor.    Dyslipidemia  She will continue atorvastatin 10 mg p.o. once a day.      Elevated blood pressure reading without diagnosis of hypertension  Her blood pressure is elevated in the office today.  She will continue beta blocker therpay but advised her to follow up with her  PCP.    Return in about 6 months (around 7/3/2023).    I will call the patient with the results of an echocardiogram upon completion.      Thank you for allowing me to participate in the care of your patient. Please do not hesitate to contact me if you have any questions regarding my recommendations and management.    Sincerely;    Madhuri Cardoza MD [facility]    By signing my name below, [employer], attest that this documentation has been prepared under the direction and in the presence of Madhuri Gregorio MD Electronically signed: Pippa Meyers. 1/3/2023 1:02 PM     Madhuri CHINO MD, personally performed the services described in this documentation. All medical record entries made by the scribe were at my direction and in my presence. I have reviewed the chart and agree that the record reflects my personal performance and is accurate and complete. Madhuri Gregorio MD. 1/3/2023. 1:02 PM.

## 2023-01-03 NOTE — ASSESSMENT & PLAN NOTE
Her QRS duration had previously  increased after adding flecainide which increased her QTc interval but remains stable.   I will continue to monitor.

## 2023-01-03 NOTE — LETTER
"January 3, 2023     Anton Muniz MD  [address] [facility] [address]    Patient: Orly Cobian  YOB: 1940  Date of Visit: 1/3/2023      Dear Dr. Muniz:    Thank you for referring Orly Cobian to me for evaluation. Below are my notes for this consultation.    If you have questions, please do not hesitate to call me. I look forward to following your patient along with you.         Sincerely,        Madhuri Gregorio MD        CC: Madhuri Feliciano MD  3/5/2023  1:39 PM  Signed   Madhuri Gregorio MD, [facility]  Cardiac Electrophysiology    [facility]  [facility] Level  [address] [place] [address]    TEL  [phone]  [facility]         Electrophysiology Progress Note           January 03, 2023  Dear Dr. Muniz:    Orly Cobian is a 82 y.o. female who comes to the office today for follow up for a history of paroxysmal atrial fibrillation with chronic right bundle branch block and history of dyslipidemia.  She is on long-term antiarrhythmic drug therapy with flecainide and anticoagulation with Eliquis.    In December 2019, she woke up in the middle of the night with an episode of atrial fibrillation.  She stated that her heart was beating very fast. She took a flecainide and the episode subsided several hours later.  When I last saw her in the office on 2/13/2019, she had been feeling frequent \"extra beats\". Therefore, the patient agreed to start taking flecainide 100 mg p.o. every 12 hours and to continue to take an extra dose of flecainide as needed.    She had an echocardiogram 3/21/2022 which revealed normal LV function.    At her last office visit on 07/05/2022, she reported that she was feeling very well from cardiovascular standpoint and had no recurrence of symptoms of atrial fibrillation. No palpitations. Her recent fell incident that she was going to the bed and takes the stair " where she lost her balance as a result she hit her head on the wall. She stated that her  called the EMT and then she presented to [facility] ED where she underwent for CT scan which revealed that she had a  fracture her C2 vertebra. She  was treated with pain medication; however, she experienced the hallucination as side effect so, she discontinued that medication and stared the tylenol extra strength for pain management.     Today, she reports that she is stable from a cardiovascular standpoint and she has no recurrence of symptoms of atrial fibrillation. She has no recent episodes of arrhthymias. She has no cardiac symptoms such as no chest pain, palpitation, shortness of breath and syncope. She exercises daily on treadmill atleast 2 miles daily. Her blood pressure is mildly elevated in the office today and she is only taking the metoprolol succinates 25-mg. She has no pedal edema. Her neurologist prescribed the baby aspirin along with the Eliquis. She notices that her left hand has a temors sensation (shakes) especially when she writes. I recommended her to see the neurologist for evaluatiton.  No dizziness, near syncope or syncope. No chest pain. No dyspnea on exertion, PND, orthopnea or pedal edema. No bleeding complications on anticoagulation medication with Eliquis.      PROBLEM LIST:  1. Paroxysmal atrial fibrillation diagnosed on 10/11/2010, with a recent episode in May of  2017.  2. History of Jaycob-Parkinson-White syndrome, status post catheter ablation procedure  performed in 1997, without recurrence of arrhythmia.  3. Chronic right bundle branch block.  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  5. History of thyroid disease.  6. Dyslipidemia.  7. Stress echo obtained on June 6, 2017 revealing no evidence of  myocardial ischemia with  normal left ventricular systolic function with a left ventricular ejection fraction of 65-  70%.  8. Vasovagal syncope    Patient Active Problem List   Diagnosis   • Paroxysmal atrial fibrillation (CMS/HCC)   • RBBB   • Non-rheumatic tricuspid valve insufficiency   • WPW (Jaycob-Parkinson-White syndrome)   • Ebstein's anomaly of tricuspid valve   • Nonrheumatic pulmonary valve insufficiency   • Vasovagal syncope   • Dyslipidemia   • Long term current use of antiarrhythmic drug   • Mass of left breast   • Malignant neoplasm of central portion of left female breast (CMS/HCC)   • Current use of long term anticoagulation   • Pleural effusion on right   • Elevated blood pressure reading without diagnosis of hypertension   • Traumatic closed fracture of C2 vertebra with minimal displacement, initial encounter (CMS/HCC)     Past Medical History:   Diagnosis Date   • Alcoholism (CMS/HCC)    • Arrhythmia    • Breast cancer (CMS/HCC)    • Chronic atrial fibrillation (CMS/HCC)    • Hypothyroidism    • Osteoporosis      Past Surgical History:   Procedure Laterality Date   • ABLATION OF DYSRHYTHMIC FOCUS     • BREAST BIOPSY      2 breast biospy benign   • BREAST LUMPECTOMY Left 08/03/2021   • CHOLECYSTECTOMY     • COSMETIC SURGERY     • EYE SURGERY     • SKIN BIOPSY       Current Outpatient Medications   Medication Sig Dispense Refill   • anastrozole (ARIMIDEX) 1 mg tablet Take  1 mg daily with lunch     • apixaban (ELIQUIS) 5 mg tablet Take 1 tablet (5 mg total) by mouth 2 (two) times a day. 180 tablet 3   • aspirin 81 mg enteric coated tablet Take 1 tablet (81 mg total) by mouth daily. 30 tablet 0   • atorvastatin (LIPITOR) 10 mg tablet Take 1 tablet (10 mg total) by mouth every evening. 90 tablet 3   • cholecalciferol, vitamin D3, 1,000 unit (25 mcg) tablet Take 1,000 Units by mouth daily with lunch.     • dorzolamide (TRUSOPT) 2 % ophthalmic solution Administer 1 drop into the right eye every  "morning.     • estradioL (ESTRACE) 0.01 % (0.1 mg/gram) vaginal cream Insert 0.5 g into the vagina 2 (two) times a week (Sun, Wed).     • flecainide (TAMBOCOR) 100 mg tablet Take 1 tablet (100 mg total) by mouth every 12 (twelve) hours. 180 tablet 3   • latanoprost (XALATAN) 0.005 % ophthalmic solution Administer 1 drop into both eyes nightly.     • levothyroxine (SYNTHROID) 50 mcg tablet Take 50 mcg by mouth daily.     • LORazepam (ATIVAN) 0.5 mg tablet take 1 tablet by mouth once daily if needed     • lutein-zeaxanthin 25-5 mg capsule Take 1 capsule by mouth daily.     • metoprolol succinate XL (TOPROL-XL) 25 mg 24 hr tablet Take 1 tablet (25 mg total) by mouth nightly. 90 tablet 3     No current facility-administered medications for this visit.     ALLERGIES:Erythromycin and Erythromycin base    REVIEW OF SYSTEMS: As in HPI.  All other other systems were reviewed and are negative. She states that she did followed-up with her pulmonologist for pleural effusion and underwent for a CT-scan which was in normal test limits.    Vitals:    01/03/23 1317   BP: (!) 158/80   BP Location: Left upper arm   Patient Position: Sitting   Pulse: 62   Resp: 16   SpO2: 97%   Weight: 49 kg (108 lb)   Height: 1.651 m (5' 5\")       PHYSICAL EXAMINATION: Pleasant and alert, in no acute distress.  Neck: No jugular venous distention or carotid bruits. Lungs were clear to auscultation without rales or wheezes. Cardiovascular exam revealed normal S1, S2, regular rhythm with a 2/6 systolic murmur best heard at the lower left sternal border. Abdomen was soft and nontender.Extremities revealed no edema or cyanosis. She was alert and oriented x 3 with no focal deficits on gross neurologic exam.    Lab Results   Component Value Date    HGB 13.3 06/17/2022     06/17/2022    CHOL 147 07/29/2022    TRIG 52 07/29/2022    HDL 59 07/29/2022    ALT 22 06/17/2022    AST 28 06/17/2022     (LL) 06/17/2022    K 3.6 06/17/2022    CREATININE " 0.8 08/16/2022    TSH 4.54 07/26/2021    LDLCALC 78 07/29/2022    MG 2.1 03/10/2021     Labs from 11/25/2022 Lex:   Glucose: 82  Creatinine: 0.70  Na: 136  K: 4.3  WBC: 6.6  RBC: 4.19  HGB: 13.4  HCT: 41    DATA REVIEW:    ECG 01/03/2023: I personally reviewed her 12-lead EKG performed today which reveals   Sinus rhythm. RBBB    ECG 07/05/2022: I personally reviewed her 12-lead EKG performed today which reveals   Sinus rhythm with rate variation. RBBB.    Chest Xray PA and Lateral March 1, 2022-[facility]    IMPRESSION:  Small-moderate right pleural effusion.    Echocardiogram:   Cardiac Imaging    TRANSTHORACIC ECHO (TTE) COMPLETE 03/21/2022    Interpretation Summary  1.  Normal left ventricular function with an estimated ejection fraction of 60%.  2.  No segmental wall motion abnormalities.  3.  Thickened aortic root.  4.  Thickened aortic valve leaflets without any restriction of motion.  5.  Mild aortic insufficiency.  6.  Thickened mitral valve leaflets without any restriction of motion.  7.  Mild mitral regurgitation.  8.  Mild tricuspid insufficiency with normal pulmonary pressures.  9.  Mildly dilated right atrium and right ventricle.  10.  Unchanged from April 2021    Cardiac Imaging    TRANSTHORACIC ECHO (TTE) COMPLETE 04/14/2021    Interpretation Summary  · Normal-sized LV. Normal LV wall thickness. Preserved LV systolic function. Estimated EF 60%. No regional wall motion abnormalities.  · Tricuspid aortic valve. Sclerotic aortic valve leaflets. Trace aortic valve regurgitation. Aortic root sclerotic.  · Trace mitral valve regurgitation. Moderately dilated LA.  · Mildly dilated RV. Normal RV systolic function. Mild to moderate tricuspid valve regurgitation. Estimated RVSP = 30 mmHg. Moderately dilated RA.  · Normal-sized IVC. IVC collapses >50% during inspiration.  · Compared with June 2017, the RV is now mildly dilated.    Assessment/Plan      WPW (Jaycob-Parkinson-White syndrome)  She is status post catheter  ablation procedure in 1997 - She has not had recurrence of supraventricular tachycardia.    Paroxysmal atrial fibrillation (CMS/HCC) (HCC)  No symptoms of recurrence of atrial fibrillation on flecainide 100 mg p.o. every 12 hours. She is tolerating the medication without side effects.   She may take an extra 100 mg x 1 during an episode of atrial fibrillation.      She will continue on Eliquis 5 mg p.o. every 12 hours. No bleeding complications.The [id number] score is 4.    Ebstein's anomaly of tricuspid valve  I ordered a repeat echocardiogram.  She has a history of Nimo anomaly although it was not mentioned in the last 2 echocardiograms it was documented in the past. Her echocardiogram last year  shows only mild tricuspid regurgitation with normal pulmonary artery pressures.    Long term current use of antiarrhythmic drug     Her twelve-lead EKG on flecainide is stable with chronic right bundle branch block without progression of conduction disease.  Her electrolytes and renal function are normal.    RBBB     Her QRS duration had previously  increased after adding flecainide which increased her QTc interval but remains stable.   I will continue to monitor.    Dyslipidemia  She will continue atorvastatin 10 mg p.o. once a day.      Elevated blood pressure reading without diagnosis of hypertension  Her blood pressure is elevated in the office today.  She will continue beta blocker therpay but advised her to follow up with her  PCP.    Return in about 6 months (around 7/3/2023).    I will call the patient with the results of an echocardiogram upon completion.      Thank you for allowing me to participate in the care of your patient. Please do not hesitate to contact me if you have any questions regarding my recommendations and management.    Sincerely;    Madhuri Cardoza MD [facility]    By signing my name below, I, Brigid Cheung, attest that this documentation has been prepared under the direction and in the  presence of Madhuri Gregorio MD Electronically signed: Pippa Meyers. 1/3/2023 1:02 PM     I, Madhuri Gregorio MD, personally performed the services described in this documentation. All medical record entries made by the scribe were at my direction and in my presence. I have reviewed the chart and agree that the record reflects my personal performance and is accurate and complete. Madhuri Gregorio MD. 1/3/2023. 1:02 PM.

## 2023-01-03 NOTE — ASSESSMENT & PLAN NOTE
I ordered a repeat echocardiogram.  She has a history of Nimo anomaly although it was not mentioned in the last 2 echocardiograms it was documented in the past. Her echocardiogram last year  shows only mild tricuspid regurgitation with normal pulmonary artery pressures.

## 2023-03-05 NOTE — ASSESSMENT & PLAN NOTE
Her blood pressure is elevated in the office today.  She will continue beta blocker therpay but advised her to follow up with her  PCP.

## 2023-04-24 NOTE — TELEPHONE ENCOUNTER
----- Message from Madhuri Gregorio MD sent at 3/11/2021  1:02 PM EST -----  Regarding: Send copy of blood tests to PCP  Hello   Please send a copy of her recent blood test from yesterday including the TSH, comprehensive metabolic panel, CBC and magnesium levels and fasting lipid profile to the patient's primary care physician.  Thanks  Dr. Gregorio     ASSESSMENT:   Bilateral cerebellar hemispheric strokes  POD 3 s/p SOC for foramen magnum decompression and R parietooccipital EVD  Bilateral carotid terminus aneurysms  R. V4 occlusion  History of peripheral neuropathy and asthma      PLAN:  NEURO: Q1h neurochecks  EVD at [id number], ICPs, CPP, output.   Post op CT head reviewed. CT 4/22:   SG ELENA- monitor output  Holding ASA/plavix- see heme. Continue Statin  Stroke core measures, stroke neurology consult  Will need MRI  Pending DSA   Activity: Bedrest    PULM: Extubated to HFNC  Albuterol prn  Wean HFNC  Pulm toilet    CARDIAC:   SBP goal 100-140  TTE w/ bubble, trop/baseline EKG    GI: NGT  PPI no longer indicated  Bowel regimen     /RENAL: Urine retention  On 3% at 30cc/hr for Na goal 145-150  Monitor BMPs Q6  Strict Is/Os  Continue cardura    HEME: S/P 3U platelets and 35mcg DDAVP for ASA/plavix reversal  Maintain Hb > 7.0, PLT > 100,000  SCDs,  LE dopplers: superficial venous thrombosis in proximal portion of right greater saphenous vein  Repeat dopplers 4/29    ID:  Monitor for infectious s/s, fever curve, leukocytosis  Post op Ancef per NSGY    ENDO:   Glu goal 140-180mg/dL  ISS   A1c, LDL, TSH    SOCIAL/FAMILY:  [] awaiting [x] updated at bedside [] family meeting    CODE STATUS:  [x] Full Code [] DNR [] DNI [] Palliative/Comfort Care    DISPOSITION:  [x] ICU [] Stroke Unit [] Floor [] EMU [] RCU [] PC    Time spent: 60 critical care minutes     ASSESSMENT:   Bilateral cerebellar hemispheric strokes  POD 3 s/p SOC for foramen magnum decompression and R parietooccipital EVD  Bilateral carotid terminus aneurysms  R. V4 occlusion  History of peripheral neuropathy and asthma    PLAN:  NEURO: Q1h neurochecks  EVD at [id number], ICPs, CPP, output.   Post op CT head reviewed. CT 4/22:   SG ELENA- monitor output  Holding ASA/plavix- see heme. Continue Statin  Stroke core measures, stroke neurology consult  Will need MRI brain w and w/o  Hypercoag and vasculitis w/u  Pending DSA   Activity: Bedrest    PULM: Extubated to HFNC  Albuterol prn  Wean HFNC  Pulm toilet    CARDIAC:   SBP goal 100-140  TTE w/ bubble, trop/baseline EKG    GI: NGT  PPI no longer indicated  Bowel regimen     /RENAL: Urine retention  On 3% at 30cc/hr for Na goal 145-150  Monitor BMPs Q6  Strict Is/Os  Continue cardura    HEME: S/P 3U platelets and 35mcg DDAVP for ASA/plavix reversal  Maintain Hb > 7.0, PLT > 100,000  SCDs, SQL pending stable CT head  LE dopplers: superficial venous thrombosis in proximal portion of right greater saphenous vein  Repeat dopplers 4/29    ID:  Monitor for infectious s/s, fever curve, leukocytosis  Post op Ancef per NSGY    ENDO:   Glu goal 140-180mg/dL  ISS   A1c, LDL, TSH    SOCIAL/FAMILY:  [] awaiting [x] updated at bedside [] family meeting    CODE STATUS:  [x] Full Code [] DNR [] DNI [] Palliative/Comfort Care    DISPOSITION:  [x] ICU [] Stroke Unit [] Floor [] EMU [] RCU [] PC    Time spent: 60 critical care minutes

## 2023-06-06 ENCOUNTER — HOSPITAL ENCOUNTER (OUTPATIENT)
Dept: CARDIOLOGY | Facility: CLINIC | Age: 83
Discharge: HOME | End: 2023-06-06
Attending: INTERNAL MEDICINE
Payer: MEDICARE

## 2023-06-06 VITALS
DIASTOLIC BLOOD PRESSURE: 60 MMHG | WEIGHT: 112 LBS | HEIGHT: 65 IN | SYSTOLIC BLOOD PRESSURE: 108 MMHG | BODY MASS INDEX: 18.66 KG/M2

## 2023-06-06 DIAGNOSIS — I36.1 NON-RHEUMATIC TRICUSPID VALVE INSUFFICIENCY: ICD-10-CM

## 2023-06-06 LAB
AORTIC ROOT ANNULUS: 2.8 CM
ASCENDING AORTA: 3.4 CM
AV PEAK GRADIENT: 6 MMHG
AV PEAK VELOCITY-S: 1.18 M/S
AV VALVE AREA: 1.46 CM2
BSA FOR ECHO PROCEDURE: 1.53 M2
CUSP SEPARATION: 1.6 CM
E WAVE DECELERATION TIME: 192 MS
E/A RATIO: 1.1
E/E' RATIO: 13.4
E/LAT E' RATIO: 7.6
EDV (BP): 49.6 CM3
EF (A4C): 68.1 %
EF A2C: 62.1 %
EJECTION FRACTION: 63.9 %
EST RIGHT VENT SYSTOLIC PRESSURE BY TRICUSPID REGURGITATION JET: 37 MMHG
ESV (BP): 17.9 CM3
FRACTIONAL SHORTENING: 43.38 %
INTERVENTRICULAR SEPTUM: 0.72 CM
LA ESV (BP): 39.3 CM3
LA ESV INDEX (A2C): 22.75 CM3/M2
LA ESV INDEX (BP): 25.69 CM3/M2
LA/AORTA RATIO: 1.25
LAAS-AP2: 14.5 CM2
LAAS-AP4: 17.5 CM2
LAD 2D: 3.5 CM
LALD A4C: 5 CM
LALD A4C: 5.19 CM
LAV-S: 34.8 CM3
LEFT ATRIUM VOLUME INDEX: 28.37 CM3/M2
LEFT ATRIUM VOLUME: 43.4 CM3
LEFT INTERNAL DIMENSION IN SYSTOLE: 2.48 CM (ref 2.26–3.43)
LEFT VENTRICLE DIASTOLIC VOLUME INDEX: 34.18 CM3/M2
LEFT VENTRICLE DIASTOLIC VOLUME: 52.3 CM3
LEFT VENTRICLE SYSTOLIC VOLUME INDEX: 10.92 CM3/M2
LEFT VENTRICLE SYSTOLIC VOLUME: 16.7 CM3
LEFT VENTRICULAR INTERNAL DIMENSION IN DIASTOLE: 4.38 CM (ref 3.81–5.29)
LEFT VENTRICULAR POSTERIOR WALL IN END DIASTOLE: 0.85 CM (ref 0.49–0.91)
LV DIASTOLIC VOLUME: 45.6 CM3
LV ESV (APICAL 2 CHAMBER): 17.3 CM3
LVAD-AP2: 18.4 CM2
LVAD-AP4: 19.7 CM2
LVAS-AP2: 10.3 CM2
LVAS-AP4: 9.79 CM2
LVEDVI(A2C): 29.8 CM3/M2
LVEDVI(BP): 32.42 CM3/M2
LVESVI(A2C): 11.31 CM3/M2
LVESVI(BP): 11.7 CM3/M2
LVLD-AP2: 6.4 CM
LVLD-AP4: 6.09 CM
LVLS-AP2: 5.51 CM
LVLS-AP4: 4.81 CM
LVOT 2D: 2 CM
LVOT A: 3.14 CM2
LVOT PEAK VELOCITY: 0.7 M/S
LVOT PG: 2 MMHG
MLH CV ECHO AVA INDEX VELOCITY RATIO: 1
MV E'TISSUE VEL-LAT: 0.09 M/S
MV E'TISSUE VEL-MED: 0.05 M/S
MV PEAK A VEL: 0.6 M/S
MV PEAK E VEL: 0.66 M/S
POSTERIOR WALL: 0.85 CM
RAP: 5 MMHG
RVOT VMAX: 0.52 M/S
SEPTAL TISSUE DOPPLER FREE WALL LATE DIA VELOCITY (APICAL 4 CHAMBER VIEW): 0.1 M/S
TAPSE: 2.76 CM
TR MAX PG: 31.81 MMHG
TRICUSPID VALVE PEAK REGURGITATION VELOCITY: 2.82 M/S
Z-SCORE OF LEFT VENTRICULAR DIMENSION IN END DIASTOLE: -0.24
Z-SCORE OF LEFT VENTRICULAR DIMENSION IN END SYSTOLE: -0.91
Z-SCORE OF LEFT VENTRICULAR POSTERIOR WALL IN END DIASTOLE: 1.28

## 2023-06-06 PROCEDURE — 93306 TTE W/DOPPLER COMPLETE: CPT | Performed by: INTERNAL MEDICINE

## 2023-06-08 ENCOUNTER — TELEPHONE (OUTPATIENT)
Dept: CARDIOLOGY | Facility: CLINIC | Age: 83
End: 2023-06-08
Payer: MEDICARE

## 2023-06-08 NOTE — TELEPHONE ENCOUNTER
Please call the patient. Her echocardiogram shows normal LV function and is unchanged from previous. The Ebstein Anomaly in her tricuspid valve is unchanged.

## 2023-06-26 RX ORDER — FLECAINIDE ACETATE 100 MG/1
100 TABLET ORAL EVERY 12 HOURS
Qty: 180 TABLET | Refills: 3 | Status: SHIPPED | OUTPATIENT
Start: 2023-06-26 | End: 2024-06-13 | Stop reason: SDUPTHER

## 2023-06-26 NOTE — TELEPHONE ENCOUNTER
Medicine Refill Request [facility]    Name of Patient: Orly Cobian    Caller's name/Relationship: self    Callback number:   [phone]      Medication Name, Dosage, Supply:   flecainide (TAMBOCOR) 100 mg tablet  Quantity left: 4 pills    Pharmacy: [facility] [id number]    Last Office Visit: 01/03/23  Last Consult Visit: Visit date not found  Last Telemedicine Visit: Visit date not found    Next Appointment: Visit date not found      Current Outpatient Medications:   •  anastrozole (ARIMIDEX) 1 mg tablet, Take  1 mg daily with lunch, Disp: , Rfl:   •  apixaban (ELIQUIS) 5 mg tablet, Take 1 tablet (5 mg total) by mouth 2 (two) times a day., Disp: 180 tablet, Rfl: 3  •  aspirin 81 mg enteric coated tablet, Take 1 tablet (81 mg total) by mouth daily., Disp: 30 tablet, Rfl: 0  •  atorvastatin (LIPITOR) 10 mg tablet, Take 1 tablet (10 mg total) by mouth every evening., Disp: 90 tablet, Rfl: 3  •  cholecalciferol, vitamin D3, 1,000 unit (25 mcg) tablet, Take 1,000 Units by mouth daily with lunch., Disp: , Rfl:   •  dorzolamide (TRUSOPT) 2 % ophthalmic solution, Administer 1 drop into the right eye every morning., Disp: , Rfl:   •  estradioL (ESTRACE) 0.01 % (0.1 mg/gram) vaginal cream, Insert 0.5 g into the vagina 2 (two) times a week (Sun, Wed)., Disp: , Rfl:   •  flecainide (TAMBOCOR) 100 mg tablet, Take 1 tablet (100 mg total) by mouth every 12 (twelve) hours., Disp: 180 tablet, Rfl: 3  •  latanoprost (XALATAN) 0.005 % ophthalmic solution, Administer 1 drop into both eyes nightly., Disp: , Rfl:   •  levothyroxine (SYNTHROID) 50 mcg tablet, Take 50 mcg by mouth daily., Disp: , Rfl:   •  LORazepam (ATIVAN) 0.5 mg tablet, take 1 tablet by mouth once daily if needed, Disp: , Rfl:   •  lutein-zeaxanthin 25-5 mg capsule, Take 1 capsule by mouth daily., Disp: , Rfl:   •  metoprolol succinate XL (TOPROL-XL) 25 mg 24 hr tablet, Take 1 tablet (25 mg total) by mouth nightly., Disp: 90 tablet, Rfl: 3      BP Readings from Last 3  Encounters:   06/06/23 108/60   01/03/23 (!) 158/80   08/12/22 130/80       Recent Lab results:  Lab Results   Component Value Date    CHOL 147 07/29/2022   ,   Lab Results   Component Value Date    HDL 59 07/29/2022   ,   Lab Results   Component Value Date    LDLCALC 78 07/29/2022   ,   Lab Results   Component Value Date    TRIG 52 07/29/2022        Lab Results   Component Value Date    GLUCOSE 103 (H) 06/17/2022   , No results found for: HGBA1C      Lab Results   Component Value Date    CREATININE 0.8 08/16/2022       Lab Results   Component Value Date    TSH 4.54 07/26/2021

## 2023-06-29 RX ORDER — METOPROLOL SUCCINATE 25 MG/1
25 TABLET, EXTENDED RELEASE ORAL NIGHTLY
Qty: 90 TABLET | Refills: 3 | Status: SHIPPED | OUTPATIENT
Start: 2023-06-29 | End: 2024-06-17 | Stop reason: SDUPTHER

## 2023-06-29 NOTE — TELEPHONE ENCOUNTER
Medicine Refill Request [facility]    Name of Patient: Orly Cobian    Caller's name/Relationship: Orly/nicolas    Callback number: [phone]      Medication Name, Dosage, Supply: metoprolol succinate XL (TOPROL-XL) 25 mg 24 hr tablet        Quantity left: 2 tablets left    Pharmacy: [facility]    Last Office Visit: 1/3/23  Last Consult Visit: Visit date not found  Last Telemedicine Visit: Visit date not found    Next Appointment: Visit date not found      Current Outpatient Medications:   •  anastrozole (ARIMIDEX) 1 mg tablet, Take  1 mg daily with lunch, Disp: , Rfl:   •  apixaban (ELIQUIS) 5 mg tablet, Take 1 tablet (5 mg total) by mouth 2 (two) times a day., Disp: 180 tablet, Rfl: 3  •  aspirin 81 mg enteric coated tablet, Take 1 tablet (81 mg total) by mouth daily., Disp: 30 tablet, Rfl: 0  •  atorvastatin (LIPITOR) 10 mg tablet, Take 1 tablet (10 mg total) by mouth every evening., Disp: 90 tablet, Rfl: 3  •  cholecalciferol, vitamin D3, 1,000 unit (25 mcg) tablet, Take 1,000 Units by mouth daily with lunch., Disp: , Rfl:   •  dorzolamide (TRUSOPT) 2 % ophthalmic solution, Administer 1 drop into the right eye every morning., Disp: , Rfl:   •  estradioL (ESTRACE) 0.01 % (0.1 mg/gram) vaginal cream, Insert 0.5 g into the vagina 2 (two) times a week (Sun, Wed)., Disp: , Rfl:   •  flecainide (TAMBOCOR) 100 mg tablet, Take 1 tablet (100 mg total) by mouth every 12 (twelve) hours., Disp: 180 tablet, Rfl: 3  •  latanoprost (XALATAN) 0.005 % ophthalmic solution, Administer 1 drop into both eyes nightly., Disp: , Rfl:   •  levothyroxine (SYNTHROID) 50 mcg tablet, Take 50 mcg by mouth daily., Disp: , Rfl:   •  LORazepam (ATIVAN) 0.5 mg tablet, take 1 tablet by mouth once daily if needed, Disp: , Rfl:   •  lutein-zeaxanthin 25-5 mg capsule, Take 1 capsule by mouth daily., Disp: , Rfl:   •  metoprolol succinate XL (TOPROL-XL) 25 mg 24 hr tablet, Take 1 tablet (25 mg total) by mouth nightly., Disp: 90 tablet, Rfl: 3      BP  Readings from Last 3 Encounters:   06/06/23 108/60   01/03/23 (!) 158/80   08/12/22 130/80       Recent Lab results:  Lab Results   Component Value Date    CHOL 147 07/29/2022   ,   Lab Results   Component Value Date    HDL 59 07/29/2022   ,   Lab Results   Component Value Date    LDLCALC 78 07/29/2022   ,   Lab Results   Component Value Date    TRIG 52 07/29/2022        Lab Results   Component Value Date    GLUCOSE 103 (H) 06/17/2022   , No results found for: HGBA1C      Lab Results   Component Value Date    CREATININE 0.8 08/16/2022       Lab Results   Component Value Date    TSH 4.54 07/26/2021

## 2023-07-11 ENCOUNTER — OFFICE VISIT (OUTPATIENT)
Dept: NEUROLOGY | Facility: CLINIC | Age: 83
End: 2023-07-11
Payer: MEDICARE

## 2023-07-11 VITALS
WEIGHT: 112 LBS | OXYGEN SATURATION: 99 % | SYSTOLIC BLOOD PRESSURE: 120 MMHG | RESPIRATION RATE: 15 BRPM | DIASTOLIC BLOOD PRESSURE: 70 MMHG | HEIGHT: 65 IN | BODY MASS INDEX: 18.66 KG/M2 | HEART RATE: 54 BPM

## 2023-07-11 DIAGNOSIS — R25.1 TREMOR: Primary | ICD-10-CM

## 2023-07-11 PROCEDURE — 99204 OFFICE O/P NEW MOD 45 MIN: CPT | Performed by: PSYCHIATRY & NEUROLOGY

## 2023-07-11 NOTE — LETTER
2023     Anton Muniz MD  [address]  [place] [address]    Patient: Orly Cobian  YOB: 1940  Date of Visit: 2023      Dear Dr. Muniz:    Thank you for referring Orly Cobian to me for evaluation. Below are my notes for this consultation.    If you have questions, please do not hesitate to call me. I look forward to following your patient along with you.         Sincerely,        Laine Ariza MD        CC: MD Annita Nicholson Robyn, MD  2023  4:02 PM  Signed  Patient ID: Orly Cobian                              : 1940  MRN: [id number]                                            VISIT DATE: 2023  ENCOUNTER PROVIDER: Laine Ariza  REFERRING PROVIDER: No ref. provider found    CHIEF COMPLAINT: Tremors    HISTORY OF PRESENT ILLNESS:  Orly Cobian is an 83 year old amibdextrous woman with PMH breast cancer, afib on eliquis who is referred for evalaution of tremor. She is accompanied by her . She eats and writes with her left hand and uses her right hand for other things. She mentioned to her cardiologist that she noticed some shaking in her left hand when writing. Letters are larger. No cramping in the hand. Her cardiologist recommended that she get a baseline evaluation. She has noticed the tremor for the last year. She has avoided writing long notes. In the past she had very good penmanship and people would ask her to address wedding invitations. She has not noticed the tremor when eating or other activities. No FH tremor. She has been without alcohol for 39 years. No caffeine. She does a lot of walking. Some difficulties with balance.   No hyposmia. Constipation. Longstanding urinary incontinence. She has not had RBD however her  is experiencing dream enactment and will be seeing a sleep specialist.    Last fall was last year when she suffered a C2 fracture.    PAST MEDICAL HISTORY:  has a past medical history of Alcoholism  (CMS/HCC), Arrhythmia, Breast cancer (CMS/HCC), Chronic atrial fibrillation (CMS/HCC), Hypothyroidism, and Osteoporosis.     PAST SURGICAL HISTORY:  has a past surgical history that includes Cholecystectomy; Cosmetic surgery; Eye surgery; Skin biopsy; Ablation of dysrhythmic focus; Breast biopsy; and Breast lumpectomy (Left, 2021).    SOCIAL HISTORY:   Social History     Tobacco Use   • Smoking status: Former     Types: Cigarettes     Quit date:      Years since quittin.5   • Smokeless tobacco: Never   Vaping Use   • Vaping Use: Never used   Substance Use Topics   • Alcohol use: No   • Drug use: No     FAMILY HISTORY: family history includes Anemia in her biological sister; Clotting disorder in her biological sister; Fainting in her biological sister; Heart attack in her biological father; Heart disease in her biological father and biological mother; Heart failure in her biological father; Hypertension in her biological sister.    MEDICATIONS:   Current Outpatient Medications:   •  anastrozole (ARIMIDEX) 1 mg tablet, Take  1 mg daily with lunch, Disp: , Rfl:   •  apixaban (ELIQUIS) 5 mg tablet, Take 1 tablet (5 mg total) by mouth 2 (two) times a day., Disp: 180 tablet, Rfl: 3  •  atorvastatin (LIPITOR) 10 mg tablet, Take 1 tablet (10 mg total) by mouth every evening., Disp: 90 tablet, Rfl: 3  •  cholecalciferol, vitamin D3, 1,000 unit (25 mcg) tablet, Take 1,000 Units by mouth daily with lunch., Disp: , Rfl:   •  dorzolamide (TRUSOPT) 2 % ophthalmic solution, Administer 1 drop into the right eye every morning., Disp: , Rfl:   •  estradioL (ESTRACE) 0.01 % (0.1 mg/gram) vaginal cream, Insert 0.5 g into the vagina 2 (two) times a week (Sun, Wed)., Disp: , Rfl:   •  flecainide (TAMBOCOR) 100 mg tablet, Take 1 tablet (100 mg total) by mouth every 12 (twelve) hours., Disp: 180 tablet, Rfl: 3  •  latanoprost (XALATAN) 0.005 % ophthalmic solution, Administer 1 drop into both eyes nightly., Disp: , Rfl:   •   "levothyroxine (SYNTHROID) 50 mcg tablet, Take 50 mcg by mouth daily., Disp: , Rfl:   •  LORazepam (ATIVAN) 0.5 mg tablet, take 1 tablet by mouth once daily if needed, Disp: , Rfl:   •  lutein-zeaxanthin 25-5 mg capsule, Take 1 capsule by mouth daily., Disp: , Rfl:   •  metoprolol succinate XL (TOPROL-XL) 25 mg 24 hr tablet, Take 1 tablet (25 mg total) by mouth nightly., Disp: 90 tablet, Rfl: 3    ALLERGIES: is allergic to erythromycin and erythromycin base.     REVIEW OF SYSTEMS:  All other systems reviewed and negative except as noted in the HPI.    PHYSICAL EXAM:  Visit Vitals  /70 (BP Location: Right upper arm, Patient Position: Sitting)   Pulse (!) 54   Resp 15   Ht 1.651 m (5' 5\")   Wt 50.8 kg (112 lb)   SpO2 99%   BMI 18.64 kg/m²     GENERAL APPEARANCE: Well appearing, well nourished and well developed.  Not in acute distress.  Appears stated age.  HEAD: Normocephalic, atraumatic.  EYES:  Sclerae white. Conjunctivae clear.  NECK:  Neck was supple.  CARDIOVASCULAR:  Regular rate and rhythm.  EXTREMITIES: No clubbing, no cyanosis nor edema.  SKIN:  Dry, intact. No rashes noted. Warm to touch.  PSYCHIATRIC: Calm and cooperative with appropriate insight    NEUROLOGICAL EXAM:  MENTAL STATUS: Awake and alert with fluent language. Attention, memory, and executive function were intact.  CRANIAL NERVES:  Pupils are equal, round and reactive to light. Optic discs could not be visualized on fundoscopy. Extraocular movements are intact. Normal pursuits and saccades. No nystagmus. Full visual fields to finger-counting. Facial strength and sensation intact. Hearing grossly intact. Uvula and tongue are midline. No dysarthria.  MOTOR:  Normal muscle bulk and tone. Full strength in the arms and legs proximally and distally.  Good facial expression  No rigidity in the arms and legs  No resting tremor. No postural and action tremor.  There was mild slowness of finger taps, hand opening-closing on the left, but improved " with time. No bradykinesia on hand supination-pronation  Mild bradykinesia on toe taps on the left, normal heel taps  Handwriting: mild tremor on spiral L>R  REFLEXES: 2+ biceps, brachioradialis, and triceps bilaterally. 2+ patellar, 1+ Achilles reflexes bilaterally. Toes downgoing bilaterally. Negative Hoffmans bilaterally.  SENSATION: Intact to light touch, MARILU sense, vibration sensation  COORDINATION: No ataxia on finger-to-nose  GAIT: Able to stand without pushing up from the chair. Gait was normal based with good strides and heel clearance. Arm swing was normal bilaterally. Turn was normal. She was unable to tandem walk.    LABORATORY STUDIES:  Reviewed in chart    IMAGING STUDIES:   I personally reviewed the [facility] 6/2022: no acute intracranial change.    IMPRESSION:  Orly Cobian is an 83 year old amibdextrous woman with PMH breast cancer, afib on eliquis who is referred for evaluation of tremor. She notes the tremor only when writing, no cramping to suggest dystonia. On exam there does seem to be some slowness of movement on the left which should be monitored.    RECOMMENDATIONS:  -Discussed use of a heavy pen when writing, consider OT. 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    Thank you for allowing me to participate in the care of your patient.  Please feel free to contact me at any time if you have any further questions.      Laine Ariza MD  Movement Disorders

## 2023-07-11 NOTE — PROGRESS NOTES
Patient ID: Orly Cobian                              : 1940  MRN: [id number]                                            VISIT DATE: 2023  ENCOUNTER PROVIDER: Laine Ariza  REFERRING PROVIDER: No ref. provider found    CHIEF COMPLAINT: Tremors    HISTORY OF PRESENT ILLNESS:  Orly Cobian is an 83 year old amibdextrous woman with PMH breast cancer, afib on eliquis who is referred for evalaution of tremor. She is accompanied by her . She eats and writes with her left hand and uses her right hand for other things. She mentioned to her cardiologist that she noticed some shaking in her left hand when writing. Letters are larger. No cramping in the hand. Her cardiologist recommended that she get a baseline evaluation. She has noticed the tremor for the last year. She has avoided writing long notes. In the past she had very good penmanship and people would ask her to address wedding invitations. She has not noticed the tremor when eating or other activities. No FH tremor. She has been without alcohol for 39 years. No caffeine. She does a lot of walking. Some difficulties with balance.   No hyposmia. Constipation. Longstanding urinary incontinence. She has not had RBD however her  is experiencing dream enactment and will be seeing a sleep specialist.    Last fall was last year when she suffered a C2 fracture.    PAST MEDICAL HISTORY:  has a past medical history of Alcoholism (CMS/HCC), Arrhythmia, Breast cancer (CMS/HCC), Chronic atrial fibrillation (CMS/HCC), Hypothyroidism, and Osteoporosis.     PAST SURGICAL HISTORY:  has a past surgical history that includes Cholecystectomy; Cosmetic surgery; Eye surgery; Skin biopsy; Ablation of dysrhythmic focus; Breast biopsy; and Breast lumpectomy (Left, 2021).    SOCIAL HISTORY:   Social History     Tobacco Use   • Smoking status: Former     Types: Cigarettes     Quit date:      Years since quittin.5   • Smokeless tobacco: Never    Vaping Use   • Vaping Use: Never used   Substance Use Topics   • Alcohol use: No   • Drug use: No     FAMILY HISTORY: family history includes Anemia in her biological sister; Clotting disorder in her biological sister; Fainting in her biological sister; Heart attack in her biological father; Heart disease in her biological father and biological mother; Heart failure in her biological father; Hypertension in her biological sister.    MEDICATIONS:   Current Outpatient Medications:   •  anastrozole (ARIMIDEX) 1 mg tablet, Take  1 mg daily with lunch, Disp: , Rfl:   •  apixaban (ELIQUIS) 5 mg tablet, Take 1 tablet (5 mg total) by mouth 2 (two) times a day., Disp: 180 tablet, Rfl: 3  •  atorvastatin (LIPITOR) 10 mg tablet, Take 1 tablet (10 mg total) by mouth every evening., Disp: 90 tablet, Rfl: 3  •  cholecalciferol, vitamin D3, 1,000 unit (25 mcg) tablet, Take 1,000 Units by mouth daily with lunch., Disp: , Rfl:   •  dorzolamide (TRUSOPT) 2 % ophthalmic solution, Administer 1 drop into the right eye every morning., Disp: , Rfl:   •  estradioL (ESTRACE) 0.01 % (0.1 mg/gram) vaginal cream, Insert 0.5 g into the vagina 2 (two) times a week (Sun, Wed)., Disp: , Rfl:   •  flecainide (TAMBOCOR) 100 mg tablet, Take 1 tablet (100 mg total) by mouth every 12 (twelve) hours., Disp: 180 tablet, Rfl: 3  •  latanoprost (XALATAN) 0.005 % ophthalmic solution, Administer 1 drop into both eyes nightly., Disp: , Rfl:   •  levothyroxine (SYNTHROID) 50 mcg tablet, Take 50 mcg by mouth daily., Disp: , Rfl:   •  LORazepam (ATIVAN) 0.5 mg tablet, take 1 tablet by mouth once daily if needed, Disp: , Rfl:   •  lutein-zeaxanthin 25-5 mg capsule, Take 1 capsule by mouth daily., Disp: , Rfl:   •  metoprolol succinate XL (TOPROL-XL) 25 mg 24 hr tablet, Take 1 tablet (25 mg total) by mouth nightly., Disp: 90 tablet, Rfl: 3    ALLERGIES: is allergic to erythromycin and erythromycin base.     REVIEW OF SYSTEMS:  All other systems reviewed and  "negative except as noted in the HPI.    PHYSICAL EXAM:  Visit Vitals  /70 (BP Location: Right upper arm, Patient Position: Sitting)   Pulse (!) 54   Resp 15   Ht 1.651 m (5' 5\")   Wt 50.8 kg (112 lb)   SpO2 99%   BMI 18.64 kg/m²     GENERAL APPEARANCE: Well appearing, well nourished and well developed.  Not in acute distress.  Appears stated age.  HEAD: Normocephalic, atraumatic.  EYES:  Sclerae white. Conjunctivae clear.  NECK:  Neck was supple.  CARDIOVASCULAR:  Regular rate and rhythm.  EXTREMITIES: No clubbing, no cyanosis nor edema.  SKIN:  Dry, intact. No rashes noted. Warm to touch.  PSYCHIATRIC: Calm and cooperative with appropriate insight    NEUROLOGICAL EXAM:  MENTAL STATUS: Awake and alert with fluent language. Attention, memory, and executive function were intact.  CRANIAL NERVES:  Pupils are equal, round and reactive to light. Optic discs could not be visualized on fundoscopy. Extraocular movements are intact. Normal pursuits and saccades. No nystagmus. Full visual fields to finger-counting. Facial strength and sensation intact. Hearing grossly intact. Uvula and tongue are midline. No dysarthria.  MOTOR:  Normal muscle bulk and tone. Full strength in the arms and legs proximally and distally.  Good facial expression  No rigidity in the arms and legs  No resting tremor. No postural and action tremor.  There was mild slowness of finger taps, hand opening-closing on the left, but improved with time. No bradykinesia on hand supination-pronation  Mild bradykinesia on toe taps on the left, normal heel taps  Handwriting: mild tremor on spiral L>R  REFLEXES: 2+ biceps, brachioradialis, and triceps bilaterally. 2+ patellar, 1+ Achilles reflexes bilaterally. Toes downgoing bilaterally. Negative Hoffmans bilaterally.  SENSATION: Intact to light touch, MARILU sense, vibration sensation  COORDINATION: No ataxia on finger-to-nose  GAIT: Able to stand without pushing up from the chair. Gait was normal based with " good strides and heel clearance. Arm swing was normal bilaterally. Turn was normal. She was unable to tandem walk.    LABORATORY STUDIES:  Reviewed in chart    IMAGING STUDIES:   I personally reviewed the [facility] 6/2022: no acute intracranial change.    IMPRESSION:  Orly Cobian is an 83 year old amibdextrous woman with PMH breast cancer, afib on eliquis who is referred for evaluation of tremor. She notes the tremor only when writing, no cramping to suggest dystonia. On exam there does seem to be some slowness of movement on the left which should be monitored.    RECOMMENDATIONS:  -Discussed use of a heavy pen when writing, consider OT. 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    Thank you for allowing me to participate in the care of your patient.  Please feel free to contact me at any time if you have any further questions.      Laine Ariza MD  Movement Disorders

## 2023-07-12 DIAGNOSIS — I65.09 VERTEBRAL ARTERY STENOSIS, UNSPECIFIED LATERALITY: Primary | ICD-10-CM

## 2023-07-18 ENCOUNTER — OFFICE VISIT (OUTPATIENT)
Dept: CARDIOLOGY | Facility: CLINIC | Age: 83
End: 2023-07-18
Payer: MEDICARE

## 2023-07-18 ENCOUNTER — TELEPHONE (OUTPATIENT)
Dept: NEUROSURGERY | Facility: CLINIC | Age: 83
End: 2023-07-18
Payer: MEDICARE

## 2023-07-18 VITALS
BODY MASS INDEX: 18.33 KG/M2 | OXYGEN SATURATION: 99 % | SYSTOLIC BLOOD PRESSURE: 150 MMHG | HEART RATE: 55 BPM | HEIGHT: 65 IN | DIASTOLIC BLOOD PRESSURE: 60 MMHG | WEIGHT: 110 LBS | RESPIRATION RATE: 16 BRPM

## 2023-07-18 DIAGNOSIS — I45.10 RBBB: ICD-10-CM

## 2023-07-18 DIAGNOSIS — I45.6 WPW (WOLFF-PARKINSON-WHITE SYNDROME): ICD-10-CM

## 2023-07-18 DIAGNOSIS — Z79.01 CURRENT USE OF LONG TERM ANTICOAGULATION: ICD-10-CM

## 2023-07-18 DIAGNOSIS — R03.0 ELEVATED BLOOD PRESSURE READING WITHOUT DIAGNOSIS OF HYPERTENSION: ICD-10-CM

## 2023-07-18 DIAGNOSIS — Z79.899 LONG TERM CURRENT USE OF ANTIARRHYTHMIC DRUG: ICD-10-CM

## 2023-07-18 DIAGNOSIS — E78.5 DYSLIPIDEMIA: ICD-10-CM

## 2023-07-18 DIAGNOSIS — Q22.5 EBSTEIN'S ANOMALY OF TRICUSPID VALVE: ICD-10-CM

## 2023-07-18 DIAGNOSIS — I48.0 PAROXYSMAL ATRIAL FIBRILLATION (CMS/HCC): Primary | ICD-10-CM

## 2023-07-18 PROCEDURE — 93000 ELECTROCARDIOGRAM COMPLETE: CPT | Performed by: INTERNAL MEDICINE

## 2023-07-18 PROCEDURE — 99214 OFFICE O/P EST MOD 30 MIN: CPT | Performed by: INTERNAL MEDICINE

## 2023-07-18 RX ORDER — ATORVASTATIN CALCIUM 10 MG/1
10 TABLET, FILM COATED ORAL EVERY EVENING
Qty: 90 TABLET | Refills: 3 | Status: SHIPPED | OUTPATIENT
Start: 2023-07-18 | End: 2024-07-08 | Stop reason: SDUPTHER

## 2023-07-18 NOTE — ASSESSMENT & PLAN NOTE
Asymptomatic. No symptoms of recurrence of atrial fibrillation on flecainide 100 mg p.o. every 12 hours. She is tolerating the medication without side effects.   She may take an extra 100 mg x 1 during an episode of atrial fibrillation. She will also continue beta blocker therapy with metoprolol succinate 25 mg once a day.     She will continue on Eliquis 5 mg p.o. every 12 hours. No bleeding complications.The [id number] score is 4.

## 2023-07-18 NOTE — ASSESSMENT & PLAN NOTE
Her twelve-lead EKG on flecainide is stable with chronic right bundle branch block without progression of conduction disease.

## 2023-07-18 NOTE — TELEPHONE ENCOUNTER
Medicine Refill Request    Last Office Visit: Visit date not found   Last Consult Visit: Visit date not found  Last Telemedicine Visit: Visit date not found  escribe atorvastratin 10 mg daily # 90 to [facility]  Next Appointment: Visit date not found      Current Outpatient Medications:   •  anastrozole (ARIMIDEX) 1 mg tablet, Take  1 mg daily with lunch, Disp: , Rfl:   •  apixaban (ELIQUIS) 5 mg tablet, Take 1 tablet (5 mg total) by mouth 2 (two) times a day., Disp: 180 tablet, Rfl: 3  •  atorvastatin (LIPITOR) 10 mg tablet, Take 1 tablet (10 mg total) by mouth every evening., Disp: 90 tablet, Rfl: 3  •  cholecalciferol, vitamin D3, 1,000 unit (25 mcg) tablet, Take 1,000 Units by mouth daily with lunch., Disp: , Rfl:   •  dorzolamide (TRUSOPT) 2 % ophthalmic solution, Administer 1 drop into the right eye every morning., Disp: , Rfl:   •  estradioL (ESTRACE) 0.01 % (0.1 mg/gram) vaginal cream, Insert 0.5 g into the vagina 2 (two) times a week (Sun, Wed)., Disp: , Rfl:   •  flecainide (TAMBOCOR) 100 mg tablet, Take 1 tablet (100 mg total) by mouth every 12 (twelve) hours., Disp: 180 tablet, Rfl: 3  •  latanoprost (XALATAN) 0.005 % ophthalmic solution, Administer 1 drop into both eyes nightly., Disp: , Rfl:   •  levothyroxine (SYNTHROID) 50 mcg tablet, Take 50 mcg by mouth daily., Disp: , Rfl:   •  LORazepam (ATIVAN) 0.5 mg tablet, take 1 tablet by mouth once daily if needed, Disp: , Rfl:   •  lutein-zeaxanthin 25-5 mg capsule, Take 1 capsule by mouth daily., Disp: , Rfl:   •  metoprolol succinate XL (TOPROL-XL) 25 mg 24 hr tablet, Take 1 tablet (25 mg total) by mouth nightly., Disp: 90 tablet, Rfl: 3      BP Readings from Last 3 Encounters:   07/11/23 120/70   06/06/23 108/60   01/03/23 (!) 158/80       Recent Lab results:  Lab Results   Component Value Date    CHOL 147 07/29/2022   ,   Lab Results   Component Value Date    HDL 59 07/29/2022   ,   Lab Results   Component Value Date    LDLCALC 78 07/29/2022   ,   Lab  Results   Component Value Date    TRIG 52 07/29/2022        Lab Results   Component Value Date    GLUCOSE 103 (H) 06/17/2022   , No results found for: HGBA1C      Lab Results   Component Value Date    CREATININE 0.8 08/16/2022       Lab Results   Component Value Date    TSH 4.54 07/26/2021         No results found for: HGBA1C

## 2023-07-18 NOTE — ASSESSMENT & PLAN NOTE
I ordered a repeat echocardiogram to evlaute severity of tricuspid regurgitation.  She has a history of Nimo anomaly although it was not mentioned in the last 2 echocardiograms it was documented in the past. Her echocardiogram last year  showed only mild tricuspid regurgitation with normal pulmonary artery pressures.

## 2023-07-18 NOTE — ASSESSMENT & PLAN NOTE
Chronic RBBB.  Her QRS duration had previously  increased after adding flecainide which increased her QTc interval but remains stable.   I will continue to monitor.

## 2023-07-18 NOTE — PROGRESS NOTES
" Madhuri Gregorio MD, [facility]  Cardiac Electrophysiology    [facility]    [facility]  [facility] [address]  [address] [place] [address]    TEL  [phone]  [facility]         Electrophysiology Progress Note           July 18, 2023  Dear Dr. Muniz:    Orly Cobian is a 83 y.o. female who comes to the office today for follow up for a history of paroxysmal atrial fibrillation with chronic right bundle branch block and history of dyslipidemia.  She is on long-term antiarrhythmic drug therapy with flecainide and anticoagulation with Eliquis.    In December 2019, she woke up in the middle of the night with an episode of atrial fibrillation.  She stated that her heart was beating very fast. She took a flecainide and the episode subsided several hours later.  When I last saw her in the office on 2/13/2019, she had been feeling frequent \"extra beats\". Therefore, the patient agreed to start taking flecainide 100 mg p.o. every 12 hours and to continue to take an extra dose of flecainide as needed.    She had an echocardiogram 3/21/2022 which revealed normal LV function.    At her last office visit on 01/03/2023, she reported that she had no recurrence of symptoms of atrial fibrillation. She has no recent episodes of arrhthymias. She has no cardiac symptoms such as no chest pain, palpitation, shortness of breath and syncope. She has no pedal edema. Her neurologist prescribed the baby aspirin along with the Eliquis. She notices that her left hand has a tremors sensation (shakes) especially when she writes. I recommended her to see the neurologist for evaluatiton.      Today, patient reports that she had an fell incident from the treadmill however she denies any fractures and she just had minor wounds. She exercise on treadmill 6 days a week. She states that she has not had any recurrence symptoms of atrial fibrillation and no recent episodes of " arrhthymias. She saw Dr. Ariza for the tremors. Patient is dealing with the significant amount of personal stress as her  has the sleeping disorder and she is his caregiver. No cardiac complaints. No dizziness, near syncope or syncope. No chest pain. No dyspnea on exertion, PND, orthopnea or pedal edema. No bleeding complications on anticoagulation medication with Eliquis.    PROBLEM LIST:  1. Paroxysmal atrial fibrillation diagnosed on 10/11/2010, with a recent episode in May of  2017.  2. History of Jaycob-Parkinson-White syndrome, status post catheter ablation procedure  performed in 1997, without recurrence of arrhythmia.  3. Chronic right bundle branch block.  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  5. History of thyroid disease.  6. Dyslipidemia.  7. Stress echo obtained on June 6, 2017 revealing no evidence of myocardial ischemia with  normal left ventricular systolic function with a left ventricular ejection fraction of 65-  70%.  8. Vasovagal syncope    Patient Active Problem List   Diagnosis    Paroxysmal atrial fibrillation (CMS/[facility])    RBBB    Non-rheumatic tricuspid valve insufficiency    WPW (Jaycob-Parkinson-White syndrome)    Ebstein's anomaly of tricuspid valve    Nonrheumatic pulmonary valve insufficiency    Vasovagal syncope    Dyslipidemia    Long term current use of antiarrhythmic drug    Mass of left breast    Malignant neoplasm of central portion of left female breast (CMS/[facility])    Current use of long term anticoagulation    Pleural effusion on right    Elevated blood pressure reading without diagnosis of hypertension    Traumatic closed fracture of C2 vertebra with minimal displacement, initial encounter (CMS/[facility])     Past Medical History:   Diagnosis Date    Alcoholism (CMS/[facility])      Arrhythmia     Breast cancer (CMS/HCC)     Hypothyroidism     Osteoporosis      Past Surgical History:   Procedure Laterality Date    ABLATION OF DYSRHYTHMIC FOCUS      BREAST BIOPSY      2 breast biospy benign    BREAST LUMPECTOMY Left 08/03/2021    CHOLECYSTECTOMY      COSMETIC SURGERY      EYE SURGERY      SKIN BIOPSY       Current Outpatient Medications   Medication Sig Dispense Refill    anastrozole (ARIMIDEX) 1 mg tablet Take  1 mg daily with lunch      atorvastatin (LIPITOR) 10 mg tablet Take 1 tablet (10 mg total) by mouth every evening. 90 tablet 3    cholecalciferol, vitamin D3, 1,000 unit (25 mcg) tablet Take 1,000 Units by mouth daily with lunch.      dorzolamide (TRUSOPT) 2 % ophthalmic solution Administer 1 drop into the right eye every morning.      estradioL (ESTRACE) 0.01 % (0.1 mg/gram) vaginal cream Insert 0.5 g into the vagina 2 (two) times a week (Sun, Wed).      flecainide (TAMBOCOR) 100 mg tablet Take 1 tablet (100 mg total) by mouth every 12 (twelve) hours. 180 tablet 3    latanoprost (XALATAN) 0.005 % ophthalmic solution Administer 1 drop into both eyes nightly.      levothyroxine (SYNTHROID) 50 mcg tablet Take 50 mcg by mouth daily.      LORazepam (ATIVAN) 0.5 mg tablet take 1 tablet by mouth once daily if needed      lutein-zeaxanthin 25-5 mg capsule Take 1 capsule by mouth daily. ocuvit      metoprolol succinate XL (TOPROL-XL) 25 mg 24 hr tablet Take 1 tablet (25 mg total) by mouth nightly. 90 tablet 3    apixaban (ELIQUIS) 5 mg tablet Take 1 tablet (5 mg total) by mouth 2 (two) times a day. 180 tablet 1     No current facility-administered medications for this visit.     ALLERGIES:Erythromycin and Erythromycin base    REVIEW OF SYSTEMS: As in HPI.  All other other systems were reviewed and are negative.     Vitals:    07/18/23 1421   BP: (!) 150/60   BP Location: Right upper arm   Patient Position: Sitting   Pulse: (!) 55   Resp: 16   SpO2: 99%   Weight: 49.9 kg  "(110 lb)   Height: 1.651 m (5' 5\")       PHYSICAL EXAMINATION: Pleasant and alert, in no acute distress.  Neck: No jugular venous distention or carotid bruits. Lungs were clear to auscultation without rales or wheezes. Cardiovascular exam revealed normal S1, S2, regular rhythm with a 2/6 systolic murmur best heard at the lower left sternal border. Abdomen was soft and nontender.Extremities revealed no edema or cyanosis. She was alert and oriented x 3 with no focal deficits on gross neurologic exam.    Lab Results   Component Value Date    HGB 13.3 06/17/2022     06/17/2022    CHOL 147 07/29/2022    TRIG 52 07/29/2022    HDL 59 07/29/2022    ALT 22 06/17/2022    AST 28 06/17/2022     (LL) 06/17/2022    K 3.6 06/17/2022    CREATININE 0.6 07/21/2023    TSH 4.54 07/26/2021    LDLCALC 78 07/29/2022    MG 2.1 03/10/2021     Labs from 11/25/2022 Lex:   Glucose: 82  Creatinine: 0.70  Na: 136  K: 4.3  WBC: 6.6  RBC: 4.19  HGB: 13.4  HCT: 41    DATA REVIEW:    ECG 07/18/2023: I personally reviewed her 12-lead EKG performed today which reveals Sinus rhythm. RBBB.      ECG 01/03/2023: I personally reviewed her 12-lead EKG performed today which reveals   Sinus rhythm. RBBB    Chest Xray PA and Lateral March 1, 2022-[facility]    IMPRESSION:  Small-moderate right pleural effusion.    Echocardiogram:   Cardiac Imaging    TRANSTHORACIC ECHO (TTE) COMPLETE 06/06/2023    Interpretation Summary  1.  Normal left ventricular function with an estimated ejection fraction of 60 to 65%.  2.  Mild biatrial enlargement.  3.  Mildly thickened aortic valve without any significant abnormality by Doppler.  4.  Displaced tricuspid valve with evidence of probable Ebstein's anomaly.  5.  Mild to moderate tricuspid insufficiency with mildly elevated pulmonary pressures at 37 mmHg.  6.  No significant change from the previous study of March 2022    Cardiac Imaging    TRANSTHORACIC ECHO (TTE) COMPLETE 03/21/2022    Interpretation Summary  1.  " Normal left ventricular function with an estimated ejection fraction of 60%.  2.  No segmental wall motion abnormalities.  3.  Thickened aortic root.  4.  Thickened aortic valve leaflets without any restriction of motion.  5.  Mild aortic insufficiency.  6.  Thickened mitral valve leaflets without any restriction of motion.  7.  Mild mitral regurgitation.  8.  Mild tricuspid insufficiency with normal pulmonary pressures.  9.  Mildly dilated right atrium and right ventricle.  10.  Unchanged from April 2021    Cardiac Imaging    TRANSTHORACIC ECHO (TTE) COMPLETE 04/14/2021    Interpretation Summary  · Normal-sized LV. Normal LV wall thickness. Preserved LV systolic function. Estimated EF 60%. No regional wall motion abnormalities.  · Tricuspid aortic valve. Sclerotic aortic valve leaflets. Trace aortic valve regurgitation. Aortic root sclerotic.  · Trace mitral valve regurgitation. Moderately dilated LA.  · Mildly dilated RV. Normal RV systolic function. Mild to moderate tricuspid valve regurgitation. Estimated RVSP = 30 mmHg. Moderately dilated RA.  · Normal-sized IVC. IVC collapses >50% during inspiration.  · Compared with June 2017, the RV is now mildly dilated.    Assessment/Plan      WPW (Jaycob-Parkinson-White syndrome)  She is status post catheter ablation procedure in 1997 - She has not had recurrence of supraventricular tachycardia.    Paroxysmal atrial fibrillation (CMS/HCC) (HCC)  Asymptomatic. No symptoms of recurrence of atrial fibrillation on flecainide 100 mg p.o. every 12 hours. She is tolerating the medication without side effects.   She may take an extra 100 mg x 1 during an episode of atrial fibrillation. She will also continue beta blocker therapy with metoprolol succinate 25 mg once a day.     She will continue on Eliquis 5 mg p.o. every 12 hours. No bleeding complications.The [id number] score is 4.    Ebstein's anomaly of tricuspid valve   I ordered a repeat echocardiogram to evlaute severity of  tricuspid regurgitation.  She has a history of Nimo anomaly although it was not mentioned in the last 2 echocardiograms it was documented in the past. Her echocardiogram last year  showed only mild tricuspid regurgitation with normal pulmonary artery pressures.     Long term current use of antiarrhythmic drug  Her twelve-lead EKG on flecainide is stable with chronic right bundle branch block without progression of conduction disease.     RBBB  Chronic RBBB.  Her QRS duration had previously  increased after adding flecainide which increased her QTc interval but remains stable.   I will continue to monitor.    Dyslipidemia  She will continue atorvastatin 10 mg p.o. once a day.    Elevated blood pressure reading without diagnosis of hypertension  Her blood pressure is elevated in the office today. She reports significant personal stress.  She will continue beta blocker therpay .  I  advised her to follow up with her  PCP.    Return in about 6 months (around 1/18/2024).     I will contact her with the results of the echocardiogram.    Thank you for allowing me to participate in the care of your patient.  Please do not hesitate to contact me if you have any questions regarding my recommendations and management.    Sincerely;    Madhuri Cardoza M.D., FACC , FACP    This document was generated utilizing voice recognition technology. A reasonable attempt at proofreading has been made to minimize errors but please excuse any typographical errors which may be present. Please call with any questions.      By signing my name below, I, Brigid Cheung, attest that this documentation has been prepared under the direction and in the presence of Madhuri Gregorio MD Electronically signed: Pippa Meyers. 7/18/2023 2:19 PM     Madhuri CHINO MD, personally performed the services described in this documentation. All medical record entries made by the scribe were at my direction and in my presence. I have reviewed  the chart and agree that the record reflects my personal performance and is accurate and complete. Madhuri Gregorio MD. 7/18/2023. 2:19 PM.

## 2023-07-18 NOTE — ASSESSMENT & PLAN NOTE
Her blood pressure is elevated in the office today. She reports significant personal stress.  She will continue beta blocker therpay .  I  advised her to follow up with her  PCP.

## 2023-07-21 ENCOUNTER — APPOINTMENT (OUTPATIENT)
Dept: LAB | Facility: HOSPITAL | Age: 83
End: 2023-07-21
Attending: PHYSICIAN ASSISTANT
Payer: MEDICARE

## 2023-07-21 DIAGNOSIS — I65.09 VERTEBRAL ARTERY STENOSIS, UNSPECIFIED LATERALITY: ICD-10-CM

## 2023-07-21 LAB
BUN SERPL-MCNC: 21 MG/DL (ref 7–25)
CREAT SERPL-MCNC: 0.6 MG/DL (ref 0.6–1.2)
GFR SERPL CREATININE-BSD FRML MDRD: >60 ML/MIN/1.73M*2

## 2023-07-21 PROCEDURE — 84520 ASSAY OF UREA NITROGEN: CPT

## 2023-07-21 PROCEDURE — 36415 COLL VENOUS BLD VENIPUNCTURE: CPT

## 2023-07-21 PROCEDURE — 82565 ASSAY OF CREATININE: CPT

## 2023-08-07 ENCOUNTER — HOSPITAL ENCOUNTER (OUTPATIENT)
Dept: RADIOLOGY | Facility: HOSPITAL | Age: 83
Discharge: HOME | End: 2023-08-07
Attending: PHYSICIAN ASSISTANT
Payer: MEDICARE

## 2023-08-07 DIAGNOSIS — I65.09 VERTEBRAL ARTERY STENOSIS, UNSPECIFIED LATERALITY: ICD-10-CM

## 2023-08-07 PROCEDURE — 63600105 HC IODINE BASED CONTRAST: Mod: JZ | Performed by: PHYSICIAN ASSISTANT

## 2023-08-07 PROCEDURE — G1004 CDSM NDSC: HCPCS

## 2023-08-07 RX ADMIN — IOHEXOL 100 ML: 350 INJECTION, SOLUTION INTRAVENOUS at 16:00

## 2023-08-18 ENCOUNTER — OFFICE VISIT (OUTPATIENT)
Dept: NEUROSURGERY | Facility: CLINIC | Age: 83
End: 2023-08-18
Payer: MEDICARE

## 2023-08-18 VITALS
HEIGHT: 65 IN | RESPIRATION RATE: 16 BRPM | OXYGEN SATURATION: 98 % | BODY MASS INDEX: 18.49 KG/M2 | SYSTOLIC BLOOD PRESSURE: 173 MMHG | DIASTOLIC BLOOD PRESSURE: 83 MMHG | WEIGHT: 111 LBS | TEMPERATURE: 96.8 F | HEART RATE: 50 BPM

## 2023-08-18 DIAGNOSIS — E78.5 DYSLIPIDEMIA: ICD-10-CM

## 2023-08-18 DIAGNOSIS — R55 VASOVAGAL SYNCOPE: Primary | ICD-10-CM

## 2023-08-18 DIAGNOSIS — I48.0 PAROXYSMAL ATRIAL FIBRILLATION (CMS/HCC): ICD-10-CM

## 2023-08-18 PROCEDURE — 99214 OFFICE O/P EST MOD 30 MIN: CPT | Performed by: PSYCHIATRY & NEUROLOGY

## 2023-08-18 ASSESSMENT — ENCOUNTER SYMPTOMS
HEMATOLOGIC/LYMPHATIC NEGATIVE: 1
PSYCHIATRIC NEGATIVE: 1
CARDIOVASCULAR NEGATIVE: 1
MUSCULOSKELETAL NEGATIVE: 1
GASTROINTESTINAL NEGATIVE: 1
ALLERGIC/IMMUNOLOGIC NEGATIVE: 1
EYES NEGATIVE: 1
ENDOCRINE NEGATIVE: 1
NEUROLOGICAL NEGATIVE: 1
RESPIRATORY NEGATIVE: 1

## 2023-08-18 NOTE — PROGRESS NOTES
CC:  Vertebral artery narrowing   Chief Complaint   Patient presents with   • Follow-up       HPI:  Orly Cobian is a 83 y.o.  female who presents for a new patient visit.  She has a PMHx of atrial fibrillation on eliquis, breast ca, hypothyroidism, alcoholism, and osteoporosis presented to  ED on 2022 after suffering a fall. She reported that it was mechanical in nature and did not experience any preceding symptoms such as lightheaded or dizziness. She had COVID at that time and lost her appetite and generally felt weak. This resulted in a unilateral C2 lateral mass fracture extending into the transverse foramen with injury of the vertebral artery. She was started on ASA 81mg daily for 1 year. She healed well from her C2 fracture and continues sleeping a in soft collar for comfort.     She presents to the clinic today with her  for further evaluation and recommendations. She had a repeat CTA which shows stable narrowing of the left vertebral artery at the level of the left C2 fracture. She since has discontinued the 81mg ASA and continues on her Eliquis 5mg BID for Afib.     PMH:   has a past medical history of Alcoholism (CMS/HCC), Arrhythmia, Breast cancer (CMS/HCC), Chronic atrial fibrillation (CMS/HCC), Hypothyroidism, and Osteoporosis.      PSH:  has a past surgical history that includes Cholecystectomy; Cosmetic surgery; Eye surgery; Skin biopsy; Ablation of dysrhythmic focus; Breast biopsy; and Breast lumpectomy (Left, 2021).      FH:  family history includes Anemia in her biological sister; Clotting disorder in her biological sister; Fainting in her biological sister; Heart attack in her biological father; Heart disease in her biological father and biological mother; Heart failure in her biological father; Hypertension in her biological sister.      SH:    Social History     Tobacco Use   • Smoking status: Former     Types: Cigarettes     Quit date:      Years since quittin.6    • Smokeless tobacco: Never   Vaping Use   • Vaping Use: Never used   Substance Use Topics   • Alcohol use: No   • Drug use: No     ALL:    Allergies   Allergen Reactions   • Erythromycin      Trouble swallowing---called her doctor and stopped taking the medicine   • Erythromycin Base      Trouble swallowing---called her doctor and stopped taking the medicine     Medications:    Current Outpatient Medications   Medication Sig Dispense Refill   • anastrozole (ARIMIDEX) 1 mg tablet Take  1 mg daily with lunch     • apixaban (ELIQUIS) 5 mg tablet Take 1 tablet (5 mg total) by mouth 2 (two) times a day. 180 tablet 3   • atorvastatin (LIPITOR) 10 mg tablet Take 1 tablet (10 mg total) by mouth every evening. 90 tablet 3   • cholecalciferol, vitamin D3, 1,000 unit (25 mcg) tablet Take 1,000 Units by mouth daily with lunch.     • dorzolamide (TRUSOPT) 2 % ophthalmic solution Administer 1 drop into the right eye every morning.     • estradioL (ESTRACE) 0.01 % (0.1 mg/gram) vaginal cream Insert 0.5 g into the vagina 2 (two) times a week (Sun, Wed).     • flecainide (TAMBOCOR) 100 mg tablet Take 1 tablet (100 mg total) by mouth every 12 (twelve) hours. 180 tablet 3   • latanoprost (XALATAN) 0.005 % ophthalmic solution Administer 1 drop into both eyes nightly.     • levothyroxine (SYNTHROID) 50 mcg tablet Take 50 mcg by mouth daily.     • LORazepam (ATIVAN) 0.5 mg tablet take 1 tablet by mouth once daily if needed     • lutein-zeaxanthin 25-5 mg capsule Take 1 capsule by mouth daily. ocuvit     • metoprolol succinate XL (TOPROL-XL) 25 mg 24 hr tablet Take 1 tablet (25 mg total) by mouth nightly. 90 tablet 3     No current facility-administered medications for this visit.     Review of Systems   HENT: Negative.    Eyes: Negative.    Respiratory: Negative.    Cardiovascular: Negative.    Gastrointestinal: Negative.    Endocrine: Negative.    Genitourinary: Negative.    Musculoskeletal: Negative.    Skin: Negative.   "  Allergic/Immunologic: Negative.    Neurological: Negative.    Hematological: Negative.    Psychiatric/Behavioral: Negative.    All other systems reviewed and are negative.    EXAM:  Vitals:    08/18/23 1125   BP: (!) 173/83   Pulse: (!) 50   Resp: 16   Temp: (!) 36 °C (96.8 °F)   TempSrc: Temporal   SpO2: 98%   Weight: 50.3 kg (111 lb)   Height: 1.651 m (5' 5\")     Well appearing female in NAD.    Neurologic Exam     Mental Status   Oriented to person, place, and time.   Attention: normal.   Speech: speech is normal   Level of consciousness: alert  Knowledge: good.     Cranial Nerves   Cranial nerves II through XII intact.     Motor Exam   Muscle bulk: normal  Right arm pronator drift: absent  Left arm pronator drift: absent    Strength   Strength 5/5 throughout.     Sensory Exam   Light touch normal.     Gait, Coordination, and Reflexes     Gait  Gait: normal    2+DP pulses bilaterally, no ankle edema  Eyes: PERRL, no conjunctival pallor     DATA REVIEW:  CT ANGIOGRAPHY NECK WITH AND WITHOUT IV CONTRAST 8/7/2023  Findings:  Thoracic aorta is normal in size and caliber with scattered calcified  atheromatous plaque.  There is a three-vessel arch.  Right common carotid artery  is normal.  Right external carotid artery is patent.  There is a small amount of  atherosclerotic calcification noted at the origin of the right external carotid  artery.  Trace calcified atheromatous plaque at the right carotid bulb without  hemodynamically significant stenosis.  Right cervical internal carotid artery is  patent with extension intracranially.  Left common carotid artery is patent.  There is no calcified atheromatous plaque  in the left carotid bulb.  Left external and internal carotid arteries are  patent with extension intracranially.  Right vertebral artery originates from the right subclavian artery and is  hypoplastic throughout its course.  Right V1 and V2 segments of the vertebral  artery are patent.  Redemonstrated " abrupt termination of contrast opacified  blood flow in the hypoplastic right vertebral artery at the level of the C2-C3  disc space.  Stable contrast opacified blood flow in the distal V3 and V4  segments of the vertebral artery as it extends intracranially to form the  basilar artery.  Dominant left vertebral artery originates from the left subclavian artery is  patent throughout its course in the neck with redemonstration of mild focal  narrowing at the level of the C2 lateral mass fracture with involvement of the  transverse foramen.  These findings are similar to the prior study.  Distal to  this region, the V3 and V4 segment vertebral artery are patent with extension  intracranially.  No new high-grade stenosis, aneurysmal dilatation, or acute dissection in the  major arterial vessels of the neck.  Visualized lung parenchyma is clear.  Stable right upper lobe pulmonary nodule  identified measuring 5 mm without interval change, image 80.  Stable multiple  pulmonary nodules in the left upper lobe with the largest nodule measuring up to  2 mm, image 76.  These are similar in appearance to prior study dating back to  8/25/2022.  Biapical pleural-parenchymal scarring.  There is mild exaggeration  of the normal cervical lordosis.  Multilevel cervical degenerative changes  including posterior disc osteophyte complexes which combine with uncovertebral  and facet hypertrophy to cause varying degrees of central canal and foraminal  narrowing.  Grade 1 retrolisthesis of C3 on C4, C4 on C5, and C5 on C6.  Grade 1  anterolisthesis of C6 on C7.  CAROTID IMAGING CRITERIA:  Measurement of carotid stenosis is based on  parameters that correlate the residual internal carotid lumen diameter with  NASCET-based stenosis levels.  Impression: IMPRESSION:  1.  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  2.   Redemonstrated lack of contrast opacified blood flow in the hypoplastic  distal V2 segment of the right vertebral artery with reconstitution of blood  flow in the distal V3 and V4 segments.  3.  Trace calcified atheromatous plaque in the right carotid bulb/proximal  internal carotid artery without hemodynamically significant stenosis.  4.  Please see the body of the report for additional stable findings.    I personally reviewed and interpreted the images as well.   I personally reviewed the patient's Epic record and referring physician's notes.     A/P:  In summary, Orly Cobian is a 83 y.o. female who initially had a trauma at C2 level and there was a narrowing and dissection of the left vertebral artery.  Patient was seen by my partner in hospital.  Patient was asked to see Vick Mercedes my partner again but patient refused to go there since it is too far the patient was seen here in clinic today purely as a follow-up.    Patient got a CTA and I have reviewed the images the left vertebral artery narrowing looks stable and it is minimal there is no flow restriction overall the dissection seems to heal.  Patient denies any symptoms      At this point I do not think patient need any follow-up imagings.  If at any point she started having any vertebrobasilar insufficiency symptoms we can consider repeating her images.  She will continue Eliquis for paroxysmal A-fib no need for any aspirin.  We will follow as needed  Thank you so much    Balaji Low MD

## 2023-09-12 DIAGNOSIS — I48.0 PAROXYSMAL ATRIAL FIBRILLATION (CMS/HCC): ICD-10-CM

## 2023-09-12 RX ORDER — APIXABAN 5 MG/1
5 TABLET, FILM COATED ORAL 2 TIMES DAILY
Qty: 180 TABLET | Refills: 3 | OUTPATIENT
Start: 2023-09-12

## 2023-09-12 NOTE — TELEPHONE ENCOUNTER
Medicine Refill Request    Last Office Visit: Visit date not found   Last Consult Visit: Visit date not found  Last Telemedicine Visit: Visit date not found    Next Appointment: Visit date not found     apixaban (ELIQUIS) 5 mg tablet           Current Outpatient Medications:     anastrozole (ARIMIDEX) 1 mg tablet, Take  1 mg daily with lunch, Disp: , Rfl:     apixaban (ELIQUIS) 5 mg tablet, Take 1 tablet (5 mg total) by mouth 2 (two) times a day., Disp: 180 tablet, Rfl: 3    atorvastatin (LIPITOR) 10 mg tablet, Take 1 tablet (10 mg total) by mouth every evening., Disp: 90 tablet, Rfl: 3    cholecalciferol, vitamin D3, 1,000 unit (25 mcg) tablet, Take 1,000 Units by mouth daily with lunch., Disp: , Rfl:     dorzolamide (TRUSOPT) 2 % ophthalmic solution, Administer 1 drop into the right eye every morning., Disp: , Rfl:     estradioL (ESTRACE) 0.01 % (0.1 mg/gram) vaginal cream, Insert 0.5 g into the vagina 2 (two) times a week (Sun, Wed)., Disp: , Rfl:     flecainide (TAMBOCOR) 100 mg tablet, Take 1 tablet (100 mg total) by mouth every 12 (twelve) hours., Disp: 180 tablet, Rfl: 3    latanoprost (XALATAN) 0.005 % ophthalmic solution, Administer 1 drop into both eyes nightly., Disp: , Rfl:     levothyroxine (SYNTHROID) 50 mcg tablet, Take 50 mcg by mouth daily., Disp: , Rfl:     LORazepam (ATIVAN) 0.5 mg tablet, take 1 tablet by mouth once daily if needed, Disp: , Rfl:     lutein-zeaxanthin 25-5 mg capsule, Take 1 capsule by mouth daily. ocuvit, Disp: , Rfl:     metoprolol succinate XL (TOPROL-XL) 25 mg 24 hr tablet, Take 1 tablet (25 mg total) by mouth nightly., Disp: 90 tablet, Rfl: 3      BP Readings from Last 3 Encounters:   08/18/23 (!) 173/83   07/18/23 (!) 150/60   07/11/23 120/70       Recent Lab results:  Lab Results   Component Value Date    CHOL 147 07/29/2022   ,   Lab Results   Component Value Date    HDL 59 07/29/2022   ,   Lab Results   Component Value Date    LDLCALC 78 07/29/2022   ,   Lab  Results   Component Value Date    TRIG 52 07/29/2022        Lab Results   Component Value Date    GLUCOSE 103 (H) 06/17/2022   , No results found for: HGBA1C      Lab Results   Component Value Date    CREATININE 0.6 07/21/2023       Lab Results   Component Value Date    TSH 4.54 07/26/2021         No results found for: HGBA1C

## 2023-10-13 ENCOUNTER — HOSPITAL ENCOUNTER (EMERGENCY)
Facility: HOSPITAL | Age: 83
Discharge: HOME | End: 2023-10-13
Attending: EMERGENCY MEDICINE
Payer: MEDICARE

## 2023-10-13 ENCOUNTER — APPOINTMENT (EMERGENCY)
Dept: RADIOLOGY | Facility: HOSPITAL | Age: 83
End: 2023-10-13
Payer: MEDICARE

## 2023-10-13 VITALS
SYSTOLIC BLOOD PRESSURE: 189 MMHG | TEMPERATURE: 98 F | HEIGHT: 65 IN | WEIGHT: 112 LBS | HEART RATE: 57 BPM | BODY MASS INDEX: 18.66 KG/M2 | DIASTOLIC BLOOD PRESSURE: 84 MMHG | OXYGEN SATURATION: 99 % | RESPIRATION RATE: 16 BRPM

## 2023-10-13 DIAGNOSIS — S46.911A STRAIN OF RIGHT SHOULDER, INITIAL ENCOUNTER: Primary | ICD-10-CM

## 2023-10-13 PROCEDURE — 73030 X-RAY EXAM OF SHOULDER: CPT | Mod: RT

## 2023-10-13 PROCEDURE — 99283 EMERGENCY DEPT VISIT LOW MDM: CPT | Mod: 25

## 2023-10-13 RX ORDER — HYDROCODONE BITARTRATE AND ACETAMINOPHEN 5; 325 MG/1; MG/1
1 TABLET ORAL EVERY 6 HOURS PRN
Qty: 12 TABLET | Refills: 0 | Status: SHIPPED | OUTPATIENT
Start: 2023-10-13 | End: 2023-10-16

## 2023-10-13 ASSESSMENT — ENCOUNTER SYMPTOMS
NUMBNESS: 0
JOINT SWELLING: 0
WOUND: 0

## 2023-10-13 NOTE — ED PROVIDER NOTES
08-Jun-2017 17:39 HPI    Chief Complaint   Patient presents with    Shoulder Pain        HPI   Patient is 83-year-old female with past medical history listed below presenting for evaluation of right shoulder pain that started yesterday morning after winding up a hose in her backyard.  Soon after she developed pain in her anterior right shoulder.  She notes that she was winding it in a very awkward position.  She took Tylenol as well as half of the tramadol without relief of symptoms.  She has relief with holding her right arm upwards.  She denies any previous surgeries to the shoulder however does note a previous injury approximately 60 years ago.  She denies any distal numbness or tingling.  She denies any other injury.      Past Medical History:   Diagnosis Date    Alcoholism (CMS/HCC)     Arrhythmia     Breast cancer (CMS/HCC)     Chronic atrial fibrillation (CMS/HCC)     Hypothyroidism     Osteoporosis          Past Surgical History:   Procedure Laterality Date    ABLATION OF DYSRHYTHMIC FOCUS      BREAST BIOPSY      2 breast biospy benign    BREAST LUMPECTOMY Left 2021    CHOLECYSTECTOMY      COSMETIC SURGERY      EYE SURGERY      SKIN BIOPSY         Family History   Problem Relation Age of Onset    Heart disease Biological Mother     Heart disease Biological Father     Heart attack Biological Father     Heart failure Biological Father     Anemia Biological Sister     Clotting disorder Biological Sister     Fainting Biological Sister     Hypertension Biological Sister        Social History     Tobacco Use    Smoking status: Former     Types: Cigarettes     Quit date:      Years since quittin.8    Smokeless tobacco: Never   Vaping Use    Vaping Use: Never used   Substance Use Topics    Alcohol use: No    Drug use: No       Systems Reviewed from Nursing Triage:               Review of Systems   Musculoskeletal: Negative for joint swelling.        Right shoulder pain   Skin: Negative for  "wound.   Neurological: Negative for numbness.            ED Triage Vitals [10/13/23 0946]   Temp Heart Rate Resp BP SpO2   36.7 °C (98 °F) (!) 57 16 (!) 189/84 99 %      Temp Source Heart Rate Source Patient Position BP Location FiO2 (%) (Set)   Temporal -- -- -- --       Vitals:    10/13/23 0946   BP: (!) 189/84   Pulse: (!) 57   Resp: 16   Temp: 36.7 °C (98 °F)   TempSrc: Temporal   SpO2: 99%   Weight: 50.8 kg (112 lb)   Height: 1.651 m (5' 5\")                         Physical Exam  Vitals and nursing note reviewed.   HENT:      Head: Normocephalic and atraumatic.   Musculoskeletal:      Right shoulder: Tenderness present. No swelling, deformity or bony tenderness. Normal range of motion. Normal strength. Normal pulse.   Skin:     Capillary Refill: Capillary refill takes less than 2 seconds.   Neurological:      Mental Status: She is alert and oriented to person, place, and time. Mental status is at baseline.              X-RAY SHOULDER RIGHT 2+ VIEWS   Final Result   IMPRESSION:   No fracture or dislocation.      Punctate soft tissue calcifications projecting adjacent to the greater   tuberosity which may reflect calcific tendinitis or which could be related to   underlying CPPD arthropathy.      COMMENT:      Comparison: None.      Technique: Frontal internal and external rotatory and Y scapular views of the   right shoulder were obtained.      FINDINGS:      There is no fracture or dislocation. The glenohumeral and acromioclavicular   joints are normal. There are punctate soft tissue calcifications projecting   adjacent to the greater tuberosity. The imaged lung is clear.                      Labs Reviewed - No data to display    X-RAY SHOULDER RIGHT 2+ VIEWS   Final Result   IMPRESSION:   No fracture or dislocation.      Punctate soft tissue calcifications projecting adjacent to the greater   tuberosity which may reflect calcific tendinitis or which could be related to   underlying CPPD arthropathy.    "   COMMENT:      Comparison: None.      Technique: Frontal internal and external rotatory and Y scapular views of the   right shoulder were obtained.      FINDINGS:      There is no fracture or dislocation. The glenohumeral and acromioclavicular   joints are normal. There are punctate soft tissue calcifications projecting   adjacent to the greater tuberosity. The imaged lung is clear.                        Procedures    Final diagnoses:   [S46.911A] Strain of right shoulder, initial encounter       ED Course & MDM     ED Course as of 10/13/23 1950   Fri Oct 13, 2023   1052 X-RAY SHOULDER RIGHT 2+ VIEWS  IMPRESSION:  No fracture or dislocation.     Punctate soft tissue calcifications projecting adjacent to the greater  tuberosity which may reflect calcific tendinitis or which could be related to  underlying CPPD arthropathy. [SW]      ED Course User Index  [SW] Araceli Rowe CRNP           Medical Decision Making  Strain of right shoulder, initial encounter: acute illness or injury  Amount and/or Complexity of Data Reviewed  External Data Reviewed: labs, radiology, ECG and notes.  Radiology: ordered. Decision-making details documented in ED Course.      Risk  Prescription drug management.          7:50 PM      Impression/Medical Decision Making: Right shoulder pain after possible overuse    Plan: X-ray, discharge home with orthopedic follow-up and pain control    Vital Signs Review: Vital signs have been reviewed. The oxygen saturation is  SpO2: 99 % which is normal.      TYLER Craft  10/13/2023      We are in a period of time with increased volumes and decreased capacity.     This document was created using dragon dictation software.  There might be some typographical errors due to this technology.     Araceli Rowe CRNP  10/13/23 1950

## 2023-10-13 NOTE — DISCHARGE INSTRUCTIONS
Call for an appointment with orthopedics if your pain persists longer than 1 week despite exercises..

## 2023-10-14 NOTE — ED ATTESTATION NOTE
Procedures  Physical Exam  Review of Systems    10/14/46513:46 PM  I have personally seen and examined the patient.  I reviewed and agree with the PA/NP/Resident's assessment and plan of care.    Vital Signs Review: Vital signs have been reviewed. The oxygen saturation is  SpO2: 99 % which is  Normal    My examination, assessment, and plan of care of Orly Cobian is as follows:      Patient Presents with right shoulder pain     Exam: well appearing, alert, lungs ctab with no distress and limited rain of motion but good distal pulse, ttp along anterior section      Impression/Plan: 83-year-old comes in today with concerns for right arm pain.  Patient is otherwise medically stable and well-appearing good distal pulses x-rays otherwise negative.  Patient has tenderness palpation in that area concerns for possible rotator cuff injury or tenosynovitis will recommend follow-up with Ortho      Please refer to work-up tab for further management and follow-up on laboratory and imaging evaluations    ED Course as of 10/14/23 1946   Fri Oct 13, 2023   1052 X-RAY SHOULDER RIGHT 2+ VIEWS  IMPRESSION:  No fracture or dislocation.     Punctate soft tissue calcifications projecting adjacent to the greater  tuberosity which may reflect calcific tendinitis or which could be related to  underlying CPPD arthropathy. [SW]      ED Course User Index  [SW] Araceli Rowe CRNP         History Provided by - Patient  While here I have Reviewed and interpreted xrays      I was physically present for the key/critical portions of the procedures documented by ENRIQUETA    This document was created using dragon dictation software.  There might be some typographical errors due to this technology.       Shane Feliciano MD  10/14/23 1947

## 2023-11-26 ASSESSMENT — CHADS2 SCORE
HYPERTENSION: YES (+1 PT.)
VASCULAR DISEASE: NO
SEX: FEMALE (+1PT.)
CHADS2 SCORE: 4
DIABETES: NO
PRIOR STROKE OR TIA OR THROMBOEMBOLISM: NO
AGE: 75+ (+2 PT.)
CHF: NO

## 2023-12-28 NOTE — PROGRESS NOTES
" Madhuri Gregorio MD, [facility]  Cardiac Electrophysiology    [facility]    [facility]  [facility] [address]  [address] [place] [address]    TEL  [phone]  [facility]         Electrophysiology Progress Note           January 02, 2024  Dear Dr. Muniz:    Orly Cobian is a 83 y.o. female who comes to the office today for follow up for a history of paroxysmal atrial fibrillation with chronic right bundle branch block and history of dyslipidemia.  She is on long-term antiarrhythmic drug therapy with flecainide and anticoagulation with Eliquis.    In December 2019, she woke up in the middle of the night with an episode of atrial fibrillation.  She stated that her heart was beating very fast. She took a flecainide and the episode subsided several hours later.  When I last saw her in the office on 2/13/2019, she had been feeling frequent \"extra beats\". Therefore, the patient agreed to start taking flecainide 100 mg p.o. every 12 hours and to continue to take an extra dose of flecainide as needed.    At her last office visit on 07/18/2023, patient reported that she has not had any recurrence symptoms of atrial fibrillation and no recent episodes of arrhthymias. She saw Dr. Ariza for the tremors. No cardiac complaints.     She had an echocardiogram 06/06/2023 which revealed normal LV function with EF of 60-65%.    Today, she reports that she is stable from a cardiovascular standpoint. She has not had recurrence symptoms of atrial fibrillation. She denies any recent episodes of arrhythmias. She reported that she has not taken any extra flecainide since her last office visit. Her blood pressure has been stable at home as well as during the office visit. She has gained some weight since the previous visit. No dizziness, near syncope or syncope. No chest pain. No dyspnea on exertion, PND, orthopnea or pedal edema. No bleeding complications on " anticoagulation medication with Eliquis.    PROBLEM LIST:  1. Paroxysmal atrial fibrillation diagnosed on 10/11/2010, with a recent episode in May of  2017.  2. History of Jaycob-Parkinson-White syndrome, status post catheter ablation procedure  performed in 1997, without recurrence of arrhythmia.  3. Chronic right bundle branch block.  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 History of thyroid disease.  6. Dyslipidemia.  7. Stress echo obtained on June 6, 2017 revealing no evidence of myocardial ischemia with  normal left ventricular systolic function with a left ventricular ejection fraction of 65-  70%.  8. Vasovagal syncope    Patient Active Problem List   Diagnosis   • Paroxysmal atrial fibrillation (CMS/HCC)   • RBBB   • Non-rheumatic tricuspid valve insufficiency   • WPW (Jaycob-Parkinson-White syndrome)   • Ebstein's anomaly of tricuspid valve   • Nonrheumatic pulmonary valve insufficiency   • Vasovagal syncope   • Dyslipidemia   • Long term current use of antiarrhythmic drug   • Mass of left breast   • Malignant neoplasm of central portion of left female breast (CMS/[facility])   • Current use of long term anticoagulation   • Pleural effusion on right   • Elevated blood pressure reading without diagnosis of hypertension   • Traumatic closed fracture of C2 vertebra with minimal displacement, initial encounter (CMS/[facility])     Past Medical History:   Diagnosis Date   • Alcoholism (CMS/[facility])    • Arrhythmia    • Breast cancer (CMS/[facility])    • Hypothyroidism    • Osteoporosis      Past Surgical History:   Procedure Laterality Date   • ABLATION OF DYSRHYTHMIC FOCUS     • BREAST BIOPSY      2 breast biospy benign   • BREAST LUMPECTOMY Left 08/03/2021   • CHOLECYSTECTOMY     • COSMETIC SURGERY     • EYE SURGERY     • SKIN BIOPSY       Current Outpatient Medications   Medication Sig Dispense Refill   • anastrozole (ARIMIDEX) 1 mg tablet Take  1 mg daily with lunch     • apixaban (ELIQUIS) 5 mg tablet Take 1 tablet (5 mg total)  "by mouth 2 (two) times a day. 180 tablet 1   • atorvastatin (LIPITOR) 10 mg tablet Take 1 tablet (10 mg total) by mouth every evening. 90 tablet 3   • cholecalciferol, vitamin D3, 1,000 unit (25 mcg) tablet Take 1,000 Units by mouth daily with lunch.     • dorzolamide (TRUSOPT) 2 % ophthalmic solution Administer 1 drop into the right eye every morning.     • estradioL (ESTRACE) 0.01 % (0.1 mg/gram) vaginal cream Insert 0.5 g into the vagina 2 (two) times a week (Sun, Wed).     • flecainide (TAMBOCOR) 100 mg tablet Take 1 tablet (100 mg total) by mouth every 12 (twelve) hours. 180 tablet 3   • latanoprost (XALATAN) 0.005 % ophthalmic solution Administer 1 drop into both eyes nightly.     • levothyroxine (SYNTHROID) 50 mcg tablet Take 50 mcg by mouth daily.     • LORazepam (ATIVAN) 0.5 mg tablet take 1 tablet by mouth once daily if needed     • lutein-zeaxanthin 25-5 mg capsule Take 1 capsule by mouth daily. ocuvit     • metoprolol succinate XL (TOPROL-XL) 25 mg 24 hr tablet Take 1 tablet (25 mg total) by mouth nightly. 90 tablet 3   • magnesium glycinate, bulk, 10 % powder Take by mouth See admin instr.       No current facility-administered medications for this visit.     ALLERGIES:Erythromycin and Erythromycin base    REVIEW OF SYSTEMS: As in HPI.  All other other systems were reviewed and are negative.     Vitals:    01/02/24 1318   BP: 138/76   BP Location: Right upper arm   Patient Position: Sitting   SpO2: 98%   Weight: 52.6 kg (116 lb)   Height: 1.651 m (5' 5\")       PHYSICAL EXAMINATION: Pleasant and alert, in no acute distress.  Neck: No jugular venous distention or carotid bruits. Lungs were clear to auscultation without rales or wheezes. Cardiovascular exam revealed normal S1, S2, regular rhythm with a 2/6 systolic murmur best heard at the lower left sternal border. Abdomen was soft and nontender.Extremities revealed no edema or cyanosis. She was alert and oriented x 3 with no focal deficits on gross " neurologic exam.    Lab Results   Component Value Date    HGB 13.3 06/17/2022     06/17/2022    CHOL 147 07/29/2022    TRIG 52 07/29/2022    HDL 59 07/29/2022    ALT 22 06/17/2022    AST 28 06/17/2022     (LL) 06/17/2022    K 3.6 06/17/2022    CREATININE 0.6 07/21/2023    TSH 4.54 07/26/2021    LDLCALC 78 07/29/2022    MG 2.1 03/10/2021     Labs from 12/05/2023 (Tyler):   Glucose: 94  Creatinine: 0.72  Na: 134  K: 4.4  WBC: 5.6  RBC: 3.97  HGB: 12.9  HCT: 39    DATA REVIEW:    ECG 01/02/2024: I personally reviewed her 12-lead EKG performed today which reveals sinus rhythm.  Right bundle branch block.    ECG 07/18/2023: I personally reviewed her 12-lead EKG performed today which reveals Sinus rhythm. RBBB.      Echocardiogram:   Cardiac Imaging    TRANSTHORACIC ECHO (TTE) COMPLETE 06/06/2023    Interpretation Summary  1.  Normal left ventricular function with an estimated ejection fraction of 60 to 65%.  2.  Mild biatrial enlargement.  3.  Mildly thickened aortic valve without any significant abnormality by Doppler.  4.  Displaced tricuspid valve with evidence of probable Ebstein's anomaly.  5.  Mild to moderate tricuspid insufficiency with mildly elevated pulmonary pressures at 37 mmHg.  6.  No significant change from the previous study of March 2022    Cardiac Imaging    TRANSTHORACIC ECHO (TTE) COMPLETE 03/21/2022    Interpretation Summary  1.  Normal left ventricular function with an estimated ejection fraction of 60%.  2.  No segmental wall motion abnormalities.  3.  Thickened aortic root.  4.  Thickened aortic valve leaflets without any restriction of motion.  5.  Mild aortic insufficiency.  6.  Thickened mitral valve leaflets without any restriction of motion.  7.  Mild mitral regurgitation.  8.  Mild tricuspid insufficiency with normal pulmonary pressures.  9.  Mildly dilated right atrium and right ventricle.  10.  Unchanged from April 2021    Cardiac Imaging    TRANSTHORACIC ECHO (TTE) COMPLETE  04/14/2021    Interpretation Summary  · Normal-sized LV. Normal LV wall thickness. Preserved LV systolic function. Estimated EF 60%. No regional wall motion abnormalities.  · Tricuspid aortic valve. Sclerotic aortic valve leaflets. Trace aortic valve regurgitation. Aortic root sclerotic.  · Trace mitral valve regurgitation. Moderately dilated LA.  · Mildly dilated RV. Normal RV systolic function. Mild to moderate tricuspid valve regurgitation. Estimated RVSP = 30 mmHg. Moderately dilated RA.  · Normal-sized IVC. IVC collapses >50% during inspiration.  · Compared with June 2017, the RV is now mildly dilated.    Assessment/Plan      WPW (Jaycob-Parkinson-White syndrome)   She has not had recurrence of supraventricular tachycardia.  She is status post catheter ablation procedure in 1997.    Paroxysmal atrial fibrillation (CMS/HCC) (HCC)  .  No symptoms of recurrence of atrial fibrillation on flecainide 100 mg p.o. every 12 hours. She is tolerating the medication without side effects.  She has not had a episodes requiring taking an extra dose of flecainide.  She may take an extra 100 mg x 1 during an episode of atrial fibrillation.   She will continue beta blocker therapy with metoprolol succinate 25 mg once a day.     She will continue long-term anticoagulation on Eliquis 5 mg p.o. every 12 hours. No bleeding complications.The [id number] score is 4.    Ebstein's anomaly of tricuspid valve  Her most recent echocardiogram obtained this year in June 2023 reveals displaced tricuspid valve with evidence of Nimo anomaly with mild to moderate tricuspid regurgitation, mildly elevated pulmonary artery pressure.    She is asymptomatic.  I will continue to monitor.    Long term current use of antiarrhythmic drug  She continues to tolerate her medication without side effects.  Her twelve-lead EKG on flecainide is stable with chronic right bundle branch block without progression of conduction disease.     RBBB  Chronic  RBBB.  Her QRS duration had previously  increased after adding flecainide which increased her QTc interval but remains stable.   I will continue to monitor.    Return in about 6 months (around 7/2/2024).      Thank you for allowing me to participate in the care of your patient.  Please do not hesitate to contact me if you have any questions regarding my recommendations and management.    Sincerely;    Madhuri Cardoza M.D., FAC , FACP    This document was generated utilizing voice recognition technology. A reasonable attempt at proofreading has been made to minimize errors but please excuse any typographical errors which may be present. Please call with any questions.    By signing my name below, [employer], attest that this documentation has been prepared under the direction and in the presence of Madhuri Gregorio MD Electronically signed: Pippa Meyers. 1/2/2024 1:20 PM     IMadhuri MD, personally performed the services described in this documentation. All medical record entries made by the scribe were at my direction and in my presence. I have reviewed the chart and agree that the record reflects my personal performance and is accurate and complete. Madhuri Gregorio MD. 1/2/2024. 1:20 PM.

## 2024-01-02 ENCOUNTER — OFFICE VISIT (OUTPATIENT)
Dept: CARDIOLOGY | Facility: CLINIC | Age: 84
End: 2024-01-02
Payer: MEDICARE

## 2024-01-02 VITALS
DIASTOLIC BLOOD PRESSURE: 76 MMHG | SYSTOLIC BLOOD PRESSURE: 138 MMHG | OXYGEN SATURATION: 98 % | BODY MASS INDEX: 19.33 KG/M2 | WEIGHT: 116 LBS | HEIGHT: 65 IN

## 2024-01-02 DIAGNOSIS — I48.0 PAROXYSMAL ATRIAL FIBRILLATION (CMS/HCC): Primary | ICD-10-CM

## 2024-01-02 DIAGNOSIS — I45.10 RBBB: ICD-10-CM

## 2024-01-02 DIAGNOSIS — Z79.899 LONG TERM CURRENT USE OF ANTIARRHYTHMIC DRUG: ICD-10-CM

## 2024-01-02 DIAGNOSIS — I45.6 WPW (WOLFF-PARKINSON-WHITE SYNDROME): ICD-10-CM

## 2024-01-02 DIAGNOSIS — Q22.5 EBSTEIN'S ANOMALY OF TRICUSPID VALVE: ICD-10-CM

## 2024-01-02 PROCEDURE — 93000 ELECTROCARDIOGRAM COMPLETE: CPT | Performed by: INTERNAL MEDICINE

## 2024-01-02 PROCEDURE — 99214 OFFICE O/P EST MOD 30 MIN: CPT | Performed by: INTERNAL MEDICINE

## 2024-01-02 NOTE — ASSESSMENT & PLAN NOTE
.  No symptoms of recurrence of atrial fibrillation on flecainide 100 mg p.o. every 12 hours. She is tolerating the medication without side effects.  She has not had a episodes requiring taking an extra dose of flecainide.  She may take an extra 100 mg x 1 during an episode of atrial fibrillation.   She will continue beta blocker therapy with metoprolol succinate 25 mg once a day.     She will continue long-term anticoagulation on Eliquis 5 mg p.o. every 12 hours. No bleeding complications.The [id number] score is 4.

## 2024-01-02 NOTE — ASSESSMENT & PLAN NOTE
She has not had recurrence of supraventricular tachycardia.  She is status post catheter ablation procedure in 1997.

## 2024-01-02 NOTE — ASSESSMENT & PLAN NOTE
She continues to tolerate her medication without side effects.  Her twelve-lead EKG on flecainide is stable with chronic right bundle branch block without progression of conduction disease.

## 2024-01-02 NOTE — ASSESSMENT & PLAN NOTE
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    She is asymptomatic.  I will continue to monitor.

## 2024-01-02 NOTE — LETTER
"    Anton Muniz MD  [address] [facility] [address]    Patient: Orly Cobian  YOB: 1940  Date of Visit: 1/2/2024      Dear Dr. Muniz:    Thank you for referring Orly Cobian to me for evaluation. Below are my notes for this consultation.    If you have questions, please do not hesitate to call me. I look forward to following your patient along with you.         Sincerely,        Madhuri Gregorio MD        CC: Madhuri Feliciano MD  1/28/2024  5:35 PM  Signed   Madhuri Gregorio MD, [facility]  Cardiac Electrophysiology    [facility]  [facility] Level  [address] [place] [address]    TEL  [phone]  [facility]         Electrophysiology Progress Note           January 02, 2024  Dear Dr. Muniz:    Orly Cobian is a 83 y.o. female who comes to the office today for follow up for a history of paroxysmal atrial fibrillation with chronic right bundle branch block and history of dyslipidemia.  She is on long-term antiarrhythmic drug therapy with flecainide and anticoagulation with Eliquis.    In December 2019, she woke up in the middle of the night with an episode of atrial fibrillation.  She stated that her heart was beating very fast. She took a flecainide and the episode subsided several hours later.  When I last saw her in the office on 2/13/2019, she had been feeling frequent \"extra beats\". Therefore, the patient agreed to start taking flecainide 100 mg p.o. every 12 hours and to continue to take an extra dose of flecainide as needed.    At her last office visit on 07/18/2023, patient reported that she has not had any recurrence symptoms of atrial fibrillation and no recent episodes of arrhthymias. She saw Dr. Ariza for the tremors. No cardiac complaints.     She had an echocardiogram 06/06/2023 which revealed normal LV function with EF of 60-65%.    Today, she reports that she is " stable from a cardiovascular standpoint. She has not had recurrence symptoms of atrial fibrillation. She denies any recent episodes of arrhythmias. She reported that she has not taken any extra flecainide since her last office visit. Her blood pressure has been stable at home as well as during the office visit. She has gained some weight since the previous visit. No dizziness, near syncope or syncope. No chest pain. No dyspnea on exertion, PND, orthopnea or pedal edema. No bleeding complications on anticoagulation medication with Eliquis.    PROBLEM LIST:  1. Paroxysmal atrial fibrillation diagnosed on 10/11/2010, with a recent episode in May of  2017.  2. History of Jaycob-Parkinson-White syndrome, status post catheter ablation procedure  performed in 1997, without recurrence of arrhythmia.  3. Chronic right bundle branch block.  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 History of thyroid disease.  6. Dyslipidemia.  7. Stress echo obtained on June 6, 2017 revealing no evidence of myocardial ischemia with  normal left ventricular systolic function with a left ventricular ejection fraction of 65-  70%.  8. Vasovagal syncope    Patient Active Problem List   Diagnosis   • Paroxysmal atrial fibrillation (CMS/[facility])   • RBBB   • Non-rheumatic tricuspid valve insufficiency   • WPW (Jaycob-Parkinson-White syndrome)   • Ebstein's anomaly of tricuspid valve   • Nonrheumatic pulmonary valve insufficiency   • Vasovagal syncope   • Dyslipidemia   • Long term current use of antiarrhythmic drug   • Mass of left breast   • Malignant neoplasm of central portion of left female breast (CMS/[facility])   • Current use of long term anticoagulation   • Pleural effusion on right   • Elevated blood pressure reading without diagnosis of hypertension   • Traumatic closed fracture of C2 vertebra with minimal displacement, initial encounter (CMS/[facility])     Past Medical History:   Diagnosis Date   • Alcoholism (CMS/[facility])    • Arrhythmia    • Breast cancer  (CMS/HCC)    • Hypothyroidism    • Osteoporosis      Past Surgical History:   Procedure Laterality Date   • ABLATION OF DYSRHYTHMIC FOCUS     • BREAST BIOPSY      2 breast biospy benign   • BREAST LUMPECTOMY Left 08/03/2021   • CHOLECYSTECTOMY     • COSMETIC SURGERY     • EYE SURGERY     • SKIN BIOPSY       Current Outpatient Medications   Medication Sig Dispense Refill   • anastrozole (ARIMIDEX) 1 mg tablet Take  1 mg daily with lunch     • apixaban (ELIQUIS) 5 mg tablet Take 1 tablet (5 mg total) by mouth 2 (two) times a day. 180 tablet 1   • atorvastatin (LIPITOR) 10 mg tablet Take 1 tablet (10 mg total) by mouth every evening. 90 tablet 3   • cholecalciferol, vitamin D3, 1,000 unit (25 mcg) tablet Take 1,000 Units by mouth daily with lunch.     • dorzolamide (TRUSOPT) 2 % ophthalmic solution Administer 1 drop into the right eye every morning.     • estradioL (ESTRACE) 0.01 % (0.1 mg/gram) vaginal cream Insert 0.5 g into the vagina 2 (two) times a week (Sun, Wed).     • flecainide (TAMBOCOR) 100 mg tablet Take 1 tablet (100 mg total) by mouth every 12 (twelve) hours. 180 tablet 3   • latanoprost (XALATAN) 0.005 % ophthalmic solution Administer 1 drop into both eyes nightly.     • levothyroxine (SYNTHROID) 50 mcg tablet Take 50 mcg by mouth daily.     • LORazepam (ATIVAN) 0.5 mg tablet take 1 tablet by mouth once daily if needed     • lutein-zeaxanthin 25-5 mg capsule Take 1 capsule by mouth daily. ocuvit     • metoprolol succinate XL (TOPROL-XL) 25 mg 24 hr tablet Take 1 tablet (25 mg total) by mouth nightly. 90 tablet 3   • magnesium glycinate, bulk, 10 % powder Take by mouth See admin instr.       No current facility-administered medications for this visit.     ALLERGIES:Erythromycin and Erythromycin base    REVIEW OF SYSTEMS: As in HPI.  All other other systems were reviewed and are negative.     Vitals:    01/02/24 1318   BP: 138/76   BP Location: Right upper arm   Patient Position: Sitting   SpO2: 98%  "  Weight: 52.6 kg (116 lb)   Height: 1.651 m (5' 5\")       PHYSICAL EXAMINATION: Pleasant and alert, in no acute distress.  Neck: No jugular venous distention or carotid bruits. Lungs were clear to auscultation without rales or wheezes. Cardiovascular exam revealed normal S1, S2, regular rhythm with a 2/6 systolic murmur best heard at the lower left sternal border. Abdomen was soft and nontender.Extremities revealed no edema or cyanosis. She was alert and oriented x 3 with no focal deficits on gross neurologic exam.    Lab Results   Component Value Date    HGB 13.3 06/17/2022     06/17/2022    CHOL 147 07/29/2022    TRIG 52 07/29/2022    HDL 59 07/29/2022    ALT 22 06/17/2022    AST 28 06/17/2022     (LL) 06/17/2022    K 3.6 06/17/2022    CREATININE 0.6 07/21/2023    TSH 4.54 07/26/2021    LDLCALC 78 07/29/2022    MG 2.1 03/10/2021     Labs from 12/05/2023 ([place]):   Glucose: 94  Creatinine: 0.72  Na: 134  K: 4.4  WBC: 5.6  RBC: 3.97  HGB: 12.9  HCT: 39    DATA REVIEW:    ECG 01/02/2024: I personally reviewed her 12-lead EKG performed today which reveals sinus rhythm.  Right bundle branch block.    ECG 07/18/2023: I personally reviewed her 12-lead EKG performed today which reveals Sinus rhythm. RBBB.      Echocardiogram:   Cardiac Imaging    TRANSTHORACIC ECHO (TTE) COMPLETE 06/06/2023    Interpretation Summary  1.  Normal left ventricular function with an estimated ejection fraction of 60 to 65%.  2.  Mild biatrial enlargement.  3.  Mildly thickened aortic valve without any significant abnormality by Doppler.  4.  Displaced tricuspid valve with evidence of probable Ebstein's anomaly.  5.  Mild to moderate tricuspid insufficiency with mildly elevated pulmonary pressures at 37 mmHg.  6.  No significant change from the previous study of March 2022    Cardiac Imaging    TRANSTHORACIC ECHO (TTE) COMPLETE 03/21/2022    Interpretation Summary  1.  Normal left ventricular function with an estimated ejection " fraction of 60%.  2.  No segmental wall motion abnormalities.  3.  Thickened aortic root.  4.  Thickened aortic valve leaflets without any restriction of motion.  5.  Mild aortic insufficiency.  6.  Thickened mitral valve leaflets without any restriction of motion.  7.  Mild mitral regurgitation.  8.  Mild tricuspid insufficiency with normal pulmonary pressures.  9.  Mildly dilated right atrium and right ventricle.  10.  Unchanged from April 2021    Cardiac Imaging    TRANSTHORACIC ECHO (TTE) COMPLETE 04/14/2021    Interpretation Summary  · Normal-sized LV. Normal LV wall thickness. Preserved LV systolic function. Estimated EF 60%. No regional wall motion abnormalities.  · Tricuspid aortic valve. Sclerotic aortic valve leaflets. Trace aortic valve regurgitation. Aortic root sclerotic.  · Trace mitral valve regurgitation. Moderately dilated LA.  · Mildly dilated RV. Normal RV systolic function. Mild to moderate tricuspid valve regurgitation. Estimated RVSP = 30 mmHg. Moderately dilated RA.  · Normal-sized IVC. IVC collapses >50% during inspiration.  · Compared with June 2017, the RV is now mildly dilated.    Assessment/Plan      WPW (Jaycob-Parkinson-White syndrome)   She has not had recurrence of supraventricular tachycardia.  She is status post catheter ablation procedure in 1997.    Paroxysmal atrial fibrillation (CMS/HCC) (HCC)  .  No symptoms of recurrence of atrial fibrillation on flecainide 100 mg p.o. every 12 hours. She is tolerating the medication without side effects.  She has not had a episodes requiring taking an extra dose of flecainide.  She may take an extra 100 mg x 1 during an episode of atrial fibrillation.   She will continue beta blocker therapy with metoprolol succinate 25 mg once a day.     She will continue long-term anticoagulation on Eliquis 5 mg p.o. every 12 hours. No bleeding complications.The [id number] score is 4.    Ebstein's anomaly of tricuspid valve  Her most recent echocardiogram  obtained this year in June 2023 reveals displaced tricuspid valve with evidence of Nimo anomaly with mild to moderate tricuspid regurgitation, mildly elevated pulmonary artery pressure.    She is asymptomatic.  I will continue to monitor.    Long term current use of antiarrhythmic drug  She continues to tolerate her medication without side effects.  Her twelve-lead EKG on flecainide is stable with chronic right bundle branch block without progression of conduction disease.     RBBB  Chronic RBBB.  Her QRS duration had previously  increased after adding flecainide which increased her QTc interval but remains stable.   I will continue to monitor.    Return in about 6 months (around 7/2/2024).      Thank you for allowing me to participate in the care of your patient.  Please do not hesitate to contact me if you have any questions regarding my recommendations and management.    Sincerely;    Madhuri Cardoza M.D., [facility] , FACP    This document was generated utilizing voice recognition technology. A reasonable attempt at proofreading has been made to minimize errors but please excuse any typographical errors which may be present. Please call with any questions.    By signing my name below, I, Brigid Cheung, attest that this documentation has been prepared under the direction and in the presence of Madhuri Gregorio MD Electronically signed: Pippa Meyers. 1/2/2024 1:20 PM     IMadhuri MD, personally performed the services described in this documentation. All medical record entries made by the scribe were at my direction and in my presence. I have reviewed the chart and agree that the record reflects my personal performance and is accurate and complete. Madhuri Gregorio MD. 1/2/2024. 1:20 PM.

## 2024-01-16 ENCOUNTER — TELEMEDICINE (OUTPATIENT)
Dept: NEUROLOGY | Facility: CLINIC | Age: 84
End: 2024-01-16
Payer: MEDICARE

## 2024-01-16 VITALS — WEIGHT: 114 LBS | BODY MASS INDEX: 18.99 KG/M2 | HEIGHT: 65 IN

## 2024-01-16 DIAGNOSIS — R25.1 TREMOR: Primary | ICD-10-CM

## 2024-01-16 PROCEDURE — 99213 OFFICE O/P EST LOW 20 MIN: CPT | Mod: 95 | Performed by: PSYCHIATRY & NEUROLOGY

## 2024-01-16 NOTE — PROGRESS NOTES
"Verification of Patient Location:  The patient affirms they are currently located in the following state: [place]    Request for Consent:    Audio and Video Encounter   Sean, my name is Laine Ariza MD.  Before we proceed, can you please verify your identification by telling me your full name and date of birth?  Can you tell me who is in the room with you?    You and I are about to have a telemedicine check-in or visit because you have requested it.  This is a live video-conference.  I am a real person, speaking to you in real time.  There is no one else with me on the video-conference. I am not recording this conversation and I am asking you not to record it.  This telemedicine visit will be billed to your health insurance or you, if you are self-insured.  You understand you will be responsible for any copayments or coinsurances that apply to your telemedicine visit.  Communication platform used for this encounter:  [employer] Video Visit (Epic Video Client)       Before starting our telemedicine visit, I am required to get your consent for this virtual check-in or visit by telemedicine. Do you consent?      Patient Response to Request for Consent:  Yes      Patient ID: Orly Cobian                              : 1940  MRN: [id number]                                            VISIT DATE: 2024  ENCOUNTER PROVIDER: Laine Ariza  REFERRING PROVIDER: No ref. provider found    REASON FOR VISIT: Orly Cobian is an 83 year old amibdextrous woman with PMH breast cancer, afib on eliquis who follows up for tremor. She was last seen 6 months ago.  She is accompanied by her .     INTERIM HISTORY:  She has been doing very well. She feels stable, not worse.  Handwriting looks like \"chicken scratch\", better when she can focus. Handwriting is smaller. Did not send [place] cards. Occasionally left hand shakes when using it. She can button fine.    She has been taking magnesium which helps her " sleep.  She continues to be active, walking up to 3 miles a day. No troubles with walking.    PAST MEDICAL HISTORY:  has a past medical history of Alcoholism (CMS/HCC), Arrhythmia, Breast cancer (CMS/HCC), Hypothyroidism, and Osteoporosis. C2 fracture.    PAST SURGICAL HISTORY:  has a past surgical history that includes Cholecystectomy; Cosmetic surgery; Eye surgery; Skin biopsy; Ablation of dysrhythmic focus; Breast biopsy; and Breast lumpectomy (Left, 2021).    SOCIAL HISTORY:   Social History     Tobacco Use   • Smoking status: Former     Types: Cigarettes     Quit date:      Years since quittin.0   • Smokeless tobacco: Never   Vaping Use   • Vaping Use: Never used   Substance Use Topics   • Alcohol use: No   • Drug use: No     FAMILY HISTORY: family history includes Anemia in her biological sister; Clotting disorder in her biological sister; Fainting in her biological sister; Heart attack in her biological father; Heart disease in her biological father and biological mother; Heart failure in her biological father; Hypertension in her biological sister.    MEDICATIONS:   Current Outpatient Medications:   •  anastrozole (ARIMIDEX) 1 mg tablet, Take  1 mg daily with lunch, Disp: , Rfl:   •  apixaban (ELIQUIS) 5 mg tablet, Take 1 tablet (5 mg total) by mouth 2 (two) times a day., Disp: 180 tablet, Rfl: 1  •  atorvastatin (LIPITOR) 10 mg tablet, Take 1 tablet (10 mg total) by mouth every evening., Disp: 90 tablet, Rfl: 3  •  cholecalciferol, vitamin D3, 1,000 unit (25 mcg) tablet, Take 1,000 Units by mouth daily with lunch., Disp: , Rfl:   •  dorzolamide (TRUSOPT) 2 % ophthalmic solution, Administer 1 drop into the right eye every morning., Disp: , Rfl:   •  estradioL (ESTRACE) 0.01 % (0.1 mg/gram) vaginal cream, Insert 0.5 g into the vagina 2 (two) times a week (Sun, Wed)., Disp: , Rfl:   •  flecainide (TAMBOCOR) 100 mg tablet, Take 1 tablet (100 mg total) by mouth every 12 (twelve) hours., Disp: 180  "tablet, Rfl: 3  •  latanoprost (XALATAN) 0.005 % ophthalmic solution, Administer 1 drop into both eyes nightly., Disp: , Rfl:   •  levothyroxine (SYNTHROID) 50 mcg tablet, Take 50 mcg by mouth daily., Disp: , Rfl:   •  LORazepam (ATIVAN) 0.5 mg tablet, take 1 tablet by mouth once daily if needed, Disp: , Rfl:   •  lutein-zeaxanthin 25-5 mg capsule, Take 1 capsule by mouth daily. ocuvit, Disp: , Rfl:   •  magnesium glycinate, bulk, 10 % powder, Take by mouth See admin instr., Disp: , Rfl:   •  metoprolol succinate XL (TOPROL-XL) 25 mg 24 hr tablet, Take 1 tablet (25 mg total) by mouth nightly., Disp: 90 tablet, Rfl: 3    ALLERGIES: is allergic to erythromycin and erythromycin base.     REVIEW OF SYSTEMS:  All other systems reviewed and negative except as noted in the HPI.    PHYSICAL EXAM:  Visit Vitals  Ht 1.651 m (5' 5\")   Wt 51.7 kg (114 lb)   BMI 18.97 kg/m²     GENERAL APPEARANCE: Well appearing, well nourished and well developed.  Not in acute distress.  Appears stated age.  HEAD: Normocephalic, atraumatic.  EYES:  Sclerae white. Conjunctivae clear.  NECK:  Neck was supple.  EXTREMITIES: No clubbing, no cyanosis nor edema.  SKIN:  Dry, intact. No rashes noted.  PSYCHIATRIC: Calm and cooperative with appropriate insight    NEUROLOGICAL EXAM:  MENTAL STATUS: Awake and alert with fluent language. Attention, memory, and executive function were intact.  CRANIAL NERVES: Extraocular movements are intact. Normal pursuits and saccades. No nystagmus. Facial strength and sensation intact. Hearing grossly intact. Uvula and tongue are midline. No dysarthria.  MOTOR:  Normal muscle bulk.  Good facial expression  No resting tremor. No postural and action tremor.  Mild bradykinesia on finger taps, hand opening-closing, hand supination-pronation on the left  Mild bradykinesia on toe taps on the left, normal heel taps  Handwriting: mild progressive micrographia  SENSATION: Intact to light touch  COORDINATION: No ataxia on " finger-to-nose  GAIT: Able to stand without pushing up from the chair. Gait was normal based with good strides and heel clearance. Arm swing was normal bilaterally. Turn was normal.     LABORATORY STUDIES:  Reviewed in chart    IMAGING STUDIES:   I personally reviewed the [facility] 6/2022: no acute intracranial change.    IMPRESSION:  Orly Cobian is an 83 year old amibdextrous woman with PMH breast cancer, afib on eliquis who follows up for tremor. On exam, again there appears to be mild left sided bradykinesia. She feels overall stable. Will need to continue to monitor for signs of PD. I discussd the importance of exercise.Discussed DaTscan, will continue to monitor clinically at this time.    RECOMMENDATIONS:  -Exercise!  -Her symptoms do not bother her to the point that she would like to take a medication    Follow-up will be scheduled in 6 months, sooner if needed.    Thank you for allowing me to participate in the care of your patient.  Please feel free to contact me at any time if you have any further questions.      Laine Ariza MD  Movement Disorders    Time Spent:  I spent 21 minutes on this date of service performing the following activities: obtaining history, performing examination, documenting and providing counseling and education.

## 2024-03-13 ENCOUNTER — HOSPITAL ENCOUNTER (EMERGENCY)
Facility: HOSPITAL | Age: 84
Discharge: HOME | End: 2024-03-13
Attending: EMERGENCY MEDICINE
Payer: MEDICARE

## 2024-03-13 ENCOUNTER — APPOINTMENT (EMERGENCY)
Dept: RADIOLOGY | Facility: HOSPITAL | Age: 84
End: 2024-03-13
Payer: MEDICARE

## 2024-03-13 VITALS
RESPIRATION RATE: 18 BRPM | HEART RATE: 48 BPM | TEMPERATURE: 97.8 F | SYSTOLIC BLOOD PRESSURE: 149 MMHG | WEIGHT: 115 LBS | BODY MASS INDEX: 19.16 KG/M2 | HEIGHT: 65 IN | OXYGEN SATURATION: 100 % | DIASTOLIC BLOOD PRESSURE: 74 MMHG

## 2024-03-13 DIAGNOSIS — S51.012A SKIN TEAR OF LEFT ELBOW WITHOUT COMPLICATION, INITIAL ENCOUNTER: ICD-10-CM
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
## 2024-03-13 DIAGNOSIS — M25.552 LEFT HIP PAIN: ICD-10-CM

## 2024-03-13 DIAGNOSIS — S09.90XA HEAD INJURY, INITIAL ENCOUNTER: ICD-10-CM

## 2024-03-13 PROCEDURE — 90715 TDAP VACCINE 7 YRS/> IM: CPT

## 2024-03-13 PROCEDURE — 90471 IMMUNIZATION ADMIN: CPT

## 2024-03-13 PROCEDURE — 99283 EMERGENCY DEPT VISIT LOW MDM: CPT | Mod: 25

## 2024-03-13 PROCEDURE — 70450 CT HEAD/BRAIN W/O DYE: CPT

## 2024-03-13 PROCEDURE — 63600000 HC DRUGS/DETAIL CODE

## 2024-03-13 RX ADMIN — TETANUS TOXOID, REDUCED DIPHTHERIA TOXOID AND ACELLULAR PERTUSSIS VACCINE, ADSORBED 0.5 ML: 5; 2.5; 8; 8; 2.5 SUSPENSION INTRAMUSCULAR at 11:37

## 2024-03-13 ASSESSMENT — VISUAL ACUITY: OU: 1

## 2024-03-13 NOTE — ED ATTESTATION NOTE
Procedures  Physical Exam  Review of Systems    3/13/01127:36 PM  I have personally seen and examined the patient.  I reviewed and agree with the PA/NP/Resident's assessment and plan of care.    Vital Signs Review: Vital signs have been reviewed. The oxygen saturation is  SpO2: 99 % which is  Normal    My examination, assessment, and plan of care of Orly Cobian is as follows:      Patient Presents with fall yesterday with head and hip pain     Exam: well appearing, alert, lungs ctab with no distress, belly soft NTND no rebound or guarding, no obvious murmurs good distal pulses all 4 extremities, and left buttock has fullness to oit but no bruising and muscle compartments are soft      Impression/Plan: pt with concerns fo ra fall while on eliquis is otherwise well appearing does have hematoma noted in the lle but compartments soft and Nv imaging otherwise wnl and pt otherwise well. Pt given return precautiotn and otherwise feels well. Will plan for DC .      Please refer to work-up tab for further management and follow-up on laboratory and imaging evaluations    ED Course as of 03/13/24 1536   Wed Mar 13, 2024   1040 Discussed with ED attending, Dr. Feliciano, who is in agreement with plan.  [GS]   1052 X-rays from urgent care being uploaded by radiology [GS]   1145 CT HEAD WITHOUT IV CONTRAST    --  IMPRESSION:     1. No mass, acute infarcts or hemorrhage.  2. Other incidental findings noted under the comment.   [GS]   1209 Patient's skin avulsions were cleaned with CarraKlenz and dressed with bacitracin, Adaptic, Telfa, gauze, and Ace wrap.  Outside imaging of pelvis and x-ray were reviewed with ED attending.  No acute fractures noted.  Patient is able to bear weight without difficulty [GS]   1213 Heart Rate(!): 48  Pt on beta blocker for afib [GS]   1225 Patient was seen and evaluated by ED attending.  Patient given precautions of keeping an eye on her leg hematoma for worsening symptoms. [GS]      ED Course  User Index  [GS] Lita Curtis PA C H    I was physically present for the key/critical portions of the procedures documented by ENRIQUETA    This document was created using dragon dictation software.  There might be some typographical errors due to this technology.       Shane Feliciano MD  03/13/24 1536

## 2024-03-13 NOTE — ED PROVIDER NOTES
Emergency Medicine Note  HPI   HISTORY OF PRESENT ILLNESS     Patient is an 83-year-old female with a past medical history of A-fib on Eliquis, hypothyroidism who presents emergency department for evaluation for a fall that occurred yesterday.  States that though she lost her balance in the parking lot yesterday around 7 PM.  Patient onto her left side and struck the left side of her head, her left elbow, and left hip.  Patient did not lose consciousness.  Patient was seen and evaluated at urgent care earlier today and was sent here for CT imaging of her head due to her blood thinner use.  She had negative x-rays of her left elbow and left hip from urgent care that were sent on a disc.  Patient sustained a wound to her left elbow and her last Tdap is unknown.  Patient is alert and oriented with a GCS of 15 and denies any prodrome prior to falling of dizziness or lightheadedness.  States that she struggles with balance at baseline.  Denies any headache, vision changes, neck or back pain, nausea, vomiting, chest pain, shortness of breath.  Patient is alert and oriented with a GCS of 15 and able to ambulate without difficulty.  Took a dose of Tylenol with some relief.          Patient History   PAST HISTORY     Reviewed from Nursing Triage:  Tobacco  Allergies  Meds  Problems  Med Hx  Surg Hx  Fam Hx  Soc   Hx      Past Medical History:   Diagnosis Date    A-fib (CMS/HCC)     Alcoholism (CMS/HCC)     Arrhythmia     Breast cancer (CMS/HCC)     Hypothyroidism     Osteoporosis     Parkinsons        Past Surgical History:   Procedure Laterality Date    ABLATION OF DYSRHYTHMIC FOCUS      BREAST BIOPSY      2 breast biospy benign    BREAST LUMPECTOMY Left 08/03/2021    CHOLECYSTECTOMY      COSMETIC SURGERY      EYE SURGERY      SKIN BIOPSY         Family History   Problem Relation Age of Onset    Heart disease Biological Mother     Heart disease Biological Father     Heart attack Biological Father     Heart failure  Biological Father     Anemia Biological Sister     Clotting disorder Biological Sister     Fainting Biological Sister     Hypertension Biological Sister        Social History     Tobacco Use    Smoking status: Former     Types: Cigarettes     Quit date:      Years since quittin.2    Smokeless tobacco: Never   Vaping Use    Vaping Use: Never used   Substance Use Topics    Alcohol use: No    Drug use: No         Review of Systems   REVIEW OF SYSTEMS     Review of Systems      VITALS     ED Vitals      Date/Time Temp Pulse Resp BP SpO2 [facility]   24 1230 -- -- 18 -- 100 % [facility] [place], [place]   24 1034 36.6 °C (97.8 °F) 48 16 149/74 99 % BAM                         Physical Exam   PHYSICAL EXAM     Physical Exam  Vitals and nursing note reviewed.   Constitutional:       Appearance: She is well-developed and normal weight. She is not ill-appearing, toxic-appearing or diaphoretic.   HENT:      Head: Normocephalic and atraumatic.      Comments: No outward signs of head trauma     Nose: No congestion or rhinorrhea.      Mouth/Throat:      Lips: Pink.      Mouth: Mucous membranes are moist.      Pharynx: Oropharynx is clear. No oropharyngeal exudate or posterior oropharyngeal erythema.   Eyes:      General: Lids are normal. Vision grossly intact. Gaze aligned appropriately. No scleral icterus.     Extraocular Movements: Extraocular movements intact.      Conjunctiva/sclera: Conjunctivae normal.      Pupils: Pupils are equal, round, and reactive to light.   Cardiovascular:      Rate and Rhythm: Normal rate and regular rhythm.      Pulses: Normal pulses.      Heart sounds: Normal heart sounds. No murmur heard.  Pulmonary:      Effort: Pulmonary effort is normal. No respiratory distress.      Breath sounds: Normal breath sounds.   Abdominal:      General: Abdomen is flat. There is no distension.      Palpations: Abdomen is soft.      Tenderness: There is no abdominal tenderness. There is no guarding.   Musculoskeletal:       Left shoulder: No swelling, tenderness or crepitus. Normal range of motion. Normal strength. Normal pulse.      Left upper arm: No swelling, deformity or tenderness.      Left elbow: Laceration (skin avulsion) present. No swelling or deformity. Normal range of motion. No tenderness.      Left forearm: No swelling, deformity or tenderness.      Left wrist: No swelling, deformity, tenderness, snuff box tenderness or crepitus. Normal range of motion. Normal pulse.      Left hand: No swelling, deformity or tenderness. Normal range of motion. Normal strength. Normal sensation. Normal capillary refill. Normal pulse.      Cervical back: Normal range of motion and neck supple. No swelling, deformity, spasms, tenderness, bony tenderness or crepitus. Normal range of motion.      Thoracic back: No swelling, deformity, spasms, tenderness or bony tenderness. Normal range of motion.      Lumbar back: No swelling, deformity, spasms, tenderness or bony tenderness. Normal range of motion.      Right hip: No deformity, tenderness, bony tenderness or crepitus. Normal range of motion. Normal strength.      Left hip: Tenderness and bony tenderness present. No deformity or crepitus. Normal range of motion. Normal strength.      Left upper leg: No swelling, deformity or tenderness.      Left knee: No swelling or deformity. No tenderness. Normal pulse.      Right lower leg: No tenderness. No edema.      Left lower leg: No tenderness. No edema.      Comments: Hematoma noted to the left hip. All compartments are soft and compressible   Skin:     General: Skin is warm and dry.      Capillary Refill: Capillary refill takes less than 2 seconds.      Findings: Bruising present.   Neurological:      General: No focal deficit present.      Mental Status: She is alert and oriented to person, place, and time. Mental status is at baseline.      GCS: GCS eye subscore is 4. GCS verbal subscore is 5. GCS motor subscore is 6.      Cranial Nerves: Cranial  nerves 2-12 are intact.      Sensory: Sensation is intact.      Motor: Motor function is intact.      Coordination: Coordination is intact.      Gait: Gait is intact.           PROCEDURES     Procedures     DATA     Results       None            Imaging Results              CT HEAD WITHOUT IV CONTRAST (Final result)  Result time 03/13/24 11:38:20      Final result                   Impression:    IMPRESSION:    1. No mass, acute infarcts or hemorrhage.  2. Other incidental findings noted under the comment.               Narrative:    CLINICAL HISTORY: Status post fall.    COMMENT: Contiguous axial CT images of the brain is performed without  intravenous contrast with sagittal and coronal reformatted images.    COMPARISON:  CT brain performed on 6/17/2022    CT DOSE: One or more dose reduction technique (e.g.automated exposure control,  adjustment of the kV and/or mA according to the patient's size, use of iterative  reconstruction technique) utilized for this examination.    There is diffuse generalized cerebral volume loss. Decrease in density is  visualized in the periventricular white matter tracts most likely representing  chronic small vessel ischemic disease. No intraparenchymal mass, acute infarcts  or hemorrhages are seen. The ventricles and the cisterns are normal. No  extra-axial masses or focal fluid collections are seen. There is no midline  shift. Intracranial vascular calcifications are seen.    Visualized portions of the paranasal sinuses and the left mastoid air cells are  clear.  Mild opacification of right mastoid air cell is noted.  No acute  fracture is seen.                                      No orders to display       Scoring tools                                  ED Course & MDM   MDM / ED COURSE / CLINICAL IMPRESSION / DISPO     Medical Decision Making  Patient is an 83-year-old female with a past medical history of A-fib on Eliquis, hypothyroidism who presents emergency department for  evaluation for a fall that occurred yesterday.  States that though she lost her balance in the parking lot yesterday around 7 PM.  Patient onto her left side and struck the left side of her head, her left elbow, and left hip.  Patient did not lose consciousness.  Patient was seen and evaluated at urgent care earlier today and was sent here for CT imaging of her head due to her blood thinner use.  She had negative x-rays of her left elbow and left hip from urgent care that were sent on a disc.  Patient sustained a wound to her left elbow and her last Tdap is unknown.  Patient is alert and oriented with a GCS of 15 and denies any prodrome prior to falling of dizziness or lightheadedness.  States that she struggles with balance at baseline.  Denies alcohol use  Denies any headache, vision changes, neck or back pain, nausea, vomiting, chest pain, shortness of breath.  Patient is alert and oriented with a GCS of 15 and able to ambulate without difficulty.  Took a dose of Tylenol with some relief.    Vital Signs Review: Vital signs have been reviewed. The oxygen saturation is SpO2: 99 % which is normal.    Plan: CT head, Tdap, wound care, reevaluate    CT head negative for any acute traumatic findings.  Reviewed x-ray imaging from urgent care with ED attending and there are no acute fractures/dislocations.  Patient able to bear weight on left hip.  Will monitor symptoms for hematoma of left thigh    Wound was cleansed and covered and Tdap was updated    Problems Addressed:  Fall, initial encounter: acute illness or injury  Head injury, initial encounter: acute illness or injury  Left hip pain: acute illness or injury  Skin tear of left elbow without complication, initial encounter: acute illness or injury    Amount and/or Complexity of Data Reviewed  Independent Historian: spouse  External Data Reviewed: radiology and notes.     Details: X-rays sent from urgent care  Radiology: ordered. Decision-making details documented  in ED Course.    Risk  OTC drugs.  Prescription drug management.        ED Course as of 03/13/24 1247   Wed Mar 13, 2024   1040 Discussed with ED attending, Dr. Feliciano, who is in agreement with plan.  [GS]   1052 X-rays from urgent care being uploaded by radiology [GS]   1145 CT HEAD WITHOUT IV CONTRAST    --  IMPRESSION:     1. No mass, acute infarcts or hemorrhage.  2. Other incidental findings noted under the comment.   [GS]   1209 Patient's skin avulsions were cleaned with CarraKlenz and dressed with bacitracin, Adaptic, Telfa, gauze, and Ace wrap.  Outside imaging of pelvis and x-ray were reviewed with ED attending.  No acute fractures noted.  Patient is able to bear weight without difficulty [GS]   1213 Heart Rate(!): 48  Pt on beta blocker for afib [GS]   1225 Patient was seen and evaluated by ED attending.  Patient given precautions of keeping an eye on her leg hematoma for worsening symptoms. [GS]      ED Course User Index  [GS] Lita Curtis PA C     Clinical Impression      Fall, initial encounter   Head injury, initial encounter   Left hip pain   Skin tear of left elbow without complication, initial encounter     _________________       ED Disposition   Discharge                       Lita Curtis PA C  03/13/24 1247

## 2024-03-13 NOTE — DISCHARGE INSTRUCTIONS
You were seen and evaluated in the emergency department today after a fall.  There were no signs of any bleeding inside the brain.  We updated your tetanus shot today and provided wound care.  Please keep your skin tear of your left elbow clean and wash with mild soap and water 1-2 times daily and cover with antibiotic ointment/Vaseline/Aquaphor and covered with bandage.     It is possible that you have sustained a mild concussion.  Over the next few days, which that you are getting plenty of rest and avoiding any strenuous exercise or prolonged screen time (TVs, computers, smart phones). Make sure that you are staying hydrated and drinking lots of fluids.  Please alternate water and electrolyte solution such as Gatorade or Pedialyte.  I have attached the concussion clinic at [facility] as a resource for you.  Schedule an appointment as needed.    Please take acetaminophen (Tylenol) as per package instructions.  Please do not exceed 3 g in 24 hours    Please use ice.  Please do not apply directly to the skin.  Please do not leave in place while asleep.  Please place for 10-15 minutes and then remove it for 10-15 minutes    Please follow-up with your family doctor in the next few days.  I have also attached a orthopedic doctor for you to follow-up with as needed if you continue to have pain in your hip/elbow    Return the emergency department any worsening symptoms, confusion, persistent vomiting, severe headache, vision changes

## 2024-03-22 ENCOUNTER — TRANSCRIBE ORDERS (OUTPATIENT)
Dept: LAB | Facility: HOSPITAL | Age: 84
End: 2024-03-22

## 2024-03-22 ENCOUNTER — APPOINTMENT (OUTPATIENT)
Dept: LAB | Facility: HOSPITAL | Age: 84
End: 2024-03-22
Attending: FAMILY MEDICINE
Payer: MEDICARE

## 2024-03-22 DIAGNOSIS — E55.9 VITAMIN D DEFICIENCY, UNSPECIFIED: ICD-10-CM
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
## 2024-03-22 DIAGNOSIS — R41.3 OTHER AMNESIA: ICD-10-CM

## 2024-03-22 DIAGNOSIS — Z13.21 ENCOUNTER FOR SCREENING FOR NUTRITIONAL DISORDER: ICD-10-CM

## 2024-03-22 LAB
25(OH)D3 SERPL-MCNC: 46 NG/ML (ref 30–100)
T4 FREE SERPL-MCNC: 1.04 NG/DL (ref 0.58–1.64)
TSH SERPL DL<=0.05 MIU/L-ACNC: 9.42 MIU/L (ref 0.34–5.6)
VIT B12 SERPL-MCNC: 318 PG/ML (ref 180–914)

## 2024-03-22 PROCEDURE — 84439 ASSAY OF FREE THYROXINE: CPT

## 2024-03-22 PROCEDURE — 82607 VITAMIN B-12: CPT

## 2024-03-22 PROCEDURE — 36415 COLL VENOUS BLD VENIPUNCTURE: CPT

## 2024-03-22 PROCEDURE — 84443 ASSAY THYROID STIM HORMONE: CPT

## 2024-03-22 PROCEDURE — 82306 VITAMIN D 25 HYDROXY: CPT

## 2024-05-31 ENCOUNTER — APPOINTMENT (OUTPATIENT)
Dept: LAB | Facility: HOSPITAL | Age: 84
End: 2024-05-31
Attending: FAMILY MEDICINE
Payer: MEDICARE

## 2024-05-31 ENCOUNTER — TRANSCRIBE ORDERS (OUTPATIENT)
Dept: REGISTRATION | Facility: HOSPITAL | Age: 84
End: 2024-05-31

## 2024-05-31 DIAGNOSIS — E03.9 HYPOTHYROIDISM, UNSPECIFIED: ICD-10-CM

## 2024-05-31 DIAGNOSIS — E03.9 HYPOTHYROIDISM, UNSPECIFIED: Primary | ICD-10-CM

## 2024-05-31 LAB — TSH SERPL DL<=0.05 MIU/L-ACNC: 4.87 MIU/L (ref 0.34–5.6)

## 2024-05-31 PROCEDURE — 36415 COLL VENOUS BLD VENIPUNCTURE: CPT

## 2024-05-31 PROCEDURE — 84443 ASSAY THYROID STIM HORMONE: CPT

## 2024-06-13 DIAGNOSIS — I48.0 PAROXYSMAL ATRIAL FIBRILLATION (CMS/HCC): ICD-10-CM

## 2024-06-13 RX ORDER — FLECAINIDE ACETATE 100 MG/1
100 TABLET ORAL EVERY 12 HOURS
Qty: 180 TABLET | Refills: 3 | Status: SHIPPED | OUTPATIENT
Start: 2024-06-13 | End: 2025-01-07

## 2024-06-13 NOTE — TELEPHONE ENCOUNTER
Medicine Refill Request [facility]    Name of Patient: Orly Cobian    Caller's name/Relationship: self    Callback number: [phone]      Medication Name, Dosage, Supply:     flecainide (TAMBOCOR) 100 mg tablet   ELIQUIS 5 mg tablet     Quantity left: some    Pharmacy: [facility] # [id number]    Last Office Visit: 1/2/24  Last Consult Visit: Visit date not found  Last Telemedicine Visit: Visit date not found    Next Appointment: Visit date not found      Current Outpatient Medications:     anastrozole (ARIMIDEX) 1 mg tablet, Take  1 mg daily with lunch, Disp: , Rfl:     atorvastatin (LIPITOR) 10 mg tablet, Take 1 tablet (10 mg total) by mouth every evening., Disp: 90 tablet, Rfl: 3    cholecalciferol, vitamin D3, 1,000 unit (25 mcg) tablet, Take 1,000 Units by mouth daily with lunch., Disp: , Rfl:     dorzolamide (TRUSOPT) 2 % ophthalmic solution, Administer 1 drop into the right eye every morning., Disp: , Rfl:     ELIQUIS 5 mg tablet, take 1 tablet by mouth twice a day, Disp: 180 tablet, Rfl: 1    estradioL (ESTRACE) 0.01 % (0.1 mg/gram) vaginal cream, Insert 0.5 g into the vagina 2 (two) times a week (Sun, Wed)., Disp: , Rfl:     flecainide (TAMBOCOR) 100 mg tablet, Take 1 tablet (100 mg total) by mouth every 12 (twelve) hours., Disp: 180 tablet, Rfl: 3    latanoprost (XALATAN) 0.005 % ophthalmic solution, Administer 1 drop into both eyes nightly., Disp: , Rfl:     levothyroxine (SYNTHROID) 50 mcg tablet, Take 50 mcg by mouth daily., Disp: , Rfl:     LORazepam (ATIVAN) 0.5 mg tablet, take 1 tablet by mouth once daily if needed, Disp: , Rfl:     lutein-zeaxanthin 25-5 mg capsule, Take 1 capsule by mouth daily. ocuvit, Disp: , Rfl:     magnesium glycinate, bulk, 10 % powder, Take by mouth See admin instr., Disp: , Rfl:     metoprolol succinate XL (TOPROL-XL) 25 mg 24 hr tablet, Take 1 tablet (25 mg total) by mouth nightly., Disp: 90 tablet, Rfl: 3      BP Readings from Last 3 Encounters:   03/13/24 (!) 149/74   01/02/24  "138/76   10/13/23 (!) 189/84       Recent Lab results:  Lab Results   Component Value Date    CHOL 147 07/29/2022   ,   Lab Results   Component Value Date    HDL 59 07/29/2022   ,   Lab Results   Component Value Date    LDLCALC 78 07/29/2022   ,   Lab Results   Component Value Date    TRIG 52 07/29/2022        Lab Results   Component Value Date    GLUCOSE 103 (H) 06/17/2022   , No results found for: \"HGBA1C\"      Lab Results   Component Value Date    CREATININE 0.6 07/21/2023       Lab Results   Component Value Date    TSH 4.87 05/31/2024           "

## 2024-06-17 RX ORDER — METOPROLOL SUCCINATE 25 MG/1
25 TABLET, EXTENDED RELEASE ORAL NIGHTLY
Qty: 90 TABLET | Refills: 3 | Status: SHIPPED | OUTPATIENT
Start: 2024-06-17 | End: 2024-12-20 | Stop reason: SDUPTHER

## 2024-06-17 NOTE — TELEPHONE ENCOUNTER
Medicine Refill Request [facility]    Name of Patient: Orly Cobian    Caller's name/Relationship: Orly Cobian      Callback number:   self    Medication Name, Dosage, Supply: metoprolol succinate XL (TOPROL-XL) 25 mg 24 hr tablet       Quantity left: 3 pills    Pharmacy:   [facility] #[id number]    Last Office Visit: 1/2/24  Last Consult Visit: Visit date not found  Last Telemedicine Visit: Visit date not found    Next Appointment: Visit date not found      Current Outpatient Medications:     anastrozole (ARIMIDEX) 1 mg tablet, Take  1 mg daily with lunch, Disp: , Rfl:     apixaban (ELIQUIS) 5 mg tablet, Take 1 tablet (5 mg total) by mouth 2 (two) times a day., Disp: 180 tablet, Rfl: 1    atorvastatin (LIPITOR) 10 mg tablet, Take 1 tablet (10 mg total) by mouth every evening., Disp: 90 tablet, Rfl: 3    cholecalciferol, vitamin D3, 1,000 unit (25 mcg) tablet, Take 1,000 Units by mouth daily with lunch., Disp: , Rfl:     dorzolamide (TRUSOPT) 2 % ophthalmic solution, Administer 1 drop into the right eye every morning., Disp: , Rfl:     estradioL (ESTRACE) 0.01 % (0.1 mg/gram) vaginal cream, Insert 0.5 g into the vagina 2 (two) times a week (Sun, Wed)., Disp: , Rfl:     flecainide (TAMBOCOR) 100 mg tablet, Take 1 tablet (100 mg total) by mouth every 12 (twelve) hours., Disp: 180 tablet, Rfl: 3    latanoprost (XALATAN) 0.005 % ophthalmic solution, Administer 1 drop into both eyes nightly., Disp: , Rfl:     levothyroxine (SYNTHROID) 50 mcg tablet, Take 50 mcg by mouth daily., Disp: , Rfl:     LORazepam (ATIVAN) 0.5 mg tablet, take 1 tablet by mouth once daily if needed, Disp: , Rfl:     lutein-zeaxanthin 25-5 mg capsule, Take 1 capsule by mouth daily. ocuvit, Disp: , Rfl:     magnesium glycinate, bulk, 10 % powder, Take by mouth See admin instr., Disp: , Rfl:     metoprolol succinate XL (TOPROL-XL) 25 mg 24 hr tablet, Take 1 tablet (25 mg total) by mouth nightly., Disp: 90 tablet, Rfl: 3      BP Readings from Last 3  "Encounters:   03/13/24 (!) 149/74   01/02/24 138/76   10/13/23 (!) 189/84       Recent Lab results:  Lab Results   Component Value Date    CHOL 147 07/29/2022   ,   Lab Results   Component Value Date    HDL 59 07/29/2022   ,   Lab Results   Component Value Date    LDLCALC 78 07/29/2022   ,   Lab Results   Component Value Date    TRIG 52 07/29/2022        Lab Results   Component Value Date    GLUCOSE 103 (H) 06/17/2022   , No results found for: \"HGBA1C\"      Lab Results   Component Value Date    CREATININE 0.6 07/21/2023       Lab Results   Component Value Date    TSH 4.87 05/31/2024           "

## 2024-07-02 ENCOUNTER — OFFICE VISIT (OUTPATIENT)
Dept: CARDIOLOGY | Facility: CLINIC | Age: 84
End: 2024-07-02
Payer: MEDICARE

## 2024-07-02 VITALS
BODY MASS INDEX: 18.91 KG/M2 | OXYGEN SATURATION: 98 % | SYSTOLIC BLOOD PRESSURE: 112 MMHG | HEART RATE: 54 BPM | HEIGHT: 65 IN | WEIGHT: 113.5 LBS | DIASTOLIC BLOOD PRESSURE: 68 MMHG | RESPIRATION RATE: 18 BRPM

## 2024-07-02 DIAGNOSIS — I45.10 RBBB: ICD-10-CM

## 2024-07-02 DIAGNOSIS — Q22.5 EBSTEIN'S ANOMALY OF TRICUSPID VALVE: ICD-10-CM

## 2024-07-02 DIAGNOSIS — Z79.899 LONG TERM CURRENT USE OF ANTIARRHYTHMIC DRUG: ICD-10-CM

## 2024-07-02 DIAGNOSIS — I48.0 PAROXYSMAL ATRIAL FIBRILLATION (CMS/HCC): Primary | ICD-10-CM

## 2024-07-02 DIAGNOSIS — I45.6 WPW (WOLFF-PARKINSON-WHITE SYNDROME): ICD-10-CM

## 2024-07-02 PROCEDURE — 99214 OFFICE O/P EST MOD 30 MIN: CPT | Performed by: INTERNAL MEDICINE

## 2024-07-02 PROCEDURE — 93000 ELECTROCARDIOGRAM COMPLETE: CPT | Performed by: INTERNAL MEDICINE

## 2024-07-02 NOTE — ASSESSMENT & PLAN NOTE
She is asymptomatic. No symptoms of right heart failure.  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   I will continue to monitor.

## 2024-07-02 NOTE — LETTER
"July 2, 2024     Anton Muniz MD  [address] [facility] [address]    Patient: Orly Cobian  YOB: 1940  Date of Visit: 7/2/2024      Dear Dr. Muniz:    Thank you for referring Orly Cobian to me for evaluation. Below are my notes for this consultation.    If you have questions, please do not hesitate to call me. I look forward to following your patient along with you.         Sincerely,        Madhuri Gregorio MD        CC: Madhuri Feliciano MD  7/7/2024  4:38 PM  Sign when Signing Visit   Madhuri Gregorio MD, [facility]  Cardiac Electrophysiology    [facility]  [facility]  [address] [place] [address]    TEL  [phone]  [facility]         Electrophysiology Progress Note           July 02, 2024  Dear Dr. Muniz:    Orly Cobian is a 84 y.o. female who comes to the office today for follow up for a history of paroxysmal atrial fibrillation with chronic right bundle branch block and history of dyslipidemia.  She is on long-term antiarrhythmic drug therapy with flecainide and anticoagulation with Eliquis.    In December 2019, she woke up in the middle of the night with an episode of atrial fibrillation.  She stated that her heart was beating very fast. She took a flecainide and the episode subsided several hours later.  When I last saw her in the office on 2/13/2019, she had been feeling frequent \"extra beats\". Therefore, the patient agreed to start taking flecainide 100 mg p.o. every 12 hours and to continue to take an extra dose of flecainide as needed.    She had an echocardiogram 06/06/2023 which revealed normal LV function with EF of 60-65%.    At her last office visit on 01/02/2023, she reported that she has not had recurrence symptoms of atrial fibrillation. She denies any recent episodes of arrhythmias. She reported that she has not taken any extra flecainide since her " last office visit. Her blood pressure has been stable at home as well as during the office visit. She has gained some weight since the previous visit.     Since then patient was presented to [facility] ED on 03/13/24, for fall incident. No LOC. Patient had negative x-rays of her left elbow and left hip from urgent care that were sent on a disc. At the ED, CT head negative for any acute traumatic findings.     Today, patient reports that she is stable from a cardiovascular standpoint. She has no cardiac complaints at this time. No palpitations. No side effects to therapy with flecainide.Patient states that she was recently diagnosed with mild Parkinson 's disease . At this time she does not need to be on any medication for Parkinson's. Patient states that she has been experiencing difficulty in fine motor skills and she notices changes in her bowel movements. She remains physically active and walks 2-3 mils daily as recommended by her neurologist.  No dizziness, near syncope or syncope. No chest pain. No dyspnea on exertion, PND, orthopnea or pedal edema. No bleeding complications on anticoagulation medication with Eliquis.    PROBLEM LIST:  1. Paroxysmal atrial fibrillation diagnosed on 10/11/2010, with a recent episode in May of  2017.  2. History of Jaycob-Parkinson-White syndrome, status post catheter ablation procedure  performed in 1997, without recurrence of arrhythmia.  3. Chronic right bundle branch block.  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 History of thyroid disease.  6. Dyslipidemia.  7. Stress echo obtained on June 6, 2017 revealing no evidence of myocardial ischemia with  normal left ventricular systolic function with a left ventricular ejection fraction of 65-  70  8. Vasovagal syncope    Patient Active Problem List   Diagnosis   • Paroxysmal atrial fibrillation (CMS/HCC)   • RBBB   • Non-rheumatic tricuspid valve insufficiency   • WPW (Jaycob-Parkinson-White syndrome)   • Ebstein's anomaly of  tricuspid valve   • Nonrheumatic pulmonary valve insufficiency   • Vasovagal syncope   • Dyslipidemia   • Long term current use of antiarrhythmic drug   • Mass of left breast   • Malignant neoplasm of central portion of left female breast (CMS/HCC)   • Current use of long term anticoagulation   • Pleural effusion on right   • Elevated blood pressure reading without diagnosis of hypertension   • Traumatic closed fracture of C2 vertebra with minimal displacement, initial encounter (CMS/[facility])     Past Medical History:   Diagnosis Date   • A-fib (CMS/[facility])    • Alcoholism (CMS/[facility])    • Arrhythmia    • Breast cancer (CMS/[facility])    • Hypothyroidism    • Osteoporosis    • Parkinson's disease (CMS/[facility])    • Parkinsons (CMS/[facility])      Past Surgical History:   Procedure Laterality Date   • ABLATION OF DYSRHYTHMIC FOCUS     • BREAST BIOPSY      2 breast biospy benign   • BREAST LUMPECTOMY Left 08/03/2021   • CHOLECYSTECTOMY     • COSMETIC SURGERY     • EYE SURGERY     • SKIN BIOPSY       Current Outpatient Medications   Medication Sig Dispense Refill   • anastrozole (ARIMIDEX) 1 mg tablet Take  1 mg daily with lunch     • apixaban (ELIQUIS) 5 mg tablet Take 1 tablet (5 mg total) by mouth 2 (two) times a day. 180 tablet 1   • atorvastatin (LIPITOR) 10 mg tablet Take 1 tablet (10 mg total) by mouth every evening. 90 tablet 3   • cholecalciferol, vitamin D3, 1,000 unit (25 mcg) tablet Take 1,000 Units by mouth daily with lunch.     • dorzolamide (TRUSOPT) 2 % ophthalmic solution Administer 1 drop into the right eye 2 (two) times a day.     • flecainide (TAMBOCOR) 100 mg tablet Take 1 tablet (100 mg total) by mouth every 12 (twelve) hours. 180 tablet 3   • latanoprost (XALATAN) 0.005 % ophthalmic solution Administer 1 drop into both eyes nightly.     • levothyroxine (SYNTHROID) 75 mcg tablet Take 75 mcg by mouth daily.     • LORazepam (ATIVAN) 0.5 mg tablet take 1 tablet by mouth once daily if needed     • lutein-zeaxanthin 25-5 mg  "capsule Take 1 capsule by mouth daily. ocuvit     • MAGNESIUM GLYCINATE, BULK, MISC Take by mouth See admin instr. CAPSULE     • metoprolol succinate XL (TOPROL-XL) 25 mg 24 hr tablet Take 1 tablet (25 mg total) by mouth nightly. 90 tablet 3     No current facility-administered medications for this visit.     ALLERGIES:Erythromycin, Erythromycin base, and Bactrim [sulfamethoxazole-trimethoprim]    REVIEW OF SYSTEMS: As in HPI.  All other other systems were reviewed and are negative.     Vitals:    07/02/24 1315   BP: 112/68   BP Location: Left upper arm   Patient Position: Sitting   Pulse: (!) 54   Resp: 18   SpO2: 98%   Weight: 51.5 kg (113 lb 8 oz)   Height: 1.651 m (5' 5\")         PHYSICAL EXAMINATION: Pleasant and alert, in no acute distress.  Neck: No jugular venous distention or carotid bruits. Lungs were clear to auscultation without rales or wheezes. Cardiovascular exam revealed normal S1, S2, regular rhythm with a 2/6 systolic murmur best heard at the lower left sternal border. Abdomen was soft and nontender.Extremities revealed no edema or cyanosis. She was alert and oriented x 3 with no focal deficits on gross neurologic exam.    Labs from 06/07/2024 ([place]):   Glucose: 78  Creatinine: 0.70  Na: 131  K: 4.6  WBC: 5.7  RBC: 4.19  HGB: 13.9  HCT: 41    DATA REVIEW:  ECG 07/2/2024: I personally reviewed her 12-lead EKG performed today which reveals Sinus Bradycardia -First degree A-V block   -Right bundle branch block with left anterior fascicular block.    ECG 01/02/2024: I personally reviewed her 12-lead EKG performed today which reveals sinus rhythm.  Right bundle branch block.    Echocardiogram:   Cardiac Imaging    TRANSTHORACIC ECHO (TTE) COMPLETE 06/06/2023    Interpretation Summary  1.  Normal left ventricular function with an estimated ejection fraction of 60 to 65%.  2.  Mild biatrial enlargement.  3.  Mildly thickened aortic valve without any significant abnormality by Doppler.  4.  Displaced " tricuspid valve with evidence of probable Ebstein's anomaly.  5.  Mild to moderate tricuspid insufficiency with mildly elevated pulmonary pressures at 37 mmHg.  6.  No significant change from the previous study of March 2022    Cardiac Imaging    TRANSTHORACIC ECHO (TTE) COMPLETE 03/21/2022    Interpretation Summary  1.  Normal left ventricular function with an estimated ejection fraction of 60%.  2.  No segmental wall motion abnormalities.  3.  Thickened aortic root.  4.  Thickened aortic valve leaflets without any restriction of motion.  5.  Mild aortic insufficiency.  6.  Thickened mitral valve leaflets without any restriction of motion.  7.  Mild mitral regurgitation.  8.  Mild tricuspid insufficiency with normal pulmonary pressures.  9.  Mildly dilated right atrium and right ventricle.  10.  Unchanged from April 2021    Cardiac Imaging    TRANSTHORACIC ECHO (TTE) COMPLETE 04/14/2021    Interpretation Summary  · Normal-sized LV. Normal LV wall thickness. Preserved LV systolic function. Estimated EF 60%. No regional wall motion abnormalities.  · Tricuspid aortic valve. Sclerotic aortic valve leaflets. Trace aortic valve regurgitation. Aortic root sclerotic.  · Trace mitral valve regurgitation. Moderately dilated LA.  · Mildly dilated RV. Normal RV systolic function. Mild to moderate tricuspid valve regurgitation. Estimated RVSP = 30 mmHg. Moderately dilated RA.  · Normal-sized IVC. IVC collapses >50% during inspiration.  · Compared with June 2017, the RV is now mildly dilated.    Assessment/Plan     WPW (Jaycob-Parkinson-White syndrome)  No recurrence of supraventricular tachycardia.  She is status post catheter ablation procedure in 1997.    Paroxysmal atrial fibrillation (CMS/HCC) (HCC)  Stable without symptoms of recurrence of atrial fibrillation on flecainide 100 mg p.o. every 12 hours. She is tolerating the medication without side effects.    She may take an extra  flecainide 100 mg x 1 if she has a sustained  episode of atrial fibrillation.   She will continue beta blocker therapy with metoprolol succinate 25 mg once a day.     She will continue long-term anticoagulation on Eliquis 5 mg p.o. every 12 hours. No bleeding complications.The [id number] score is 4.    Ebstein's anomaly of tricuspid valve   She is asymptomatic. No symptoms of right heart failure.  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   I will continue to monitor.    Long term current use of antiarrhythmic drug  No side effects to antiarrhythmic drug therapy with flecainide.  Her twelve-lead EKG on flecainide is stable with first degree AV block and chronic right bundle branch block without progression of conduction disease.     RBBB  Chronic RBBB.  Her QRS duration had previously  increased after adding flecainide which increased her QTc interval but remains stable.She is at risk for AV block.   I will continue to monitor.      Return in about 6 months (around 1/2/2025).    Thank you for allowing me to participate in the care of your patient.  Please do not hesitate to contact me if you have any questions regarding my recommendations and management.    Sincerely;    Madhuri Cardoza M.D., FACC , FACP    This document was generated utilizing voice recognition technology. A reasonable attempt at proofreading has been made to minimize errors but please excuse any typographical errors which may be present. Please call with any questions.    By signing my name below, Brigid CHINO, attest that this documentation has been prepared under the direction and in the presence of Madhuri Gregorio MD Electronically signed: Pippa Meyers. 7/2/2024 1:11 PM     Madhuri CHINO MD, personally performed the services described in this documentation. All medical record entries  made by the scribe were at my direction and in my presence. I have reviewed the chart and agree that the record reflects my personal performance and is accurate and complete. Madhuri Gregorio MD. 7/2/2024. 1:11 PM.

## 2024-07-02 NOTE — ASSESSMENT & PLAN NOTE
No side effects to antiarrhythmic drug therapy with flecainide.  Her twelve-lead EKG on flecainide is stable with first degree AV block and chronic right bundle branch block without progression of conduction disease.

## 2024-07-02 NOTE — ASSESSMENT & PLAN NOTE
Stable without symptoms of recurrence of atrial fibrillation on flecainide 100 mg p.o. every 12 hours. She is tolerating the medication without side effects.    She may take an extra  flecainide 100 mg x 1 if she has a sustained episode of atrial fibrillation.   She will continue beta blocker therapy with metoprolol succinate 25 mg once a day.     She will continue long-term anticoagulation on Eliquis 5 mg p.o. every 12 hours. No bleeding complications.The [id number] score is 4.

## 2024-07-02 NOTE — ASSESSMENT & PLAN NOTE
No recurrence of supraventricular tachycardia.  She is status post catheter ablation procedure in 1997.

## 2024-07-02 NOTE — PROGRESS NOTES
" Madhuri Gregorio MD, [facility]  Cardiac Electrophysiology    [facility]    [facility]  [facility] [address]  [address] [place] [address]    TEL  [phone]  [facility]         Electrophysiology Progress Note           July 02, 2024  Dear Dr. Muniz:    Orly Cobian is a 84 y.o. female who comes to the office today for follow up for a history of paroxysmal atrial fibrillation with chronic right bundle branch block and history of dyslipidemia.  She is on long-term antiarrhythmic drug therapy with flecainide and anticoagulation with Eliquis.    In December 2019, she woke up in the middle of the night with an episode of atrial fibrillation.  She stated that her heart was beating very fast. She took a flecainide and the episode subsided several hours later.  When I last saw her in the office on 2/13/2019, she had been feeling frequent \"extra beats\". Therefore, the patient agreed to start taking flecainide 100 mg p.o. every 12 hours and to continue to take an extra dose of flecainide as needed.    She had an echocardiogram 06/06/2023 which revealed normal LV function with EF of 60-65%.    At her last office visit on 01/02/2023, she reported that she has not had recurrence symptoms of atrial fibrillation. She denies any recent episodes of arrhythmias. She reported that she has not taken any extra flecainide since her last office visit. Her blood pressure has been stable at home as well as during the office visit. She has gained some weight since the previous visit.     Since then patient was presented to [facility] ED on 03/13/24, for fall incident. No LOC. Patient had negative x-rays of her left elbow and left hip from urgent care that were sent on a disc. At the ED, CT head negative for any acute traumatic findings.     Today, patient reports that she is stable from a cardiovascular standpoint. She has no cardiac complaints at this time. No " palpitations. No side effects to therapy with flecainide.Patient states that she was recently diagnosed with mild Parkinson 's disease . At this time she does not need to be on any medication for Parkinson's. Patient states that she has been experiencing difficulty in fine motor skills and she notices changes in her bowel movements. She remains physically active and walks 2-3 mils daily as recommended by her neurologist.  No dizziness, near syncope or syncope. No chest pain. No dyspnea on exertion, PND, orthopnea or pedal edema. No bleeding complications on anticoagulation medication with Eliquis.    PROBLEM LIST:  1. Paroxysmal atrial fibrillation diagnosed on 10/11/2010, with a recent episode in May of  2017.  2. History of Jaycob-Parkinson-White syndrome, status post catheter ablation procedure  performed in 1997, without recurrence of arrhythmia.  3. Chronic right bundle branch block.  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 History of thyroid disease.  6. Dyslipidemia.  7. Stress echo obtained on June 6, 2017 revealing no evidence of myocardial ischemia with  normal left ventricular systolic function with a left ventricular ejection fraction of 65-  70  8. Vasovagal syncope    Patient Active Problem List   Diagnosis    Paroxysmal atrial fibrillation (CMS/[facility])    RBBB    Non-rheumatic tricuspid valve insufficiency    WPW (Jaycob-Parkinson-White syndrome)    Ebstein's anomaly of tricuspid valve    Nonrheumatic pulmonary valve insufficiency    Vasovagal syncope    Dyslipidemia    Long term current use of antiarrhythmic drug    Mass of left breast    Malignant neoplasm of central portion of left female breast (CMS/[facility])    Current use of long term anticoagulation    Pleural effusion on right    Elevated blood pressure reading without diagnosis of hypertension    Traumatic closed fracture of C2 vertebra with minimal displacement, initial encounter (CMS/[facility])     Past Medical History:   Diagnosis Date    A-fib  (CMS/HCC)     Alcoholism (CMS/HCC)     Arrhythmia     Breast cancer (CMS/HCC)     Hypothyroidism     Osteoporosis     Parkinson's disease (CMS/HCC)     Parkinsons (CMS/HCC)      Past Surgical History:   Procedure Laterality Date    ABLATION OF DYSRHYTHMIC FOCUS      BREAST BIOPSY      2 breast biospy benign    BREAST LUMPECTOMY Left 08/03/2021    CHOLECYSTECTOMY      COSMETIC SURGERY      EYE SURGERY      SKIN BIOPSY       Current Outpatient Medications   Medication Sig Dispense Refill    anastrozole (ARIMIDEX) 1 mg tablet Take  1 mg daily with lunch      apixaban (ELIQUIS) 5 mg tablet Take 1 tablet (5 mg total) by mouth 2 (two) times a day. 180 tablet 1    atorvastatin (LIPITOR) 10 mg tablet Take 1 tablet (10 mg total) by mouth every evening. 90 tablet 3    cholecalciferol, vitamin D3, 1,000 unit (25 mcg) tablet Take 1,000 Units by mouth daily with lunch.      dorzolamide (TRUSOPT) 2 % ophthalmic solution Administer 1 drop into the right eye 2 (two) times a day.      flecainide (TAMBOCOR) 100 mg tablet Take 1 tablet (100 mg total) by mouth every 12 (twelve) hours. 180 tablet 3    latanoprost (XALATAN) 0.005 % ophthalmic solution Administer 1 drop into both eyes nightly.      levothyroxine (SYNTHROID) 75 mcg tablet Take 75 mcg by mouth daily.      LORazepam (ATIVAN) 0.5 mg tablet take 1 tablet by mouth once daily if needed      lutein-zeaxanthin 25-5 mg capsule Take 1 capsule by mouth daily. ocuvit      MAGNESIUM GLYCINATE, BULK, MISC Take by mouth See admin instr. CAPSULE      metoprolol succinate XL (TOPROL-XL) 25 mg 24 hr tablet Take 1 tablet (25 mg total) by mouth nightly. 90 tablet 3     No current facility-administered medications for this visit.     ALLERGIES:Erythromycin, Erythromycin base, and Bactrim [sulfamethoxazole-trimethoprim]    REVIEW OF SYSTEMS: As in HPI.  All other other systems were reviewed and are negative.     Vitals:    07/02/24 1315   BP: 112/68   BP Location: Left upper arm   Patient  "Position: Sitting   Pulse: (!) 54   Resp: 18   SpO2: 98%   Weight: 51.5 kg (113 lb 8 oz)   Height: 1.651 m (5' 5\")         PHYSICAL EXAMINATION: Pleasant and alert, in no acute distress.  Neck: No jugular venous distention or carotid bruits. Lungs were clear to auscultation without rales or wheezes. Cardiovascular exam revealed normal S1, S2, regular rhythm with a 2/6 systolic murmur best heard at the lower left sternal border. Abdomen was soft and nontender.Extremities revealed no edema or cyanosis. She was alert and oriented x 3 with no focal deficits on gross neurologic exam.    Labs from 06/07/2024 ([facility] [facility]):   Glucose: 78  Creatinine: 0.70  Na: 131  K: 4.6  WBC: 5.7  RBC: 4.19  HGB: 13.9  HCT: 41    DATA REVIEW:  ECG 07/2/2024: I personally reviewed her 12-lead EKG performed today which reveals Sinus Bradycardia -First degree A-V block   -Right bundle branch block with left anterior fascicular block.    ECG 01/02/2024: I personally reviewed her 12-lead EKG performed today which reveals sinus rhythm.  Right bundle branch block.    Echocardiogram:   Cardiac Imaging    TRANSTHORACIC ECHO (TTE) COMPLETE 06/06/2023    Interpretation Summary  1.  Normal left ventricular function with an estimated ejection fraction of 60 to 65%.  2.  Mild biatrial enlargement.  3.  Mildly thickened aortic valve without any significant abnormality by Doppler.  4.  Displaced tricuspid valve with evidence of probable Ebstein's anomaly.  5.  Mild to moderate tricuspid insufficiency with mildly elevated pulmonary pressures at 37 mmHg.  6.  No significant change from the previous study of March 2022    Cardiac Imaging    TRANSTHORACIC ECHO (TTE) COMPLETE 03/21/2022    Interpretation Summary  1.  Normal left ventricular function with an estimated ejection fraction of 60%.  2.  No segmental wall motion abnormalities.  3.  Thickened aortic root.  4.  Thickened aortic valve leaflets without any restriction of motion.  5.  Mild aortic " insufficiency.  6.  Thickened mitral valve leaflets without any restriction of motion.  7.  Mild mitral regurgitation.  8.  Mild tricuspid insufficiency with normal pulmonary pressures.  9.  Mildly dilated right atrium and right ventricle.  10.  Unchanged from April 2021    Cardiac Imaging    TRANSTHORACIC ECHO (TTE) COMPLETE 04/14/2021    Interpretation Summary  · Normal-sized LV. Normal LV wall thickness. Preserved LV systolic function. Estimated EF 60%. No regional wall motion abnormalities.  · Tricuspid aortic valve. Sclerotic aortic valve leaflets. Trace aortic valve regurgitation. Aortic root sclerotic.  · Trace mitral valve regurgitation. Moderately dilated LA.  · Mildly dilated RV. Normal RV systolic function. Mild to moderate tricuspid valve regurgitation. Estimated RVSP = 30 mmHg. Moderately dilated RA.  · Normal-sized IVC. IVC collapses >50% during inspiration.  · Compared with June 2017, the RV is now mildly dilated.    Assessment/Plan      WPW (Jaycob-Parkinson-White syndrome)  No recurrence of supraventricular tachycardia.  She is status post catheter ablation procedure in 1997.    Paroxysmal atrial fibrillation (CMS/HCC) (HCC)  Stable without symptoms of recurrence of atrial fibrillation on flecainide 100 mg p.o. every 12 hours. She is tolerating the medication without side effects.    She may take an extra  flecainide 100 mg x 1 if she has a sustained episode of atrial fibrillation.   She will continue beta blocker therapy with metoprolol succinate 25 mg once a day.     She will continue long-term anticoagulation on Eliquis 5 mg p.o. every 12 hours. No bleeding complications.The [id number] score is 4.    Ebstein's anomaly of tricuspid valve   She is asymptomatic. No symptoms of right heart failure.  Her most recent echocardiogram obtained this year in June 2023 reveals displaced tricuspid valve with evidence of Nimo anomaly with mild to moderate tricuspid regurgitation, mildly elevated pulmonary  artery pressure.   I will continue to monitor.    Long term current use of antiarrhythmic drug  No side effects to antiarrhythmic drug therapy with flecainide.  Her twelve-lead EKG on flecainide is stable with first degree AV block and chronic right bundle branch block without progression of conduction disease.     RBBB  Chronic RBBB.  Her QRS duration had previously  increased after adding flecainide which increased her QTc interval but remains stable.She is at risk for AV block.   I will continue to monitor.      Return in about 6 months (around 1/2/2025).    Thank you for allowing me to participate in the care of your patient.  Please do not hesitate to contact me if you have any questions regarding my recommendations and management.    Sincerely;    Madhuri Cardoza M.D., [facility]    This document was generated utilizing voice recognition technology. A reasonable attempt at proofreading has been made to minimize errors but please excuse any typographical errors which may be present. Please call with any questions.    By signing my name below, I, Brigid Cheung, attest that this documentation has been prepared under the direction and in the presence of Madhuri Gregorio MD Electronically signed: Pippa Meyers. 7/2/2024 1:11 PM     I, Madhuri Gregorio MD, personally performed the services described in this documentation. All medical record entries made by the scribe were at my direction and in my presence. I have reviewed the chart and agree that the record reflects my personal performance and is accurate and complete. Madhuri Gregorio MD. 7/2/2024. 1:11 PM.

## 2024-07-02 NOTE — ASSESSMENT & PLAN NOTE
Chronic RBBB.  Her QRS duration had previously  increased after adding flecainide which increased her QTc interval but remains stable.She is at risk for AV block.   I will continue to monitor.

## 2024-07-08 RX ORDER — ATORVASTATIN CALCIUM 10 MG/1
10 TABLET, FILM COATED ORAL EVERY EVENING
Qty: 90 TABLET | Refills: 3 | Status: SHIPPED | OUTPATIENT
Start: 2024-07-08 | End: 2025-07-08

## 2024-07-08 NOTE — TELEPHONE ENCOUNTER
Medicine Refill Request    Last Office Visit: Visit date not found   Last Consult Visit: Visit date not found  Last Telemedicine Visit: Visit date not found    Next Appointment: Visit date not found    atorvastatin (LIPITOR) 10 mg tablet      Current Outpatient Medications:     anastrozole (ARIMIDEX) 1 mg tablet, Take  1 mg daily with lunch, Disp: , Rfl:     apixaban (ELIQUIS) 5 mg tablet, Take 1 tablet (5 mg total) by mouth 2 (two) times a day., Disp: 180 tablet, Rfl: 1    atorvastatin (LIPITOR) 10 mg tablet, Take 1 tablet (10 mg total) by mouth every evening., Disp: 90 tablet, Rfl: 3    cholecalciferol, vitamin D3, 1,000 unit (25 mcg) tablet, Take 1,000 Units by mouth daily with lunch., Disp: , Rfl:     dorzolamide (TRUSOPT) 2 % ophthalmic solution, Administer 1 drop into the right eye 2 (two) times a day., Disp: , Rfl:     flecainide (TAMBOCOR) 100 mg tablet, Take 1 tablet (100 mg total) by mouth every 12 (twelve) hours., Disp: 180 tablet, Rfl: 3    latanoprost (XALATAN) 0.005 % ophthalmic solution, Administer 1 drop into both eyes nightly., Disp: , Rfl:     levothyroxine (SYNTHROID) 75 mcg tablet, Take 75 mcg by mouth daily., Disp: , Rfl:     LORazepam (ATIVAN) 0.5 mg tablet, take 1 tablet by mouth once daily if needed, Disp: , Rfl:     lutein-zeaxanthin 25-5 mg capsule, Take 1 capsule by mouth daily. ocuvit, Disp: , Rfl:     MAGNESIUM GLYCINATE, BULK, MISC, Take by mouth See admin instr. CAPSULE, Disp: , Rfl:     metoprolol succinate XL (TOPROL-XL) 25 mg 24 hr tablet, Take 1 tablet (25 mg total) by mouth nightly., Disp: 90 tablet, Rfl: 3      BP Readings from Last 3 Encounters:   07/02/24 112/68   03/13/24 (!) 149/74   01/02/24 138/76       Recent Lab results:  Lab Results   Component Value Date    CHOL 147 07/29/2022   ,   Lab Results   Component Value Date    HDL 59 07/29/2022   ,   Lab Results   Component Value Date    LDLCALC 78 07/29/2022   ,   Lab Results   Component Value Date    TRIG 52 07/29/2022     "    Lab Results   Component Value Date    GLUCOSE 103 (H) 06/17/2022   , No results found for: \"HGBA1C\"      Lab Results   Component Value Date    CREATININE 0.6 07/21/2023       Lab Results   Component Value Date    TSH 4.87 05/31/2024         No results found for: \"HGBA1C\"  "

## 2024-07-12 ENCOUNTER — APPOINTMENT (EMERGENCY)
Dept: RADIOLOGY | Facility: HOSPITAL | Age: 84
End: 2024-07-12
Payer: MEDICARE

## 2024-07-12 ENCOUNTER — HOSPITAL ENCOUNTER (EMERGENCY)
Facility: HOSPITAL | Age: 84
Discharge: HOME | End: 2024-07-12
Attending: EMERGENCY MEDICINE
Payer: MEDICARE

## 2024-07-12 VITALS
HEIGHT: 65 IN | RESPIRATION RATE: 16 BRPM | DIASTOLIC BLOOD PRESSURE: 82 MMHG | HEART RATE: 64 BPM | BODY MASS INDEX: 18.83 KG/M2 | SYSTOLIC BLOOD PRESSURE: 170 MMHG | OXYGEN SATURATION: 97 % | WEIGHT: 113 LBS | TEMPERATURE: 98.8 F

## 2024-07-12 DIAGNOSIS — N39.0 LOWER URINARY TRACT INFECTION: Primary | ICD-10-CM

## 2024-07-12 LAB
ALBUMIN SERPL-MCNC: 4.7 G/DL (ref 3.5–5.7)
ALP SERPL-CCNC: 70 IU/L (ref 34–125)
ALT SERPL-CCNC: 24 IU/L (ref 7–52)
ANION GAP SERPL CALC-SCNC: 8 MEQ/L (ref 3–15)
AST SERPL-CCNC: 37 IU/L (ref 13–39)
BACTERIA URNS QL MICRO: 1 /HPF
BASOPHILS # BLD: 0.04 K/UL (ref 0.01–0.1)
BASOPHILS NFR BLD: 0.2 %
BILIRUB SERPL-MCNC: 1.1 MG/DL (ref 0.3–1.2)
BILIRUB UR QL STRIP.AUTO: NEGATIVE MG/DL
BUN SERPL-MCNC: 18 MG/DL (ref 7–25)
CALCIUM SERPL-MCNC: 9.2 MG/DL (ref 8.6–10.3)
CHLORIDE SERPL-SCNC: 95 MEQ/L (ref 98–107)
CLARITY UR REFRACT.AUTO: ABNORMAL
CO2 SERPL-SCNC: 25 MEQ/L (ref 21–31)
COLOR UR AUTO: YELLOW
CREAT SERPL-MCNC: 0.7 MG/DL (ref 0.6–1.2)
DIFFERENTIAL METHOD BLD: ABNORMAL
EGFRCR SERPLBLD CKD-EPI 2021: >60 ML/MIN/1.73M*2
EOSINOPHIL # BLD: 0 K/UL (ref 0.04–0.36)
EOSINOPHIL NFR BLD: 0 %
ERYTHROCYTE [DISTWIDTH] IN BLOOD BY AUTOMATED COUNT: 12.8 % (ref 11.7–14.4)
FLUAV RNA SPEC QL NAA+PROBE: NEGATIVE
FLUBV RNA SPEC QL NAA+PROBE: NEGATIVE
GLUCOSE SERPL-MCNC: 116 MG/DL (ref 70–99)
GLUCOSE UR STRIP.AUTO-MCNC: NEGATIVE MG/DL
HCT VFR BLD AUTO: 46.1 % (ref 35–45)
HGB BLD-MCNC: 15.5 G/DL (ref 11.8–15.7)
HGB UR QL STRIP.AUTO: NEGATIVE
HYALINE CASTS #/AREA URNS LPF: ABNORMAL /LPF
IMM GRANULOCYTES # BLD AUTO: 0.2 K/UL (ref 0–0.08)
IMM GRANULOCYTES NFR BLD AUTO: 1.2 %
KETONES UR STRIP.AUTO-MCNC: NEGATIVE MG/DL
LACTATE SERPL-SCNC: 1.8 MMOL/L (ref 0.4–2)
LEUKOCYTE ESTERASE UR QL STRIP.AUTO: 3
LYMPHOCYTES # BLD: 0.59 K/UL (ref 1.2–3.5)
LYMPHOCYTES NFR BLD: 3.5 %
MCH RBC QN AUTO: 32 PG (ref 28–33.2)
MCHC RBC AUTO-ENTMCNC: 33.6 G/DL (ref 32.2–35.5)
MCV RBC AUTO: 95.2 FL (ref 83–98)
MONOCYTES # BLD: 0.51 K/UL (ref 0.28–0.8)
MONOCYTES NFR BLD: 3 %
MUCOUS THREADS URNS QL MICRO: ABNORMAL /LPF
NEUTROPHILS # BLD: 15.58 K/UL (ref 1.7–7)
NEUTS SEG NFR BLD: 92.1 %
NITRITE UR QL STRIP.AUTO: POSITIVE
NRBC BLD-RTO: 0 %
PDW BLD AUTO: 9.5 FL (ref 9.4–12.3)
PH UR STRIP.AUTO: 7.5 [PH]
PLATELET # BLD AUTO: 244 K/UL (ref 150–369)
POTASSIUM SERPL-SCNC: 5.3 MEQ/L (ref 3.5–5.1)
POTASSIUM SERPL-SCNC: 5.8 MEQ/L (ref 3.5–5.1)
PROT SERPL-MCNC: 7.9 G/DL (ref 6–8.2)
PROT UR QL STRIP.AUTO: 1
RBC # BLD AUTO: 4.84 M/UL (ref 3.93–5.22)
RBC #/AREA URNS HPF: ABNORMAL /HPF
RSV RNA SPEC QL NAA+PROBE: NEGATIVE
SARS-COV-2 RNA RESP QL NAA+PROBE: NEGATIVE
SODIUM SERPL-SCNC: 128 MEQ/L (ref 136–145)
SP GR UR REFRACT.AUTO: 1.02
SQUAMOUS URNS QL MICRO: ABNORMAL /HPF
TROPONIN I SERPL HS-MCNC: 7.4 PG/ML
TSH SERPL DL<=0.05 MIU/L-ACNC: 2.23 MIU/L (ref 0.34–5.6)
UROBILINOGEN UR STRIP-ACNC: 0.2 EU/DL
WBC # BLD AUTO: 16.92 K/UL (ref 3.8–10.5)
WBC #/AREA URNS HPF: ABNORMAL /HPF

## 2024-07-12 PROCEDURE — 87186 SC STD MICRODIL/AGAR DIL: CPT | Performed by: EMERGENCY MEDICINE

## 2024-07-12 PROCEDURE — 85025 COMPLETE CBC W/AUTO DIFF WBC: CPT

## 2024-07-12 PROCEDURE — 25800000 HC PHARMACY IV SOLUTIONS

## 2024-07-12 PROCEDURE — 36415 COLL VENOUS BLD VENIPUNCTURE: CPT

## 2024-07-12 PROCEDURE — 93005 ELECTROCARDIOGRAM TRACING: CPT

## 2024-07-12 PROCEDURE — 85025 COMPLETE CBC W/AUTO DIFF WBC: CPT | Performed by: EMERGENCY MEDICINE

## 2024-07-12 PROCEDURE — 81001 URINALYSIS AUTO W/SCOPE: CPT | Performed by: EMERGENCY MEDICINE

## 2024-07-12 PROCEDURE — 84443 ASSAY THYROID STIM HORMONE: CPT

## 2024-07-12 PROCEDURE — 96360 HYDRATION IV INFUSION INIT: CPT

## 2024-07-12 PROCEDURE — 70450 CT HEAD/BRAIN W/O DYE: CPT

## 2024-07-12 PROCEDURE — 82374 ASSAY BLOOD CARBON DIOXIDE: CPT | Performed by: EMERGENCY MEDICINE

## 2024-07-12 PROCEDURE — 99284 EMERGENCY DEPT VISIT MOD MDM: CPT | Mod: 25

## 2024-07-12 PROCEDURE — 83605 ASSAY OF LACTIC ACID: CPT

## 2024-07-12 PROCEDURE — 63700000 HC SELF-ADMINISTRABLE DRUG

## 2024-07-12 PROCEDURE — 93005 ELECTROCARDIOGRAM TRACING: CPT | Performed by: EMERGENCY MEDICINE

## 2024-07-12 PROCEDURE — 71045 X-RAY EXAM CHEST 1 VIEW: CPT

## 2024-07-12 PROCEDURE — 84484 ASSAY OF TROPONIN QUANT: CPT | Performed by: EMERGENCY MEDICINE

## 2024-07-12 PROCEDURE — 87637 SARSCOV2&INF A&B&RSV AMP PRB: CPT | Performed by: EMERGENCY MEDICINE

## 2024-07-12 PROCEDURE — 83605 ASSAY OF LACTIC ACID: CPT | Performed by: EMERGENCY MEDICINE

## 2024-07-12 RX ORDER — CEPHALEXIN 500 MG/1
500 CAPSULE ORAL 2 TIMES DAILY
Qty: 14 CAPSULE | Refills: 0 | Status: SHIPPED | OUTPATIENT
Start: 2024-07-12 | End: 2024-07-16

## 2024-07-12 RX ORDER — ACETAMINOPHEN 325 MG/1
975 TABLET ORAL ONCE
Status: COMPLETED | OUTPATIENT
Start: 2024-07-12 | End: 2024-07-12

## 2024-07-12 RX ORDER — CEPHALEXIN 500 MG/1
500 CAPSULE ORAL ONCE
Status: COMPLETED | OUTPATIENT
Start: 2024-07-12 | End: 2024-07-12

## 2024-07-12 RX ADMIN — CEPHALEXIN 500 MG: 500 CAPSULE ORAL at 15:13

## 2024-07-12 RX ADMIN — ACETAMINOPHEN 975 MG: 325 TABLET, FILM COATED ORAL at 13:48

## 2024-07-12 RX ADMIN — SODIUM CHLORIDE 1000 ML: 9 INJECTION, SOLUTION INTRAVENOUS at 13:49

## 2024-07-12 ASSESSMENT — ENCOUNTER SYMPTOMS
EYE PAIN: 0
HEMATURIA: 0
RHINORRHEA: 0
DIZZINESS: 0
MYALGIAS: 0
FLANK PAIN: 0
ABDOMINAL DISTENTION: 0
FATIGUE: 1
APPETITE CHANGE: 0
EYE REDNESS: 0
WHEEZING: 0
BACK PAIN: 0
ACTIVITY CHANGE: 1
ABDOMINAL PAIN: 0
VOMITING: 0
DIARRHEA: 0
DYSURIA: 0
SINUS PAIN: 1
FEVER: 0
WEAKNESS: 1
NAUSEA: 1
LIGHT-HEADEDNESS: 0
SHORTNESS OF BREATH: 0
FREQUENCY: 0
JOINT SWELLING: 0
CHILLS: 0

## 2024-07-12 NOTE — DISCHARGE INSTRUCTIONS
You have a urinary tract infection. We are prescribing an antibiotic called Keflex. Take one pill every 12 hours for 7 days.  You should return to the emergency department if you have worsening fever or symptoms after finishing your antibiotic course.

## 2024-07-12 NOTE — ED PROVIDER NOTES
"Emergency Medicine Note  HPI   HISTORY OF PRESENT ILLNESS       Weakness - Generalized  Associated symptoms: nausea    Associated symptoms: no abdominal pain, no chest pain, no diarrhea, no dizziness, no drooling, no dysuria, no fever, no frequency, no myalgias, no shortness of breath, no urgency and no vomiting        Afib on eliquis, hypothyroidism, presenting with fever. Last night patient noted that she had nausea, sinus pain, and overall felt \"lousy\". Denies vomiting, chest pain, sob, cough, abdominal pain,  diarrhea dysuria, urinary frequency or urgency.  She also woke up this morning with difficulty ambulating.  States she feels weak in both of her lower extremities.  Denies headache, vision changes, sensory deficits.  She required assistance with ambulating which she has not required typically.  At home she walks 3 miles a day on a treadmill.  She took a home COVID test which was negative.      Patient History   PAST HISTORY     Reviewed from Nursing Triage:       Past Medical History:   Diagnosis Date    A-fib (CMS/[facility])     Alcoholism (CMS/[facility])     Arrhythmia     Breast cancer (CMS/[facility])     Hypothyroidism     Osteoporosis     Parkinson's disease (CMS/[facility])     Parkinsons (CMS/[facility])        Past Surgical History:   Procedure Laterality Date    ABLATION OF DYSRHYTHMIC FOCUS      BREAST BIOPSY      2 breast biospy benign    BREAST LUMPECTOMY Left 2021    CHOLECYSTECTOMY      COSMETIC SURGERY      EYE SURGERY      SKIN BIOPSY         Family History   Problem Relation Age of Onset    Heart disease Biological Mother     Heart disease Biological Father     Heart attack Biological Father     Heart failure Biological Father     Anemia Biological Sister     Clotting disorder Biological Sister     Fainting Biological Sister     Hypertension Biological Sister        Social History     Tobacco Use    Smoking status: Former     Types: Cigarettes     Quit date:      Years since quittin.5    Smokeless tobacco: " Never   Vaping Use    Vaping Use: Never used   Substance Use Topics    Alcohol use: No    Drug use: No         Review of Systems   REVIEW OF SYSTEMS     Review of Systems   Constitutional:  Positive for activity change and fatigue. Negative for appetite change, chills and fever.   HENT:  Positive for sinus pain. Negative for congestion, drooling, postnasal drip and rhinorrhea.    Eyes:  Negative for pain and redness.   Respiratory:  Negative for shortness of breath and wheezing.    Cardiovascular:  Negative for chest pain.   Gastrointestinal:  Positive for nausea. Negative for abdominal distention, abdominal pain, diarrhea and vomiting.   Genitourinary:  Negative for dysuria, flank pain, frequency, hematuria and urgency.   Musculoskeletal:  Negative for back pain, joint swelling and myalgias.   Skin:  Negative for rash.   Neurological:  Positive for weakness. Negative for dizziness, syncope and light-headedness.         VITALS     ED Vitals      Date/Time Temp Pulse Resp BP SpO2 [facility]   07/12/24 1246 38.3 °C (101 °F) 68 17 195/86 96 % LTE                         Physical Exam   PHYSICAL EXAM     Physical Exam  Constitutional:       General: She is not in acute distress.     Appearance: She is not ill-appearing or toxic-appearing.   HENT:      Head: Normocephalic and atraumatic.      Nose: Nose normal.      Mouth/Throat:      Pharynx: Oropharynx is clear.   Eyes:      Extraocular Movements: Extraocular movements intact.      Conjunctiva/sclera: Conjunctivae normal.      Pupils: Pupils are equal, round, and reactive to light.   Cardiovascular:      Rate and Rhythm: Normal rate and regular rhythm.      Pulses: Normal pulses.      Heart sounds: Normal heart sounds.   Pulmonary:      Effort: Pulmonary effort is normal. No respiratory distress.      Breath sounds: Normal breath sounds. No wheezing, rhonchi or rales.   Abdominal:      General: There is no distension.      Palpations: Abdomen is soft.      Tenderness: There  is no abdominal tenderness. There is no right CVA tenderness, left CVA tenderness or guarding.   Musculoskeletal:         General: No tenderness.      Right lower leg: No edema.      Left lower leg: No edema.   Skin:     Capillary Refill: Capillary refill takes less than 2 seconds.      Findings: No rash.   Neurological:      General: No focal deficit present.      Mental Status: She is alert and oriented to person, place, and time.      Cranial Nerves: No cranial nerve deficit.      Sensory: No sensory deficit.      Motor: No weakness.      Coordination: Coordination normal.      Comments: Attempted to ambulate patient however she.  Unsteady when attempting to stand from sitting on the bed           PROCEDURES     Procedures     DATA     Results       Procedure Component Value Units Date/Time    CBC and differential [[id number]]  (Abnormal) Collected: 07/12/24 1258    Specimen: Blood, Venous Updated: 07/12/24 1316     WBC 16.92 K/uL      RBC 4.84 M/uL      Hemoglobin 15.5 g/dL      Hematocrit 46.1 %      MCV 95.2 fL      MCH 32.0 pg      MCHC 33.6 g/dL      RDW 12.8 %      Platelets 244 K/uL      MPV 9.5 fL      Differential Type Auto     nRBC 0.0 %      Immature Granulocytes 1.2 %      Neutrophils 92.1 %      Lymphocytes 3.5 %      Monocytes 3.0 %      Eosinophils 0.0 %      Basophils 0.2 %      Immature Granulocytes, Absolute 0.20 K/uL      Neutrophils, Absolute 15.58 K/uL      Lymphocytes, Absolute 0.59 K/uL      Monocytes, Absolute 0.51 K/uL      Eosinophils, Absolute 0.00 K/uL      Basophils, Absolute 0.04 K/uL     Lactate, w/ reflex repeat if > 2.0 [[id number]]  (Normal) Collected: 07/12/24 1258    Specimen: Blood, Venous Updated: 07/12/24 1315     Lactate 1.8 mmol/L     Comprehensive metabolic panel [[id number]] Collected: 07/12/24 1258    Specimen: Blood, Venous Updated: 07/12/24 1310    HS Troponin (with 2 hour reflex) [[id number]] Collected: 07/12/24 1258    Specimen: Blood, Venous Updated: 07/12/24 1310             Imaging Results    None         ECG 12 lead          Scoring tools                                  ED Course & MDM   MDM / ED COURSE / CLINICAL IMPRESSION / DISPO     84-year-old female presenting with bilateral lower extremity weakness.  Patient is febrile in .8, not tachycardic, not toxic appearing.  She has a leukocytosis of 17.  Lactate was 1.8.  Abdominal exam was benign without concern for acute abdominal pathology so imaging was not pursued.  Hyponatremic at 128.  Was given a bolus of normal saline.  Initial potassium was 5.8 over was moderately hemolyzed.  Repeat potassium was 5.3 but was still moderately hemolyzed.  Patient's heart rate was within normal limits and EKG did not show findings of significant hyperkalemia so therapy was not initiated.  Urinalysis is positive for leukocyte esterase, nitrates, bacteria, white blood cells consistent with a urinary tract infection.  Patient was given 1 dose of Keflex in the emergency department and discharged with 7-day course of Keflex.    Medical Decision Making  Amount and/or Complexity of Data Reviewed  Labs: ordered. Decision-making details documented in ED Course.  Radiology: ordered.  ECG/medicine tests: ordered.    Risk  OTC drugs.  Prescription drug management.        ED Course as of 07/12/24 1356   Fri Jul 12, 2024   1342 Potassium, Bld(!): 5.8  Moderate hemolysis, EKG no peaked T-waves, reordering K lab [AB]   1355 UA w/ reflex culture (ED Only)(!)  UTI [AB]   1355 Creatinine: 0.7  Normal kidney function [AB]   1355 Lactate: 1.8  WNL [AB]      ED Course User Index  [AB] Sylvester Perez MD     Clinical Impression      None                 Sylvester Perez MD  Resident  07/12/24 4817

## 2024-07-12 NOTE — ED ATTESTATION NOTE
Procedures  Physical Exam  Review of Systems    7/12/20241:51 PM  I have personally seen and examined the patient.  I personally performed the key   components of the encounter and provided a substantive portion of the care and   medical decision making for this patient.     I have reviewed and agree with the PA/NP/Resident's assessment and ED plan of care, with any exceptions as documented below.  My examination, assessment and plan of care of Orly Cobian is as follows:    Patient is a 84-year-old female who presents with fever and generalized weakness, symptoms started yesterday, patient does not have any discomfort that she is reporting, no cough or significant shortness of breath reported, no abdominal discomfort, no nausea or vomiting, patient does not complain of any urinary symptoms at this time    Exam:   Vital signs have been reviewed, pulse ox is 96% on room air, normal    Heart: RRR, normal S1/S2  Lungs: CTA bilaterally  Abdo: soft, non-tender    Plan/Medical Decision Making: Patient is a 84-year-old female who presents with fever and generalized weakness, checking labs and imaging, reevaluate afterwards      This document was created using Dragon Dictation software. There might be some typographical errors due to this technology.          Godshall, Duane K, MD  07/12/24 8634

## 2024-07-13 LAB
ATRIAL RATE: 69
P AXIS: 74
PR INTERVAL: 288
QRS DURATION: 168
QT INTERVAL: 504
QTC CALCULATION(BAZETT): 540
R AXIS: -56
T WAVE AXIS: 53
VENTRICULAR RATE: 69

## 2024-07-14 LAB
BACTERIA UR CULT: ABNORMAL
BACTERIA UR CULT: ABNORMAL

## 2024-07-14 RX ORDER — AMOXICILLIN AND CLAVULANATE POTASSIUM 875; 125 MG/1; MG/1
1 TABLET, FILM COATED ORAL
Qty: 20 TABLET | Refills: 0 | Status: SHIPPED
Start: 2024-07-14 | End: 2024-07-24

## 2024-07-16 ENCOUNTER — OFFICE VISIT (OUTPATIENT)
Dept: NEUROLOGY | Facility: CLINIC | Age: 84
End: 2024-07-16
Payer: MEDICARE

## 2024-07-16 VITALS
RESPIRATION RATE: 16 BRPM | OXYGEN SATURATION: 98 % | WEIGHT: 113.4 LBS | SYSTOLIC BLOOD PRESSURE: 152 MMHG | HEART RATE: 67 BPM | BODY MASS INDEX: 18.89 KG/M2 | DIASTOLIC BLOOD PRESSURE: 70 MMHG | HEIGHT: 65 IN

## 2024-07-16 DIAGNOSIS — G20.A1 PARKINSON'S DISEASE WITHOUT DYSKINESIA OR FLUCTUATING MANIFESTATIONS (CMS/HCC): Primary | ICD-10-CM

## 2024-07-16 PROCEDURE — 99215 OFFICE O/P EST HI 40 MIN: CPT | Performed by: PSYCHIATRY & NEUROLOGY

## 2024-07-16 RX ORDER — CARBIDOPA AND LEVODOPA 25; 100 MG/1; MG/1
1 TABLET ORAL 3 TIMES DAILY
Qty: 270 TABLET | Refills: 3 | Status: SHIPPED | OUTPATIENT
Start: 2024-07-16 | End: 2025-01-07

## 2024-07-16 NOTE — PROGRESS NOTES
Patient ID: Orly Cobian                              : 1940  MRN: [id number]                                            VISIT DATE: 2024  ENCOUNTER PROVIDER: Laine Ariza  REFERRING PROVIDER: No ref. provider found    REASON FOR VISIT: Orly Cobian is an 84 year old amibdextrous woman with PMH breast cancer, afib on eliquis who follows up for tremor. She was last seen 6 months ago.  She is accompanied by her .     INTERIM HISTORY:    She fell in March in the parking lot. No other falls.    Thursday night she felt achy. The next morning she had trouble getting out of bed. They called 911 and she went to the ER. She was febrile. CTH reviewed no acute change. She was diagnosed with a UTI. They gave her antibiotic however she was still having trouble walking. Her antibiotic was switched to augmentin. She is moving much better but not back to baseline. She notified her PCP today. Advised to follow-up with him.    She has otherwise been doing well with her walking. She thinks fine motor control has worsened. She has trouble with buttoning. Handwriting is not good.    Voice is raspy. Some difficulty swallowing.    PAST MEDICAL HISTORY:  has a past medical history of A-fib (CMS/HCC), Alcoholism (CMS/HCC), Arrhythmia, Breast cancer (CMS/HCC), Hypothyroidism, Osteoporosis, Parkinson's disease (CMS/HCC), and Parkinsons (CMS/HCC). C2 fracture.    PAST SURGICAL HISTORY:  has a past surgical history that includes Cholecystectomy; Cosmetic surgery; Eye surgery; Skin biopsy; Ablation of dysrhythmic focus; Breast biopsy; and Breast lumpectomy (Left, 2021).    SOCIAL HISTORY:   Social History     Tobacco Use    Smoking status: Former     Types: Cigarettes     Quit date:      Years since quittin.5    Smokeless tobacco: Never   Vaping Use    Vaping Use: Never used   Substance Use Topics    Alcohol use: No    Drug use: No     FAMILY HISTORY: family history includes Anemia in her biological  sister; Clotting disorder in her biological sister; Fainting in her biological sister; Heart attack in her biological father; Heart disease in her biological father and biological mother; Heart failure in her biological father; Hypertension in her biological sister.    MEDICATIONS:   Current Outpatient Medications:     amoxicillin-pot clavulanate (AUGMENTIN) 875-125 mg per tablet, Take 1 tablet by mouth every 12 (twelve) hours for 10 days., Disp: 20 tablet, Rfl: 0    anastrozole (ARIMIDEX) 1 mg tablet, Take  1 mg daily with lunch, Disp: , Rfl:     apixaban (ELIQUIS) 5 mg tablet, Take 1 tablet (5 mg total) by mouth 2 (two) times a day., Disp: 180 tablet, Rfl: 1    atorvastatin (LIPITOR) 10 mg tablet, Take 1 tablet (10 mg total) by mouth every evening., Disp: 90 tablet, Rfl: 3    cholecalciferol, vitamin D3, 1,000 unit (25 mcg) tablet, Take 1,000 Units by mouth daily with lunch., Disp: , Rfl:     dorzolamide (TRUSOPT) 2 % ophthalmic solution, Administer 1 drop into the right eye 2 (two) times a day., Disp: , Rfl:     flecainide (TAMBOCOR) 100 mg tablet, Take 1 tablet (100 mg total) by mouth every 12 (twelve) hours., Disp: 180 tablet, Rfl: 3    latanoprost (XALATAN) 0.005 % ophthalmic solution, Administer 1 drop into both eyes nightly., Disp: , Rfl:     levothyroxine (SYNTHROID) 75 mcg tablet, Take 75 mcg by mouth daily., Disp: , Rfl:     LORazepam (ATIVAN) 0.5 mg tablet, take 1 tablet by mouth once daily if needed, Disp: , Rfl:     lutein-zeaxanthin 25-5 mg capsule, Take 1 capsule by mouth daily. ocuvit, Disp: , Rfl:     MAGNESIUM GLYCINATE, BULK, MISC, Take by mouth See admin instr. CAPSULE, Disp: , Rfl:     metoprolol succinate XL (TOPROL-XL) 25 mg 24 hr tablet, Take 1 tablet (25 mg total) by mouth nightly., Disp: 90 tablet, Rfl: 3    cephalexin (KEFLEX) 500 mg capsule, Take 1 capsule (500 mg total) by mouth 2 (two) times a day for 7 days. (Patient not taking: Reported on 7/16/2024), Disp: 14 capsule, Rfl:  "0    ALLERGIES: is allergic to erythromycin, erythromycin base, and bactrim [sulfamethoxazole-trimethoprim].     REVIEW OF SYSTEMS:  All other systems reviewed and negative except as noted in the HPI.    PHYSICAL EXAM:  Visit Vitals  BP (!) 152/70 (BP Location: Left upper arm, Patient Position: Sitting)   Pulse 67   Resp 16   Ht 1.651 m (5' 5\")   Wt 51.4 kg (113 lb 6.4 oz)   SpO2 98%   BMI 18.87 kg/m²     GENERAL APPEARANCE: Well appearing, well nourished and well developed.  Not in acute distress.  Appears stated age.  HEAD: Normocephalic, atraumatic.  EYES:  Sclerae white. Conjunctivae clear.  NECK:  Neck was supple.  CARDIOVASCULAR:  Regular rate and rhythm.  EXTREMITIES: No clubbing, no cyanosis nor edema.  SKIN:  Dry, intact. No rashes noted. Warm to touch.  PSYCHIATRIC: Calm and cooperative with appropriate insight    NEUROLOGICAL EXAM:  MENTAL STATUS: Awake and alert with fluent language. Attention, memory, and executive function were intact.  CRANIAL NERVES:  Pupils are equal, round and reactive to light. Optic discs could not be visualized on fundoscopy. Extraocular movements are intact. Normal pursuits and saccades. No nystagmus. Facial strength and sensation intact. Hearing grossly intact. Uvula and tongue are midline. No dysarthria.  MOTOR:  Normal muscle bulk. Full strength in the arms and legs proximally and distally.  Good facial expression  Slight rigidity in the left wrist  No resting tremor. No postural and action tremor.  There was mild bradykinesia finger taps, hand opening-closing, hand supination-pronation on the left  Mild bradykinesia on toe taps on the left, normal heel taps  Handwriting: mild progressive micrographia  COORDINATION: No ataxia on finger-to-nose  GAIT: Able to stand without pushing up from the chair. Gait was normal based with good strides and heel clearance, slow. Arm swing was mildly reduced on the right. Turn was slow.    LABORATORY STUDIES:  Reviewed in chart    IMAGING " STUDIES:   I personally reviewed the [facility] 6/2022: no acute intracranial change.    IMPRESSION:  Orly Cobian is an 84 year old amibdextrous woman with PMH breast cancer, afib on eliquis who follows up for tremor. She is currently being treated for UTI, which has exacerbated her neurologic symptoms. She does appear more parkinsonian today and expect she will improve with appropriate antibiotic treatment of her UTI. However, she notes worsening of fine motor control prior to the infection.    RECOMMENDATIONS:  -Refer to PT, OT, SLP  -She would like to start medication for PD. After she has finished her antibiotic and feeling better, gradually start Sinemet and increase potentially to 1 tab tid as instructed. Potential side effects discussed.   -Exercise    Follow-up will be scheduled in 3-4 months, sooner if needed.    Thank you for allowing me to participate in the care of your patient.  Please feel free to contact me at any time if you have any further questions.      Laine Ariza MD  Movement Disorders

## 2024-07-16 NOTE — LETTER
2024     Anton Muniz MD  [address]  [place] [address]    Patient: Orly Cobian  YOB: 1940  Date of Visit: 2024      Dear Dr. Muniz:    Thank you for referring Orly Cobian to me for evaluation. Below are my notes for this consultation.    If you have questions, please do not hesitate to call me. I look forward to following your patient along with you.         Sincerely,        Laine Ariza MD        CC: No Recipients    Laine Ariza MD  2024  5:43 PM  Sign when Signing Visit  Patient ID: Orly Cobian                              : 1940  MRN: [id number]                                            VISIT DATE: 2024  ENCOUNTER PROVIDER: Laine Ariza  REFERRING PROVIDER: No ref. provider found    REASON FOR VISIT: Orly Cobian is an 84 year old amibdextrous woman with PMH breast cancer, afib on eliquis who follows up for tremor. She was last seen 6 months ago.  She is accompanied by her .     INTERIM HISTORY:    She fell in March in the parking lot. No other falls.    Thursday night she felt achy. The next morning she had trouble getting out of bed. They called 911 and she went to the ER. She was febrile. CTH reviewed no acute change. She was diagnosed with a UTI. They gave her antibiotic however she was still having trouble walking. Her antibiotic was switched to augmentin. She is moving much better but not back to baseline. She notified her PCP today. Advised to follow-up with him.    She has otherwise been doing well with her walking. She thinks fine motor control has worsened. She has trouble with buttoning. Handwriting is not good.    Voice is raspy. Some difficulty swallowing.    PAST MEDICAL HISTORY:  has a past medical history of A-fib (CMS/HCC), Alcoholism (CMS/HCC), Arrhythmia, Breast cancer (CMS/HCC), Hypothyroidism, Osteoporosis, Parkinson's disease (CMS/HCC), and Parkinsons (CMS/HCC). C2 fracture.    PAST SURGICAL HISTORY:  has a  past surgical history that includes Cholecystectomy; Cosmetic surgery; Eye surgery; Skin biopsy; Ablation of dysrhythmic focus; Breast biopsy; and Breast lumpectomy (Left, 2021).    SOCIAL HISTORY:   Social History     Tobacco Use   • Smoking status: Former     Types: Cigarettes     Quit date:      Years since quittin.5   • Smokeless tobacco: Never   Vaping Use   • Vaping Use: Never used   Substance Use Topics   • Alcohol use: No   • Drug use: No     FAMILY HISTORY: family history includes Anemia in her biological sister; Clotting disorder in her biological sister; Fainting in her biological sister; Heart attack in her biological father; Heart disease in her biological father and biological mother; Heart failure in her biological father; Hypertension in her biological sister.    MEDICATIONS:   Current Outpatient Medications:   •  amoxicillin-pot clavulanate (AUGMENTIN) 875-125 mg per tablet, Take 1 tablet by mouth every 12 (twelve) hours for 10 days., Disp: 20 tablet, Rfl: 0  •  anastrozole (ARIMIDEX) 1 mg tablet, Take  1 mg daily with lunch, Disp: , Rfl:   •  apixaban (ELIQUIS) 5 mg tablet, Take 1 tablet (5 mg total) by mouth 2 (two) times a day., Disp: 180 tablet, Rfl: 1  •  atorvastatin (LIPITOR) 10 mg tablet, Take 1 tablet (10 mg total) by mouth every evening., Disp: 90 tablet, Rfl: 3  •  cholecalciferol, vitamin D3, 1,000 unit (25 mcg) tablet, Take 1,000 Units by mouth daily with lunch., Disp: , Rfl:   •  dorzolamide (TRUSOPT) 2 % ophthalmic solution, Administer 1 drop into the right eye 2 (two) times a day., Disp: , Rfl:   •  flecainide (TAMBOCOR) 100 mg tablet, Take 1 tablet (100 mg total) by mouth every 12 (twelve) hours., Disp: 180 tablet, Rfl: 3  •  latanoprost (XALATAN) 0.005 % ophthalmic solution, Administer 1 drop into both eyes nightly., Disp: , Rfl:   •  levothyroxine (SYNTHROID) 75 mcg tablet, Take 75 mcg by mouth daily., Disp: , Rfl:   •  LORazepam (ATIVAN) 0.5 mg tablet, take 1  "tablet by mouth once daily if needed, Disp: , Rfl:   •  lutein-zeaxanthin 25-5 mg capsule, Take 1 capsule by mouth daily. ocuvit, Disp: , Rfl:   •  MAGNESIUM GLYCINATE, BULK, MISC, Take by mouth See admin instr. CAPSULE, Disp: , Rfl:   •  metoprolol succinate XL (TOPROL-XL) 25 mg 24 hr tablet, Take 1 tablet (25 mg total) by mouth nightly., Disp: 90 tablet, Rfl: 3  •  cephalexin (KEFLEX) 500 mg capsule, Take 1 capsule (500 mg total) by mouth 2 (two) times a day for 7 days. (Patient not taking: Reported on 7/16/2024), Disp: 14 capsule, Rfl: 0    ALLERGIES: is allergic to erythromycin, erythromycin base, and bactrim [sulfamethoxazole-trimethoprim].     REVIEW OF SYSTEMS:  All other systems reviewed and negative except as noted in the HPI.    PHYSICAL EXAM:  Visit Vitals  BP (!) 152/70 (BP Location: Left upper arm, Patient Position: Sitting)   Pulse 67   Resp 16   Ht 1.651 m (5' 5\")   Wt 51.4 kg (113 lb 6.4 oz)   SpO2 98%   BMI 18.87 kg/m²     GENERAL APPEARANCE: Well appearing, well nourished and well developed.  Not in acute distress.  Appears stated age.  HEAD: Normocephalic, atraumatic.  EYES:  Sclerae white. Conjunctivae clear.  NECK:  Neck was supple.  CARDIOVASCULAR:  Regular rate and rhythm.  EXTREMITIES: No clubbing, no cyanosis nor edema.  SKIN:  Dry, intact. No rashes noted. Warm to touch.  PSYCHIATRIC: Calm and cooperative with appropriate insight    NEUROLOGICAL EXAM:  MENTAL STATUS: Awake and alert with fluent language. Attention, memory, and executive function were intact.  CRANIAL NERVES:  Pupils are equal, round and reactive to light. Optic discs could not be visualized on fundoscopy. Extraocular movements are intact. Normal pursuits and saccades. No nystagmus. Facial strength and sensation intact. Hearing grossly intact. Uvula and tongue are midline. No dysarthria.  MOTOR:  Normal muscle bulk. Full strength in the arms and legs proximally and distally.  Good facial expression  Slight rigidity in the " left wrist  No resting tremor. No postural and action tremor.  There was mild bradykinesia finger taps, hand opening-closing, hand supination-pronation on the left  Mild bradykinesia on toe taps on the left, normal heel taps  Handwriting: mild progressive micrographia  COORDINATION: No ataxia on finger-to-nose  GAIT: Able to stand without pushing up from the chair. Gait was normal based with good strides and heel clearance, slow. Arm swing was mildly reduced on the right. Turn was slow.    LABORATORY STUDIES:  Reviewed in chart    IMAGING STUDIES:   I personally reviewed the [facility] 6/2022: no acute intracranial change.    IMPRESSION:  Orly Cobian is an 84 year old amibdextrous woman with PMH breast cancer, afib on eliquis who follows up for tremor. She is currently being treated for UTI, which has exacerbated her neurologic symptoms. She does appear more parkinsonian today and expect she will improve with appropriate antibiotic treatment of her UTI. However, she notes worsening of fine motor control prior to the infection.    RECOMMENDATIONS:  -Refer to PT, OT, SLP  -She would like to start medication for PD. After she has finished her antibiotic and feeling better, gradually start Sinemet and increase potentially to 1 tab tid as instructed. Potential side effects discussed.   -Exercise    Follow-up will be scheduled in 3-4 months, sooner if needed.    Thank you for allowing me to participate in the care of your patient.  Please feel free to contact me at any time if you have any further questions.      Laine Ariza MD  Movement Disorders

## 2024-07-16 NOTE — PATIENT INSTRUCTIONS
After you have finished the antibiotics:   Start Sinemet (carbidopa-levodopa) as follows:      Morning  Afternoon  Evening     Week 1:  1/2 tab   --  --  THEN    Week 2:  1/2 tab   --  1/2 tab  THEN    Week 3:  1/2 tab  1/2 tab   1/2 tab  THEN    Week 4:  1 tab  1/2 tab  1/2 tab  THEN     Week 5:  1 tab  1 tab  1/2 tab  THEN    Week 6:  1 tab  1 tab   1 tab  CONTINUE       Possible side effects: nausea, fatigue, lightheadedness

## 2024-08-16 ENCOUNTER — TRANSCRIBE ORDERS (OUTPATIENT)
Dept: REGISTRATION | Facility: HOSPITAL | Age: 84
End: 2024-08-16

## 2024-08-16 ENCOUNTER — APPOINTMENT (OUTPATIENT)
Dept: LAB | Facility: HOSPITAL | Age: 84
End: 2024-08-16
Attending: FAMILY MEDICINE
Payer: MEDICARE

## 2024-08-16 DIAGNOSIS — E78.00 PURE HYPERCHOLESTEROLEMIA, UNSPECIFIED: ICD-10-CM

## 2024-08-16 DIAGNOSIS — I48.0 PAROXYSMAL ATRIAL FIBRILLATION (CMS/HCC): ICD-10-CM

## 2024-08-16 DIAGNOSIS — E03.9 HYPOTHYROIDISM, UNSPECIFIED: Primary | ICD-10-CM

## 2024-08-16 DIAGNOSIS — E55.9 VITAMIN D DEFICIENCY, UNSPECIFIED: ICD-10-CM

## 2024-08-16 DIAGNOSIS — E03.9 HYPOTHYROIDISM, UNSPECIFIED: ICD-10-CM

## 2024-08-16 LAB
25(OH)D3 SERPL-MCNC: 42 NG/ML (ref 30–100)
ALBUMIN SERPL-MCNC: 4.5 G/DL (ref 3.5–5.7)
ALP SERPL-CCNC: 74 IU/L (ref 34–125)
ALT SERPL-CCNC: 18 IU/L (ref 7–52)
ANION GAP SERPL CALC-SCNC: 14 MEQ/L (ref 3–15)
AST SERPL-CCNC: 19 IU/L (ref 13–39)
BASOPHILS # BLD: 0.09 K/UL (ref 0.01–0.1)
BASOPHILS NFR BLD: 1.6 %
BILIRUB SERPL-MCNC: 0.7 MG/DL (ref 0.3–1.2)
BUN SERPL-MCNC: 22 MG/DL (ref 7–25)
CALCIUM SERPL-MCNC: 9.3 MG/DL (ref 8.6–10.3)
CHLORIDE SERPL-SCNC: 100 MEQ/L (ref 98–107)
CHOLEST SERPL-MCNC: 162 MG/DL
CO2 SERPL-SCNC: 23 MEQ/L (ref 21–31)
CREAT SERPL-MCNC: 0.8 MG/DL (ref 0.6–1.2)
DIFFERENTIAL METHOD BLD: ABNORMAL
EGFRCR SERPLBLD CKD-EPI 2021: >60 ML/MIN/1.73M*2
EOSINOPHIL # BLD: 0.19 K/UL (ref 0.04–0.36)
EOSINOPHIL NFR BLD: 3.4 %
ERYTHROCYTE [DISTWIDTH] IN BLOOD BY AUTOMATED COUNT: 13.9 % (ref 11.7–14.4)
GLUCOSE SERPL-MCNC: 87 MG/DL (ref 70–99)
HCT VFR BLD AUTO: 43.3 % (ref 35–45)
HDLC SERPL-MCNC: 68 MG/DL
HDLC SERPL: 2.4 {RATIO}
HGB BLD-MCNC: 14.4 G/DL (ref 11.8–15.7)
IMM GRANULOCYTES # BLD AUTO: 0.02 K/UL (ref 0–0.08)
IMM GRANULOCYTES NFR BLD AUTO: 0.4 %
LDLC SERPL CALC-MCNC: 77 MG/DL
LYMPHOCYTES # BLD: 1.66 K/UL (ref 1.2–3.5)
LYMPHOCYTES NFR BLD: 29.3 %
MCH RBC QN AUTO: 32.7 PG (ref 28–33.2)
MCHC RBC AUTO-ENTMCNC: 33.3 G/DL (ref 32.2–35.5)
MCV RBC AUTO: 98.2 FL (ref 83–98)
MONOCYTES # BLD: 0.58 K/UL (ref 0.28–0.8)
MONOCYTES NFR BLD: 10.2 %
NEUTROPHILS # BLD: 3.13 K/UL (ref 1.7–7)
NEUTS SEG NFR BLD: 55.1 %
NONHDLC SERPL-MCNC: 94 MG/DL
NRBC BLD-RTO: 0 %
PDW BLD AUTO: 9.7 FL (ref 9.4–12.3)
PLATELET # BLD AUTO: 265 K/UL (ref 150–369)
POTASSIUM SERPL-SCNC: 4.8 MEQ/L (ref 3.5–5.1)
PROT SERPL-MCNC: 7 G/DL (ref 6–8.2)
RBC # BLD AUTO: 4.41 M/UL (ref 3.93–5.22)
SODIUM SERPL-SCNC: 137 MEQ/L (ref 136–145)
T4 FREE SERPL-MCNC: 1.11 NG/DL (ref 0.58–1.64)
TRIGL SERPL-MCNC: 85 MG/DL
TSH SERPL DL<=0.05 MIU/L-ACNC: 4.07 MIU/L (ref 0.34–5.6)
WBC # BLD AUTO: 5.67 K/UL (ref 3.8–10.5)

## 2024-08-16 PROCEDURE — 36415 COLL VENOUS BLD VENIPUNCTURE: CPT

## 2024-08-16 PROCEDURE — 84439 ASSAY OF FREE THYROXINE: CPT

## 2024-08-16 PROCEDURE — 80053 COMPREHEN METABOLIC PANEL: CPT

## 2024-08-16 PROCEDURE — 82306 VITAMIN D 25 HYDROXY: CPT

## 2024-08-16 PROCEDURE — 85025 COMPLETE CBC W/AUTO DIFF WBC: CPT

## 2024-08-16 PROCEDURE — 84443 ASSAY THYROID STIM HORMONE: CPT

## 2024-08-16 PROCEDURE — 80061 LIPID PANEL: CPT

## 2024-08-23 ENCOUNTER — HOSPITAL ENCOUNTER (OUTPATIENT)
Dept: PHYSICAL THERAPY | Age: 84
Setting detail: THERAPIES SERIES
Discharge: HOME | End: 2024-08-23
Attending: PSYCHIATRY & NEUROLOGY
Payer: MEDICARE

## 2024-08-23 DIAGNOSIS — R26.89 DECREASED FUNCTIONAL MOBILITY: Primary | ICD-10-CM

## 2024-08-23 DIAGNOSIS — G20.A1 PARKINSON'S DISEASE WITHOUT DYSKINESIA OR FLUCTUATING MANIFESTATIONS (CMS/HCC): ICD-10-CM

## 2024-08-23 PROCEDURE — 97530 THERAPEUTIC ACTIVITIES: CPT | Mod: GP

## 2024-08-23 PROCEDURE — 97162 PT EVAL MOD COMPLEX 30 MIN: CPT | Mod: GP

## 2024-08-23 ASSESSMENT — GAIT ASSESSMENTS
TUG TIME: 9.3
TUG TIME: 11.2
TUG TIME: 10.7
TUG TIME: 11.69

## 2024-08-23 NOTE — OP PT TREATMENT LOG
PT FLOWSHEET    Exercises Current Session Time Completed (Y/N/G)   MODALITIES- HEAT/ICE (14973) TOTAL TIME FOR SESSION Not performed    Heat     Ice     THER ACT (82024) TOTAL TIME FOR SESSION 8-22 Minutes    Assessment      Vitals/pain See note Y   Education 8/23/24: results of exam, areas that PT will be able to focus, plan of care, possibility of participating in OT after this episode ends to focus more on fine motor coordination - patient verbalizes agreement and understanding Y   Daily task training Changing bed sheets  Carrying objects     Transfer training Floor transfers    GROUP (96641)  Not performed    THER EX (76570) TOTAL TIME FOR SESSION Not performed    HEP Next visit    CARDIOVASCULAR      Nu Step     STRENGTHENING     BIG Sit to stand     Heel rise     Toe walk          STRETCHING/ROM     Calf stretch on wedge               NEURO RE-ED (89464) TOTAL TIME FOR SESSION Not performed    Forward sit and reach     Side sit and reach     Forward step and reach     Side step and reach     Backward step and reach     Forward rock and reach     Side rock and reach     Dynamic training     Obstacle course          GAIT TRAINING (22685) TOTAL TIME FOR SESSION Not performed    BIG walking          Y = yes; N = no; G = group; NV = next visit; H = hold

## 2024-08-23 NOTE — PROGRESS NOTES
Physical Therapy Evaluation    [facility] OP Therapy Fax: [phone]    PT EVALUATION FOR OUTPATIENT THERAPY    Patient: Orly Cobian    MRN: [id number]  : 1940 84 y.o.     Referring Physician: Laine Ariza MD  Date of Visit: 2024      Certification Dates:  24 through 11/15/24         Recommended Frequency & Duration:  2 times/week for up to 3 months     Diagnosis:   1. Decreased functional mobility    2. Parkinson's disease without dyskinesia or fluctuating manifestations (CMS/HCC)        Chief Complaints:   Chief Complaint   Patient presents with    Dec Strength    Decreased Mobility    Decreased Endurance       Precautions: cardiac, other (see comments)  Precautions additional comments: A-fib, Ca, osteoporosis    Past Medical History:   Past Medical History:   Diagnosis Date    A-fib (CMS/HCC)     Alcoholism (CMS/HCC)     Arrhythmia     Breast cancer (CMS/HCC)     Hypothyroidism     Osteoporosis     Parkinson's disease (CMS/HCC)     Parkinsons (CMS/HCC)        Past Surgical History:   Past Surgical History:   Procedure Laterality Date    ABLATION OF DYSRHYTHMIC FOCUS      BREAST BIOPSY      2 breast biospy benign    BREAST LUMPECTOMY Left 2021    CHOLECYSTECTOMY      COSMETIC SURGERY      EYE SURGERY      SKIN BIOPSY           LEARNING ASSESSMENT    Assessment completed:  Yes    Learner name:  Orly    Learner: Patient    Learning Barriers:  Learning barriers: No Barriers    Preferred Language: English     Needed: No    Education Provided:   Method: Discussion, Handout, and Demonstration  Readiness: acceptance  Response: Demonstrated understanding and Verbalizes understanding      CO-LEARNER ASSESSMENT:    Completed: No      Welcome letter discussed: Yes Patient provided with Welcome Letter, which includes attendance policy. Provided education regarding cancellation and no-show policy. Education regarding the importance of participation and regular attendance to maximize  goal attainment.       OBJECTIVE MEASUREMENTS/DATA:    Time In Session:  Start Time: 0845  Stop Time: 0950  Time Calculation (min): 65 min   Assessment and Plan - 08/23/24 1230          Assessment    Plan of Care reviewed and patient/family in agreement Yes     System Pathology/Pathophysiology Noted neuromuscular     Functional Limitations in Following Categories (PT Eval) home management;community/leisure     Rehab Potential/Prognosis good, to achieve stated therapy goals     Problem List decreased flexibility;decreased strength;impaired motor control;impaired balance;decreased endurance     Clinical Assessment Orly Cobian is an 84 year-old female with a diagnosis of Parkinson's Disease referred to outpatient physical therapy for mobility dysfunction. She presents with a loss of functional strength, decreased endurance, decreased efficiency of transfers, mild gait deviations, impaired dynamic balance, and limited community ambulation. The PDQ-39 shows a 34% loss of general function, and the functional tasks recording form shows a 52% loss if function in salient activities. Orly has good rehab potential. She will benefit from physical therapy focusing on noted impairments to improve functional mobility and independence.     Plan and Recommendations Start physical therapy twice per week.     Planned Services CPT 51781 Therapeutic exercises;CPT 99753 Therapeutic activities;CPT 66275 Neuromuscular Reeducation;CPT 48051 Gait training                    General Information - 08/23/24 0842          Session Details    Document Type initial evaluation     Mode of Treatment individual therapy        General Information    Referring Physician Laine Ariza MD     History of present illness/functional impairment Orly Cobian was diagnosed with Parkinson's Disease in 2023 and has been noting a loss of fine motor coordination. She is previously active in walking two to three miles per day. Currently, she has difficulty  completing two miles secondary to reduced endurance. After attempting to walk longer distances, she notes more significant limits the next day. The patient reports that she has lost some mobility, especially rising from the floor. She feels pain in her legs while rising. Standing from a chair is also somewhat difficult. The patient lives in a three story home, and negotiating the stairs can be limited. She fell in 2021 and fractured a vertebra. Orly has not fallen in the last three years.     Patient/Family/Caregiver Comments/Observations The patient reports decreased mobility, especially in transfers.     Existing Precautions/Restrictions cardiac;other (see comments)     Precautions comments A-fib, Ca, osteoporosis         Services    Do You Speak a Language Other Than English at Home? no                    Pain/Vitals - 08/23/24 0842          Pain Assessment    Currently in pain No/Denies        Pre Activity Vital Signs    /78     BP Location Left upper arm     BP Method Manual     Patient Position Sitting                    Falls/Food Screening - 08/23/24 0842          Initial Falls Assessment    One or more falls in the last year No        Food Insecurity    Within the past 12 months, you worried that your food would run out before you got the money to buy more. Never true     Within the past 12 months, the food you bought just didn't last and you didn't have money to get more. Never true                    PT - 08/23/24 0842          Physical Therapy    Physical Therapy Specialty Traditional Neuro PT        PT Plan    Frequency of treatment 2 times/week     PT Duration 3 months     PT Cert From 08/23/24     PT Cert To 11/15/24     Date PT POC was sent to provider 08/23/24     Signed PT Plan of Care received?  No                       Living Environment    Living Environment - 08/23/24 0842          Living Environment    People in Home spouse     Living Arrangements house     Living  Environment Comment The patient lives in a three story home with hand rails. She has a walk in shower with grab bars.     Transportation Concerns other (see comments)   does not drive secondary to decreased confidence                  PLOF:    Prior Level of Function - 08/23/24 0842          OTHER    Previous level of function The patient is previously independent and active in walking two to three miles per day.                   Transfers    Transfers - 08/23/24 0842          Sit to Stand Transfer    Crary, Sit to Stand Transfer independent     Comment inconsistent completion of full stand secondary to posterior loss of balance                   Gait and Mobility    Gait and Mobility - 08/23/24 0842          Gait Training    Crary, Gait independent     Variable surfaces Flat surface     Assistive Device none     Comment The patient initially demonstrates limited heel strike bilaterally and mild reduction in stride. When performing the six-minute walk test, she initially shows increased stride, but it reduces again within the last 2 minutes.        Stairs Assessment    Crary, Stairs modified independence     Assistive Device railing     Ascending Stairs Technique step-over-step     Descending Stairs Technique step-over-step                   Neuromuscular/Motor    Neuromuscular/Motor - 08/23/24 0842          Motor Skills    Motor Skills coordination     Coordination fine motor deficit     Comment: Coordination difficulty coordinating finger opposition; no dysdiadokokinesia noted; finger to nose normal; gross LE coordination intact                   Outcome Measures    PT Outcome Measures - 08/23/24 0842          Objective Outcome Measures    6 Minute Walk Test 1,427 feet     Gait Speed (m/sec) 1.28 m/sec     FGA Score (out of 30 total) 22     30 Second Sit to Stand Test 10 repetitions   difficulty with anterior weight shift       Other Outcome Measures Used/Comments    Other outcome measure  used: PDQ-39 = 34% limitation total; mobility = 45%; ADL = 33%; emotional = 42%; stigma = 12.5%; social = 25%; cognitive = 37.5%; communication = 25%; bodily discomfort = 25%     Outcome Measures General Comments Functional tasks recording form: gardening = 6; rising from chair = 4; cooking = 4; changing bed linens = 3; watering with hose = 3; writing cursive = 4; collecting trash = 3; walking uphill = 4; driving the car = 5; kneeling = 5 (1-7 scale, 7 = unable) overall 52% limitation        Timed Up and Go Test    Trial One: Timed Up and Go Test 11.69     Trial Two: Timed Up and Go Test 9.3     Trial Three: Timed Up and Go Test 11.2     Mean of 3 Trials: Timed Up and Go Test 10.7                      Goals          Patient Stated      <enter goal here> (pt-stated)        Other      Mutually agreed upon pain goal       Mutually agreed upon pain goal: No pain        PT Parkinson's goals       Short Term Goals Time Frame Result Comment   30 sec STS >/= 12 4-6 weeks     FGA >/= 24 4-6 weeks     Decrease TUG </= 10 sec to decrease fall risk 4-6 weeks     PDQ-39 </= 26% 4-6 weeks     FTRF </= 40% 4-6 weeks     Pt will complete independent daily mobility without falls 4-6 weeks       Long Term Goals Time Frame Result Comment   30 sec STS >/= 14 8-12 weeks     FGA >/= 26 8-12 weeks     6 minute walk >/= 1550 feet 8-12 weeks     PDQ-39 </= 18% 8-12 weeks     FTRF </= 28% 8-12 weeks     Pt performs daily household chores with minimal difficulty 8-12 weeks     Pt will be able to walk >/= 3 miles per day consistently with independence 8-12 weeks     Pt will complete a floor transfer independently without use of furniture 8-12 weeks                       TREATMENT PLAN:    PT FLOWSHEET    Exercises Current Session Time Completed (Y/N/G)   MODALITIES- HEAT/ICE (04942) TOTAL TIME FOR SESSION Not performed    Heat     Ice     THER ACT (47938) TOTAL TIME FOR SESSION 8-22 Minutes    Assessment      Vitals/pain See note Y   Education  8/23/24: results of exam, areas that PT will be able to focus, plan of care, possibility of participating in OT after this episode ends to focus more on fine motor coordination - patient verbalizes agreement and understanding Y   Daily task training Changing bed sheets  Carrying objects     Transfer training Floor transfers    GROUP (67962)  Not performed    THER EX (75368) TOTAL TIME FOR SESSION Not performed    HEP Next visit    CARDIOVASCULAR      Nu Step     STRENGTHENING     BIG Sit to stand     Heel rise     Toe walk          STRETCHING/ROM     Calf stretch on wedge               NEURO RE-ED (67383) TOTAL TIME FOR SESSION Not performed    Forward sit and reach     Side sit and reach     Forward step and reach     Side step and reach     Backward step and reach     Forward rock and reach     Side rock and reach     Dynamic training     Obstacle course          GAIT TRAINING (21646) TOTAL TIME FOR SESSION Not performed    BIG walking          Y = yes; N = no; G = group; NV = next visit; H = hold      ASSESSMENT:    This 84 y.o. year old female presents to PT with above stated diagnosis. Physical Therapy evaluation reveals decreased flexibility, decreased strength, impaired motor control, impaired balance, decreased endurance resulting in home management, community/leisure limitations. Orly Cobian will benefit from skilled PT services to address limitation, work towards rehab and patient goals and maximize PLOF of chosen ADLs.     Planned Services: The patient's treatment will include CPT 05216 Therapeutic exercises, CPT 00108 Therapeutic activities, CPT 61691 Neuromuscular Reeducation, CPT 35365 Gait training, .     Corona Hannah, PT

## 2024-08-27 ENCOUNTER — HOSPITAL ENCOUNTER (OUTPATIENT)
Dept: PHYSICAL THERAPY | Age: 84
Setting detail: THERAPIES SERIES
Discharge: HOME | End: 2024-08-27
Attending: PSYCHIATRY & NEUROLOGY
Payer: MEDICARE

## 2024-08-27 DIAGNOSIS — G20.A1 PARKINSON'S DISEASE WITHOUT DYSKINESIA OR FLUCTUATING MANIFESTATIONS (CMS/HCC): Primary | ICD-10-CM

## 2024-08-27 DIAGNOSIS — R26.89 DECREASED FUNCTIONAL MOBILITY: ICD-10-CM

## 2024-08-27 PROCEDURE — 97530 THERAPEUTIC ACTIVITIES: CPT | Mod: GP,CQ

## 2024-08-27 PROCEDURE — 97112 NEUROMUSCULAR REEDUCATION: CPT | Mod: GP,CQ

## 2024-08-27 PROCEDURE — 97110 THERAPEUTIC EXERCISES: CPT | Mod: GP,CQ

## 2024-08-27 NOTE — PROGRESS NOTES
Physical Therapy Visit    PT DAILY NOTE FOR OUTPATIENT THERAPY    Patient: Orly Cobian MRN: [id number]  : 1940 84 y.o.  Referring Physician: Laine Ariza MD  Date of Visit: 2024    Certification Dates: 24 through 11/15/24    Diagnosis:   1. Parkinson's disease without dyskinesia or fluctuating manifestations (CMS/HCC)    2. Decreased functional mobility        Chief Complaints:       Precautions:   Existing Precautions/Restrictions: cardiac, other (see comments)  Precautions comments: A-fib, Ca, osteoporosis      TODAY'S VISIT    Time In Session:  Start Time: 1100  Stop Time: 1201  Time Calculation (min): 61 min   History/Vitals/Pain/Encounter Info - 24 1055          Injury History/Precautions/Daily Required Info    Document Type daily treatment     Referring Physician Laine Ariza MD     Existing Precautions/Restrictions cardiac;other (see comments)     Precautions comments A-fib, Ca, osteoporosis     History of present illness/functional impairment Orly Cobian was diagnosed with Parkinson's Disease in  and has been noting a loss of fine motor coordination. She is previously active in walking two to three miles per day. Currently, she has difficulty completing two miles secondary to reduced endurance. After attempting to walk longer distances, she notes more significant limits the next day. The patient reports that she has lost some mobility, especially rising from the floor. She feels pain in her legs while rising. Standing from a chair is also somewhat difficult. The patient lives in a three story home, and negotiating the stairs can be limited. She fell in  and fractured a vertebra. Orly has not fallen in the last three years.     Patient/Family/Caregiver Comments/Observations Orly states she forgot to report to evaluating PT a fall she had in March, in which she struck her head. Says she takes metoprolol at night.     Patient reported fall since last visit No         Pain Assessment    Currently in pain No/Denies        Pre Activity Vital Signs    Pulse 57     SpO2 98 %     Oxygen Therapy None (Room air)     /80   bright blue cuff, taken x2    BP Location Left upper arm     BP Method Automatic     Patient Position Sitting         Services    Do You Speak a Language Other Than English at Home? no                    Daily Treatment Assessment and Plan - 08/27/24 1055          Daily Treatment Assessment and Plan    Progress toward goals Progressing     Daily Outcome Summary The patient completes her first visit following evaluation. She presents with resting hypertension today and vital signs are monitored several times with patient educated to contact physicians if high readings persist. The session focused on low-level execises, introducing her to large amplitude movements. Two exercises are performed in a seated position, with forward step and reach performed in standing.     Plan and Recommendations Continue physical therapy twice per week.                         OBJECTIVE DATA TAKEN TODAY:    None taken    Today's Treatment:    PT FLOWSHEET    Exercises Current Session Time Completed (Y/N/G)   MODALITIES- HEAT/ICE (98272) TOTAL TIME FOR SESSION Not performed    Heat     Ice     THER ACT (14901) TOTAL TIME FOR SESSION 8-22 Minutes    Assessment      Vitals/pain See note - take BP (manually) before each session Y   Education 8/23/24: results of exam, areas that PT will be able to focus, plan of care, possibility of participating in OT after this episode ends to focus more on fine motor coordination - patient verbalizes agreement and understanding  8/27/24: patient educated to monitor BP at home and to contact physicians if high BP readings persist            Y   Daily task training Changing bed sheets  Carrying objects     Transfer training Floor transfers    GROUP (81424)  Not performed    THER EX (09354) TOTAL TIME FOR SESSION 8-22 Minutes    HEP 8/27:  Forward step and reach, side to side (pictures provided, pt verbalized understanding) y   CARDIOVASCULAR      Nu Step Not appropriate 8/27 2o HTN H   STRENGTHENING     BIG Sit to stand 2 x10 w/ ball between knees y   Heel rise 2 x10 y   Toe walk          STRETCHING/ROM     Calf stretch on wedge 2 x30s y             NEURO RE-ED (42120) TOTAL TIME FOR SESSION 23-37 Minutes    Floor to ceiling x10 y   Side to side x8 y   Forward step and reach X10 with UE support at elevated mat y   Side step and reach     Backward step and reach     Forward rock and reach     Side rock and reach     Dynamic training     Obstacle course          GAIT TRAINING (12628) TOTAL TIME FOR SESSION Not performed    BIG walking          Y = yes; N = no; G = group; NV = next visit; H = hold

## 2024-08-27 NOTE — OP PT TREATMENT LOG
PT FLOWSHEET    Exercises Current Session Time Completed (Y/N/G)   MODALITIES- HEAT/ICE (40041) TOTAL TIME FOR SESSION Not performed    Heat     Ice     THER ACT (00454) TOTAL TIME FOR SESSION 8-22 Minutes    Assessment      Vitals/pain See note - take BP (manually) before each session Y   Education 8/23/24: results of exam, areas that PT will be able to focus, plan of care, possibility of participating in OT after this episode ends to focus more on fine motor coordination - patient verbalizes agreement and understanding  8/27/24: patient educated to monitor BP at home and to contact physicians if high BP readings persist            Y   Daily task training Changing bed sheets  Carrying objects     Transfer training Floor transfers    GROUP (59409)  Not performed    THER EX (71535) TOTAL TIME FOR SESSION 8-22 Minutes    HEP 8/27: Forward step and reach, side to side (pictures provided, pt verbalized understanding) y   CARDIOVASCULAR      Nu Step Not appropriate 8/27 2o HTN H   STRENGTHENING     BIG Sit to stand 2 x10 w/ ball between knees y   Heel rise 2 x10 y   Toe walk          STRETCHING/ROM     Calf stretch on wedge 2 x30s y             NEURO RE-ED (90815) TOTAL TIME FOR SESSION 23-37 Minutes    Floor to ceiling x10 y   Side to side x8 y   Forward step and reach X10 with UE support at elevated mat y   Side step and reach     Backward step and reach     Forward rock and reach     Side rock and reach     Dynamic training     Obstacle course          GAIT TRAINING (15164) TOTAL TIME FOR SESSION Not performed    BIG walking          Y = yes; N = no; G = group; NV = next visit; H = hold

## 2024-08-28 ENCOUNTER — HOSPITAL ENCOUNTER (OUTPATIENT)
Dept: PHYSICAL THERAPY | Age: 84
Setting detail: THERAPIES SERIES
Discharge: HOME | End: 2024-08-28
Attending: PSYCHIATRY & NEUROLOGY
Payer: MEDICARE

## 2024-08-28 DIAGNOSIS — G20.A1 PARKINSON'S DISEASE WITHOUT DYSKINESIA OR FLUCTUATING MANIFESTATIONS (CMS/HCC): Primary | ICD-10-CM

## 2024-08-28 DIAGNOSIS — R26.89 DECREASED FUNCTIONAL MOBILITY: ICD-10-CM

## 2024-08-28 PROCEDURE — 97112 NEUROMUSCULAR REEDUCATION: CPT | Mod: GP,CQ

## 2024-08-28 PROCEDURE — 97110 THERAPEUTIC EXERCISES: CPT | Mod: GP,CQ

## 2024-08-28 PROCEDURE — 97530 THERAPEUTIC ACTIVITIES: CPT | Mod: GP,CQ

## 2024-08-28 NOTE — PROGRESS NOTES
Physical Therapy Visit    PT DAILY NOTE FOR OUTPATIENT THERAPY    Patient: Orly Cobian MRN: [id number]  : 1940 84 y.o.  Referring Physician: Laine Ariza MD  Date of Visit: 2024    Certification Dates: 24 through 11/15/24    Diagnosis:   1. Parkinson's disease without dyskinesia or fluctuating manifestations (CMS/HCC)    2. Decreased functional mobility        Chief Complaints:       Precautions:   Existing Precautions/Restrictions: cardiac, other (see comments)  Precautions comments: A-fib, Ca, osteoporosis      TODAY'S VISIT    Time In Session:  Start Time: 1300  Stop Time: 1400  Time Calculation (min): 60 min   History/Vitals/Pain/Encounter Info - 24 1409          Injury History/Precautions/Daily Required Info    Document Type daily treatment     Referring Physician Laine Ariza MD     Existing Precautions/Restrictions cardiac;other (see comments)     Precautions comments A-fib, Ca, osteoporosis     History of present illness/functional impairment Orly Cobian was diagnosed with Parkinson's Disease in  and has been noting a loss of fine motor coordination. She is previously active in walking two to three miles per day. Currently, she has difficulty completing two miles secondary to reduced endurance. After attempting to walk longer distances, she notes more significant limits the next day. The patient reports that she has lost some mobility, especially rising from the floor. She feels pain in her legs while rising. Standing from a chair is also somewhat difficult. The patient lives in a three story home, and negotiating the stairs can be limited. She fell in  and fractured a vertebra. Orly has not fallen in the last three years.     Patient/Family/Caregiver Comments/Observations Orly arrives with sheet of paper detailing her blood pressure readings taken at home, which vary from 136/80 at lowest to 163/90 at highest.     Patient reported fall since last visit No         Pain Assessment    Currently in pain No/Denies        Pre Activity Vital Signs    Oxygen Therapy None (Room air)     /70   bright blue cuff    BP Location Left upper arm     BP Method Manual     Patient Position Sitting         Services    Do You Speak a Language Other Than English at Home? no                    Daily Treatment Assessment and Plan - 08/28/24 1409          Daily Treatment Assessment and Plan    Progress toward goals Progressing     Daily Outcome Summary Orly progresses with standing maximal daily exercises with the addition of side step and reach and backwards step and reach with aide guarding. The former exercise is challenging in terms of balance and combining movements. Orly also initiates functional transfers with and without UE support and requires modA for force production.     Plan and Recommendations Continue physical therapy twice per week.                         OBJECTIVE DATA TAKEN TODAY:    None taken    Today's Treatment:    PT FLOWSHEET    Exercises Current Session Time Completed (Y/N/G)   MODALITIES- HEAT/ICE (87200) TOTAL TIME FOR SESSION Not performed    Heat     Ice     THER ACT (81602) TOTAL TIME FOR SESSION 8-22 Minutes    Assessment      Vitals/pain See note - take BP (manually) before each session Y   Education 8/23/24: results of exam, areas that PT will be able to focus, plan of care, possibility of participating in OT after this episode ends to focus more on fine motor coordination - patient verbalizes agreement and understanding  8/27/24: patient educated to monitor BP at home and to contact physicians if high BP readings persist               Daily task training Changing bed sheets - plus blanket  Carrying objects - crate half full of tennis balls  Y  Y   Transfer training Floor transfers with UE support at table: CS; without UE support: modA for force production Y   GROUP (73040)  Not performed    THER EX (25725) TOTAL TIME FOR SESSION 8-22  Minutes    HEP 8/27: Forward step and reach, side to side (pictures provided, pt verbalized understanding) y   CARDIOVASCULAR      Nu Step Level 1, 10 min, LEs and UEs, RPE 13 y   STRENGTHENING     BIG Sit to stand 1 x10 w/ ball between knees y   Heel rise 2 x10    Toe walk          STRETCHING/ROM     Calf stretch on wedge 2 x30s              NEURO RE-ED (29505) TOTAL TIME FOR SESSION 23-37 Minutes    Floor to ceiling x10    Side to side x8    Forward step and reach X10 with challenge calibrating step length, aide guarding y   Side step and reach X10 with multiple LOB, aide guarding, max Vcs for head rotation, reciprocal arms y   Backward step and reach X8, aide guarding y   Forward rock and reach     Side rock and reach     Dynamic training     Obstacle course          GAIT TRAINING (17481) TOTAL TIME FOR SESSION Not performed    BIG walking          Y = yes; N = no; G = group; NV = next visit; H = hold

## 2024-08-28 NOTE — OP PT TREATMENT LOG
PT FLOWSHEET    Exercises Current Session Time Completed (Y/N/G)   MODALITIES- HEAT/ICE (52976) TOTAL TIME FOR SESSION Not performed    Heat     Ice     THER ACT (83988) TOTAL TIME FOR SESSION 8-22 Minutes    Assessment      Vitals/pain See note - take BP (manually) before each session Y   Education 8/23/24: results of exam, areas that PT will be able to focus, plan of care, possibility of participating in OT after this episode ends to focus more on fine motor coordination - patient verbalizes agreement and understanding  8/27/24: patient educated to monitor BP at home and to contact physicians if high BP readings persist               Daily task training Changing bed sheets - plus blanket  Carrying objects - crate half full of tennis balls  Y  Y   Transfer training Floor transfers with UE support at table: CS; without UE support: modA for force production Y   GROUP (76975)  Not performed    THER EX (50590) TOTAL TIME FOR SESSION 8-22 Minutes    HEP 8/27: Forward step and reach, side to side (pictures provided, pt verbalized understanding) y   CARDIOVASCULAR      Nu Step Level 1, 10 min, LEs and UEs, RPE 13 y   STRENGTHENING     BIG Sit to stand 1 x10 w/ ball between knees y   Heel rise 2 x10    Toe walk          STRETCHING/ROM     Calf stretch on wedge 2 x30s              NEURO RE-ED (75900) TOTAL TIME FOR SESSION 23-37 Minutes    Floor to ceiling x10    Side to side x8    Forward step and reach X10 with challenge calibrating step length, aide guarding y   Side step and reach X10 with multiple LOB, aide guarding, max Vcs for head rotation, reciprocal arms y   Backward step and reach X8, aide guarding y   Forward rock and reach     Side rock and reach     Dynamic training     Obstacle course          GAIT TRAINING (99133) TOTAL TIME FOR SESSION Not performed    BIG walking          Y = yes; N = no; G = group; NV = next visit; H = hold

## 2024-08-28 NOTE — ADDENDUM NOTE
Encounter addended by: Lexie Trejo, PTA on: 8/28/2024 3:27 PM   Actions taken: Flowsheet data copied forward, Flowsheet accepted, Clinical Note Signed

## 2024-09-03 ENCOUNTER — OFFICE VISIT (OUTPATIENT)
Dept: NEUROLOGY | Facility: CLINIC | Age: 84
End: 2024-09-03
Payer: MEDICARE

## 2024-09-03 ENCOUNTER — HOSPITAL ENCOUNTER (OUTPATIENT)
Dept: PHYSICAL THERAPY | Age: 84
Setting detail: THERAPIES SERIES
Discharge: HOME | End: 2024-09-03
Attending: PSYCHIATRY & NEUROLOGY
Payer: MEDICARE

## 2024-09-03 VITALS
WEIGHT: 113 LBS | OXYGEN SATURATION: 98 % | HEIGHT: 65 IN | DIASTOLIC BLOOD PRESSURE: 84 MMHG | BODY MASS INDEX: 18.83 KG/M2 | RESPIRATION RATE: 16 BRPM | SYSTOLIC BLOOD PRESSURE: 140 MMHG | HEART RATE: 52 BPM

## 2024-09-03 DIAGNOSIS — R26.89 DECREASED FUNCTIONAL MOBILITY: ICD-10-CM

## 2024-09-03 DIAGNOSIS — G20.A1 PARKINSON'S DISEASE WITHOUT DYSKINESIA OR FLUCTUATING MANIFESTATIONS (CMS/HCC): Primary | ICD-10-CM

## 2024-09-03 PROCEDURE — 99214 OFFICE O/P EST MOD 30 MIN: CPT | Performed by: PSYCHIATRY & NEUROLOGY

## 2024-09-03 PROCEDURE — 97110 THERAPEUTIC EXERCISES: CPT | Mod: GP

## 2024-09-03 PROCEDURE — 97112 NEUROMUSCULAR REEDUCATION: CPT | Mod: GP

## 2024-09-03 NOTE — PROGRESS NOTES
Physical Therapy Visit    PT DAILY NOTE FOR OUTPATIENT THERAPY    Patient: Orly Cobian MRN: [id number]  : 1940 84 y.o.  Referring Physician: Laine Ariza MD  Date of Visit: 9/3/2024    Certification Dates: 24 through 11/15/24    Diagnosis:   1. Parkinson's disease without dyskinesia or fluctuating manifestations (CMS/HCC)    2. Decreased functional mobility        Chief Complaints:  decreased mobility    Precautions:   Existing Precautions/Restrictions: cardiac, other (see comments)  Precautions comments: A-fib, Ca, osteoporosis      TODAY'S VISIT    Time In Session:  Start Time: 1159  Stop Time: 1250 (patient needs to leave early for MD appointment)  Time Calculation (min): 51 min   History/Vitals/Pain/Encounter Info - 24 1159          Injury History/Precautions/Daily Required Info    Document Type daily treatment     Primary Therapist Marco Antonio     Chief Complaint/Reason for Visit  decreased mobility     Referring Physician Laine Ariza MD     Existing Precautions/Restrictions cardiac;other (see comments)     Precautions comments A-fib, Ca, osteoporosis     History of present illness/functional impairment Orly Cobian was diagnosed with Parkinson's Disease in  and has been noting a loss of fine motor coordination. She is previously active in walking two to three miles per day. Currently, she has difficulty completing two miles secondary to reduced endurance. After attempting to walk longer distances, she notes more significant limits the next day. The patient reports that she has lost some mobility, especially rising from the floor. She feels pain in her legs while rising. Standing from a chair is also somewhat difficult. The patient lives in a three story home, and negotiating the stairs can be limited. She fell in  and fractured a vertebra. Orly has not fallen in the last three years.     Patient/Family/Caregiver Comments/Observations The patient reports no recent falls and no  discomfort at this time.     Patient reported fall since last visit No        Pain Assessment    Currently in pain No/Denies        Pre Activity Vital Signs    Oxygen Therapy None (Room air)         Services    Do You Speak a Language Other Than English at Home? no                    Daily Treatment Assessment and Plan - 09/03/24 1251          Daily Treatment Assessment and Plan    Progress toward goals Progressing     Daily Outcome Summary The patient is able to maintain large amplitude movements during gait, focusing on stride length and arm swing. Stride length reduces a minimal amount when a second task is added (boy's names A-Z). The remainder of the BIG exercises are completed today. Moderate cues are needed for good performance, but the patient is able to show some consistency.     Plan and Recommendations Continue to address floor transfers early in the session to avoid fatigue from other activities.                         OBJECTIVE DATA TAKEN TODAY:    None taken    Today's Treatment:    PT FLOWSHEET    Exercises Current Session Time Completed (Y/N/G)   MODALITIES- HEAT/ICE (51966) TOTAL TIME FOR SESSION Not performed    Heat     Ice     THER ACT (23770) TOTAL TIME FOR SESSION 0-7 Minutes    Assessment      Vitals/pain See note - take BP (manually) before each session Y   Education 8/23/24: results of exam, areas that PT will be able to focus, plan of care, possibility of participating in OT after this episode ends to focus more on fine motor coordination - patient verbalizes agreement and understanding  8/27/24: patient educated to monitor BP at home and to contact physicians if high BP readings persist               Daily task training Changing bed sheets - plus blanket  Carrying objects - crate half full of tennis balls       Transfer training Floor transfers with UE support at table - trial half kneel to stand and tall kneel to stand: CS; without UE support: modA for force production Y    GROUP (00162)  Not performed    THER EX (65287) TOTAL TIME FOR SESSION 8-22 Minutes    HEP 9/3/24: modified BIG exercises Y   CARDIOVASCULAR      Nu Step Level 1, 6 min, seat 7 and UE 8, RPE 13 Y   STRENGTHENING     BIG Sit to stand 1 x 8 Y   Heel rise 2 x 10    Toe walk          STRETCHING/ROM     Calf stretch on wedge 2 x 30s              NEURO RE-ED (85269) TOTAL TIME FOR SESSION 23-37 Minutes    Floor to ceiling 8 reps with modeling Y   Side to side 8 reps with modeling Y   Forward step and reach 8 reps, aide guarding Y   Side step and reach 8 reps, moderate cues, aide guarding Y   Backward step and reach 8 reps, moderate cues, aide guarding Y   Forward rock and reach 10 reps, moderate cues, aide guarding Y   Sideways rock and reach 10 reps, moderate cues, aide guarding Y   Dynamic training     Obstacle course          GAIT TRAINING (66108) TOTAL TIME FOR SESSION 0-7 Minutes    BIG walking 350 feet naming boy's names through alphabet Y        Y = yes; N = no; G = group; NV = next visit; H = hold

## 2024-09-03 NOTE — PROGRESS NOTES
Patient ID: Orly Cobian                              : 1940  MRN: [id number]                                            VISIT DATE: 9/3/2024  ENCOUNTER PROVIDER: Laine Ariza  REFERRING PROVIDER: No ref. provider found    REASON FOR VISIT: Orly Cobian is an 84 year old amibdextrous woman with PMH breast cancer, afib on eliquis who follows up for tremor. She was last seen less than 2 months ago.  She is accompanied by her .     INTERIM HISTORY:  She has been going to PT.  She started Sinemet and increased it to 1 tab tid. She has slight nausea. She thinks the Sinemet is helping her, she feels she is doing better in PT. It is getting easier getting up out of the chair. She is buttoning with more ease. She is walking about 2 miles a day and she is is walking further than before.    PAST MEDICAL HISTORY:  has a past medical history of A-fib (CMS/HCC), Alcoholism (CMS/HCC), Arrhythmia, Breast cancer (CMS/HCC), Hypothyroidism, Osteoporosis, Parkinson's disease (CMS/HCC), and Parkinsons (CMS/HCC). C2 fracture.    PAST SURGICAL HISTORY:  has a past surgical history that includes Cholecystectomy; Cosmetic surgery; Eye surgery; Skin biopsy; Ablation of dysrhythmic focus; Breast biopsy; and Breast lumpectomy (Left, 2021).    SOCIAL HISTORY:   Social History     Tobacco Use    Smoking status: Former     Types: Cigarettes     Quit date:      Years since quittin.7    Smokeless tobacco: Never   Vaping Use    Vaping Use: Never used   Substance Use Topics    Alcohol use: No    Drug use: No     FAMILY HISTORY: family history includes Anemia in her biological sister; Clotting disorder in her biological sister; Fainting in her biological sister; Heart attack in her biological father; Heart disease in her biological father and biological mother; Heart failure in her biological father; Hypertension in her biological sister.    MEDICATIONS:   Current Outpatient Medications:     anastrozole (ARIMIDEX)  "1 mg tablet, Take  1 mg daily with lunch, Disp: , Rfl:     apixaban (ELIQUIS) 5 mg tablet, Take 1 tablet (5 mg total) by mouth 2 (two) times a day., Disp: 180 tablet, Rfl: 1    atorvastatin (LIPITOR) 10 mg tablet, Take 1 tablet (10 mg total) by mouth every evening., Disp: 90 tablet, Rfl: 3    carbidopa-levodopa (SINEMET)  mg per tablet, Take 1 tablet by mouth 3 (three) times a day., Disp: 270 tablet, Rfl: 3    cholecalciferol, vitamin D3, 1,000 unit (25 mcg) tablet, Take 1,000 Units by mouth daily with lunch., Disp: , Rfl:     dorzolamide (TRUSOPT) 2 % ophthalmic solution, Administer 1 drop into the right eye 2 (two) times a day., Disp: , Rfl:     flecainide (TAMBOCOR) 100 mg tablet, Take 1 tablet (100 mg total) by mouth every 12 (twelve) hours., Disp: 180 tablet, Rfl: 3    latanoprost (XALATAN) 0.005 % ophthalmic solution, Administer 1 drop into both eyes nightly., Disp: , Rfl:     levothyroxine (SYNTHROID) 75 mcg tablet, Take 75 mcg by mouth daily., Disp: , Rfl:     LORazepam (ATIVAN) 0.5 mg tablet, take 1 tablet by mouth once daily if needed, Disp: , Rfl:     lutein-zeaxanthin 25-5 mg capsule, Take 1 capsule by mouth daily. ocuvit, Disp: , Rfl:     MAGNESIUM GLYCINATE, BULK, MISC, Take by mouth See admin instr. CAPSULE, Disp: , Rfl:     metoprolol succinate XL (TOPROL-XL) 25 mg 24 hr tablet, Take 1 tablet (25 mg total) by mouth nightly., Disp: 90 tablet, Rfl: 3    ALLERGIES: is allergic to erythromycin, erythromycin base, and bactrim [sulfamethoxazole-trimethoprim].     REVIEW OF SYSTEMS:  All other systems reviewed and negative except as noted in the HPI.    PHYSICAL EXAM:  Visit Vitals  BP (!) 140/84 (BP Location: Left upper arm, Patient Position: Sitting)   Pulse (!) 52   Resp 16   Ht 1.651 m (5' 5\")   Wt 51.3 kg (113 lb)   SpO2 98%   BMI 18.80 kg/m²       GENERAL APPEARANCE: Well appearing, well nourished and well developed.  Not in acute distress.  Appears stated age.  HEAD: Normocephalic, " atraumatic.  EYES:  Sclerae white. Conjunctivae clear.  NECK:  Neck was supple.  CARDIOVASCULAR:  Regular rate and rhythm.  EXTREMITIES: No clubbing, no cyanosis nor edema.  SKIN:  Dry, intact. No rashes noted. Warm to touch.  PSYCHIATRIC: Calm and cooperative with appropriate insight    NEUROLOGICAL EXAM:  MENTAL STATUS: Awake and alert with fluent language. Attention, memory, and executive function were intact.  CRANIAL NERVES: Extraocular movements are intact. Normal pursuits and saccades. No nystagmus. Facial strength and sensation intact. Hearing grossly intact. Uvula and tongue are midline. No dysarthria.  MOTOR:  Normal muscle bulk. Full strength in the arms and legs proximally and distally.  Good facial expression  No rigidity  No resting tremor. No postural and action tremor.  Subtle bradykinesia on hand opening-closing, hand supination-pronation on the left. No bradykinesia on finger taps.  Mild bradykinesia on toe taps on the left, normal heel taps  Handwriting: mild progressive micrographia  COORDINATION: No ataxia on finger-to-nose  GAIT: Able to stand without pushing up from the chair. Gait was normal based with good strides and heel clearance. Arm swing good b/l. Turn was normal.    LABORATORY STUDIES:  Reviewed in chart    IMAGING STUDIES:   I personally reviewed the [facility] 6/2022: no acute intracranial change.    IMPRESSION:  Orly Cobian is an 84 year old amibdextrous woman with PMH breast cancer, afib on eliquis who follows up for tremor, suspect underlying Parkinson's disease. She is doing well with PT and initiation of levodopa.    RECOMMENDATIONS:  -Continue to take Sinemet 1 tab tid with food. Nausea may improve as she gets used to the medication She is managing right now, if nausea persists, can try pre-treatment with additional carbidopa  -Continue PT. She has also been referred to OT, SLP  -Exercise    Follow-up will be scheduled in 6 months, sooner if needed.    Thank you for allowing me  to participate in the care of your patient.  Please feel free to contact me at any time if you have any further questions.      Laine Ariza MD  Movement Disorders

## 2024-09-03 NOTE — OP PT TREATMENT LOG
PT FLOWSHEET    Exercises Current Session Time Completed (Y/N/G)   MODALITIES- HEAT/ICE (64002) TOTAL TIME FOR SESSION Not performed    Heat     Ice     THER ACT (77242) TOTAL TIME FOR SESSION 0-7 Minutes    Assessment      Vitals/pain See note - take BP (manually) before each session Y   Education 8/23/24: results of exam, areas that PT will be able to focus, plan of care, possibility of participating in OT after this episode ends to focus more on fine motor coordination - patient verbalizes agreement and understanding  8/27/24: patient educated to monitor BP at home and to contact physicians if high BP readings persist               Daily task training Changing bed sheets - plus blanket  Carrying objects - crate half full of tennis balls       Transfer training Floor transfers with UE support at table - trial half kneel to stand and tall kneel to stand: CS; without UE support: modA for force production Y   GROUP (06186)  Not performed    THER EX (38283) TOTAL TIME FOR SESSION 8-22 Minutes    HEP 9/3/24: modified BIG exercises Y   CARDIOVASCULAR      Nu Step Level 1, 6 min, seat 7 and UE 8, RPE 13 Y   STRENGTHENING     BIG Sit to stand 1 x 8 Y   Heel rise 2 x 10    Toe walk          STRETCHING/ROM     Calf stretch on wedge 2 x 30s              NEURO RE-ED (29484) TOTAL TIME FOR SESSION 23-37 Minutes    Floor to ceiling 8 reps with modeling Y   Side to side 8 reps with modeling Y   Forward step and reach 8 reps, aide guarding Y   Side step and reach 8 reps, moderate cues, aide guarding Y   Backward step and reach 8 reps, moderate cues, aide guarding Y   Forward rock and reach 10 reps, moderate cues, aide guarding Y   Sideways rock and reach 10 reps, moderate cues, aide guarding Y   Dynamic training     Obstacle course          GAIT TRAINING (28739) TOTAL TIME FOR SESSION 0-7 Minutes    BIG walking 350 feet naming boy's names through alphabet Y        Y = yes; N = no; G = group; NV = next visit; H = hold

## 2024-09-06 ENCOUNTER — HOSPITAL ENCOUNTER (OUTPATIENT)
Dept: PHYSICAL THERAPY | Age: 84
Setting detail: THERAPIES SERIES
Discharge: HOME | End: 2024-09-06
Attending: PSYCHIATRY & NEUROLOGY
Payer: MEDICARE

## 2024-09-06 DIAGNOSIS — G20.A1 PARKINSON'S DISEASE WITHOUT DYSKINESIA OR FLUCTUATING MANIFESTATIONS (CMS/HCC): Primary | ICD-10-CM

## 2024-09-06 DIAGNOSIS — R26.89 DECREASED FUNCTIONAL MOBILITY: ICD-10-CM

## 2024-09-06 PROCEDURE — 97110 THERAPEUTIC EXERCISES: CPT | Mod: GP,CQ

## 2024-09-06 PROCEDURE — 97112 NEUROMUSCULAR REEDUCATION: CPT | Mod: GP,CQ

## 2024-09-06 PROCEDURE — 97530 THERAPEUTIC ACTIVITIES: CPT | Mod: GP,CQ

## 2024-09-06 NOTE — PROGRESS NOTES
Physical Therapy Visit    PT DAILY NOTE FOR OUTPATIENT THERAPY    Patient: Orly Cobian MRN: [id number]  : 1940 84 y.o.  Referring Physician: Laine Ariza MD  Date of Visit: 2024    Certification Dates: 24 through 11/15/24    Diagnosis:   1. Parkinson's disease without dyskinesia or fluctuating manifestations (CMS/HCC)    2. Decreased functional mobility        Chief Complaints:  decreased mobility    Precautions:   Existing Precautions/Restrictions: cardiac, other (see comments)  Precautions comments: A-fib, Ca, osteoporosis      TODAY'S VISIT    Time In Session:  Start Time: 0900  Stop Time: 1000  Time Calculation (min): 60 min   History/Vitals/Pain/Encounter Info - 24 0904          Injury History/Precautions/Daily Required Info    Document Type daily treatment     Primary Therapist Marco Antonio     Chief Complaint/Reason for Visit  decreased mobility     Referring Physician Laine Ariza MD     Existing Precautions/Restrictions cardiac;other (see comments)     Precautions comments A-fib, Ca, osteoporosis     History of present illness/functional impairment Orly Cobian was diagnosed with Parkinson's Disease in  and has been noting a loss of fine motor coordination. She is previously active in walking two to three miles per day. Currently, she has difficulty completing two miles secondary to reduced endurance. After attempting to walk longer distances, she notes more significant limits the next day. The patient reports that she has lost some mobility, especially rising from the floor. She feels pain in her legs while rising. Standing from a chair is also somewhat difficult. The patient lives in a three story home, and negotiating the stairs can be limited. She fell in  and fractured a vertebra. Orly has not fallen in the last three years.     Patient/Family/Caregiver Comments/Observations The patient endorses adherance to HEP.     Patient reported fall since last visit No         Pain Assessment    Currently in pain No/Denies        Pre Activity Vital Signs    Pulse 52     SpO2 99 %     Oxygen Therapy None (Room air)     /74   bright blue - faint    BP Location Left upper arm     BP Method Manual     Patient Position Sitting         Services    Do You Speak a Language Other Than English at Home? no                    Daily Treatment Assessment and Plan - 09/06/24 0904          Daily Treatment Assessment and Plan    Progress toward goals Progressing     Daily Outcome Summary A new approach to floor transfers render the transfer achievable without assistance today. Progresses sit to stand by adding Airex. She benefits from max cues for eccentric control on descent due to posterior loss of balance. She also progresses with the addition of a tia for forward step and reach, but the return is challenging.     Plan and Recommendations Continue to address floor transfers early in the session to avoid fatigue from other activities.                         OBJECTIVE DATA TAKEN TODAY:    None taken    Today's Treatment:    PT FLOWSHEET    Exercises Current Session Time Completed (Y/N/G)   MODALITIES- HEAT/ICE (16262) TOTAL TIME FOR SESSION Not performed    Heat     Ice     THER ACT (63005) TOTAL TIME FOR SESSION 8-22 Minutes    Assessment      Vitals/pain See note - take BP (manually) before each session Y   Education 8/23/24: results of exam, areas that PT will be able to focus, plan of care, possibility of participating in OT after this episode ends to focus more on fine motor coordination - patient verbalizes agreement and understanding  8/27/24: patient educated to monitor BP at home and to contact physicians if high BP readings persist               Daily task training Changing bed sheets - plus blanket  Carrying objects - crate half full of tennis balls       Transfer training Floor transfers without UE support: x4 reps, CGA  Y   GROUP (39440)  Not performed    THER EX (79311)  TOTAL TIME FOR SESSION 8-22 Minutes    HEP 9/3/24: modified BIG exercises    CARDIOVASCULAR      Nu Step Level 2, 10 min, seat 9 and UE 10, RPE 13 Y   STRENGTHENING     BIG Sit to stand 1 x 10 w/ Airex under feet w/posterior LOB on first rep, decreased eccentric control req CGA and max cues to avoid plopping Y   Heel rise 2 x 10    Toe walk          STRETCHING/ROM     Calf stretch on wedge 2 x 30s              NEURO RE-ED (00595) TOTAL TIME FOR SESSION 23-37 Minutes    Floor to ceiling 8 reps with modeling Y   Side to side 8 reps with modeling/ shaping - L trunk collapse noted, req modified arms (1 arm on quad for stability) Y   Forward step and reach 8 reps, aide guarding Y   Side step and reach 8 reps, moderate cues, aide guarding Y   Backward step and reach 8 reps, moderate cues, aide guarding Y   Forward rock and reach 10 reps, moderate cues, aide guarding Y   Sideways rock and reach 10 reps, moderate cues, aide guarding Y   Dynamic training     Obstacle course          GAIT TRAINING (36421) TOTAL TIME FOR SESSION 0-7 Minutes    BIG walking 350 feet naming boy's names through alphabet  Y        Y = yes; N = no; G = group; NV = next visit; H = hold

## 2024-09-06 NOTE — OP PT TREATMENT LOG
PT FLOWSHEET    Exercises Current Session Time Completed (Y/N/G)   MODALITIES- HEAT/ICE (81575) TOTAL TIME FOR SESSION Not performed    Heat     Ice     THER ACT (50643) TOTAL TIME FOR SESSION 8-22 Minutes    Assessment      Vitals/pain See note - take BP (manually) before each session Y   Education 8/23/24: results of exam, areas that PT will be able to focus, plan of care, possibility of participating in OT after this episode ends to focus more on fine motor coordination - patient verbalizes agreement and understanding  8/27/24: patient educated to monitor BP at home and to contact physicians if high BP readings persist               Daily task training Changing bed sheets - plus blanket  Carrying objects - crate half full of tennis balls       Transfer training Floor transfers without UE support: x4 reps, CGA  Y   GROUP (95841)  Not performed    THER EX (64000) TOTAL TIME FOR SESSION 8-22 Minutes    HEP 9/3/24: modified BIG exercises    CARDIOVASCULAR      Nu Step Level 2, 10 min, seat 9 and UE 10, RPE 13 Y   STRENGTHENING     BIG Sit to stand 1 x 10 w/ Airex under feet w/posterior LOB on first rep, decreased eccentric control req CGA and max cues to avoid plopping Y   Heel rise 2 x 10    Toe walk          STRETCHING/ROM     Calf stretch on wedge 2 x 30s              NEURO RE-ED (55652) TOTAL TIME FOR SESSION 23-37 Minutes    Floor to ceiling 8 reps with modeling Y   Side to side 8 reps with modeling/ shaping - L trunk collapse noted, req modified arms (1 arm on quad for stability) Y   Forward step and reach 8 reps, aide guarding Y   Side step and reach 8 reps, moderate cues, aide guarding Y   Backward step and reach 8 reps, moderate cues, aide guarding Y   Forward rock and reach 10 reps, moderate cues, aide guarding Y   Sideways rock and reach 10 reps, moderate cues, aide guarding Y   Dynamic training     Obstacle course          GAIT TRAINING (42627) TOTAL TIME FOR SESSION 0-7 Minutes    BIG walking 350 feet  naming boy's names through alphabet  Y        Y = yes; N = no; G = group; NV = next visit; H = hold

## 2024-09-10 ENCOUNTER — HOSPITAL ENCOUNTER (OUTPATIENT)
Dept: PHYSICAL THERAPY | Age: 84
Setting detail: THERAPIES SERIES
Discharge: HOME | End: 2024-09-10
Attending: PSYCHIATRY & NEUROLOGY
Payer: MEDICARE

## 2024-09-10 DIAGNOSIS — G20.A1 PARKINSON'S DISEASE WITHOUT DYSKINESIA OR FLUCTUATING MANIFESTATIONS (CMS/HCC): Primary | ICD-10-CM

## 2024-09-10 DIAGNOSIS — R26.89 DECREASED FUNCTIONAL MOBILITY: ICD-10-CM

## 2024-09-10 PROCEDURE — 97116 GAIT TRAINING THERAPY: CPT | Mod: GP,CQ

## 2024-09-10 PROCEDURE — 97112 NEUROMUSCULAR REEDUCATION: CPT | Mod: GP,CQ

## 2024-09-10 NOTE — OP PT TREATMENT LOG
PT FLOWSHEET    Exercises Current Session Time Completed (Y/N/G)   MODALITIES- HEAT/ICE (37152) TOTAL TIME FOR SESSION Not performed    Heat     Ice     THER ACT (14236) TOTAL TIME FOR SESSION 0-7 Minutes    Assessment      Vitals/pain See note - take BP (manually) before each session Y   Education 8/23/24: results of exam, areas that PT will be able to focus, plan of care, possibility of participating in OT after this episode ends to focus more on fine motor coordination - patient verbalizes agreement and understanding  8/27/24: patient educated to monitor BP at home and to contact physicians if high BP readings persist               Daily task training Changing bed sheets - plus blanket  Carrying objects - crate half full of tennis balls       Transfer training Floor transfers without UE support: x1 rep, CGA  y   GROUP (62239)  Not performed    THER EX (74030) TOTAL TIME FOR SESSION 0-7 Minutes    HEP 9/3/24: modified BIG exercises    CARDIOVASCULAR      Nu Step Level 2, 10 min, seat 9 and UE 10, RPE 13    STRENGTHENING     BIG Sit to stand 1 x 10 w/ Airex under feet  Y   Heel rise 2 x 10    Toe walk          STRETCHING/ROM     Calf stretch on wedge 2 x 30s              NEURO RE-ED (36427) TOTAL TIME FOR SESSION 23-37 Minutes    Floor to ceiling 10 reps with modeling Y   Side to side 10 reps with modeling/ shaping - L trunk collapse noted, req modified arms (1 arm on quad for stability) Y   Forward step and reach 10 reps, aide guarding Y   Side step and reach 10 reps, moderate cues, aide guarding Y   Backward step and reach 10 reps, moderate cues, aide guarding Y   Forward rock and reach 10 reps, moderate cues, aide guarding Y   Sideways rock and reach 10 reps, moderate cues, aide guarding Y   Dynamic training     Obstacle course          GAIT TRAINING (11033) TOTAL TIME FOR SESSION 23-37 Minutes    BIG walking T/o gym and up and down trial stairs with UUE HR, mult reps, holding 1# db, naming flowers/plants, for  dual tasking (slowed processing, req cues to maintain arm swing and gait speed) Y        Y = yes; N = no; G = group; NV = next visit; H = hold

## 2024-09-10 NOTE — PROGRESS NOTES
Physical Therapy Visit    PT DAILY NOTE FOR OUTPATIENT THERAPY    Patient: Orly Cobian MRN: [id number]  : 1940 84 y.o.  Referring Physician: Laine Ariza MD  Date of Visit: 9/10/2024    Certification Dates: 24 through 11/15/24    Diagnosis:   1. Parkinson's disease without dyskinesia or fluctuating manifestations (CMS/HCC)    2. Decreased functional mobility        Chief Complaints:  decreased mobility    Precautions:   Existing Precautions/Restrictions: cardiac, other (see comments)  Precautions comments: A-fib, Ca, osteoporosis      TODAY'S VISIT    Time In Session:  Start Time: 1200  Stop Time: 1300  Time Calculation (min): 60 min   History/Vitals/Pain/Encounter Info - 09/10/24 1205          Injury History/Precautions/Daily Required Info    Document Type daily treatment     Primary Therapist Marco Antonio     Chief Complaint/Reason for Visit  decreased mobility     Referring Physician Laine Ariza MD     Existing Precautions/Restrictions cardiac;other (see comments)     Precautions comments A-fib, Ca, osteoporosis     History of present illness/functional impairment Olry Cobian was diagnosed with Parkinson's Disease in  and has been noting a loss of fine motor coordination. She is previously active in walking two to three miles per day. Currently, she has difficulty completing two miles secondary to reduced endurance. After attempting to walk longer distances, she notes more significant limits the next day. The patient reports that she has lost some mobility, especially rising from the floor. She feels pain in her legs while rising. Standing from a chair is also somewhat difficult. The patient lives in a three story home, and negotiating the stairs can be limited. She fell in  and fractured a vertebra. Orly has not fallen in the last three years.     Patient/Family/Caregiver Comments/Observations Orly endorsed feeling stressed about hsbd's upcoming procedure, and the fact that she  hasn't felt confident enough to drive anymore.     Patient reported fall since last visit No        Pain Assessment    Currently in pain No/Denies        Pre Activity Vital Signs    Oxygen Therapy None (Room air)     /86     BP Location Left upper arm     BP Method Manual     Patient Position Sitting        Activity Vital Signs    Patient activity Therapeutic Exercise     Activity /80     Activity BP Location Left upper arm     Activity BP Method Manual     Patient Position Sitting         Services    Do You Speak a Language Other Than English at Home? no                    Daily Treatment Assessment and Plan - 09/10/24 1205          Daily Treatment Assessment and Plan    Progress toward goals Progressing     Daily Outcome Summary Orly's resting BP registers higher than her baseline (she attributed that to stress). Consequently, she omits the C/V warmup. Continued modified BIG maximal daily exercise with chair support. Progressed program, incorporating dual task daily activity (walking and carrying dumbell, going up/down stairs with naming flowers). She benefits from cues to maintain sherley.     Plan and Recommendations Continue to address floor transfers early in the session to avoid fatigue from other activities. Monitor BP.                         OBJECTIVE DATA TAKEN TODAY:    None taken    Today's Treatment:    PT FLOWSHEET    Exercises Current Session Time Completed (Y/N/G)   MODALITIES- HEAT/ICE (25459) TOTAL TIME FOR SESSION Not performed    Heat     Ice     THER ACT (93614) TOTAL TIME FOR SESSION 0-7 Minutes    Assessment      Vitals/pain See note - take BP (manually) before each session Y   Education 8/23/24: results of exam, areas that PT will be able to focus, plan of care, possibility of participating in OT after this episode ends to focus more on fine motor coordination - patient verbalizes agreement and understanding  8/27/24: patient educated to monitor BP at home and to  contact physicians if high BP readings persist               Daily task training Changing bed sheets - plus blanket  Carrying objects - crate half full of tennis balls       Transfer training Floor transfers without UE support: x1 rep, CGA  y   GROUP (32362)  Not performed    THER EX (18832) TOTAL TIME FOR SESSION 0-7 Minutes    HEP 9/3/24: modified BIG exercises    CARDIOVASCULAR      Nu Step Level 2, 10 min, seat 9 and UE 10, RPE 13    STRENGTHENING     BIG Sit to stand 1 x 10 w/ Airex under feet  Y   Heel rise 2 x 10    Toe walk          STRETCHING/ROM     Calf stretch on wedge 2 x 30s              NEURO RE-ED (56887) TOTAL TIME FOR SESSION 23-37 Minutes    Floor to ceiling 10 reps with modeling Y   Side to side 10 reps with modeling/ shaping - L trunk collapse noted, req modified arms (1 arm on quad for stability) Y   Forward step and reach 10 reps, aide guarding Y   Side step and reach 10 reps, moderate cues, aide guarding Y   Backward step and reach 10 reps, moderate cues, aide guarding Y   Forward rock and reach 10 reps, moderate cues, aide guarding Y   Sideways rock and reach 10 reps, moderate cues, aide guarding Y   Dynamic training     Obstacle course          GAIT TRAINING (82855) TOTAL TIME FOR SESSION 23-37 Minutes    BIG walking T/o gym and up and down trial stairs with UUE HR, mult reps, holding 1# db, naming flowers/plants, for dual tasking (slowed processing, req cues to maintain arm swing and gait speed) Y        Y = yes; N = no; G = group; NV = next visit; H = hold

## 2024-09-12 ENCOUNTER — HOSPITAL ENCOUNTER (OUTPATIENT)
Dept: PHYSICAL THERAPY | Age: 84
Setting detail: THERAPIES SERIES
Discharge: HOME | End: 2024-09-12
Attending: PSYCHIATRY & NEUROLOGY
Payer: MEDICARE

## 2024-09-12 DIAGNOSIS — G20.A1 PARKINSON'S DISEASE WITHOUT DYSKINESIA OR FLUCTUATING MANIFESTATIONS (CMS/HCC): Primary | ICD-10-CM

## 2024-09-12 DIAGNOSIS — R26.89 DECREASED FUNCTIONAL MOBILITY: ICD-10-CM

## 2024-09-12 PROCEDURE — 97112 NEUROMUSCULAR REEDUCATION: CPT | Mod: GP

## 2024-09-12 PROCEDURE — 97110 THERAPEUTIC EXERCISES: CPT | Mod: GP

## 2024-09-12 PROCEDURE — 97530 THERAPEUTIC ACTIVITIES: CPT | Mod: GP

## 2024-09-12 NOTE — OP PT TREATMENT LOG
PT FLOWSHEET    Exercises Current Session Time Completed (Y/N/G)   MODALITIES- HEAT/ICE (62043) TOTAL TIME FOR SESSION Not performed    Heat     Ice     THER ACT (44273) TOTAL TIME FOR SESSION 8-22 Minutes    Assessment      Vitals/pain See note - take BP (manually) before each session Y   Education 8/23/24: results of exam, areas that PT will be able to focus, plan of care, possibility of participating in OT after this episode ends to focus more on fine motor coordination - patient verbalizes agreement and understanding  8/27/24: patient educated to monitor BP at home and to contact physicians if high BP readings persist               Daily task training Changing bed sheets - plus blanket  Carrying objects - crate with small weights through gym, up and down steps with CGA Y     Transfer training Floor transfers tall kneel to stand without UE support: x 2 reps, CGA  Y   GROUP (59336)  Not performed    THER EX (31863) TOTAL TIME FOR SESSION 8-22 Minutes    HEP 9/3/24: modified BIG exercises    CARDIOVASCULAR      Nu Step Level 2, 10 min, seat 8 and UE 9, RPE 13 Y   STRENGTHENING     BIG Sit to stand 1 x 10 w/ Airex under feet  Y   Heel rise 2 x 10    Toe walk          STRETCHING/ROM     Calf stretch on wedge 2 x 30s              NEURO RE-ED (25494) TOTAL TIME FOR SESSION 23-37 Minutes    Floor to ceiling 8 reps with modeling Y   Side to side 8 reps with modeling/ shaping Y   Forward step and reach 8 reps, aide guarding Y   Side step and reach 8 reps, moderate cues, aide guarding Y   Backward step and reach 8 reps, moderate cues, aide guarding Y   Forward rock and reach 10 reps, moderate cues, aide guarding Y   Sideways rock and reach 10 reps, moderate cues, aide guarding Y   Dynamic training     Obstacle course          GAIT TRAINING (58717) TOTAL TIME FOR SESSION 0-7 Minutes    BIG walking 1# weights on ankles and 1# dumbells, starting with normal steps and building into bigger movements Y   Stairs      Y = yes; N =  no; G = group; NV = next visit; H = hold

## 2024-09-12 NOTE — PROGRESS NOTES
Physical Therapy Visit    PT DAILY NOTE FOR OUTPATIENT THERAPY    Patient: Orly Cobian MRN: [id number]  : 1940 84 y.o.  Referring Physician: Laine Ariza MD  Date of Visit: 2024    Certification Dates: 24 through 11/15/24    Diagnosis:   1. Parkinson's disease without dyskinesia or fluctuating manifestations (CMS/HCC)    2. Decreased functional mobility        Chief Complaints:  decreased mobility    Precautions:   Existing Precautions/Restrictions: cardiac, other (see comments)  Precautions comments: A-fib, Ca, osteoporosis      TODAY'S VISIT    Time In Session:  Start Time: 1101  Stop Time: 1159  Time Calculation (min): 58 min   History/Vitals/Pain/Encounter Info - 24 1101          Injury History/Precautions/Daily Required Info    Document Type daily treatment     Primary Therapist Marco Antonio     Chief Complaint/Reason for Visit  decreased mobility     Referring Physician Laine Ariza MD     Existing Precautions/Restrictions cardiac;other (see comments)     Precautions comments A-fib, Ca, osteoporosis     History of present illness/functional impairment Orly Cobian was diagnosed with Parkinson's Disease in  and has been noting a loss of fine motor coordination. She is previously active in walking two to three miles per day. Currently, she has difficulty completing two miles secondary to reduced endurance. After attempting to walk longer distances, she notes more significant limits the next day. The patient reports that she has lost some mobility, especially rising from the floor. She feels pain in her legs while rising. Standing from a chair is also somewhat difficult. The patient lives in a three story home, and negotiating the stairs can be limited. She fell in  and fractured a vertebra. Orly has not fallen in the last three years.     Patient/Family/Caregiver Comments/Observations The patient reports compliance to the home program, and she slept well last night.      Patient reported fall since last visit No        Pain Assessment    Currently in pain No/Denies        Pre Activity Vital Signs    Oxygen Therapy --     /78     BP Location Left upper arm     BP Method Manual     Patient Position Sitting         Services    Do You Speak a Language Other Than English at Home? no                    Daily Treatment Assessment and Plan - 09/12/24 1101          Daily Treatment Assessment and Plan    Progress toward goals Progressing     Daily Outcome Summary The patient continues to perform well with floor transfers without external support. She uses a tall kneel to stand technique, walking her hands back on the floor. Dual task activity is not completed today due to the patient's difficulty coordinating arm and leg movements. She is cued to start walking at her normal pace to establish rhythm and start adding bigger movements as she continues.     Plan and Recommendations Continue functional mobility training. Incorporate an obstacle course.                         OBJECTIVE DATA TAKEN TODAY:    None taken    Today's Treatment:    PT FLOWSHEET    Exercises Current Session Time Completed (Y/N/G)   MODALITIES- HEAT/ICE (28743) TOTAL TIME FOR SESSION Not performed    Heat     Ice     THER ACT (14358) TOTAL TIME FOR SESSION 8-22 Minutes    Assessment      Vitals/pain See note - take BP (manually) before each session Y   Education 8/23/24: results of exam, areas that PT will be able to focus, plan of care, possibility of participating in OT after this episode ends to focus more on fine motor coordination - patient verbalizes agreement and understanding  8/27/24: patient educated to monitor BP at home and to contact physicians if high BP readings persist               Daily task training Changing bed sheets - plus blanket  Carrying objects - crate with small weights through gym, up and down steps with CGA Y     Transfer training Floor transfers tall kneel to stand without UE  support: x 2 reps, CGA  Y   GROUP (57712)  Not performed    THER EX (06373) TOTAL TIME FOR SESSION 8-22 Minutes    HEP 9/3/24: modified BIG exercises    CARDIOVASCULAR      Nu Step Level 2, 10 min, seat 8 and UE 9, RPE 13 Y   STRENGTHENING     BIG Sit to stand 1 x 10 w/ Airex under feet  Y   Heel rise 2 x 10    Toe walk          STRETCHING/ROM     Calf stretch on wedge 2 x 30s              NEURO RE-ED (82579) TOTAL TIME FOR SESSION 23-37 Minutes    Floor to ceiling 8 reps with modeling Y   Side to side 8 reps with modeling/ shaping Y   Forward step and reach 8 reps, aide guarding Y   Side step and reach 8 reps, moderate cues, aide guarding Y   Backward step and reach 8 reps, moderate cues, aide guarding Y   Forward rock and reach 10 reps, moderate cues, aide guarding Y   Sideways rock and reach 10 reps, moderate cues, aide guarding Y   Dynamic training     Obstacle course          GAIT TRAINING (33446) TOTAL TIME FOR SESSION 0-7 Minutes    BIG walking 1# weights on ankles and 1# dumbells, starting with normal steps and building into bigger movements Y   Stairs      Y = yes; N = no; G = group; NV = next visit; H = hold

## 2024-09-19 ENCOUNTER — HOSPITAL ENCOUNTER (OUTPATIENT)
Dept: PHYSICAL THERAPY | Age: 84
Setting detail: THERAPIES SERIES
Discharge: HOME | End: 2024-09-19
Attending: PSYCHIATRY & NEUROLOGY
Payer: MEDICARE

## 2024-09-19 DIAGNOSIS — R26.89 DECREASED FUNCTIONAL MOBILITY: ICD-10-CM

## 2024-09-19 DIAGNOSIS — G20.A1 PARKINSON'S DISEASE WITHOUT DYSKINESIA OR FLUCTUATING MANIFESTATIONS (CMS/HCC): Primary | ICD-10-CM

## 2024-09-19 PROCEDURE — 97140 MANUAL THERAPY 1/> REGIONS: CPT | Mod: GP

## 2024-09-19 PROCEDURE — 97112 NEUROMUSCULAR REEDUCATION: CPT | Mod: GP

## 2024-09-19 PROCEDURE — 97110 THERAPEUTIC EXERCISES: CPT | Mod: GP

## 2024-09-19 NOTE — PROGRESS NOTES
Physical Therapy Visit    PT DAILY NOTE FOR OUTPATIENT THERAPY    Patient: Orly Cobian MRN: [id number]  : 1940 84 y.o.  Referring Physician: Laine Ariza MD  Date of Visit: 2024    Certification Dates: 24 through 11/15/24    Diagnosis:   1. Parkinson's disease without dyskinesia or fluctuating manifestations (CMS/HCC)    2. Decreased functional mobility        Chief Complaints:  decreased mobility    Precautions:   Existing Precautions/Restrictions: cardiac, other (see comments)  Precautions comments: A-fib, Ca, osteoporosis      TODAY'S VISIT    Time In Session:  Start Time: 1303  Stop Time: 1401  Time Calculation (min): 58 min   History/Vitals/Pain/Encounter Info - 24 1257          Injury History/Precautions/Daily Required Info    Document Type daily treatment     Primary Therapist Marco Antonio     Chief Complaint/Reason for Visit  decreased mobility     Referring Physician Laine Ariza MD     Existing Precautions/Restrictions cardiac;other (see comments)     Precautions comments A-fib, Ca, osteoporosis     History of present illness/functional impairment Orly Cobian was diagnosed with Parkinson's Disease in  and has been noting a loss of fine motor coordination. She is previously active in walking two to three miles per day. Currently, she has difficulty completing two miles secondary to reduced endurance. After attempting to walk longer distances, she notes more significant limits the next day. The patient reports that she has lost some mobility, especially rising from the floor. She feels pain in her legs while rising. Standing from a chair is also somewhat difficult. The patient lives in a three story home, and negotiating the stairs can be limited. She fell in  and fractured a vertebra. Orly has not fallen in the last three years.     Patient/Family/Caregiver Comments/Observations The patient reports continued compliance to the home exercises.     Patient reported fall  since last visit No        Pain Assessment    Currently in pain No/Denies        Pre Activity Vital Signs    /64     BP Location Left upper arm     BP Method Manual     Patient Position Sitting         Services    Do You Speak a Language Other Than English at Home? no                    Daily Treatment Assessment and Plan - 09/19/24 1303          Daily Treatment Assessment and Plan    Progress toward goals Progressing     Daily Outcome Summary Orly is able to perform maximal daily exercise without support of a chair, but an aide provides contact guard with a mobility belt. In addition to taking support away, the patient uses cuff weights for increased resistance. Gait training begins with patient perceived normal amplitude movements and progresses to large amplitude.     Plan and Recommendations Continue functional training and dynamic balance training.                         OBJECTIVE DATA TAKEN TODAY:    None taken    Today's Treatment:    PT FLOWSHEET    Exercises Current Session Time Completed (Y/N/G)   MODALITIES- HEAT/ICE (34118) TOTAL TIME FOR SESSION Not performed    Heat     Ice     THER ACT (20119) TOTAL TIME FOR SESSION 0-7 Minutes    Assessment      Vitals/pain See note - take BP (manually) before each session Y   Education 8/23/24: results of exam, areas that PT will be able to focus, plan of care, possibility of participating in OT after this episode ends to focus more on fine motor coordination - patient verbalizes agreement and understanding  8/27/24: patient educated to monitor BP at home and to contact physicians if high BP readings persist               Daily task training Changing bed sheets - plus blanket  Carrying objects - crate with small weights through gym, up and down steps with CGA      Transfer training Floor transfers tall kneel to stand without UE support: x 2 reps, CGA     GROUP (36249)  Not performed    THER EX (30286) TOTAL TIME FOR SESSION 8-22 Minutes    HEP  9/3/24: modified BIG exercises    CARDIOVASCULAR      Nu Step Level 2, 10 min, seat 8 and UE 9, RPE 13 Y   STRENGTHENING     BIG Sit to stand 1 x 10 w/ Airex under feet  Y   Heel rise 2 x 10    Toe walk          STRETCHING/ROM     Calf stretch on wedge 2 x 30s              NEURO RE-ED (72506) TOTAL TIME FOR SESSION 23-37 Minutes    Floor to ceiling 8 reps with modeling Y   Side to side 8 reps with modeling/ shaping with 1# cuff weights on UE/LE Y   Forward step and reach 8 reps, aide guarding with 1# cuff weights on UE/LE Y   Side step and reach 8 reps, min to mod cues, aide guarding with 1# cuff weights on UE/LE Y   Backward step and reach 8 reps, min to mod cues, aide guarding with 1# cuff weights on UE/LE Y   Forward rock and reach 10 reps, min to mod cues, aide guarding with 1# cuff weights on UE/LE Y   Sideways rock and reach 10 reps, min to mod cues, aide guarding with 1# cuff weights on UE/LE Y   Dynamic training     Obstacle course Stepping over bar cones, reciprocal x 4 each way Y        GAIT TRAINING (23204) TOTAL TIME FOR SESSION 8-22 Minutes    BIG walking 1.5# weights on ankles and 1# weights on wrists, starting with normal steps and building into bigger movements Y   Stairs      Y = yes; N = no; G = group; NV = next visit; H = hold

## 2024-09-19 NOTE — OP PT TREATMENT LOG
PT FLOWSHEET    Exercises Current Session Time Completed (Y/N/G)   MODALITIES- HEAT/ICE (27208) TOTAL TIME FOR SESSION Not performed    Heat     Ice     THER ACT (03790) TOTAL TIME FOR SESSION 0-7 Minutes    Assessment      Vitals/pain See note - take BP (manually) before each session Y   Education 8/23/24: results of exam, areas that PT will be able to focus, plan of care, possibility of participating in OT after this episode ends to focus more on fine motor coordination - patient verbalizes agreement and understanding  8/27/24: patient educated to monitor BP at home and to contact physicians if high BP readings persist               Daily task training Changing bed sheets - plus blanket  Carrying objects - crate with small weights through gym, up and down steps with CGA      Transfer training Floor transfers tall kneel to stand without UE support: x 2 reps, CGA     GROUP (49806)  Not performed    THER EX (13067) TOTAL TIME FOR SESSION 8-22 Minutes    HEP 9/3/24: modified BIG exercises    CARDIOVASCULAR      Nu Step Level 2, 10 min, seat 8 and UE 9, RPE 13 Y   STRENGTHENING     BIG Sit to stand 1 x 10 w/ Airex under feet  Y   Heel rise 2 x 10    Toe walk          STRETCHING/ROM     Calf stretch on wedge 2 x 30s              NEURO RE-ED (97133) TOTAL TIME FOR SESSION 23-37 Minutes    Floor to ceiling 8 reps with modeling Y   Side to side 8 reps with modeling/ shaping with 1# cuff weights on UE/LE Y   Forward step and reach 8 reps, aide guarding with 1# cuff weights on UE/LE Y   Side step and reach 8 reps, min to mod cues, aide guarding with 1# cuff weights on UE/LE Y   Backward step and reach 8 reps, min to mod cues, aide guarding with 1# cuff weights on UE/LE Y   Forward rock and reach 10 reps, min to mod cues, aide guarding with 1# cuff weights on UE/LE Y   Sideways rock and reach 10 reps, min to mod cues, aide guarding with 1# cuff weights on UE/LE Y   Dynamic training     Obstacle course Stepping over bar cones,  reciprocal x 4 each way Y        GAIT TRAINING (15112) TOTAL TIME FOR SESSION 8-22 Minutes    BIG walking 1.5# weights on ankles and 1# weights on wrists, starting with normal steps and building into bigger movements Y   Stairs      Y = yes; N = no; G = group; NV = next visit; H = hold

## 2024-09-20 ENCOUNTER — HOSPITAL ENCOUNTER (OUTPATIENT)
Dept: PHYSICAL THERAPY | Age: 84
Setting detail: THERAPIES SERIES
Discharge: HOME | End: 2024-09-20
Attending: PSYCHIATRY & NEUROLOGY
Payer: MEDICARE

## 2024-09-20 DIAGNOSIS — R26.89 DECREASED FUNCTIONAL MOBILITY: ICD-10-CM

## 2024-09-20 DIAGNOSIS — G20.A1 PARKINSON'S DISEASE WITHOUT DYSKINESIA OR FLUCTUATING MANIFESTATIONS (CMS/HCC): Primary | ICD-10-CM

## 2024-09-20 PROCEDURE — 97530 THERAPEUTIC ACTIVITIES: CPT | Mod: GP

## 2024-09-20 PROCEDURE — 97110 THERAPEUTIC EXERCISES: CPT | Mod: GP

## 2024-09-20 ASSESSMENT — GAIT ASSESSMENTS
TUG TIME: 9.7
TUG TIME: 8.9
TUG TIME: 11.3
TUG TIME: 8.96

## 2024-09-20 NOTE — LETTER
[address] [facility]  [address]  [facility] [address]      PHYSICAL THERAPY UPDATE    Patient Name: Orly Cobian    Provider: Corona Hannah PT  Referring Provider: Laine Ariza MD  PCP: Anton Muniz MD  Payor: Payor: MEDICARE / Plan: MEDICARE PART A & B / Product Type: Medicare /   Medical Diagnosis: Parkinson's disease without dyskinesia or fluctuating manifestations (CMS/HCC) [G20.A1]     Dear Dr. Ariza,    Thank you for the referral of your patient Orly Cobian for physical therapy. I am writing to you today to provide you an update on the status of your patient. Please review the latest progress note below and the goals being addressed during this plan of care.     If you have any questions or concerns, please feel free to contact me. Once again, thank you for your referral and the opportunity to work with your patient.     Sincerely,  Corona Hannah PT    Rehab Potential:        Physical Therapy Progress Note    PT PROGRESS NOTE FOR OUTPATIENT THERAPY    Patient: Orly Cobian   MRN: [id number]  : 1940 84 y.o.    Referring Physician: Laine Ariza MD  Date of Visit: 2024    Certification Dates: 24 through 11/15/24    Recommended Frequency & Duration:  2 times/week for up to 3 months        Diagnosis:   1. Parkinson's disease without dyskinesia or fluctuating manifestations (CMS/HCC)    2. Decreased functional mobility        Chief Complaints:  Chief Complaint   Patient presents with   • Decreased Mobility   • Balance Deficits       Precautions:   Existing Precautions/Restrictions: cardiac, other (see comments)  Precautions comments: A-fib, Ca, osteoporosis    TODAY'S VISIT:    Time In Session:  Start Time: 1258  Stop Time: 1356  Time Calculation (min): 58 min   General Information - 24 1258          Session Details    Document Type progress note     Mode of Treatment individual therapy        General Information    Referring Physician Laine Ariza  MD     History of present illness/functional impairment Orly Cobian was diagnosed with Parkinson's Disease in 2023 and has been noting a loss of fine motor coordination. She is previously active in walking two to three miles per day. Currently, she has difficulty completing two miles secondary to reduced endurance. After attempting to walk longer distances, she notes more significant limits the next day. The patient reports that she has lost some mobility, especially rising from the floor. She feels pain in her legs while rising. Standing from a chair is also somewhat difficult. The patient lives in a three story home, and negotiating the stairs can be limited. She fell in 2021 and fractured a vertebra. Orly has not fallen in the last three years.     Patient/Family/Caregiver Comments/Observations The patient reports increased mobility since starting treatment. Quad soreness is now less provoked with activity. She has been generally more active, and she is sleeping better.     Existing Precautions/Restrictions cardiac;other (see comments)     Precautions comments A-fib, Ca, osteoporosis         Services    Do You Speak a Language Other Than English at Home? no                    Daily Falls Screen - 09/20/24 1258          Daily Falls Assessment    Patient reported fall since last visit No                    Pain/Vitals - 09/20/24 1258          Pain Assessment    Currently in pain No/Denies        Pre Activity Vital Signs    /66     BP Location Left upper arm     BP Method Manual     Patient Position Sitting                    PT - 09/20/24 1258          Physical Therapy    Physical Therapy Specialty Traditional Neuro PT        PT Plan    Frequency of treatment 2 times/week     PT Duration 3 months     PT Cert From 08/23/24     PT Cert To 11/15/24     Date PT POC was sent to provider 08/23/24     Signed PT Plan of Care received?  Yes                    Assessment and Plan - 09/20/24 1251           Assessment    Clinical Assessment Orly Cobian completed nine physical therapy visits for Parkinson's Disease and mobility dysfunction. She demonstrates increased functional strength, more efficient transfers, improved gait mechanics, improved dynamic balance, and increased endurance to activity. The PDQ-39 decreased from 34% disability to 13%, and the functional tasks recording form improved from 52% to 30%. Further improvements are expected, and the patient will participate in physical therapy for an additional four weeks to maximize functional outcome.     Plan and Recommendations Continue functional training and dynamic balance training.                     OBJECTIVE MEASUREMENTS/DATA:    Transfers    Transfers - 09/20/24 1258          Sit to Stand Transfer    [place], Sit to Stand Transfer independent     Comment inconsistent completion of full stand                   Gait and Mobility    Gait and Mobility - 09/20/24 1258          Gait Training    [place], Gait independent     Variable surfaces Flat surface     Assistive Device none     Comment The patient demonstrates good heel strike, good stride length, and symmetrical arm swing.        Stairs Assessment    [place], Stairs modified independence     Assistive Device railing     Ascending Stairs Technique step-over-step     Descending Stairs Technique step-over-step                   Outcome Measures    PT Outcome Measures - 09/20/24 1258          Objective Outcome Measures    6 Minute Walk Test 1,618 feet     2 Minute Walk Test 549 feet     Gait Speed (m/sec) 1.28 m/sec     FGA Score (out of 30 total) 25     30 Second Sit to Stand Test 13 repetitions        Other Outcome Measures Used/Comments    Other outcome measure used: PDQ-39 = 13.75% limitation overall; mobility = 10%; ADL = 12.5%; emotional = 21%; stigma = 6.25%; social = 0%; cognitive = 18.75%; communication = 16.7%; bodily discomfort = 25%     Outcome Measures General Comments  Functional tasks recording form: gardening = 4; rising from chair = 3; cooking = 1; changing bed linens = 2; watering with hose = 1; writing cursive = 4; collecting trash = 2; walking uphill = 3; driving the car = 5; kneeling = 3 (1-7 scale, 7 = unable) overall 30% limitation        Timed Up and Go Test    Trial One: Timed Up and Go Test 11.3     Trial Two: Timed Up and Go Test 8.96     Trial Three: Timed Up and Go Test 8.9     Mean of 3 Trials: Timed Up and Go Test 9.7                       Outcome Measures          8/23/2024    08:42 9/20/2024    12:58   PT OBJECTIVE Outcome Measures   6 Minute Walk Test 1,427 feet 1,618 feet   2 Minute Walk Test  549 feet   Gait Speed (m/sec) 1.28 m/sec 1.28 m/sec   30 Second Sit to Stand 10 repetitions       difficulty with anterior weight shift 13 repetitions   FGA 22 25   TUG (Mean) 10.7 9.7   PT SUBJECTIVE Outcome Measures   Other PDQ-39 = 34% limitation total; mobility = 45%; ADL = 33%; emotional = 42%; stigma = 12.5%; social = 25%; cognitive = 37.5%; communication = 25%; bodily discomfort = 25% PDQ-39 = 13.75% limitation overall; mobility = 10%; ADL = 12.5%; emotional = 21%; stigma = 6.25%; social = 0%; cognitive = 18.75%; communication = 16.7%; bodily discomfort = 25%       Today's Treatment::    Education provided:  Yes: See treatment log for details of education provided  Methods: Discussion, Handout, and Demonstration  Readiness: acceptance  Response: Demonstrated understanding and Verbalizes understanding    PT FLOWSHEET    Exercises Current Session Time Completed (Y/N/G)   MODALITIES- HEAT/ICE (28906) TOTAL TIME FOR SESSION Not performed    Heat     Ice     THER ACT (29527) TOTAL TIME FOR SESSION 38-52 Minutes    Assessment  See progress note for objective and subjective outcomes Y   Vitals/pain See note - take BP (manually) before each session Y   Education 8/23/24: results of exam, areas that PT will be able to focus, plan of care, possibility of participating in OT  after this episode ends to focus more on fine motor coordination - patient verbalizes agreement and understanding  8/27/24: patient educated to monitor BP at home and to contact physicians if high BP readings persist   9/20/24: progress made since the first visit, continued plan of care, possibility of finishing PT prior to the projected end date - patient verbalizes agreement and understanding     Y         Daily task training Changing bed sheets - plus blanket  Carrying objects - crate with small weights through gym, up and down steps with CGA      Transfer training Floor transfers tall kneel to stand without UE support: x 1 rep, supervision Y   GROUP (83940)  Not performed    THER EX (90777) TOTAL TIME FOR SESSION 8-22 Minutes    HEP 9/3/24: modified BIG exercises    CARDIOVASCULAR      Nu Step Level 2, 10 min, seat 8 and UE 9, RPE 13    STRENGTHENING     BIG Sit to stand 1 x 10 w/ Airex under feet  Y   Heel rise 2 x 10    Toe walk     Squat with UE pull upward on band 10 reps with double green band anchored to floor  (pulling weeds) Y   1 kg ball transfer from floor to rolling table 10 reps Y   STRETCHING/ROM     Calf stretch on wedge 2 x 30s              NEURO RE-ED (62633) TOTAL TIME FOR SESSION Not performed    Floor to ceiling 8 reps with modeling    Side to side 8 reps with modeling/ shaping with 1# cuff weights on UE/LE    Forward step and reach 8 reps, aide guarding with 1# cuff weights on UE/LE    Side step and reach 8 reps, min to mod cues, aide guarding with 1# cuff weights on UE/LE    Backward step and reach 8 reps, min to mod cues, aide guarding with 1# cuff weights on UE/LE    Forward rock and reach 10 reps, min to mod cues, aide guarding with 1# cuff weights on UE/LE    Sideways rock and reach 10 reps, min to mod cues, aide guarding with 1# cuff weights on UE/LE    Dynamic training     Obstacle course Stepping over bar cones, reciprocal x 4 each way         GAIT TRAINING (61368) TOTAL TIME FOR  SESSION Not performed    BIG walking 1.5# weights on ankles and 1# weights on wrists, starting with normal steps and building into bigger movements    Stairs      Y = yes; N = no; G = group; NV = next visit; H = hold       Goals Addressed                   This Visit's Progress    • PT Parkinson's goals        Short Term Goals Time Frame Result Comment   30 sec STS >/= 12 4-6 weeks Met    FGA >/= 24 4-6 weeks Met    Decrease TUG </= 10 sec to decrease fall risk 4-6 weeks Met    PDQ-39 </= 26% 4-6 weeks Met    FTRF </= 40% 4-6 weeks Met    Pt will complete independent daily mobility without falls 4-6 weeks Met      Long Term Goals Time Frame Result Comment   30 sec STS >/= 14 8-12 weeks     FGA >/= 26 8-12 weeks     6 minute walk >/= 1550 feet 8-12 weeks Met    PDQ-39 </= 18% 8-12 weeks Met    FTRF </= 28% 8-12 weeks     Pt performs daily household chores with minimal difficulty 8-12 weeks     Pt will be able to walk >/= 3 miles per day consistently with independence 8-12 weeks Met    Pt will complete a floor transfer independently without use of furniture 8-12 weeks Improved Supervision                    Corona Hannah, PT

## 2024-09-20 NOTE — OP PT TREATMENT LOG
PT FLOWSHEET    Exercises Current Session Time Completed (Y/N/G)   MODALITIES- HEAT/ICE (95134) TOTAL TIME FOR SESSION Not performed    Heat     Ice     THER ACT (89252) TOTAL TIME FOR SESSION 38-52 Minutes    Assessment  See progress note for objective and subjective outcomes Y   Vitals/pain See note - take BP (manually) before each session Y   Education 8/23/24: results of exam, areas that PT will be able to focus, plan of care, possibility of participating in OT after this episode ends to focus more on fine motor coordination - patient verbalizes agreement and understanding  8/27/24: patient educated to monitor BP at home and to contact physicians if high BP readings persist   9/20/24: progress made since the first visit, continued plan of care, possibility of finishing PT prior to the projected end date - patient verbalizes agreement and understanding     Y         Daily task training Changing bed sheets - plus blanket  Carrying objects - crate with small weights through gym, up and down steps with CGA      Transfer training Floor transfers tall kneel to stand without UE support: x 1 rep, supervision Y   GROUP (33467)  Not performed    THER EX (65280) TOTAL TIME FOR SESSION 8-22 Minutes    HEP 9/3/24: modified BIG exercises    CARDIOVASCULAR      Nu Step Level 2, 10 min, seat 8 and UE 9, RPE 13    STRENGTHENING     BIG Sit to stand 1 x 10 w/ Airex under feet  Y   Heel rise 2 x 10    Toe walk     Squat with UE pull upward on band 10 reps with double green band anchored to floor  (pulling weeds) Y   1 kg ball transfer from floor to rolling table 10 reps Y   STRETCHING/ROM     Calf stretch on wedge 2 x 30s              NEURO RE-ED (35151) TOTAL TIME FOR SESSION Not performed    Floor to ceiling 8 reps with modeling    Side to side 8 reps with modeling/ shaping with 1# cuff weights on UE/LE    Forward step and reach 8 reps, aide guarding with 1# cuff weights on UE/LE    Side step and reach 8 reps, min to mod cues,  aide guarding with 1# cuff weights on UE/LE    Backward step and reach 8 reps, min to mod cues, aide guarding with 1# cuff weights on UE/LE    Forward rock and reach 10 reps, min to mod cues, aide guarding with 1# cuff weights on UE/LE    Sideways rock and reach 10 reps, min to mod cues, aide guarding with 1# cuff weights on UE/LE    Dynamic training     Obstacle course Stepping over bar cones, reciprocal x 4 each way         GAIT TRAINING (78208) TOTAL TIME FOR SESSION Not performed    BIG walking 1.5# weights on ankles and 1# weights on wrists, starting with normal steps and building into bigger movements    Stairs      Y = yes; N = no; G = group; NV = next visit; H = hold

## 2024-09-20 NOTE — PROGRESS NOTES
Physical Therapy Progress Note    PT PROGRESS NOTE FOR OUTPATIENT THERAPY    Patient: Orly Cobian   MRN: [id number]  : 1940 84 y.o.    Referring Physician: Laine Ariza MD  Date of Visit: 2024    Certification Dates: 24 through 11/15/24    Recommended Frequency & Duration:  2 times/week for up to 3 months        Diagnosis:   1. Parkinson's disease without dyskinesia or fluctuating manifestations (CMS/HCC)    2. Decreased functional mobility        Chief Complaints:  Chief Complaint   Patient presents with    Decreased Mobility    Balance Deficits       Precautions:   Existing Precautions/Restrictions: cardiac, other (see comments)  Precautions comments: A-fib, Ca, osteoporosis    TODAY'S VISIT:    Time In Session:  Start Time: 1258  Stop Time: 1356  Time Calculation (min): 58 min   General Information - 24 1258          Session Details    Document Type progress note     Mode of Treatment individual therapy        General Information    Referring Physician Laine Ariza MD     History of present illness/functional impairment Orly Cobian was diagnosed with Parkinson's Disease in  and has been noting a loss of fine motor coordination. She is previously active in walking two to three miles per day. Currently, she has difficulty completing two miles secondary to reduced endurance. After attempting to walk longer distances, she notes more significant limits the next day. The patient reports that she has lost some mobility, especially rising from the floor. She feels pain in her legs while rising. Standing from a chair is also somewhat difficult. The patient lives in a three story home, and negotiating the stairs can be limited. She fell in  and fractured a vertebra. Orly has not fallen in the last three years.     Patient/Family/Caregiver Comments/Observations The patient reports increased mobility since starting treatment. Quad soreness is now less provoked with activity. She  has been generally more active, and she is sleeping better.     Existing Precautions/Restrictions cardiac;other (see comments)     Precautions comments A-fib, Ca, osteoporosis         Services    Do You Speak a Language Other Than English at Home? no                    Daily Falls Screen - 09/20/24 1258          Daily Falls Assessment    Patient reported fall since last visit No                    Pain/Vitals - 09/20/24 1258          Pain Assessment    Currently in pain No/Denies        Pre Activity Vital Signs    /66     BP Location Left upper arm     BP Method Manual     Patient Position Sitting                    PT - 09/20/24 1258          Physical Therapy    Physical Therapy Specialty Traditional Neuro PT        PT Plan    Frequency of treatment 2 times/week     PT Duration 3 months     PT Cert From 08/23/24     PT Cert To 11/15/24     Date PT POC was sent to provider 08/23/24     Signed PT Plan of Care received?  Yes                    Assessment and Plan - 09/20/24 1258          Assessment    Clinical Assessment Orly Cobian completed nine physical therapy visits for Parkinson's Disease and mobility dysfunction. She demonstrates increased functional strength, more efficient transfers, improved gait mechanics, improved dynamic balance, and increased endurance to activity. The PDQ-39 decreased from 34% disability to 13%, and the functional tasks recording form improved from 52% to 30%. Further improvements are expected, and the patient will participate in physical therapy for an additional four weeks to maximize functional outcome.     Plan and Recommendations Continue functional training and dynamic balance training.                     OBJECTIVE MEASUREMENTS/DATA:    Transfers    Transfers - 09/20/24 1258          Sit to Stand Transfer    [place], Sit to Stand Transfer independent     Comment inconsistent completion of full stand                   Gait and Mobility    Gait and Mobility  - 09/20/24 1258          Gait Training    [place], Gait independent     Variable surfaces Flat surface     Assistive Device none     Comment The patient demonstrates good heel strike, good stride length, and symmetrical arm swing.        Stairs Assessment    [place], Stairs modified independence     Assistive Device railing     Ascending Stairs Technique step-over-step     Descending Stairs Technique step-over-step                   Outcome Measures    PT Outcome Measures - 09/20/24 1258          Objective Outcome Measures    6 Minute Walk Test 1,618 feet     2 Minute Walk Test 549 feet     Gait Speed (m/sec) 1.28 m/sec     FGA Score (out of 30 total) 25     30 Second Sit to Stand Test 13 repetitions        Other Outcome Measures Used/Comments    Other outcome measure used: PDQ-39 = 13.75% limitation overall; mobility = 10%; ADL = 12.5%; emotional = 21%; stigma = 6.25%; social = 0%; cognitive = 18.75%; communication = 16.7%; bodily discomfort = 25%     Outcome Measures General Comments Functional tasks recording form: gardening = 4; rising from chair = 3; cooking = 1; changing bed linens = 2; watering with hose = 1; writing cursive = 4; collecting trash = 2; walking uphill = 3; driving the car = 5; kneeling = 3 (1-7 scale, 7 = unable) overall 30% limitation        Timed Up and Go Test    Trial One: Timed Up and Go Test 11.3     Trial Two: Timed Up and Go Test 8.96     Trial Three: Timed Up and Go Test 8.9     Mean of 3 Trials: Timed Up and Go Test 9.7                       Outcome Measures          8/23/2024    08:42 9/20/2024    12:58   PT OBJECTIVE Outcome Measures   6 Minute Walk Test 1,427 feet 1,618 feet   2 Minute Walk Test  549 feet   Gait Speed (m/sec) 1.28 m/sec 1.28 m/sec   30 Second Sit to Stand 10 repetitions       difficulty with anterior weight shift 13 repetitions   FGA 22 25   TUG (Mean) 10.7 9.7   PT SUBJECTIVE Outcome Measures   Other PDQ-39 = 34% limitation total; mobility = 45%; ADL =  33%; emotional = 42%; stigma = 12.5%; social = 25%; cognitive = 37.5%; communication = 25%; bodily discomfort = 25% PDQ-39 = 13.75% limitation overall; mobility = 10%; ADL = 12.5%; emotional = 21%; stigma = 6.25%; social = 0%; cognitive = 18.75%; communication = 16.7%; bodily discomfort = 25%       Today's Treatment::    Education provided:  Yes: See treatment log for details of education provided  Methods: Discussion, Handout, and Demonstration  Readiness: acceptance  Response: Demonstrated understanding and Verbalizes understanding    PT FLOWSHEET    Exercises Current Session Time Completed (Y/N/G)   MODALITIES- HEAT/ICE (78927) TOTAL TIME FOR SESSION Not performed    Heat     Ice     THER ACT (18355) TOTAL TIME FOR SESSION 38-52 Minutes    Assessment  See progress note for objective and subjective outcomes Y   Vitals/pain See note - take BP (manually) before each session Y   Education 8/23/24: results of exam, areas that PT will be able to focus, plan of care, possibility of participating in OT after this episode ends to focus more on fine motor coordination - patient verbalizes agreement and understanding  8/27/24: patient educated to monitor BP at home and to contact physicians if high BP readings persist   9/20/24: progress made since the first visit, continued plan of care, possibility of finishing PT prior to the projected end date - patient verbalizes agreement and understanding     Y         Daily task training Changing bed sheets - plus blanket  Carrying objects - crate with small weights through gym, up and down steps with CGA      Transfer training Floor transfers tall kneel to stand without UE support: x 1 rep, supervision Y   GROUP (98054)  Not performed    THER EX (08223) TOTAL TIME FOR SESSION 8-22 Minutes    HEP 9/3/24: modified BIG exercises    CARDIOVASCULAR      Nu Step Level 2, 10 min, seat 8 and UE 9, RPE 13    STRENGTHENING     BIG Sit to stand 1 x 10 w/ Airex under feet  Y   Heel rise 2 x 10     Toe walk     Squat with UE pull upward on band 10 reps with double green band anchored to floor  (pulling weeds) Y   1 kg ball transfer from floor to rolling table 10 reps Y   STRETCHING/ROM     Calf stretch on wedge 2 x 30s              NEURO RE-ED (20496) TOTAL TIME FOR SESSION Not performed    Floor to ceiling 8 reps with modeling    Side to side 8 reps with modeling/ shaping with 1# cuff weights on UE/LE    Forward step and reach 8 reps, aide guarding with 1# cuff weights on UE/LE    Side step and reach 8 reps, min to mod cues, aide guarding with 1# cuff weights on UE/LE    Backward step and reach 8 reps, min to mod cues, aide guarding with 1# cuff weights on UE/LE    Forward rock and reach 10 reps, min to mod cues, aide guarding with 1# cuff weights on UE/LE    Sideways rock and reach 10 reps, min to mod cues, aide guarding with 1# cuff weights on UE/LE    Dynamic training     Obstacle course Stepping over bar cones, reciprocal x 4 each way         GAIT TRAINING (32933) TOTAL TIME FOR SESSION Not performed    BIG walking 1.5# weights on ankles and 1# weights on wrists, starting with normal steps and building into bigger movements    Stairs      Y = yes; N = no; G = group; NV = next visit; H = hold       Goals Addressed                   This Visit's Progress     PT Parkinson's goals        Short Term Goals Time Frame Result Comment   30 sec STS >/= 12 4-6 weeks Met    FGA >/= 24 4-6 weeks Met    Decrease TUG </= 10 sec to decrease fall risk 4-6 weeks Met    PDQ-39 </= 26% 4-6 weeks Met    FTRF </= 40% 4-6 weeks Met    Pt will complete independent daily mobility without falls 4-6 weeks Met      Long Term Goals Time Frame Result Comment   30 sec STS >/= 14 8-12 weeks     FGA >/= 26 8-12 weeks     6 minute walk >/= 1550 feet 8-12 weeks Met    PDQ-39 </= 18% 8-12 weeks Met    FTRF </= 28% 8-12 weeks     Pt performs daily household chores with minimal difficulty 8-12 weeks     Pt will be able to walk >/= 3 miles  per day consistently with independence 8-12 weeks Met    Pt will complete a floor transfer independently without use of furniture 8-12 weeks Improved Supervision                    Corona Hannah, PT

## 2024-09-24 ENCOUNTER — HOSPITAL ENCOUNTER (OUTPATIENT)
Dept: PHYSICAL THERAPY | Age: 84
Setting detail: THERAPIES SERIES
Discharge: HOME | End: 2024-09-24
Attending: PSYCHIATRY & NEUROLOGY
Payer: MEDICARE

## 2024-09-24 ENCOUNTER — TELEPHONE (OUTPATIENT)
Dept: NEUROLOGY | Facility: CLINIC | Age: 84
End: 2024-09-24
Payer: MEDICARE

## 2024-09-24 DIAGNOSIS — G20.A1 PARKINSON'S DISEASE WITHOUT DYSKINESIA OR FLUCTUATING MANIFESTATIONS (CMS/HCC): Primary | ICD-10-CM

## 2024-09-24 DIAGNOSIS — R26.89 DECREASED FUNCTIONAL MOBILITY: ICD-10-CM

## 2024-09-24 PROCEDURE — 97112 NEUROMUSCULAR REEDUCATION: CPT | Mod: GP

## 2024-09-24 PROCEDURE — 97110 THERAPEUTIC EXERCISES: CPT | Mod: GP

## 2024-09-24 RX ORDER — CARBIDOPA 25 MG/1
25 TABLET ORAL 3 TIMES DAILY
Qty: 270 TABLET | Refills: 3 | Status: SHIPPED | OUTPATIENT
Start: 2024-09-24 | End: 2025-01-07

## 2024-09-24 NOTE — OP PT TREATMENT LOG
PT FLOWSHEET    Exercises Current Session Time Completed (Y/N/G)   MODALITIES- HEAT/ICE (34400) TOTAL TIME FOR SESSION Not performed    Heat     Ice     THER ACT (43249) TOTAL TIME FOR SESSION 0-7 Minutes    Assessment  See progress note for objective and subjective outcomes    Vitals/pain See note - take BP (manually) before each session Y   Education 8/23/24: results of exam, areas that PT will be able to focus, plan of care, possibility of participating in OT after this episode ends to focus more on fine motor coordination - patient verbalizes agreement and understanding  8/27/24: patient educated to monitor BP at home and to contact physicians if high BP readings persist   9/20/24: progress made since the first visit, continued plan of care, possibility of finishing PT prior to the projected end date - patient verbalizes agreement and understanding              Daily task training Changing bed sheets - plus blanket  Carrying objects - crate with small weights through gym, up and down steps with CGA      Transfer training Floor transfers tall kneel to stand without UE support: x 1 rep, supervision    GROUP (93550)  Not performed    THER EX (90580) TOTAL TIME FOR SESSION 8-22 Minutes    HEP 9/3/24: modified BIG exercises    CARDIOVASCULAR      Nu Step Level 2, 11 min, seat 8 and UE 9, RPE 13 Y   STRENGTHENING     BIG Sit to stand 1 x 10 w/ Airex under feet  Y   Heel rise 2 x 10    Toe walk     Squat with UE pull upward on band 10 reps B with double green band anchored to small kettle bell  (pulling weeds) - use mobility belt for safety Y   1 kg ball transfer from floor to rolling table 10 reps    STRETCHING/ROM     Calf stretch on wedge 2 x 30s              NEURO RE-ED (04303) TOTAL TIME FOR SESSION 38-52 Minutes    Floor to ceiling 8 reps with modeling Y   Side to side 8 reps with modeling/ shaping with 1# cuff weights on UE Y   Forward step and reach 8 reps, aide guarding with 1# cuff weights UE, bar cone Y    Side step and reach 8 reps, min to mod cues, aide guarding with 1# cuff weights on UE, bar cone Y   Backward step and reach 8 reps, min to mod cues, aide guarding Y   Forward rock and reach 10 reps, min to mod cues, aide guarding Y   Sideways rock and reach 10 reps, min to mod cues, aide guarding Y   Dynamic training     Obstacle course Stepping over bar cones, reciprocal x 4 each way forward, then laterally Y        GAIT TRAINING (17061) TOTAL TIME FOR SESSION Not performed    BIG walking 1.5# weights on ankles and 1# weights on wrists, starting with normal steps and building into bigger movements    Stairs      Y = yes; N = no; G = group; NV = next visit; H = hold

## 2024-09-24 NOTE — TELEPHONE ENCOUNTER
I spoke with her. Will start pretreatment with carbidopa. If cost prohibitive, she will let me know, may next try Rytary if needed instead

## 2024-09-24 NOTE — PROGRESS NOTES
Physical Therapy Visit    PT DAILY NOTE FOR OUTPATIENT THERAPY    Patient: Orly Cobian MRN: [id number]  : 1940 84 y.o.  Referring Physician: Laine Ariza MD  Date of Visit: 2024    Certification Dates: 24 through 11/15/24    Diagnosis:   1. Parkinson's disease without dyskinesia or fluctuating manifestations (CMS/HCC)    2. Decreased functional mobility        Chief Complaints:  decreased mobility    Precautions:   Existing Precautions/Restrictions: cardiac, other (see comments)  Precautions comments: A-fib, Ca, osteoporosis      TODAY'S VISIT    Time In Session:  Start Time: 1303  Stop Time: 1357  Time Calculation (min): 54 min   History/Vitals/Pain/Encounter Info - 24 1305          Injury History/Precautions/Daily Required Info    Document Type daily treatment     Primary Therapist Marco Antonio     Chief Complaint/Reason for Visit  decreased mobility     Referring Physician Laine Ariza MD     Existing Precautions/Restrictions cardiac;other (see comments)     Precautions comments A-fib, Ca, osteoporosis     History of present illness/functional impairment Orly Cobian was diagnosed with Parkinson's Disease in  and has been noting a loss of fine motor coordination. She is previously active in walking two to three miles per day. Currently, she has difficulty completing two miles secondary to reduced endurance. After attempting to walk longer distances, she notes more significant limits the next day. The patient reports that she has lost some mobility, especially rising from the floor. She feels pain in her legs while rising. Standing from a chair is also somewhat difficult. The patient lives in a three story home, and negotiating the stairs can be limited. She fell in  and fractured a vertebra. Orly has not fallen in the last three years.     Patient/Family/Caregiver Comments/Observations The patient reports monitoring her blood pressure at home.     Patient reported fall since  last visit No        Pain Assessment    Currently in pain No/Denies        Pre Activity Vital Signs    /78     BP Location Left upper arm     BP Method Manual     Patient Position Sitting         Services    Do You Speak a Language Other Than English at Home? no                    Daily Treatment Assessment and Plan - 09/24/24 1305          Daily Treatment Assessment and Plan    Progress toward goals Progressing     Daily Outcome Summary Weights are removed for some maximal daily exercises due to breakdown of form. The patient is still able to demonstrate good mechanics on side step and reach using the bar cone for increased clearance. Band pulls simulating weeding leads to some quad fatigue. The patient needs some assistance to rise on the last repetition.     Plan and Recommendations Continue dynamic balance training, and progress MDE as able.                         OBJECTIVE DATA TAKEN TODAY:    None taken    Today's Treatment:    PT FLOWSHEET    Exercises Current Session Time Completed (Y/N/G)   MODALITIES- HEAT/ICE (78862) TOTAL TIME FOR SESSION Not performed    Heat     Ice     THER ACT (91206) TOTAL TIME FOR SESSION 0-7 Minutes    Assessment  See progress note for objective and subjective outcomes    Vitals/pain See note - take BP (manually) before each session Y   Education 8/23/24: results of exam, areas that PT will be able to focus, plan of care, possibility of participating in OT after this episode ends to focus more on fine motor coordination - patient verbalizes agreement and understanding  8/27/24: patient educated to monitor BP at home and to contact physicians if high BP readings persist   9/20/24: progress made since the first visit, continued plan of care, possibility of finishing PT prior to the projected end date - patient verbalizes agreement and understanding              Daily task training Changing bed sheets - plus blanket  Carrying objects - crate with small weights  through gym, up and down steps with CGA      Transfer training Floor transfers tall kneel to stand without UE support: x 1 rep, supervision    GROUP (97352)  Not performed    THER EX (35875) TOTAL TIME FOR SESSION 8-22 Minutes    HEP 9/3/24: modified BIG exercises    CARDIOVASCULAR      Nu Step Level 2, 11 min, seat 8 and UE 9, RPE 13 Y   STRENGTHENING     BIG Sit to stand 1 x 10 w/ Airex under feet  Y   Heel rise 2 x 10    Toe walk     Squat with UE pull upward on band 10 reps B with double green band anchored to small kettle bell  (pulling weeds) - use mobility belt for safety Y   1 kg ball transfer from floor to rolling table 10 reps    STRETCHING/ROM     Calf stretch on wedge 2 x 30s              NEURO RE-ED (83065) TOTAL TIME FOR SESSION 38-52 Minutes    Floor to ceiling 8 reps with modeling Y   Side to side 8 reps with modeling/ shaping with 1# cuff weights on UE Y   Forward step and reach 8 reps, aide guarding with 1# cuff weights UE, bar cone Y   Side step and reach 8 reps, min to mod cues, aide guarding with 1# cuff weights on UE, bar cone Y   Backward step and reach 8 reps, min to mod cues, aide guarding Y   Forward rock and reach 10 reps, min to mod cues, aide guarding Y   Sideways rock and reach 10 reps, min to mod cues, aide guarding Y   Dynamic training     Obstacle course Stepping over bar cones, reciprocal x 4 each way forward, then laterally Y        GAIT TRAINING (84444) TOTAL TIME FOR SESSION Not performed    BIG walking 1.5# weights on ankles and 1# weights on wrists, starting with normal steps and building into bigger movements    Stairs      Y = yes; N = no; G = group; NV = next visit; H = hold

## 2024-09-24 NOTE — TELEPHONE ENCOUNTER
"Pt cld s/\"the Carbidopa-levodopa is causing nausea. Can  prescribe something for the nausea?\" Please call Pt on home#. Thx  "

## 2024-09-26 ENCOUNTER — TELEPHONE (OUTPATIENT)
Dept: NEUROLOGY | Facility: CLINIC | Age: 84
End: 2024-09-26
Payer: MEDICARE

## 2024-09-26 ENCOUNTER — HOSPITAL ENCOUNTER (OUTPATIENT)
Dept: PHYSICAL THERAPY | Age: 84
Setting detail: THERAPIES SERIES
Discharge: HOME | End: 2024-09-26
Attending: PSYCHIATRY & NEUROLOGY
Payer: MEDICARE

## 2024-09-26 DIAGNOSIS — G20.A1 PARKINSON'S DISEASE WITHOUT DYSKINESIA OR FLUCTUATING MANIFESTATIONS (CMS/HCC): Primary | ICD-10-CM

## 2024-09-26 DIAGNOSIS — R26.89 DECREASED FUNCTIONAL MOBILITY: ICD-10-CM

## 2024-09-26 PROCEDURE — 97110 THERAPEUTIC EXERCISES: CPT | Mod: GP,CQ

## 2024-09-26 PROCEDURE — 97112 NEUROMUSCULAR REEDUCATION: CPT | Mod: GP,CQ

## 2024-09-26 NOTE — TELEPHONE ENCOUNTER
Pt called to let Dr Ariza know that she isn't going to take the carbidopa medication after reading the side effects of the medication.  Doesn't need a call back.

## 2024-09-26 NOTE — OP PT TREATMENT LOG
PT FLOWSHEET    Exercises Current Session Time Completed (Y/N/G)   MODALITIES- HEAT/ICE (05208) TOTAL TIME FOR SESSION Not performed    Heat     Ice     THER ACT (64830) TOTAL TIME FOR SESSION 0-7 Minutes    Assessment  See progress note for objective and subjective outcomes    Vitals/pain See note - take BP (manually) before each session Y   Education 8/23/24: results of exam, areas that PT will be able to focus, plan of care, possibility of participating in OT after this episode ends to focus more on fine motor coordination - patient verbalizes agreement and understanding  8/27/24: patient educated to monitor BP at home and to contact physicians if high BP readings persist   9/20/24: progress made since the first visit, continued plan of care, possibility of finishing PT prior to the projected end date - patient verbalizes agreement and understanding              Daily task training Changing bed sheets - plus blanket  Carrying objects - crate with small weights through gym, up and down steps with CGA      Transfer training Floor transfers tall kneel to stand without UE support: x 1 rep, supervision    GROUP (71981)  Not performed    THER EX (35539) TOTAL TIME FOR SESSION 8-22 Minutes    HEP 9/3/24: modified BIG exercises    CARDIOVASCULAR      Nu Step Level 2, 11 min, seat 8 and UE 9, RPE 13    STRENGTHENING     BIG Sit to stand 1 x 10 w/ Airex under feet  Y   Heel rise 2 x 10    Toe walk     Squat with UE pull upward on band 10 reps B with double green band anchored to small kettle bell  (pulling weeds) - use mobility belt for safety -smaller LOB than previous session Y   1 kg ball transfer from floor to rolling table 10 reps    STRETCHING/ROM     Calf stretch on wedge 2 x 30s              NEURO RE-ED (33470) TOTAL TIME FOR SESSION 38-52 Minutes    Floor to ceiling 10 reps with modeling Y   Side to side 8 reps with modeling/ shaping with 1# cuff weights on UE (no weight today 2o trunk collapse on left) Y    Forward step and reach 8 reps, aide guarding with 1# cuff weights UE, bar cone -difficulty with foot placement (adduction across midline) Y   Side step and reach 8 reps, min to mod cues, aide guarding with 1# cuff weights on UE, bar cone Y   Backward step and reach 8 reps, min to mod cues, aide guarding Y   Forward rock and reach 10 reps, min to mod cues, aide guarding Y   Sideways rock and reach 10 reps, min to mod cues, aide guarding Y   Dynamic training Agility ladder - retro walking with dual task (naming) - subs with trunk extension, gait speed noticeably slows Y   Obstacle course Stepping over bar cones, reciprocal x 4 each way forward, then laterally         GAIT TRAINING (02588) TOTAL TIME FOR SESSION Not performed    BIG walking 1.5# weights on ankles and 1# weights on wrists, starting with normal steps and building into bigger movements    Stairs      Y = yes; N = no; G = group; NV = next visit; H = hold

## 2024-09-26 NOTE — PROGRESS NOTES
Physical Therapy Visit    PT DAILY NOTE FOR OUTPATIENT THERAPY    Patient: Orly Cobian MRN: [id number]  : 1940 84 y.o.  Referring Physician: Laine Ariza MD  Date of Visit: 2024    Certification Dates: 24 through 11/15/24    Diagnosis:   1. Parkinson's disease without dyskinesia or fluctuating manifestations (CMS/HCC)    2. Decreased functional mobility        Chief Complaints:  decreased mobility    Precautions:   Existing Precautions/Restrictions: cardiac, other (see comments)  Precautions comments: A-fib, Ca, osteoporosis      TODAY'S VISIT    Time In Session:  Start Time: 1104 (arrives late then in [place])  Stop Time: 1200  Time Calculation (min): 56 min   History/Vitals/Pain/Encounter Info - 24 1106          Injury History/Precautions/Daily Required Info    Document Type daily treatment     Primary Therapist Marco Antonio     Chief Complaint/Reason for Visit  decreased mobility     Referring Physician Laine Ariza MD     Existing Precautions/Restrictions cardiac;other (see comments)     Precautions comments A-fib, Ca, osteoporosis     History of present illness/functional impairment Orly Cobian was diagnosed with Parkinson's Disease in  and has been noting a loss of fine motor coordination. She is previously active in walking two to three miles per day. Currently, she has difficulty completing two miles secondary to reduced endurance. After attempting to walk longer distances, she notes more significant limits the next day. The patient reports that she has lost some mobility, especially rising from the floor. She feels pain in her legs while rising. Standing from a chair is also somewhat difficult. The patient lives in a three story home, and negotiating the stairs can be limited. She fell in  and fractured a vertebra. Orly has not fallen in the last three years.     Patient/Family/Caregiver Comments/Observations Orly and  arrive a few minutes late, and she takes the  start of session to use the restroom. She endorses feeling nauseated from medication.     Patient reported fall since last visit No        Pain Assessment    Currently in pain No/Denies        Pre Activity Vital Signs    Pulse 78     SpO2 96 %     Oxygen Therapy None (Room air)     /80   bright blue    BP Location Left upper arm     BP Method Manual     Patient Position Sitting         Services    Do You Speak a Language Other Than English at Home? no                    Daily Treatment Assessment and Plan - 09/26/24 1106          Daily Treatment Assessment and Plan    Progress toward goals Progressing     Daily Outcome Summary Orly initially confuses Floor to Ceiling exercise with Big sit to stand, requiring VCs for recall. Left trunk extensor weakness is still noted with side to side, requiring max cues for trunk extension. Forward step and reach reveals continuing challenge with foot placement, with occasional adduction towards midline resulting in LOB.     Plan and Recommendations Continue dynamic balance training, and progress MDE as able.                         OBJECTIVE DATA TAKEN TODAY:    None taken    Today's Treatment:    PT FLOWSHEET    Exercises Current Session Time Completed (Y/N/G)   MODALITIES- HEAT/ICE (59763) TOTAL TIME FOR SESSION Not performed    Heat     Ice     THER ACT (06447) TOTAL TIME FOR SESSION 0-7 Minutes    Assessment  See progress note for objective and subjective outcomes    Vitals/pain See note - take BP (manually) before each session Y   Education 8/23/24: results of exam, areas that PT will be able to focus, plan of care, possibility of participating in OT after this episode ends to focus more on fine motor coordination - patient verbalizes agreement and understanding  8/27/24: patient educated to monitor BP at home and to contact physicians if high BP readings persist   9/20/24: progress made since the first visit, continued plan of care, possibility of  finishing PT prior to the projected end date - patient verbalizes agreement and understanding              Daily task training Changing bed sheets - plus blanket  Carrying objects - crate with small weights through gym, up and down steps with CGA      Transfer training Floor transfers tall kneel to stand without UE support: x 1 rep, supervision    GROUP (84144)  Not performed    THER EX (78904) TOTAL TIME FOR SESSION 8-22 Minutes    HEP 9/3/24: modified BIG exercises    CARDIOVASCULAR      Nu Step Level 2, 11 min, seat 8 and UE 9, RPE 13    STRENGTHENING     BIG Sit to stand 1 x 10 w/ Airex under feet  Y   Heel rise 2 x 10    Toe walk     Squat with UE pull upward on band 10 reps B with double green band anchored to small kettle bell  (pulling weeds) - use mobility belt for safety -smaller LOB than previous session Y   1 kg ball transfer from floor to rolling table 10 reps    STRETCHING/ROM     Calf stretch on wedge 2 x 30s              NEURO RE-ED (10434) TOTAL TIME FOR SESSION 38-52 Minutes    Floor to ceiling 10 reps with modeling Y   Side to side 8 reps with modeling/ shaping with 1# cuff weights on UE (no weight today 2o trunk collapse on left) Y   Forward step and reach 8 reps, aide guarding with 1# cuff weights UE, bar cone -difficulty with foot placement (adduction across midline) Y   Side step and reach 8 reps, min to mod cues, aide guarding with 1# cuff weights on UE, bar cone Y   Backward step and reach 8 reps, min to mod cues, aide guarding Y   Forward rock and reach 10 reps, min to mod cues, aide guarding Y   Sideways rock and reach 10 reps, min to mod cues, aide guarding Y   Dynamic training Agility ladder - retro walking with dual task (naming) - subs with trunk extension, gait speed noticeably slows Y   Obstacle course Stepping over bar cones, reciprocal x 4 each way forward, then laterally         GAIT TRAINING (38263) TOTAL TIME FOR SESSION Not performed    BIG walking 1.5# weights on ankles and 1#  weights on wrists, starting with normal steps and building into bigger movements    Stairs      Y = yes; N = no; G = group; NV = next visit; H = hold

## 2024-09-30 NOTE — TELEPHONE ENCOUNTER
Patient is calling as an FYI that she would like to wean off of her medication Carbidopa-levodopa and would like a return call to discuss how to move forward.

## 2024-10-01 ENCOUNTER — HOSPITAL ENCOUNTER (OUTPATIENT)
Dept: PHYSICAL THERAPY | Age: 84
Setting detail: THERAPIES SERIES
Discharge: HOME | End: 2024-10-01
Attending: PSYCHIATRY & NEUROLOGY
Payer: MEDICARE

## 2024-10-01 DIAGNOSIS — R26.89 DECREASED FUNCTIONAL MOBILITY: ICD-10-CM

## 2024-10-01 DIAGNOSIS — G20.A1 PARKINSON'S DISEASE WITHOUT DYSKINESIA OR FLUCTUATING MANIFESTATIONS (CMS/HCC): Primary | ICD-10-CM

## 2024-10-01 PROCEDURE — 97112 NEUROMUSCULAR REEDUCATION: CPT | Mod: GP,CQ

## 2024-10-01 PROCEDURE — 97110 THERAPEUTIC EXERCISES: CPT | Mod: GP,CQ

## 2024-10-01 NOTE — PROGRESS NOTES
Physical Therapy Visit    PT DAILY NOTE FOR OUTPATIENT THERAPY    Patient: Orly Cobian MRN: [id number]  : 1940 84 y.o.  Referring Physician: Laine Ariza MD  Date of Visit: 10/1/2024    Certification Dates: 24 through 11/15/24    Diagnosis:   1. Parkinson's disease without dyskinesia or fluctuating manifestations (CMS/HCC)    2. Decreased functional mobility        Chief Complaints:  decreased mobility    Precautions:   Existing Precautions/Restrictions: cardiac, other (see comments)  Precautions comments: A-fib, Ca, osteoporosis      TODAY'S VISIT    Time In Session:  Start Time: 1103 (late arrival)  Stop Time: 1200  Time Calculation (min): 57 min   History/Vitals/Pain/Encounter Info - 10/01/24 1113          Injury History/Precautions/Daily Required Info    Document Type daily treatment     Primary Therapist Marco Antonio     Chief Complaint/Reason for Visit  decreased mobility     Referring Physician Laine Ariza MD     Existing Precautions/Restrictions cardiac;other (see comments)     Precautions comments A-fib, Ca, osteoporosis     History of present illness/functional impairment Orly Cobian was diagnosed with Parkinson's Disease in  and has been noting a loss of fine motor coordination. She is previously active in walking two to three miles per day. Currently, she has difficulty completing two miles secondary to reduced endurance. After attempting to walk longer distances, she notes more significant limits the next day. The patient reports that she has lost some mobility, especially rising from the floor. She feels pain in her legs while rising. Standing from a chair is also somewhat difficult. The patient lives in a three story home, and negotiating the stairs can be limited. She fell in  and fractured a vertebra. Orly has not fallen in the last three years.     Patient/Family/Caregiver Comments/Observations Orly endorses sleeping poorly last night. She tells this PTA that she told  Dr. Ariza that she wants to D/C sinemet 2o concerns over polypharmacy and side effects that she enumerates (that don't seem to be known side effects of Sinemet). The patient reports monitoring her blood pressure at home.     Patient reported fall since last visit No        Pain Assessment    Currently in pain No/Denies        Pre Activity Vital Signs    Pulse 51     SpO2 99 %     Oxygen Therapy None (Room air)     /82   bright blue    BP Location Left upper arm     BP Method Manual     Patient Position Sitting        Activity Vital Signs    Patient activity Balance Training     Activity /80   bright blue    Activity BP Location Left upper arm     Activity BP Method Manual     Patient Position Sitting         Services    Do You Speak a Language Other Than English at Home? no                    Daily Treatment Assessment and Plan - 10/01/24 1113          Daily Treatment Assessment and Plan    Progress toward goals Progressing     Daily Outcome Summary Some exercises are regressed today successfully. The tia is removed for the forward step and reach secondary to the tia encouraging faulty movement patterns (return of leading leg). The weights are removed and she omits use of the Nustep secondary to high blood pressure readings (178/82 at rest and 204/80 after MDEs). She completes the rest of session in sitting, incorporating dual / cog motor activity.      Plan and Recommendations Continue dynamic balance training, and progress MDE as able.                         OBJECTIVE DATA TAKEN TODAY:    None taken    Today's Treatment:    PT FLOWSHEET    Exercises Current Session Time Completed (Y/N/G)   MODALITIES- HEAT/ICE (70356) TOTAL TIME FOR SESSION Not performed    Heat     Ice     THER ACT (32018) TOTAL TIME FOR SESSION 0-7 Minutes    Assessment  See progress note for objective and subjective outcomes    Vitals/pain See note - take BP (manually) before each session - see assessment 10./1 Y    Education 8/23/24: results of exam, areas that PT will be able to focus, plan of care, possibility of participating in OT after this episode ends to focus more on fine motor coordination - patient verbalizes agreement and understanding  8/27/24: patient educated to monitor BP at home and to contact physicians if high BP readings persist   9/20/24: progress made since the first visit, continued plan of care, possibility of finishing PT prior to the projected end date - patient verbalizes agreement and understanding  10/1/24: encouraged again to let primary care doctor know of readings.  Y             Daily task training Changing bed sheets - plus blanket  Carrying objects - crate with small weights through gym, up and down steps with CGA      Transfer training Floor transfers tall kneel to stand without UE support: x 1 rep, supervision    GROUP (85477)  Not performed    THER EX (15628) TOTAL TIME FOR SESSION 8-22 Minutes    HEP 9/3/24: modified BIG exercises    CARDIOVASCULAR      Nu Step Level 2, 11 min, seat 8 and UE 9, RPE 13 - held 2o HTN 10/1    STRENGTHENING     BIG Sit to stand 1 x 10 w/ Airex under feet  Y   Heel rise 2 x 10    Toe walk     Squat with UE pull upward on band 10 reps B with double green band anchored to small kettle bell  (pulling weeds) - use mobility belt for safety -smaller LOB than previous session    1 kg ball transfer from floor to rolling table 10 reps    LAQs 1.5# 2 x10 Y   STRETCHING/ROM     Calf stretch on wedge 2 x 30s              NEURO RE-ED (23288) TOTAL TIME FOR SESSION 38-52 Minutes    Floor to ceiling 10 reps with modeling Y   Side to side 8 reps with modeling/ shaping with 1# cuff weights on UE (no weight today 2o HTN) Y   Forward step and reach 8 reps, aide guarding, bar cone -removed Y   Side step and reach 8 reps, min to mod cues, aide guarding Y   Backward step and reach 8 reps, min to mod cues, aide guarding Y   Forward rock and reach 10 reps, min to mod cues, aide  guarding Y   Sideways rock and reach 10 reps, min to mod cues, aide guarding Y   Dynamic training Agility ladder - retro walking with dual task (naming) - subs with trunk extension, gait speed noticeably slows    Obstacle course Stepping over bar cones, reciprocal x 4 each way forward, then laterally         GAIT TRAINING (92032) TOTAL TIME FOR SESSION Not performed    BIG walking 1.5# weights on ankles and 1# weights on wrists, starting with normal steps and building into bigger movements    Stairs      Y = yes; N = no; G = group; NV = next visit; H = hold

## 2024-10-01 NOTE — OP PT TREATMENT LOG
PT FLOWSHEET    Exercises Current Session Time Completed (Y/N/G)   MODALITIES- HEAT/ICE (05598) TOTAL TIME FOR SESSION Not performed    Heat     Ice     THER ACT (69432) TOTAL TIME FOR SESSION 0-7 Minutes    Assessment  See progress note for objective and subjective outcomes    Vitals/pain See note - take BP (manually) before each session - see assessment 10./1 Y   Education 8/23/24: results of exam, areas that PT will be able to focus, plan of care, possibility of participating in OT after this episode ends to focus more on fine motor coordination - patient verbalizes agreement and understanding  8/27/24: patient educated to monitor BP at home and to contact physicians if high BP readings persist   9/20/24: progress made since the first visit, continued plan of care, possibility of finishing PT prior to the projected end date - patient verbalizes agreement and understanding  10/1/24: encouraged again to let primary care doctor know of readings.  Y             Daily task training Changing bed sheets - plus blanket  Carrying objects - crate with small weights through gym, up and down steps with CGA      Transfer training Floor transfers tall kneel to stand without UE support: x 1 rep, supervision    GROUP (02789)  Not performed    THER EX ([id number]) TOTAL TIME FOR SESSION 8-22 Minutes    HEP 9/3/24: modified BIG exercises    CARDIOVASCULAR      Nu Step Level 2, 11 min, seat 8 and UE 9, RPE 13 - held 2o HTN 10/1    STRENGTHENING     BIG Sit to stand 1 x 10 w/ Airex under feet  Y   Heel rise 2 x 10    Toe walk     Squat with UE pull upward on band 10 reps B with double green band anchored to small kettle bell  (pulling weeds) - use mobility belt for safety -smaller LOB than previous session    1 kg ball transfer from floor to rolling table 10 reps    LAQs 1.5# 2 x10 Y   STRETCHING/ROM     Calf stretch on wedge 2 x 30s              NEURO RE-ED ([id number]) TOTAL TIME FOR SESSION 38-52 Minutes    Floor to ceiling 10 reps with  modeling Y   Side to side 8 reps with modeling/ shaping with 1# cuff weights on UE (no weight today 2o HTN) Y   Forward step and reach 8 reps, aide guarding, bar cone -removed Y   Side step and reach 8 reps, min to mod cues, aide guarding Y   Backward step and reach 8 reps, min to mod cues, aide guarding Y   Forward rock and reach 10 reps, min to mod cues, aide guarding Y   Sideways rock and reach 10 reps, min to mod cues, aide guarding Y   Dynamic training Agility ladder - retro walking with dual task (naming) - subs with trunk extension, gait speed noticeably slows    Obstacle course Stepping over bar cones, reciprocal x 4 each way forward, then laterally         GAIT TRAINING (57463) TOTAL TIME FOR SESSION Not performed    BIG walking 1.5# weights on ankles and 1# weights on wrists, starting with normal steps and building into bigger movements    Stairs      Y = yes; N = no; G = group; NV = next visit; H = hold

## 2024-10-03 ENCOUNTER — HOSPITAL ENCOUNTER (OUTPATIENT)
Dept: PHYSICAL THERAPY | Age: 84
Setting detail: THERAPIES SERIES
Discharge: HOME | End: 2024-10-03
Attending: PSYCHIATRY & NEUROLOGY
Payer: MEDICARE

## 2024-10-03 DIAGNOSIS — G20.A1 PARKINSON'S DISEASE WITHOUT DYSKINESIA OR FLUCTUATING MANIFESTATIONS (CMS/HCC): Primary | ICD-10-CM

## 2024-10-03 DIAGNOSIS — R26.89 DECREASED FUNCTIONAL MOBILITY: ICD-10-CM

## 2024-10-03 PROCEDURE — 97530 THERAPEUTIC ACTIVITIES: CPT | Mod: GP

## 2024-10-03 PROCEDURE — 97110 THERAPEUTIC EXERCISES: CPT | Mod: GP

## 2024-10-03 NOTE — PROGRESS NOTES
Physical Therapy Visit    PT DAILY NOTE FOR OUTPATIENT THERAPY    Patient: Orly Cobian MRN: [id number]  : 1940 84 y.o.  Referring Physician: Laine Ariza MD  Date of Visit: 10/3/2024    Certification Dates: 24 through 11/15/24    Diagnosis:   1. Parkinson's disease without dyskinesia or fluctuating manifestations (CMS/HCC)    2. Decreased functional mobility        Chief Complaints:  decreased mobility    Precautions:   Existing Precautions/Restrictions: cardiac, other (see comments)  Precautions comments: A-fib, Ca, osteoporosis      TODAY'S VISIT    Time In Session:  Start Time: 1202  Stop Time: 1300  Time Calculation (min): 58 min   History/Vitals/Pain/Encounter Info - 10/03/24 1202          Injury History/Precautions/Daily Required Info    Document Type daily treatment     Primary Therapist Marco Antonio     Chief Complaint/Reason for Visit  decreased mobility     Referring Physician Laine Ariza MD     Existing Precautions/Restrictions cardiac;other (see comments)     Precautions comments A-fib, Ca, osteoporosis     History of present illness/functional impairment Orly Cobian was diagnosed with Parkinson's Disease in  and has been noting a loss of fine motor coordination. She is previously active in walking two to three miles per day. Currently, she has difficulty completing two miles secondary to reduced endurance. After attempting to walk longer distances, she notes more significant limits the next day. The patient reports that she has lost some mobility, especially rising from the floor. She feels pain in her legs while rising. Standing from a chair is also somewhat difficult. The patient lives in a three story home, and negotiating the stairs can be limited. She fell in  and fractured a vertebra. Orly has not fallen in the last three years.     Patient/Family/Caregiver Comments/Observations The patient is weaning from the Carbidopa secondary to fatigue, nausea, and light-headedness.  When not taking the medication, she feels better. She walked 1.1 miles this morning without shortness of breath.     Patient reported fall since last visit No        Pain Assessment    Currently in pain No/Denies        Pre Activity Vital Signs    Oxygen Therapy --     /84     BP Location Left upper arm     BP Method Manual     Patient Position Sitting         Services    Do You Speak a Language Other Than English at Home? no                    Daily Treatment Assessment and Plan - 10/03/24 1202          Daily Treatment Assessment and Plan    Progress toward goals Progressing     Daily Outcome Summary The patient continues to perform well with the absence of hurdles for maximal daily exercises, although some cues for mechanics are needed. Walking backwards is partially a focus, but minimal assistance is necessary at time. Orly simulates vacuuming, carrying objects, and lifting small weights from the floor. Good performance is noted through a majority of the session today.     Plan and Recommendations Continue dynamic balance training and functional task simulation.                         OBJECTIVE DATA TAKEN TODAY:    None taken    Today's Treatment:    PT FLOWSHEET    Exercises Current Session Time Completed (Y/N/G)   MODALITIES- HEAT/ICE (27735) TOTAL TIME FOR SESSION Not performed    Heat     Ice     THER ACT (84481) TOTAL TIME FOR SESSION 8-22 Minutes    Assessment  See progress note for objective and subjective outcomes    Vitals/pain See note - take BP (manually) before each session - see assessment 10./1 Y   Education 8/23/24: results of exam, areas that PT will be able to focus, plan of care, possibility of participating in OT after this episode ends to focus more on fine motor coordination - patient verbalizes agreement and understanding  8/27/24: patient educated to monitor BP at home and to contact physicians if high BP readings persist   9/20/24: progress made since the first visit,  continued plan of care, possibility of finishing PT prior to the projected end date - patient verbalizes agreement and understanding  10/1/24: encouraged again to let primary care doctor know of readings.               Daily task training Changing bed sheets - plus blanket  Carrying objects - crate with small weights through gym, up and down steps with CGA      Carrying basket around the gym and up the stairs One lap with mobility belt Y   Simulated vacuuming with hockey stick Walking forward, backward, reaching under furniture with mobility belt Y   Picking up small dumbells from floor, then returning them 2 sets (1 and 2# weights) with mobility belt Y   Transfer training Floor transfers tall kneel to stand without UE support: x 1 rep, supervision    GROUP ([id number])  Not performed    THER EX (99941) TOTAL TIME FOR SESSION 0-7 Minutes    HEP 9/3/24: modified BIG exercises    CARDIOVASCULAR      Nu Step Level 2, 11 min, seat 8 and UE 9, RPE 13 - held 2o HTN 10/1    STRENGTHENING     BIG Sit to stand 1 x 10 w/ Airex under feet  Y   Heel rise 2 x 10    Toe walk     Squat with UE pull upward on band 10 reps B with double green band anchored to small kettle bell  (pulling weeds) - use mobility belt for safety -smaller LOB than previous session    1 kg ball transfer from floor to rolling table 10 reps    LAQs 1.5# 2 x10    STRETCHING/ROM     Calf stretch on wedge 2 x 30s              NEURO RE-ED (14843) TOTAL TIME FOR SESSION 38-52 Minutes    Floor to ceiling 8 reps with modeling Y   Side to side 8 reps with modeling/ shaping with 1# cuff weights on UE (no weight today 2o HTN) Y   Forward step and reach 10 reps, guarding with mobility belt Y   Side step and reach 10 reps, min cues, guarding with mobility belt Y   Backward step and reach 10 reps, min to mod cues, guarding with mobility belt Y   Forward rock and reach 10 reps, min cues, guarding with mobility belt Y   Sideways rock and reach 10 reps, min to mod cues, guarding  with mobility belt Y   Dynamic training Agility ladder - retro walking with dual task - subs with trunk extension, gait speed noticeably slows Y   Obstacle course Stepping over bar cones, reciprocal x 4 each way forward, then laterally         GAIT TRAINING (39687) TOTAL TIME FOR SESSION 0-7 Minutes    BIG walking Starting with normal steps and building into bigger movements - using multiplication flash cards for dual task challenge Y   Stairs      Y = yes; N = no; G = group; NV = next visit; H = hold

## 2024-10-03 NOTE — OP PT TREATMENT LOG
PT FLOWSHEET    Exercises Current Session Time Completed (Y/N/G)   MODALITIES- HEAT/ICE (22276) TOTAL TIME FOR SESSION Not performed    Heat     Ice     THER ACT (52803) TOTAL TIME FOR SESSION 8-22 Minutes    Assessment  See progress note for objective and subjective outcomes    Vitals/pain See note - take BP (manually) before each session - see assessment 10./1 Y   Education 8/23/24: results of exam, areas that PT will be able to focus, plan of care, possibility of participating in OT after this episode ends to focus more on fine motor coordination - patient verbalizes agreement and understanding  8/27/24: patient educated to monitor BP at home and to contact physicians if high BP readings persist   9/20/24: progress made since the first visit, continued plan of care, possibility of finishing PT prior to the projected end date - patient verbalizes agreement and understanding  10/1/24: encouraged again to let primary care doctor know of readings.               Daily task training Changing bed sheets - plus blanket  Carrying objects - crate with small weights through gym, up and down steps with CGA      Carrying basket around the gym and up the stairs One lap with mobility belt Y   Simulated vacuuming with hockey stick Walking forward, backward, reaching under furniture with mobility belt Y   Picking up small dumbells from floor, then returning them 2 sets (1 and 2# weights) with mobility belt Y   Transfer training Floor transfers tall kneel to stand without UE support: x 1 rep, supervision    GROUP ([id number])  Not performed    THER EX (38017) TOTAL TIME FOR SESSION 0-7 Minutes    HEP 9/3/24: modified BIG exercises    CARDIOVASCULAR      Nu Step Level 2, 11 min, seat 8 and UE 9, RPE 13 - held 2o HTN 10/1    STRENGTHENING     BIG Sit to stand 1 x 10 w/ Airex under feet  Y   Heel rise 2 x 10    Toe walk     Squat with UE pull upward on band 10 reps B with double green band anchored to small kettle bell  (pulling weeds) -  use mobility belt for safety -smaller LOB than previous session    1 kg ball transfer from floor to rolling table 10 reps    LAQs 1.5# 2 x10    STRETCHING/ROM     Calf stretch on wedge 2 x 30s              NEURO RE-ED (29969) TOTAL TIME FOR SESSION 38-52 Minutes    Floor to ceiling 8 reps with modeling Y   Side to side 8 reps with modeling/ shaping with 1# cuff weights on UE (no weight today 2o HTN) Y   Forward step and reach 10 reps, guarding with mobility belt Y   Side step and reach 10 reps, min cues, guarding with mobility belt Y   Backward step and reach 10 reps, min to mod cues, guarding with mobility belt Y   Forward rock and reach 10 reps, min cues, guarding with mobility belt Y   Sideways rock and reach 10 reps, min to mod cues, guarding with mobility belt Y   Dynamic training Agility ladder - retro walking with dual task - subs with trunk extension, gait speed noticeably slows Y   Obstacle course Stepping over bar cones, reciprocal x 4 each way forward, then laterally         GAIT TRAINING (75515) TOTAL TIME FOR SESSION 0-7 Minutes    BIG walking Starting with normal steps and building into bigger movements - using multiplication flash cards for dual task challenge Y   Stairs      Y = yes; N = no; G = group; NV = next visit; H = hold

## 2024-10-08 ENCOUNTER — HOSPITAL ENCOUNTER (OUTPATIENT)
Dept: PHYSICAL THERAPY | Age: 84
Setting detail: THERAPIES SERIES
Discharge: HOME | End: 2024-10-08
Attending: PSYCHIATRY & NEUROLOGY
Payer: MEDICARE

## 2024-10-08 DIAGNOSIS — R26.89 DECREASED FUNCTIONAL MOBILITY: ICD-10-CM

## 2024-10-08 DIAGNOSIS — G20.A1 PARKINSON'S DISEASE WITHOUT DYSKINESIA OR FLUCTUATING MANIFESTATIONS (CMS/HCC): Primary | ICD-10-CM

## 2024-10-08 PROCEDURE — 97110 THERAPEUTIC EXERCISES: CPT | Mod: GP,CQ

## 2024-10-08 PROCEDURE — 97112 NEUROMUSCULAR REEDUCATION: CPT | Mod: GP,CQ

## 2024-10-08 PROCEDURE — 97530 THERAPEUTIC ACTIVITIES: CPT | Mod: GP,CQ

## 2024-10-08 NOTE — OP PT TREATMENT LOG
PT FLOWSHEET    Exercises Current Session Time Completed (Y/N/G)   MODALITIES- HEAT/ICE (66579) TOTAL TIME FOR SESSION Not performed    Heat     Ice     THER ACT (90275) TOTAL TIME FOR SESSION 8-22 Minutes    Assessment  See progress note for objective and subjective outcomes    Vitals/pain See note - take BP (manually) before each session - see assessment 10./1 Y   Education 8/23/24: results of exam, areas that PT will be able to focus, plan of care, possibility of participating in OT after this episode ends to focus more on fine motor coordination - patient verbalizes agreement and understanding  8/27/24: patient educated to monitor BP at home and to contact physicians if high BP readings persist   9/20/24: progress made since the first visit, continued plan of care, possibility of finishing PT prior to the projected end date - patient verbalizes agreement and understanding  10/1/24: encouraged again to let primary care doctor know of readings.               Daily task training Changing bed sheets - plus blanket  Carrying objects - crate with small weights through gym, up and down steps with CGA      Carrying basket around the gym and up the stairs 2 lap with mobility belt  - stairs, 2 trials Y   Simulated vacuuming with hockey stick Walking forward, backward, reaching under furniture with mobility belt , 2 2# weights for heavy vacuum Y   Picking up small dumbells from floor, then returning them 2 sets (1 and 2# weights) with mobility belt    Transfer training Floor transfers tall kneel to stand without UE support: x 1 rep, supervision    GROUP (05894)  Not performed    THER EX (07777) TOTAL TIME FOR SESSION 8-22 Minutes    HEP 9/3/24: modified BIG exercises    CARDIOVASCULAR      Nu Step Level 3, 10 min, seat 8 and UE 9 Y   STRENGTHENING     BIG Sit to stand 1 x 10 w/ Airex under feet  Y   Heel rise 2 x 10    Toe walk     Squat with UE pull upward on band 10 reps B with double green band anchored to small kettle  bell  (pulling weeds) - use mobility belt for safety -smaller LOB than previous session    1 kg ball transfer from floor to rolling table 10 reps    LAQs 1.5# 2 x10    STRETCHING/ROM     Calf stretch on wedge 2 x 30s              NEURO RE-ED (40707) TOTAL TIME FOR SESSION 23-37 Minutes    Floor to ceiling 8 reps with modeling Y   Side to side 10 reps with modeling/ shaping with 1# cuff weights on UE (no weight today 2o HTN) Y   Forward step and reach 10 reps, guarding with mobility belt Y   Side step and reach 10 reps, min cues, guarding with mobility belt Y   Backward step and reach 10 reps, min to mod cues, guarding with mobility belt Y   Forward rock and reach 10 reps, min cues, guarding with mobility belt Y   Sideways rock and reach 10 reps, min to mod cues, guarding with mobility belt Y   Dynamic training Agility ladder - retro walking with dual task - subs with trunk extension, gait speed noticeably slows    Obstacle course Stepping over bar cones, reciprocal x 4 each way forward, then laterally         GAIT TRAINING (19926) TOTAL TIME FOR SESSION 0-7 Minutes    BIG walking Starting with normal steps and building into bigger movements - using multiplication flash cards for dual task challenge Y   Stairs      Y = yes; N = no; G = group; NV = next visit; H = hold

## 2024-10-08 NOTE — PROGRESS NOTES
Physical Therapy Visit    PT DAILY NOTE FOR OUTPATIENT THERAPY    Patient: Orly Cobian MRN: [id number]  : 1940 84 y.o.  Referring Physician: Laine Ariza MD  Date of Visit: 10/8/2024    Certification Dates: 24 through 11/15/24    Diagnosis:   1. Parkinson's disease without dyskinesia or fluctuating manifestations (CMS/HCC)    2. Decreased functional mobility        Chief Complaints:  decreased mobility    Precautions:   Existing Precautions/Restrictions: cardiac, other (see comments)  Precautions comments: A-fib, Ca, osteoporosis      TODAY'S VISIT    Time In Session:  Start Time: 1101 (bathroom)  Stop Time: 1200  Time Calculation (min): 59 min   History/Vitals/Pain/Encounter Info - 10/08/24 1105          Injury History/Precautions/Daily Required Info    Document Type daily treatment     Primary Therapist Marco Antonio     Chief Complaint/Reason for Visit  decreased mobility     Referring Physician Laine Ariza MD     Existing Precautions/Restrictions cardiac;other (see comments)     Precautions comments A-fib, Ca, osteoporosis     History of present illness/functional impairment Orly Cobian was diagnosed with Parkinson's Disease in  and has been noting a loss of fine motor coordination. She is previously active in walking two to three miles per day. Currently, she has difficulty completing two miles secondary to reduced endurance. After attempting to walk longer distances, she notes more significant limits the next day. The patient reports that she has lost some mobility, especially rising from the floor. She feels pain in her legs while rising. Standing from a chair is also somewhat difficult. The patient lives in a three story home, and negotiating the stairs can be limited. She fell in  and fractured a vertebra. Orly has not fallen in the last three years.     Patient/Family/Caregiver Comments/Observations Orly endorsed adherence to HEP.     Patient reported fall since last visit No         Pain Assessment    Currently in pain No/Denies        Pre Activity Vital Signs    Pulse 49     SpO2 95 %     Oxygen Therapy None (Room air)     /64   bright blue    BP Location Left upper arm     BP Method Manual     Patient Position Sitting         Services    Do You Speak a Language Other Than English at Home? no                    Daily Treatment Assessment and Plan - 10/08/24 1105          Daily Treatment Assessment and Plan    Progress toward goals Progressing     Daily Outcome Summary Orly progresses with the addition of cuff weghts to the hockey stick for a more realistic simulation of a vacuum that has some weight to it. Instability requiring CGA is still noted with descent while holding laundry basket.     Plan and Recommendations Continue dynamic balance training and functional task simulation.                         OBJECTIVE DATA TAKEN TODAY:    None taken    Today's Treatment:    PT FLOWSHEET    Exercises Current Session Time Completed (Y/N/G)   MODALITIES- HEAT/ICE (79548) TOTAL TIME FOR SESSION Not performed    Heat     Ice     THER ACT (23845) TOTAL TIME FOR SESSION 8-22 Minutes    Assessment  See progress note for objective and subjective outcomes    Vitals/pain See note - take BP (manually) before each session - see assessment 10./1 Y   Education 8/23/24: results of exam, areas that PT will be able to focus, plan of care, possibility of participating in OT after this episode ends to focus more on fine motor coordination - patient verbalizes agreement and understanding  8/27/24: patient educated to monitor BP at home and to contact physicians if high BP readings persist   9/20/24: progress made since the first visit, continued plan of care, possibility of finishing PT prior to the projected end date - patient verbalizes agreement and understanding  10/1/24: encouraged again to let primary care doctor know of readings.               Daily task training Changing bed sheets -  plus blanket  Carrying objects - crate with small weights through gym, up and down steps with CGA      Carrying basket around the gym and up the stairs 2 lap with mobility belt  - stairs, 2 trials Y   Simulated vacuuming with hockey stick Walking forward, backward, reaching under furniture with mobility belt , 2 2# weights for heavy vacuum Y   Picking up small dumbells from floor, then returning them 2 sets (1 and 2# weights) with mobility belt    Transfer training Floor transfers tall kneel to stand without UE support: x 1 rep, supervision    GROUP (12404)  Not performed    THER EX (95978) TOTAL TIME FOR SESSION 8-22 Minutes    HEP 9/3/24: modified BIG exercises    CARDIOVASCULAR      Nu Step Level 3, 10 min, seat 8 and UE 9 Y   STRENGTHENING     BIG Sit to stand 1 x 10 w/ Airex under feet  Y   Heel rise 2 x 10    Toe walk     Squat with UE pull upward on band 10 reps B with double green band anchored to small kettle bell  (pulling weeds) - use mobility belt for safety -smaller LOB than previous session    1 kg ball transfer from floor to rolling table 10 reps    LAQs 1.5# 2 x10    STRETCHING/ROM     Calf stretch on wedge 2 x 30s              NEURO RE-ED (75955) TOTAL TIME FOR SESSION 23-37 Minutes    Floor to ceiling 8 reps with modeling Y   Side to side 10 reps with modeling/ shaping with 1# cuff weights on UE (no weight today 2o HTN) Y   Forward step and reach 10 reps, guarding with mobility belt Y   Side step and reach 10 reps, min cues, guarding with mobility belt Y   Backward step and reach 10 reps, min to mod cues, guarding with mobility belt Y   Forward rock and reach 10 reps, min cues, guarding with mobility belt Y   Sideways rock and reach 10 reps, min to mod cues, guarding with mobility belt Y   Dynamic training Agility ladder - retro walking with dual task - subs with trunk extension, gait speed noticeably slows    Obstacle course Stepping over bar cones, reciprocal x 4 each way forward, then laterally          GAIT TRAINING (11358) TOTAL TIME FOR SESSION 0-7 Minutes    BIG walking Starting with normal steps and building into bigger movements - using multiplication flash cards for dual task challenge Y   Stairs      Y = yes; N = no; G = group; NV = next visit; H = hold

## 2024-10-10 ENCOUNTER — HOSPITAL ENCOUNTER (OUTPATIENT)
Dept: PHYSICAL THERAPY | Age: 84
Setting detail: THERAPIES SERIES
Discharge: HOME | End: 2024-10-10
Attending: PSYCHIATRY & NEUROLOGY
Payer: MEDICARE

## 2024-10-10 DIAGNOSIS — G20.A1 PARKINSON'S DISEASE WITHOUT DYSKINESIA OR FLUCTUATING MANIFESTATIONS (CMS/HCC): Primary | ICD-10-CM

## 2024-10-10 DIAGNOSIS — R26.89 DECREASED FUNCTIONAL MOBILITY: ICD-10-CM

## 2024-10-10 PROCEDURE — 97112 NEUROMUSCULAR REEDUCATION: CPT | Mod: GP,CQ

## 2024-10-10 NOTE — OP PT TREATMENT LOG
PT FLOWSHEET    Exercises Current Session Time Completed (Y/N/G)   MODALITIES- HEAT/ICE (29169) TOTAL TIME FOR SESSION Not performed    Heat     Ice     THER ACT (64452) TOTAL TIME FOR SESSION 0-7 Minutes    Assessment  See progress note for objective and subjective outcomes    Vitals/pain See note - take BP (manually) before each session - see assessment 10./1 Y   Education 8/23/24: results of exam, areas that PT will be able to focus, plan of care, possibility of participating in OT after this episode ends to focus more on fine motor coordination - patient verbalizes agreement and understanding  8/27/24: patient educated to monitor BP at home and to contact physicians if high BP readings persist   9/20/24: progress made since the first visit, continued plan of care, possibility of finishing PT prior to the projected end date - patient verbalizes agreement and understanding  10/1/24: encouraged again to let primary care doctor know of readings.               Daily task training Changing bed sheets - plus blanket  Carrying objects - crate with small weights through gym, up and down steps with CGA      Carrying basket around the gym and up the stairs 2 lap with mobility belt  - stairs, 2 trials Y   Simulated vacuuming with hockey stick Walking forward, backward, reaching under furniture with mobility belt , 2 2# weights for heavy vacuum Y   Picking up small dumbells from floor, then returning them 2 sets (1 and 2# weights) with mobility belt    Transfer training Floor transfers tall kneel to stand without UE support: x 1 rep, supervision    GROUP (05435)  Not performed    THER EX (71562) TOTAL TIME FOR SESSION 0-7 Minutes    HEP 9/3/24: modified BIG exercises    CARDIOVASCULAR      Nu Step Level 3, 10 min, seat 8 and UE 9    STRENGTHENING     BIG Sit to stand 1 x 10 w/ Airex under feet  Y   Heel rise 2 x 10    Toe walk     Squat with UE pull upward on band 10 reps B with double green band anchored to small kettle  bell  (pulling weeds) - use mobility belt for safety -smaller LOB than previous session    1 kg ball transfer from floor to rolling table 10 reps    LAQs 1.5# 2 x10    STRETCHING/ROM     Calf stretch on wedge 2 x 30s              NEURO RE-ED (27485) TOTAL TIME FOR SESSION 53-67 Minutes    Floor to ceiling 10 reps with modeling, finger flicks and toe taps.  Y   Side to side 10 reps with modeling/ shaping with 1# cuff weights on UE (no weight today 2o HTN), finger flicks and toe taps.  Y   Forward step and reach 10 reps, guarding with mobility belt, step onto 4-in block Y   Side step and reach 10 reps, min cues, guarding with mobility belt Y   Backward step and reach 10 reps, min to mod cues, guarding with mobility belt Y   Forward rock and reach 10 reps, min cues, guarding with mobility belt Y   Sideways rock and reach 10 reps, min to mod cues, guarding with mobility belt Y   Dynamic training Agility ladder - retro walking with dual task - subs with trunk extension, gait speed noticeably slows    Obstacle course Stepping over bar cones, reciprocal x 4 each way forward, then laterally     Uneven surfaces progressed with curb/threshold negotiation of varying sizes,posterior LOB x2    RB + HT - using Spot it cards for dual task challenge - many  LOB Y-all       GAIT TRAINING (60667) TOTAL TIME FOR SESSION 0-7 Minutes    BIG walking Starting with normal steps and building into bigger movements - using multiplication flash cards for dual task challenge Y   Stairs      Y = yes; N = no; G = group; NV = next visit; H = hold

## 2024-10-10 NOTE — PROGRESS NOTES
Physical Therapy Visit    PT DAILY NOTE FOR OUTPATIENT THERAPY    Patient: rOly Cobian MRN: [id number]  : 1940 84 y.o.  Referring Physician: Laine Ariza MD  Date of Visit: 10/10/2024    Certification Dates:   through      Diagnosis:   1. Parkinson's disease without dyskinesia or fluctuating manifestations (CMS/HCC)    2. Decreased functional mobility        Chief Complaints:  decreased mobility    Precautions:   Existing Precautions/Restrictions: cardiac, other (see comments)  Precautions comments: A-fib, Ca, osteoporosis      TODAY'S VISIT    Time In Session:  Start Time: 1200  Stop Time: 1300  Time Calculation (min): 60 min   History/Vitals/Pain/Encounter Info - 10/10/24 1203          Injury History/Precautions/Daily Required Info    Document Type daily treatment     Primary Therapist Marco Antonio     Chief Complaint/Reason for Visit  decreased mobility     Referring Physician Laine Ariza MD     Existing Precautions/Restrictions cardiac;other (see comments)     Precautions comments A-fib, Ca, osteoporosis     History of present illness/functional impairment Orly Cobian was diagnosed with Parkinson's Disease in  and has been noting a loss of fine motor coordination. She is previously active in walking two to three miles per day. Currently, she has difficulty completing two miles secondary to reduced endurance. After attempting to walk longer distances, she notes more significant limits the next day. The patient reports that she has lost some mobility, especially rising from the floor. She feels pain in her legs while rising. Standing from a chair is also somewhat difficult. The patient lives in a three story home, and negotiating the stairs can be limited. She fell in  and fractured a vertebra. Orly has not fallen in the last three years.     Patient/Family/Caregiver Comments/Observations No falls, med changes nor pain.     Patient reported fall since last visit No        Pain Assessment     Currently in pain No/Denies        Pre Activity Vital Signs    Pulse 52     SpO2 96 %     Oxygen Therapy None (Room air)     /70   bright blue    BP Location Left upper arm     BP Method Manual     Patient Position Sitting         Services    Do You Speak a Language Other Than English at Home? no                    Daily Treatment Assessment and Plan - 10/10/24 1203          Daily Treatment Assessment and Plan    Progress toward goals Progressing     Daily Outcome Summary Resting blood pressure continues to be high, and the cardiovascular warmup is omitted and the wrist weights are held today as a result. She progresses the forward step and reach with step up to 4-in step with reciprocal arms, and the seated MDEs with finger flicks and toe taps. She intiiates practice with the rockerboard, adding Spot-it card identification for dual motor / cog activity, and is very challenged by exercise. Uneven surfaces progressed with curb/threshold negotiation, and tow small LOB result.     Plan and Recommendations Continue dynamic balance training and functional task simulation.                         OBJECTIVE DATA TAKEN TODAY:    None taken    Today's Treatment:    PT FLOWSHEET    Exercises Current Session Time Completed (Y/N/G)   MODALITIES- HEAT/ICE (53630) TOTAL TIME FOR SESSION Not performed    Heat     Ice     THER ACT (76181) TOTAL TIME FOR SESSION 0-7 Minutes    Assessment  See progress note for objective and subjective outcomes    Vitals/pain See note - take BP (manually) before each session - see assessment 10./1 Y   Education 8/23/24: results of exam, areas that PT will be able to focus, plan of care, possibility of participating in OT after this episode ends to focus more on fine motor coordination - patient verbalizes agreement and understanding  8/27/24: patient educated to monitor BP at home and to contact physicians if high BP readings persist   9/20/24: progress made since the first visit,  continued plan of care, possibility of finishing PT prior to the projected end date - patient verbalizes agreement and understanding  10/1/24: encouraged again to let primary care doctor know of readings.               Daily task training Changing bed sheets - plus blanket  Carrying objects - crate with small weights through gym, up and down steps with CGA      Carrying basket around the gym and up the stairs 2 lap with mobility belt  - stairs, 2 trials Y   Simulated vacuuming with hockey stick Walking forward, backward, reaching under furniture with mobility belt , 2 2# weights for heavy vacuum Y   Picking up small dumbells from floor, then returning them 2 sets (1 and 2# weights) with mobility belt    Transfer training Floor transfers tall kneel to stand without UE support: x 1 rep, supervision    GROUP (33840)  Not performed    THER EX (45661) TOTAL TIME FOR SESSION 0-7 Minutes    HEP 9/3/24: modified BIG exercises    CARDIOVASCULAR      Nu Step Level 3, 10 min, seat 8 and UE 9    STRENGTHENING     BIG Sit to stand 1 x 10 w/ Airex under feet  Y   Heel rise 2 x 10    Toe walk     Squat with UE pull upward on band 10 reps B with double green band anchored to small kettle bell  (pulling weeds) - use mobility belt for safety -smaller LOB than previous session    1 kg ball transfer from floor to rolling table 10 reps    LAQs 1.5# 2 x10    STRETCHING/ROM     Calf stretch on wedge 2 x 30s              NEURO RE-ED (22753) TOTAL TIME FOR SESSION 53-67 Minutes    Floor to ceiling 10 reps with modeling, finger flicks and toe taps.  Y   Side to side 10 reps with modeling/ shaping with 1# cuff weights on UE (no weight today 2o HTN), finger flicks and toe taps.  Y   Forward step and reach 10 reps, guarding with mobility belt, step onto 4-in block Y   Side step and reach 10 reps, min cues, guarding with mobility belt Y   Backward step and reach 10 reps, min to mod cues, guarding with mobility belt Y   Forward rock and reach 10  reps, min cues, guarding with mobility belt Y   Sideways rock and reach 10 reps, min to mod cues, guarding with mobility belt Y   Dynamic training Agility ladder - retro walking with dual task - subs with trunk extension, gait speed noticeably slows    Obstacle course Stepping over bar cones, reciprocal x 4 each way forward, then laterally     Uneven surfaces progressed with curb/threshold negotiation of varying sizes,posterior LOB x2    RB + HT - using Spot it cards for dual task challenge - many  LOB Y-all       GAIT TRAINING (06404) TOTAL TIME FOR SESSION 0-7 Minutes    BIG walking Starting with normal steps and building into bigger movements - using multiplication flash cards for dual task challenge Y   Stairs      Y = yes; N = no; G = group; NV = next visit; H = hold

## 2024-10-15 ENCOUNTER — HOSPITAL ENCOUNTER (OUTPATIENT)
Dept: PHYSICAL THERAPY | Age: 84
Setting detail: THERAPIES SERIES
Discharge: HOME | End: 2024-10-15
Attending: PSYCHIATRY & NEUROLOGY
Payer: MEDICARE

## 2024-10-15 DIAGNOSIS — G20.A1 PARKINSON'S DISEASE WITHOUT DYSKINESIA OR FLUCTUATING MANIFESTATIONS (CMS/HCC): Primary | ICD-10-CM

## 2024-10-15 DIAGNOSIS — R26.89 DECREASED FUNCTIONAL MOBILITY: ICD-10-CM

## 2024-10-15 PROCEDURE — 97112 NEUROMUSCULAR REEDUCATION: CPT | Mod: GP,CQ

## 2024-10-15 NOTE — PROGRESS NOTES
Physical Therapy Visit    PT DAILY NOTE FOR OUTPATIENT THERAPY    Patient: Orly Cobian MRN: [id number]  : 1940 84 y.o.  Referring Physician: Laine Ariza MD  Date of Visit: 10/15/2024    Certification Dates: 24 through 11/15/24    Diagnosis:   1. Parkinson's disease without dyskinesia or fluctuating manifestations (CMS/HCC)    2. Decreased functional mobility        Chief Complaints:  decreased mobility    Precautions:   Existing Precautions/Restrictions: cardiac, other (see comments)  Precautions comments: A-fib, Ca, osteoporosis      TODAY'S VISIT    Time In Session:  Start Time: 1100  Stop Time: 1200  Time Calculation (min): 60 min   History/Vitals/Pain/Encounter Info - 10/15/24 1106          Injury History/Precautions/Daily Required Info    Document Type daily treatment     Primary Therapist Marco Antonio     Chief Complaint/Reason for Visit  decreased mobility     Referring Physician Laine Ariza MD     Existing Precautions/Restrictions cardiac;other (see comments)     Precautions comments A-fib, Ca, osteoporosis     History of present illness/functional impairment Orly Cobian was diagnosed with Parkinson's Disease in  and has been noting a loss of fine motor coordination. She is previously active in walking two to three miles per day. Currently, she has difficulty completing two miles secondary to reduced endurance. After attempting to walk longer distances, she notes more significant limits the next day. The patient reports that she has lost some mobility, especially rising from the floor. She feels pain in her legs while rising. Standing from a chair is also somewhat difficult. The patient lives in a three story home, and negotiating the stairs can be limited. She fell in  and fractured a vertebra. Orly has not fallen in the last three years.     Patient/Family/Caregiver Comments/Observations Orly endorsed no pain, nor med changes nor falls.     Patient reported fall since last  visit No        Pain Assessment    Currently in pain No/Denies        Pre Activity Vital Signs    Pulse 50     SpO2 98 %     Oxygen Therapy None (Room air)     /68   bright blue    BP Location Left upper arm     BP Method Manual     Patient Position Sitting         Services    Do You Speak a Language Other Than English at Home? no                    Daily Treatment Assessment and Plan - 10/15/24 1106          Daily Treatment Assessment and Plan    Progress toward goals Progressing     Daily Outcome Summary Orly performs MDEs facing (mirroring) , and both perform better than independently. She progresses to use of 2.5# wrist cuffs (an increase from last time). Improved balance in standing and posture in sitting is noted.     Plan and Recommendations Continue dynamic balance training and functional task simulation.                         OBJECTIVE DATA TAKEN TODAY:    None taken    Today's Treatment:    PT FLOWSHEET    Exercises Current Session Time Completed (Y/N/G)   MODALITIES- HEAT/ICE (79104) TOTAL TIME FOR SESSION Not performed    Heat     Ice     THER ACT (83221) TOTAL TIME FOR SESSION 0-7 Minutes    Assessment  See progress note for objective and subjective outcomes    Vitals/pain See note - take BP (manually) before each session  Y   Education 8/23/24: results of exam, areas that PT will be able to focus, plan of care, possibility of participating in OT after this episode ends to focus more on fine motor coordination - patient verbalizes agreement and understanding  8/27/24: patient educated to monitor BP at home and to contact physicians if high BP readings persist   9/20/24: progress made since the first visit, continued plan of care, possibility of finishing PT prior to the projected end date - patient verbalizes agreement and understanding  10/1/24: encouraged again to let primary care doctor know of readings.               Daily task training Changing bed sheets - plus  blanket  Carrying objects - crate with small weights through gym, up and down steps with CGA      Carrying basket around the gym and up the stairs 2 lap with mobility belt  - stairs, 2 trials    Simulated vacuuming with hockey stick Walking forward, backward, reaching under furniture with mobility belt , 2 2# weights for heavy vacuum    Picking up small dumbells from floor, then returning them 2 sets (1 and 2# weights) with mobility belt    Transfer training Floor transfers tall kneel to stand without UE support: x 1 rep, supervision    GROUP (84031)  Not performed    THER EX (70495) TOTAL TIME FOR SESSION Not performed    HEP 9/3/24: modified BIG exercises    CARDIOVASCULAR      Nu Step Level 3, 10 min, seat 8 and UE 9    STRENGTHENING     BIG Sit to stand 1 x 10 w/ Airex under feet     Heel rise 2 x 10    Toe walk     Squat with UE pull upward on band 10 reps B with double green band anchored to small kettle bell  (pulling weeds) - use mobility belt for safety -smaller LOB than previous session    1 kg ball transfer from floor to rolling table 10 reps    LAQs 1.5# 2 x10    STRETCHING/ROM     Calf stretch on wedge 2 x 30s              NEURO RE-ED (88231) TOTAL TIME FOR SESSION 53-67 Minutes    Floor to ceiling 10 reps with modeling, finger flicks  Y   Side to side 10 reps with modeling/ shaping with 2# cuff weights on UEs, finger flicks  Y   Forward step and reach 10 reps, guarding with mobility belt, 2# cuff weights Y   Side step and reach 10 reps, min cues, guarding with mobility belt, 2# cuff weights Y   Backward step and reach 10 reps, min to mod cues, guarding with mobility belt, 2# cuff weights Y   Forward rock and reach 10 reps, min cues, guarding with mobility belt, 2# cuff weights Y   Sideways rock and reach 10 reps, min to mod cues, guarding with mobility belt, 2# cuff weights Y   Dynamic training Agility ladder - retro walking with dual task - subs with trunk extension, gait speed noticeably slows     Obstacle course Stepping over bar cones, reciprocal x 4 each way forward, then laterally     Uneven surfaces progressed with curb/threshold negotiation of varying sizes,posterior LOB x2    RB + HT - using Spot it cards for dual task challenge - many  LOB        GAIT TRAINING (43134) TOTAL TIME FOR SESSION 0-7 Minutes    BIG walking Starting with normal steps and building into bigger movements - using multiplication flash cards for dual task challenge Y   Stairs      Y = yes; N = no; G = group; NV = next visit; H = hold

## 2024-10-15 NOTE — OP PT TREATMENT LOG
PT FLOWSHEET    Exercises Current Session Time Completed (Y/N/G)   MODALITIES- HEAT/ICE (06862) TOTAL TIME FOR SESSION Not performed    Heat     Ice     THER ACT (97038) TOTAL TIME FOR SESSION 0-7 Minutes    Assessment  See progress note for objective and subjective outcomes    Vitals/pain See note - take BP (manually) before each session  Y   Education 8/23/24: results of exam, areas that PT will be able to focus, plan of care, possibility of participating in OT after this episode ends to focus more on fine motor coordination - patient verbalizes agreement and understanding  8/27/24: patient educated to monitor BP at home and to contact physicians if high BP readings persist   9/20/24: progress made since the first visit, continued plan of care, possibility of finishing PT prior to the projected end date - patient verbalizes agreement and understanding  10/1/24: encouraged again to let primary care doctor know of readings.               Daily task training Changing bed sheets - plus blanket  Carrying objects - crate with small weights through gym, up and down steps with CGA      Carrying basket around the gym and up the stairs 2 lap with mobility belt  - stairs, 2 trials    Simulated vacuuming with hockey stick Walking forward, backward, reaching under furniture with mobility belt , 2 2# weights for heavy vacuum    Picking up small dumbells from floor, then returning them 2 sets (1 and 2# weights) with mobility belt    Transfer training Floor transfers tall kneel to stand without UE support: x 1 rep, supervision    GROUP (21767)  Not performed    THER EX (32954) TOTAL TIME FOR SESSION Not performed    HEP 9/3/24: modified BIG exercises    CARDIOVASCULAR      Nu Step Level 3, 10 min, seat 8 and UE 9    STRENGTHENING     BIG Sit to stand 1 x 10 w/ Airex under feet     Heel rise 2 x 10    Toe walk     Squat with UE pull upward on band 10 reps B with double green band anchored to small kettle bell  (pulling weeds) -  use mobility belt for safety -smaller LOB than previous session    1 kg ball transfer from floor to rolling table 10 reps    LAQs 1.5# 2 x10    STRETCHING/ROM     Calf stretch on wedge 2 x 30s              NEURO RE-ED (64350) TOTAL TIME FOR SESSION 53-67 Minutes    Floor to ceiling 10 reps with modeling, finger flicks  Y   Side to side 10 reps with modeling/ shaping with 2# cuff weights on UEs, finger flicks  Y   Forward step and reach 10 reps, guarding with mobility belt, 2# cuff weights Y   Side step and reach 10 reps, min cues, guarding with mobility belt, 2# cuff weights Y   Backward step and reach 10 reps, min to mod cues, guarding with mobility belt, 2# cuff weights Y   Forward rock and reach 10 reps, min cues, guarding with mobility belt, 2# cuff weights Y   Sideways rock and reach 10 reps, min to mod cues, guarding with mobility belt, 2# cuff weights Y   Dynamic training Agility ladder - retro walking with dual task - subs with trunk extension, gait speed noticeably slows    Obstacle course Stepping over bar cones, reciprocal x 4 each way forward, then laterally     Uneven surfaces progressed with curb/threshold negotiation of varying sizes,posterior LOB x2    RB + HT - using Spot it cards for dual task challenge - many  LOB        GAIT TRAINING (85211) TOTAL TIME FOR SESSION 0-7 Minutes    BIG walking Starting with normal steps and building into bigger movements - using multiplication flash cards for dual task challenge Y   Stairs      Y = yes; N = no; G = group; NV = next visit; H = hold

## 2024-10-17 ENCOUNTER — TELEPHONE (OUTPATIENT)
Dept: NEUROLOGY | Facility: CLINIC | Age: 84
End: 2024-10-17
Payer: MEDICARE

## 2024-10-17 ENCOUNTER — HOSPITAL ENCOUNTER (OUTPATIENT)
Dept: PHYSICAL THERAPY | Age: 84
Setting detail: THERAPIES SERIES
Discharge: HOME | End: 2024-10-17
Attending: PSYCHIATRY & NEUROLOGY
Payer: MEDICARE

## 2024-10-17 ENCOUNTER — TELEPHONE (OUTPATIENT)
Dept: PHYSICAL THERAPY | Age: 84
End: 2024-10-17
Payer: MEDICARE

## 2024-10-17 DIAGNOSIS — R26.89 DECREASED FUNCTIONAL MOBILITY: ICD-10-CM

## 2024-10-17 DIAGNOSIS — G20.A1 PARKINSON'S DISEASE WITHOUT DYSKINESIA OR FLUCTUATING MANIFESTATIONS (CMS/HCC): Primary | ICD-10-CM

## 2024-10-17 PROCEDURE — 97530 THERAPEUTIC ACTIVITIES: CPT | Mod: GP,CQ,KX

## 2024-10-17 PROCEDURE — 97112 NEUROMUSCULAR REEDUCATION: CPT | Mod: GP,CQ,KX

## 2024-10-17 NOTE — TELEPHONE ENCOUNTER
PT called patient's referring physician, Dr Ariza, re: trending high, asymptomatic BP with irregular rate.  Spoke with Staci at Dr Ariza's office with findings and was notified that PT will receive a callback.

## 2024-10-17 NOTE — OP PT TREATMENT LOG
PT FLOWSHEET    Exercises Current Session Time Completed (Y/N/G)   MODALITIES- HEAT/ICE (00736) TOTAL TIME FOR SESSION Not performed    Heat     Ice     THER ACT (55016) TOTAL TIME FOR SESSION 8-22 Minutes    Assessment  See progress note for objective and subjective outcomes Y by GV,PT - incl ABC and PDQmob for PN NV   Vitals/pain See note - take BP (manually) before each session   VS monitored t/o session 10/17 - see assessment Y   Education 8/23/24: results of exam, areas that PT will be able to focus, plan of care, possibility of participating in OT after this episode ends to focus more on fine motor coordination - patient verbalizes agreement and understanding  8/27/24: patient educated to monitor BP at home and to contact physicians if high BP readings persist   9/20/24: progress made since the first visit, continued plan of care, possibility of finishing PT prior to the projected end date - patient verbalizes agreement and understanding  10/1/24: encouraged again to let primary care doctor know of readings.               Daily task training Changing bed sheets - plus blanket  Carrying objects - crate with small weights through gym, up and down steps with CGA      Carrying basket around the gym and up the stairs 2 lap with mobility belt  - stairs, 2 trials    Simulated vacuuming with hockey stick Walking forward, backward, reaching under furniture with mobility belt , 2 2# weights for heavy vacuum    Picking up small dumbells from floor, then returning them 2 sets (1 and 2# weights) with mobility belt    Transfer training Floor transfers tall kneel to stand without UE support: x 1 rep, supervision    GROUP (02293)  Not performed    THER EX (31560) TOTAL TIME FOR SESSION Not performed    HEP 9/3/24: modified BIG exercises    CARDIOVASCULAR      Nu Step Level 3, 10 min, seat 8 and UE 9    STRENGTHENING     BIG Sit to stand 1 x 10 w/ Airex under feet     Heel rise 2 x 10    Toe walk     Squat with UE pull upward  on band 10 reps B with double green band anchored to small kettle bell  (pulling weeds) - use mobility belt for safety -smaller LOB than previous session    1 kg ball transfer from floor to rolling table 10 reps    LAQs 1.5# 2 x10    STRETCHING/ROM     Calf stretch on wedge 2 x 30s              NEURO RE-ED (79552) TOTAL TIME FOR SESSION 8-22 Minutes    Floor to ceiling 10 reps with modeling, finger flicks  Y   Side to side 10 reps with modeling/ shaping with 2# cuff weights on UEs, finger flicks  Y   Forward step and reach 10 reps, guarding with mobility belt, 2# cuff weights    Side step and reach 10 reps, min cues, guarding with mobility belt, 2# cuff weights    Backward step and reach 10 reps, min to mod cues, guarding with mobility belt, 2# cuff weights    Forward rock and reach 10 reps, min cues, guarding with mobility belt, 2# cuff weights    Sideways rock and reach 10 reps, min to mod cues, guarding with mobility belt, 2# cuff weights    Dynamic training Agility ladder - retro walking with dual task - subs with trunk extension, gait speed noticeably slows    Obstacle course Stepping over bar cones, reciprocal x 4 each way forward, then laterally     Uneven surfaces progressed with curb/threshold negotiation of varying sizes,posterior LOB x2    RB + HT - using Spot it cards for dual task challenge - many  LOB        GAIT TRAINING (89110) TOTAL TIME FOR SESSION Not performed    BIG walking Starting with normal steps and building into bigger movements - using multiplication flash cards for dual task challenge    Stairs      Y = yes; N = no; G = group; NV = next visit; H = hold

## 2024-10-17 NOTE — TELEPHONE ENCOUNTER
"RIC MELO(PHYSICAL THERAPIST FROM [facility])  cld s/\"Pt is here today for PT & her blood pressure @ rest was 180/76 asymptomatic. Per Ric it was 186/86 on 09/10/24 asymptomatic @ rest & the lowest it has gone was 154/64. Ric has asked if  can call [phone]. Thx  "

## 2024-10-17 NOTE — PROGRESS NOTES
Physical Therapy Visit    PT DAILY NOTE FOR OUTPATIENT THERAPY    Patient: rOly Cobian MRN: [id number]  : 1940 84 y.o.  Referring Physician: Laine Ariza MD  Date of Visit: 10/17/2024    Certification Dates: 24 through 11/15/24    Diagnosis:   1. Parkinson's disease without dyskinesia or fluctuating manifestations (CMS/HCC)    2. Decreased functional mobility        Chief Complaints:  decreased mobility    Precautions:   Existing Precautions/Restrictions: cardiac, other (see comments)  Precautions comments: A-fib, Ca, osteoporosis      TODAY'S VISIT    Time In Session:  Start Time: 1100  Stop Time: 1200  Time Calculation (min): 60 min   History/Vitals/Pain/Encounter Info - 10/17/24 1106          Injury History/Precautions/Daily Required Info    Document Type daily treatment     Primary Therapist Marco Antonio     Chief Complaint/Reason for Visit  decreased mobility     Referring Physician Laine Ariza MD     Existing Precautions/Restrictions cardiac;other (see comments)     Precautions comments A-fib, Ca, osteoporosis     History of present illness/functional impairment Orly Cobian was diagnosed with Parkinson's Disease in  and has been noting a loss of fine motor coordination. She is previously active in walking two to three miles per day. Currently, she has difficulty completing two miles secondary to reduced endurance. After attempting to walk longer distances, she notes more significant limits the next day. The patient reports that she has lost some mobility, especially rising from the floor. She feels pain in her legs while rising. Standing from a chair is also somewhat difficult. The patient lives in a three story home, and negotiating the stairs can be limited. She fell in  and fractured a vertebra. Orly has not fallen in the last three years.     Patient/Family/Caregiver Comments/Observations Orly endorsed running around to appt and to the pharmacy where there was a snafu and is  stressed this AM. Reports she did exercises with Pedro at home. Denies feeling HA or other sx but says she didn't get as much sleep last night 2o having to get up early this AM. Says has been indulging in salty snacks at home that she put out for visitor and normally does not eat.     Patient reported fall since last visit No        Pain Assessment    Currently in pain No/Denies        Pre Activity Vital Signs    Pulse 52     SpO2 98 %     Oxygen Therapy None (Room air)     /76    bright blue cuff; Systolic irregular    BP Location Left upper arm     BP Method Manual     Patient Position Sitting        Activity Vital Signs    Patient activity Therapeutic Exercise     Activity /88    birght blue    Activity BP Location Left upper arm     Activity BP Method Manual     Patient Position Sitting        Post Activity Vital Signs    Post Activity Oxygen Therapy None (Room air)     Post Activity /96   bright blue    Post Activity BP Location Left upper arm     Post Activity BP Method Manual     Patient Position Lying   HOB raised        Services    Do You Speak a Language Other Than English at Home? no                    Daily Treatment Assessment and Plan - 10/17/24 1106          Daily Treatment Assessment and Plan    Progress toward goals Progressing     Daily Outcome Summary Anirudhs blood pressure is significantly elevated upon arrival (she cites feeling stressed by errands / appts this morning), and continues to increase after 2 exercises in sitting. This PTA followed PTs instruction to have patient rest in supine (HOB elevated 30*). Subsequently, the systolic BP abated mildly (while still significantly high); diastolic BP laurita with supine rest, to 190/96.     Plan and Recommendations Continue monitor BP manually. Dynamic balance training and functional task simulation.                         OBJECTIVE DATA TAKEN TODAY:    None taken    Today's Treatment:    PT FLOWSHEET    Exercises  Current Session Time Completed (Y/N/G)   MODALITIES- HEAT/ICE (22839) TOTAL TIME FOR SESSION Not performed    Heat     Ice     THER ACT (35743) TOTAL TIME FOR SESSION 8-22 Minutes    Assessment  See progress note for objective and subjective outcomes    Vitals/pain See note - take BP (manually) before each session   VS monitored t/o session 10/17 - see assessment Y   Education 8/23/24: results of exam, areas that PT will be able to focus, plan of care, possibility of participating in OT after this episode ends to focus more on fine motor coordination - patient verbalizes agreement and understanding  8/27/24: patient educated to monitor BP at home and to contact physicians if high BP readings persist   9/20/24: progress made since the first visit, continued plan of care, possibility of finishing PT prior to the projected end date - patient verbalizes agreement and understanding  10/1/24: encouraged again to let primary care doctor know of readings.               Daily task training Changing bed sheets - plus blanket  Carrying objects - crate with small weights through gym, up and down steps with CGA      Carrying basket around the gym and up the stairs 2 lap with mobility belt  - stairs, 2 trials    Simulated vacuuming with hockey stick Walking forward, backward, reaching under furniture with mobility belt , 2 2# weights for heavy vacuum    Picking up small dumbells from floor, then returning them 2 sets (1 and 2# weights) with mobility belt    Transfer training Floor transfers tall kneel to stand without UE support: x 1 rep, supervision    GROUP (22889)  Not performed    THER EX (76374) TOTAL TIME FOR SESSION Not performed    HEP 9/3/24: modified BIG exercises    CARDIOVASCULAR      Nu Step Level 3, 10 min, seat 8 and UE 9    STRENGTHENING     BIG Sit to stand 1 x 10 w/ Airex under feet     Heel rise 2 x 10    Toe walk     Squat with UE pull upward on band 10 reps B with double green band anchored to small kettle  bell  (pulling weeds) - use mobility belt for safety -smaller LOB than previous session    1 kg ball transfer from floor to rolling table 10 reps    LAQs 1.5# 2 x10    STRETCHING/ROM     Calf stretch on wedge 2 x 30s              NEURO RE-ED (10245) TOTAL TIME FOR SESSION 8-22 Minutes    Floor to ceiling 10 reps with modeling, finger flicks  Y   Side to side 10 reps with modeling/ shaping with 2# cuff weights on UEs, finger flicks  Y   Forward step and reach 10 reps, guarding with mobility belt, 2# cuff weights    Side step and reach 10 reps, min cues, guarding with mobility belt, 2# cuff weights    Backward step and reach 10 reps, min to mod cues, guarding with mobility belt, 2# cuff weights    Forward rock and reach 10 reps, min cues, guarding with mobility belt, 2# cuff weights    Sideways rock and reach 10 reps, min to mod cues, guarding with mobility belt, 2# cuff weights    Dynamic training Agility ladder - retro walking with dual task - subs with trunk extension, gait speed noticeably slows    Obstacle course Stepping over bar cones, reciprocal x 4 each way forward, then laterally     Uneven surfaces progressed with curb/threshold negotiation of varying sizes,posterior LOB x2    RB + HT - using Spot it cards for dual task challenge - many  LOB        GAIT TRAINING (39472) TOTAL TIME FOR SESSION Not performed    BIG walking Starting with normal steps and building into bigger movements - using multiplication flash cards for dual task challenge    Stairs      Y = yes; N = no; G = group; NV = next visit; H = hold

## 2024-10-17 NOTE — TELEPHONE ENCOUNTER
I called back and spoke with the rehab. Yanet had left for the day. They will let her know that I called.

## 2024-10-24 ENCOUNTER — HOSPITAL ENCOUNTER (OUTPATIENT)
Dept: PHYSICAL THERAPY | Age: 84
Setting detail: THERAPIES SERIES
Discharge: HOME | End: 2024-10-24
Attending: PSYCHIATRY & NEUROLOGY
Payer: MEDICARE

## 2024-10-24 DIAGNOSIS — G20.A1 PARKINSON'S DISEASE WITHOUT DYSKINESIA OR FLUCTUATING MANIFESTATIONS (CMS/HCC): Primary | ICD-10-CM

## 2024-10-24 DIAGNOSIS — R26.89 DECREASED FUNCTIONAL MOBILITY: ICD-10-CM

## 2024-10-24 PROCEDURE — 97530 THERAPEUTIC ACTIVITIES: CPT | Mod: GP,CQ

## 2024-10-24 PROCEDURE — 97112 NEUROMUSCULAR REEDUCATION: CPT | Mod: GP,CQ

## 2024-10-24 NOTE — PROGRESS NOTES
Physical Therapy Visit    PT DAILY NOTE FOR OUTPATIENT THERAPY    Patient: Orly Cobian MRN: [id number]  : 1940 84 y.o.  Referring Physician: Laine Ariza MD  Date of Visit: 10/24/2024    Certification Dates: 24 through 11/15/24    Diagnosis:   1. Parkinson's disease without dyskinesia or fluctuating manifestations (CMS/HCC)    2. Decreased functional mobility        Chief Complaints:  decreased mobility    Precautions:   Existing Precautions/Restrictions: cardiac, other (see comments)  Precautions comments: A-fib, Ca, osteoporosis      TODAY'S VISIT    Time In Session:  Start Time: 1104  Stop Time: 1158  Time Calculation (min): 54 min   History/Vitals/Pain/Encounter Info - 10/24/24 1103          Injury History/Precautions/Daily Required Info    Document Type daily treatment     Primary Therapist Marco Antonio     Chief Complaint/Reason for Visit  decreased mobility     Referring Physician Laine Ariza MD     Existing Precautions/Restrictions cardiac;other (see comments)     Precautions comments A-fib, Ca, osteoporosis     History of present illness/functional impairment Orly Cobian was diagnosed with Parkinson's Disease in  and has been noting a loss of fine motor coordination. She is previously active in walking two to three miles per day. Currently, she has difficulty completing two miles secondary to reduced endurance. After attempting to walk longer distances, she notes more significant limits the next day. The patient reports that she has lost some mobility, especially rising from the floor. She feels pain in her legs while rising. Standing from a chair is also somewhat difficult. The patient lives in a three story home, and negotiating the stairs can be limited. She fell in  and fractured a vertebra. Orly has not fallen in the last three years.     Patient/Family/Caregiver Comments/Observations Orly reports feeling okay. Reports some soreness from fall on Tuesday. Reports going to  the bathroom, went to get up off the toilet, lost balance and fell forward. Hit her head, did not have LOC, denies N/V, headache. Had cuts, but did not need stitches, washed with soap and water, no s/sx of infection.     Patient reported fall since last visit Yes     Recommended plan to address falls to prevent: use walker in the bathroom, replace battery in night light     Fall prevention interventions recommended Educate and re-educate the patient on safety strategies        Pain Assessment    Currently in pain No/Denies        Pre Activity Vital Signs    Oxygen Therapy None (Room air)     /76     BP Location Left upper arm     BP Method Manual     Patient Position Sitting        Post Activity Vital Signs    Post Activity Oxygen Therapy None (Room air)     Post Activity /72   RPE 5        Services    Do You Speak a Language Other Than English at Home? no                    Daily Treatment Assessment and Plan - 10/24/24 1103          Daily Treatment Assessment and Plan    Progress toward goals Progressing     Daily Outcome Summary Reports fall since last visit, discussed safety strategies and how big sit to stand will reduce future occurence of fall. Requires occasional seated rest breaks during MDEs, few LOB requiring up to min A for balance. Completes functional activity to incorporate big walking into stair management and object retrieval, simulating tidying up home. BP elevates more than anticipated in response to exercise, but not within red flag/ED range.     Plan and Recommendations Continue monitor BP manually. Dynamic balance training and functional task simulation.                         OBJECTIVE DATA TAKEN TODAY:    None taken    Today's Treatment:    PT FLOWSHEET    Exercises Current Session Time Completed (Y/N/G)   MODALITIES- HEAT/ICE (23040) TOTAL TIME FOR SESSION Not performed    Heat     Ice     THER ACT (20466) TOTAL TIME FOR SESSION 8-22 Minutes    Assessment  See  progress note for objective and subjective outcomes    Vitals/pain See note - take BP (manually) before each session   VS monitored t/o session 10/17 - see assessment Y   Education 8/23/24: results of exam, areas that PT will be able to focus, plan of care, possibility of participating in OT after this episode ends to focus more on fine motor coordination - patient verbalizes agreement and understanding  8/27/24: patient educated to monitor BP at home and to contact physicians if high BP readings persist   9/20/24: progress made since the first visit, continued plan of care, possibility of finishing PT prior to the projected end date - patient verbalizes agreement and understanding  10/1/24: encouraged again to let primary care doctor know of readings.               Daily task training Changing bed sheets - plus blanket  Carrying objects - crate with small weights through gym, up and down steps with CGA      Carrying basket around the gym and up the stairs 2 lap with mobility belt  - stairs, 2 trials    Simulated vacuuming with hockey stick Walking forward, backward, reaching under furniture with mobility belt , 2 2# weights for heavy vacuum    Picking up small dumbbells from various heights, then completing FF of steps with 1 railing Placed 2, 3, and 5 lb dumbbell at various heights Y   Picking up small dumbells from floor, then returning them 2 sets (1 and 2# weights) with mobility belt    Transfer training Floor transfers tall kneel to stand without UE support: x 1 rep, supervision    GROUP (35014)  Not performed    THER EX (39055) TOTAL TIME FOR SESSION Not performed    HEP 9/3/24: modified BIG exercises    CARDIOVASCULAR      Nu Step Level 3, 10 min, seat 8 and UE 9    STRENGTHENING     BIG Sit to stand 1 x 10 w/ Airex under feet     Heel rise 2 x 10    Toe walk     Squat with UE pull upward on band 10 reps B with double green band anchored to small kettle bell  (pulling weeds) - use mobility belt for safety  -smaller LOB than previous session    1 kg ball transfer from floor to rolling table 10 reps    LAQs 1.5# 2 x10    STRETCHING/ROM     Calf stretch on wedge 2 x 30s              NEURO RE-ED (11867) TOTAL TIME FOR SESSION 38-52 Minutes    Floor to ceiling 10 reps with modeling, finger flicks  Y   Side to side 10 reps with modeling/ shaping with (resume 2.5# cuff weights on UEs, finger flicks) Y   Forward step and reach 10 reps, guarding with mobility belt, min A for LOB (resume 2.5 # cuff weights) Y   Side step and reach 10 reps, min cues, guarding with mobility belt, min A for LOB, (resume 2.5# cuff weights) Y   Backward step and reach 10 reps, min to mod cues, guarding with mobility belt, min A for LOB ( resume 2.5 # cuff weights) Y   Forward rock and reach 10 reps, min cues, guarding with mobility belt, (resume 2.5# cuff weights) Y   Sideways rock and reach 10 reps, min to mod cues, guarding with mobility belt, ( resume 2.5# cuff weights) Y   Big sit to stand 1 x 10 with air ex under feet Y   Dynamic training Agility ladder - retro walking with dual task - subs with trunk extension, gait speed noticeably slows    Obstacle course Stepping over bar cones, reciprocal x 4 each way forward, then laterally     Uneven surfaces progressed with curb/threshold negotiation of varying sizes,posterior LOB x2    RB + HT - using Spot it cards for dual task challenge - many  LOB        GAIT TRAINING (24266) TOTAL TIME FOR SESSION Not performed    BIG walking Starting with normal steps and building into bigger movements - using multiplication flash cards for dual task challenge    Stairs      Y = yes; N = no; G = group; NV = next visit; H = hold

## 2024-10-24 NOTE — OP PT TREATMENT LOG
PT FLOWSHEET    Exercises Current Session Time Completed (Y/N/G)   MODALITIES- HEAT/ICE (36182) TOTAL TIME FOR SESSION Not performed    Heat     Ice     THER ACT (77254) TOTAL TIME FOR SESSION 8-22 Minutes    Assessment  See progress note for objective and subjective outcomes    Vitals/pain See note - take BP (manually) before each session   VS monitored t/o session 10/17 - see assessment Y   Education 8/23/24: results of exam, areas that PT will be able to focus, plan of care, possibility of participating in OT after this episode ends to focus more on fine motor coordination - patient verbalizes agreement and understanding  8/27/24: patient educated to monitor BP at home and to contact physicians if high BP readings persist   9/20/24: progress made since the first visit, continued plan of care, possibility of finishing PT prior to the projected end date - patient verbalizes agreement and understanding  10/1/24: encouraged again to let primary care doctor know of readings.               Daily task training Changing bed sheets - plus blanket  Carrying objects - crate with small weights through gym, up and down steps with CGA      Carrying basket around the gym and up the stairs 2 lap with mobility belt  - stairs, 2 trials    Simulated vacuuming with hockey stick Walking forward, backward, reaching under furniture with mobility belt , 2 2# weights for heavy vacuum    Picking up small dumbbells from various heights, then completing FF of steps with 1 railing Placed 2, 3, and 5 lb dumbbell at various heights Y   Picking up small dumbells from floor, then returning them 2 sets (1 and 2# weights) with mobility belt    Transfer training Floor transfers tall kneel to stand without UE support: x 1 rep, supervision    GROUP (13355)  Not performed    THER EX (18918) TOTAL TIME FOR SESSION Not performed    HEP 9/3/24: modified BIG exercises    CARDIOVASCULAR      Nu Step Level 3, 10 min, seat 8 and UE 9    STRENGTHENING     BIG  Sit to stand 1 x 10 w/ Airex under feet     Heel rise 2 x 10    Toe walk     Squat with UE pull upward on band 10 reps B with double green band anchored to small kettle bell  (pulling weeds) - use mobility belt for safety -smaller LOB than previous session    1 kg ball transfer from floor to rolling table 10 reps    LAQs 1.5# 2 x10    STRETCHING/ROM     Calf stretch on wedge 2 x 30s              NEURO RE-ED (46986) TOTAL TIME FOR SESSION 38-52 Minutes    Floor to ceiling 10 reps with modeling, finger flicks  Y   Side to side 10 reps with modeling/ shaping with (resume 2.5# cuff weights on UEs, finger flicks) Y   Forward step and reach 10 reps, guarding with mobility belt, min A for LOB (resume 2.5 # cuff weights) Y   Side step and reach 10 reps, min cues, guarding with mobility belt, min A for LOB, (resume 2.5# cuff weights) Y   Backward step and reach 10 reps, min to mod cues, guarding with mobility belt, min A for LOB ( resume 2.5 # cuff weights) Y   Forward rock and reach 10 reps, min cues, guarding with mobility belt, (resume 2.5# cuff weights) Y   Sideways rock and reach 10 reps, min to mod cues, guarding with mobility belt, ( resume 2.5# cuff weights) Y   Big sit to stand 1 x 10 with air ex under feet Y   Dynamic training Agility ladder - retro walking with dual task - subs with trunk extension, gait speed noticeably slows    Obstacle course Stepping over bar cones, reciprocal x 4 each way forward, then laterally     Uneven surfaces progressed with curb/threshold negotiation of varying sizes,posterior LOB x2    RB + HT - using Spot it cards for dual task challenge - many  LOB        GAIT TRAINING (94474) TOTAL TIME FOR SESSION Not performed    BIG walking Starting with normal steps and building into bigger movements - using multiplication flash cards for dual task challenge    Stairs      Y = yes; N = no; G = group; NV = next visit; H = hold

## 2024-10-31 ENCOUNTER — HOSPITAL ENCOUNTER (OUTPATIENT)
Dept: PHYSICAL THERAPY | Age: 84
Setting detail: THERAPIES SERIES
Discharge: HOME | End: 2024-10-31
Attending: PSYCHIATRY & NEUROLOGY
Payer: MEDICARE

## 2024-10-31 DIAGNOSIS — R26.89 DECREASED FUNCTIONAL MOBILITY: ICD-10-CM

## 2024-10-31 DIAGNOSIS — G20.A1 PARKINSON'S DISEASE WITHOUT DYSKINESIA OR FLUCTUATING MANIFESTATIONS (CMS/HCC): Primary | ICD-10-CM

## 2024-10-31 PROCEDURE — 97116 GAIT TRAINING THERAPY: CPT | Mod: GP,CQ

## 2024-10-31 PROCEDURE — 97112 NEUROMUSCULAR REEDUCATION: CPT | Mod: GP,CQ

## 2024-10-31 NOTE — PROGRESS NOTES
Physical Therapy Visit    PT DAILY NOTE FOR OUTPATIENT THERAPY    Patient: Orly Cobian MRN: [id number]  : 1940 84 y.o.  Referring Physician: Laine Ariza MD  Date of Visit: 10/31/2024    Certification Dates: 24 through 11/15/24    Diagnosis:   1. Parkinson's disease without dyskinesia or fluctuating manifestations (CMS/HCC)    2. Decreased functional mobility        Chief Complaints:  decreased mobility    Precautions:   Existing Precautions/Restrictions: cardiac, other (see comments)  Precautions comments: A-fib, Ca, osteoporosis      TODAY'S VISIT    Time In Session:  Start Time: 1303  Stop Time: 1400  Time Calculation (min): 57 min   History/Vitals/Pain/Encounter Info - 10/31/24 1303          Injury History/Precautions/Daily Required Info    Document Type daily treatment     Primary Therapist Marco Antonio     Chief Complaint/Reason for Visit  decreased mobility     Referring Physician Laine Ariza MD     Existing Precautions/Restrictions cardiac;other (see comments)     Precautions comments A-fib, Ca, osteoporosis     History of present illness/functional impairment Orly Cobian was diagnosed with Parkinson's Disease in  and has been noting a loss of fine motor coordination. She is previously active in walking two to three miles per day. Currently, she has difficulty completing two miles secondary to reduced endurance. After attempting to walk longer distances, she notes more significant limits the next day. The patient reports that she has lost some mobility, especially rising from the floor. She feels pain in her legs while rising. Standing from a chair is also somewhat difficult. The patient lives in a three story home, and negotiating the stairs can be limited. She fell in  and fractured a vertebra. Orly has not fallen in the last three years.     Patient/Family/Caregiver Comments/Observations Orly reports soreness has subsided mostly, knee is a little sore if she kneels on it,  wrist and knee have scabbed up and are healing well, no s/sx of infection.     Patient reported fall since last visit No     Recommended plan to address falls to prevent: use walker in the bathroom, replace battery in night light     Fall prevention interventions recommended Educate and re-educate the patient on safety strategies        Pain Assessment    Currently in pain No/Denies        Pre Activity Vital Signs    Oxygen Therapy None (Room air)     /64     BP Location Left upper arm     BP Method Manual     Patient Position Sitting        Post Activity Vital Signs    Post Activity Oxygen Therapy None (Room air)         Services    Do You Speak a Language Other Than English at Home? no                    Daily Treatment Assessment and Plan - 10/31/24 1303          Daily Treatment Assessment and Plan    Progress toward goals Progressing     Daily Outcome Summary Focused session on refining MDEs for transition to discharge soon. Orly able to complete with minimal verbal cuing, CGA for balance but few LOB corrected without assistance via stepping strategy. Big walking focused on carrying objects while maintaining big walking and completing full slight of stairs. Able to transfer from 16 inch surface successfully without posterior LOB.     Plan and Recommendations Discharge next 2 visits                         OBJECTIVE DATA TAKEN TODAY:    None taken    Today's Treatment:    PT FLOWSHEET    Exercises Current Session Time Completed (Y/N/G)   MODALITIES- HEAT/ICE (70472) TOTAL TIME FOR SESSION Not performed    Heat     Ice     THER ACT (75831) TOTAL TIME FOR SESSION 0-7 Minutes    Assessment  See progress note for objective and subjective outcomes    Vitals/pain See note - take BP (manually) before each session   VS monitored t/o session 10/17 - see assessment Y   Education 8/23/24: results of exam, areas that PT will be able to focus, plan of care, possibility of participating in OT after this  episode ends to focus more on fine motor coordination - patient verbalizes agreement and understanding  8/27/24: patient educated to monitor BP at home and to contact physicians if high BP readings persist   9/20/24: progress made since the first visit, continued plan of care, possibility of finishing PT prior to the projected end date - patient verbalizes agreement and understanding  10/1/24: encouraged again to let primary care doctor know of readings.               Daily task training Changing bed sheets - plus blanket  Carrying objects - crate with small weights through gym, up and down steps with CGA      Carrying basket around the gym and up the stairs 2 lap with mobility belt  - stairs, 2 trials    Simulated vacuuming with hockey stick Walking forward, backward, reaching under furniture with mobility belt , 2 2# weights for heavy vacuum    Picking up small dumbbells from various heights, then completing FF of steps with 1 railing Placed 2, 3, and 5 lb dumbbell at various heights    Picking up small dumbells from floor, then returning them 2 sets (1 and 2# weights) with mobility belt    Transfer training Floor transfers tall kneel to stand without UE support: x 1 rep, supervision    GROUP (61342)  Not performed    THER EX (40537) TOTAL TIME FOR SESSION Not performed    HEP 9/3/24: modified BIG exercises    CARDIOVASCULAR      Nu Step Level 3, 10 min, seat 8 and UE 9    STRENGTHENING     BIG Sit to stand 1 x 10 w/ Airex under feet     Heel rise 2 x 10    Toe walk     Squat with UE pull upward on band 10 reps B with double green band anchored to small kettle bell  (pulling weeds) - use mobility belt for safety -smaller LOB than previous session    1 kg ball transfer from floor to rolling table 10 reps    LAQs 1.5# 2 x10    STRETCHING/ROM     Calf stretch on wedge 2 x 30s              NEURO RE-ED (50550) TOTAL TIME FOR SESSION 38-52 Minutes    Floor to ceiling 10 reps with modeling, finger flicks seated on green  PB Y   Side to side 10 reps with modeling/ shaping seated on green PB Y   Forward step and reach 10 reps, guarding with mobility belt no LOB(resume 2.5 # cuff weights) Y   Side step and reach 10 reps, min cues, guarding with mobility belt LOB regained with stepping strategy (resume 2.5# cuff weights) Y   Backward step and reach 10 reps, min to mod cues, guarding with mobility belt, LOB regained with stepping strategy ( resume 2.5 # cuff weights) Y   Forward rock and reach 10 reps, min cues, guarding with mobility belt, LOB regained with stepping strategy (resume 2.5# cuff weights) Y   Sideways rock and reach 10 reps, min to mod cues, guarding with mobility belt, ( resume 2.5# cuff weights) Y   Big sit to stand 1 x 10 from 16 inch box Y   Dynamic training Agility ladder - retro walking with dual task - subs with trunk extension, gait speed noticeably slows    Obstacle course Stepping over bar cones, reciprocal x 4 each way forward, then laterally     Uneven surfaces progressed with curb/threshold negotiation of varying sizes,posterior LOB x2    RB + HT - using Spot it cards for dual task challenge - many  LOB        GAIT TRAINING (51468) TOTAL TIME FOR SESSION 8-22 Minutes    BIG walking Focusing on maintaining arm swing and big step length; carrying shopping bag with 5 lb for 200 feet and FF of stairs with unilateral railing; progressed to 10 lb for 200 feet and FF of stairs with unilateral railing to simulate shopping/housemaking tasks Y   Stairs      Y = yes; N = no; G = group; NV = next visit; H = hold

## 2024-10-31 NOTE — OP PT TREATMENT LOG
PT FLOWSHEET    Exercises Current Session Time Completed (Y/N/G)   MODALITIES- HEAT/ICE (42941) TOTAL TIME FOR SESSION Not performed    Heat     Ice     THER ACT (32425) TOTAL TIME FOR SESSION 0-7 Minutes    Assessment  See progress note for objective and subjective outcomes    Vitals/pain See note - take BP (manually) before each session   VS monitored t/o session 10/17 - see assessment Y   Education 8/23/24: results of exam, areas that PT will be able to focus, plan of care, possibility of participating in OT after this episode ends to focus more on fine motor coordination - patient verbalizes agreement and understanding  8/27/24: patient educated to monitor BP at home and to contact physicians if high BP readings persist   9/20/24: progress made since the first visit, continued plan of care, possibility of finishing PT prior to the projected end date - patient verbalizes agreement and understanding  10/1/24: encouraged again to let primary care doctor know of readings.               Daily task training Changing bed sheets - plus blanket  Carrying objects - crate with small weights through gym, up and down steps with CGA      Carrying basket around the gym and up the stairs 2 lap with mobility belt  - stairs, 2 trials    Simulated vacuuming with hockey stick Walking forward, backward, reaching under furniture with mobility belt , 2 2# weights for heavy vacuum    Picking up small dumbbells from various heights, then completing FF of steps with 1 railing Placed 2, 3, and 5 lb dumbbell at various heights    Picking up small dumbells from floor, then returning them 2 sets (1 and 2# weights) with mobility belt    Transfer training Floor transfers tall kneel to stand without UE support: x 1 rep, supervision    GROUP (72883)  Not performed    THER EX (84930) TOTAL TIME FOR SESSION Not performed    HEP 9/3/24: modified BIG exercises    CARDIOVASCULAR      Nu Step Level 3, 10 min, seat 8 and UE 9    STRENGTHENING     BIG  Sit to stand 1 x 10 w/ Airex under feet     Heel rise 2 x 10    Toe walk     Squat with UE pull upward on band 10 reps B with double green band anchored to small kettle bell  (pulling weeds) - use mobility belt for safety -smaller LOB than previous session    1 kg ball transfer from floor to rolling table 10 reps    LAQs 1.5# 2 x10    STRETCHING/ROM     Calf stretch on wedge 2 x 30s              NEURO RE-ED (82181) TOTAL TIME FOR SESSION 38-52 Minutes    Floor to ceiling 10 reps with modeling, finger flicks seated on green PB Y   Side to side 10 reps with modeling/ shaping seated on green PB Y   Forward step and reach 10 reps, guarding with mobility belt no LOB(resume 2.5 # cuff weights) Y   Side step and reach 10 reps, min cues, guarding with mobility belt LOB regained with stepping strategy (resume 2.5# cuff weights) Y   Backward step and reach 10 reps, min to mod cues, guarding with mobility belt, LOB regained with stepping strategy ( resume 2.5 # cuff weights) Y   Forward rock and reach 10 reps, min cues, guarding with mobility belt, LOB regained with stepping strategy (resume 2.5# cuff weights) Y   Sideways rock and reach 10 reps, min to mod cues, guarding with mobility belt, ( resume 2.5# cuff weights) Y   Big sit to stand 1 x 10 from 16 inch box Y   Dynamic training Agility ladder - retro walking with dual task - subs with trunk extension, gait speed noticeably slows    Obstacle course Stepping over bar cones, reciprocal x 4 each way forward, then laterally     Uneven surfaces progressed with curb/threshold negotiation of varying sizes,posterior LOB x2    RB + HT - using Spot it cards for dual task challenge - many  LOB        GAIT TRAINING (78271) TOTAL TIME FOR SESSION 8-22 Minutes    BIG walking Focusing on maintaining arm swing and big step length; carrying shopping bag with 5 lb for 200 feet and FF of stairs with unilateral railing; progressed to 10 lb for 200 feet and FF of stairs with unilateral  railing to simulate shopping/housemaking tasks Y   Stairs      Y = yes; N = no; G = group; NV = next visit; H = hold

## 2024-11-05 ENCOUNTER — HOSPITAL ENCOUNTER (OUTPATIENT)
Dept: PHYSICAL THERAPY | Age: 84
Setting detail: THERAPIES SERIES
Discharge: HOME | End: 2024-11-05
Attending: PSYCHIATRY & NEUROLOGY
Payer: MEDICARE

## 2024-11-05 DIAGNOSIS — R26.89 DECREASED FUNCTIONAL MOBILITY: ICD-10-CM

## 2024-11-05 DIAGNOSIS — G20.A1 PARKINSON'S DISEASE WITHOUT DYSKINESIA OR FLUCTUATING MANIFESTATIONS (CMS/HCC): Primary | ICD-10-CM

## 2024-11-05 PROCEDURE — 97530 THERAPEUTIC ACTIVITIES: CPT | Mod: GP,CQ,KX

## 2024-11-05 PROCEDURE — 97112 NEUROMUSCULAR REEDUCATION: CPT | Mod: GP,CQ,KX

## 2024-11-05 PROCEDURE — 97110 THERAPEUTIC EXERCISES: CPT | Mod: GP,CQ,KX

## 2024-11-05 NOTE — PROGRESS NOTES
Physical Therapy Visit    PT DAILY NOTE FOR OUTPATIENT THERAPY    Patient: Orly Cobian MRN: [id number]  : 1940 84 y.o.  Referring Physician: Laine Ariza MD  Date of Visit: 2024    Certification Dates: 24 through 11/15/24    Diagnosis:   1. Parkinson's disease without dyskinesia or fluctuating manifestations (CMS/HCC)    2. Decreased functional mobility        Chief Complaints:  decreased mobility    Precautions:   Existing Precautions/Restrictions: cardiac, other (see comments)  Precautions comments: A-fib, Ca, osteoporosis      TODAY'S VISIT    Time In Session:  Start Time: 1100  Stop Time: 1200  Time Calculation (min): 60 min   History/Vitals/Pain/Encounter Info - 24 1106          Injury History/Precautions/Daily Required Info    Document Type daily treatment     Primary Therapist Marc Oantonio     Chief Complaint/Reason for Visit  decreased mobility     Referring Physician Laine Ariza MD     Existing Precautions/Restrictions cardiac;other (see comments)     Precautions comments A-fib, Ca, osteoporosis     History of present illness/functional impairment Orly Cobian was diagnosed with Parkinson's Disease in  and has been noting a loss of fine motor coordination. She is previously active in walking two to three miles per day. Currently, she has difficulty completing two miles secondary to reduced endurance. After attempting to walk longer distances, she notes more significant limits the next day. The patient reports that she has lost some mobility, especially rising from the floor. She feels pain in her legs while rising. Standing from a chair is also somewhat difficult. The patient lives in a three story home, and negotiating the stairs can be limited. She fell in  and fractured a vertebra. Orly has not fallen in the last three years.     Patient/Family/Caregiver Comments/Observations Orly reports a busy day yesterday in which she was thrown off -kilter mentally, and did  not know if she had taken her medication or not. Today is fine.     Patient reported fall since last visit No     Recommended plan to address falls to prevent: use walker in the bathroom, replace battery in night light     Fall prevention interventions recommended Educate and re-educate the patient on safety strategies        Pain Assessment    Currently in pain No/Denies        Pre Activity Vital Signs    Pulse 52     SpO2 94 %     Oxygen Therapy None (Room air)     /72   bright blue    BP Location Left upper arm     BP Method Manual     Patient Position Sitting        Activity Vital Signs    Patient activity Therapeutic Exercise     Activity /84   bright blue    Activity BP Location Left upper arm     Activity BP Method Manual     Patient Position Sitting        Post Activity Vital Signs    Post Activity Oxygen Therapy None (Room air)         Services    Do You Speak a Language Other Than English at Home? no                    Daily Treatment Assessment and Plan - 11/05/24 1106          Daily Treatment Assessment and Plan    Progress toward goals Slower than expected     Daily Outcome Summary Session is limited today secondary to continued high blood pressure readings, and activity is done in seated and sidelying position today. Blood pressure rises significantly, to 200/84 in sitting. Patient is advised to call PCP to inform as well as schedule appt.     Plan and Recommendations Discharge next visit                         OBJECTIVE DATA TAKEN TODAY:    None taken    Today's Treatment:    PT FLOWSHEET    Exercises Current Session Time Completed (Y/N/G)   MODALITIES- HEAT/ICE (48753) TOTAL TIME FOR SESSION Not performed    Heat     Ice     THER ACT (79813) TOTAL TIME FOR SESSION 8-22 Minutes    Assessment  See progress note for objective and subjective outcomes    Vitals/pain See note - take BP (manually) before each session   VS monitored t/o session 10/17, 11/5 - see assessment Y    Education 8/23/24: results of exam, areas that PT will be able to focus, plan of care, possibility of participating in OT after this episode ends to focus more on fine motor coordination - patient verbalizes agreement and understanding  8/27/24: patient educated to monitor BP at home and to contact physicians if high BP readings persist   9/20/24: progress made since the first visit, continued plan of care, possibility of finishing PT prior to the projected end date - patient verbalizes agreement and understanding  10/1/24: encouraged again to let primary care doctor know of readings.   11/5/24: Patient is advised to call PCP to inform as well as schedule appt.  Y             Daily task training Changing bed sheets - plus blanket  Carrying objects - crate with small weights through gym, up and down steps with CGA      Carrying basket around the gym and up the stairs 2 lap with mobility belt  - stairs, 2 trials    Simulated vacuuming with hockey stick Walking forward, backward, reaching under furniture with mobility belt , 2 2# weights for heavy vacuum    Picking up small dumbbells from various heights, then completing FF of steps with 1 railing Placed 2, 3, and 5 lb dumbbell at various heights    Picking up small dumbells from floor, then returning them 2 sets (1 and 2# weights) with mobility belt    Transfer training Floor transfers tall kneel to stand without UE support: x 1 rep, supervision    GROUP (23262)  Not performed    THER EX (38498) TOTAL TIME FOR SESSION 23-37 Minutes    HEP 9/3/24: modified BIG exercises    CARDIOVASCULAR      Nu Step Level 3, 10 min, seat 8 and UE 9    STRENGTHENING     BIG Sit to stand 1 x 10 w/ Airex under feet  Y   Heel rise 2 x 10    Toe walk     Squat with UE pull upward on band 10 reps B with double green band anchored to small kettle bell  (pulling weeds) - use mobility belt for safety -smaller LOB than previous session    1 kg ball transfer from floor to rolling table 10 reps     LAQs, seated marching 2.5#. 2 x10 ea Y   STRETCHING/ROM     Calf stretch on wedge 2 x 30s    Open book in s/l X10 x5s hold ea Y        NEURO RE-ED (42920) TOTAL TIME FOR SESSION 8-22 Minutes    Floor to ceiling 10 reps with modeling, finger flicks seated on green PB Y   Side to side 10 reps with modeling/ shaping, initially seated on green PB -regressed to chair 2o posture Y   Forward step and reach 10 reps, guarding with mobility belt no LOB(resume 2.5 # cuff weights)    Side step and reach 10 reps, min cues, guarding with mobility belt LOB regained with stepping strategy (resume 2.5# cuff weights)    Backward step and reach 10 reps, min to mod cues, guarding with mobility belt, LOB regained with stepping strategy ( resume 2.5 # cuff weights)    Forward rock and reach 10 reps, min cues, guarding with mobility belt, LOB regained with stepping strategy (resume 2.5# cuff weights)    Sideways rock and reach 10 reps, min to mod cues, guarding with mobility belt, ( resume 2.5# cuff weights)    Big sit to stand 1 x 10 from 16 inch box    Dynamic training Agility ladder - retro walking with dual task - subs with trunk extension, gait speed noticeably slows    Obstacle course Stepping over bar cones, reciprocal x 4 each way forward, then laterally     Uneven surfaces progressed with curb/threshold negotiation of varying sizes,posterior LOB x2    RB + HT - using Spot it cards for dual task challenge - many  LOB        GAIT TRAINING (89320) TOTAL TIME FOR SESSION Not performed    BIG walking Focusing on maintaining arm swing and big step length; carrying shopping bag with 5 lb for 200 feet and FF of stairs with unilateral railing; progressed to 10 lb for 200 feet and FF of stairs with unilateral railing to simulate shopping/housemaking tasks    Stairs      Y = yes; N = no; G = group; NV = next visit; H = hold

## 2024-11-05 NOTE — OP PT TREATMENT LOG
PT FLOWSHEET    Exercises Current Session Time Completed (Y/N/G)   MODALITIES- HEAT/ICE (80589) TOTAL TIME FOR SESSION Not performed    Heat     Ice     THER ACT (96149) TOTAL TIME FOR SESSION 8-22 Minutes    Assessment  See progress note for objective and subjective outcomes    Vitals/pain See note - take BP (manually) before each session   VS monitored t/o session 10/17, 11/5 - see assessment Y   Education 8/23/24: results of exam, areas that PT will be able to focus, plan of care, possibility of participating in OT after this episode ends to focus more on fine motor coordination - patient verbalizes agreement and understanding  8/27/24: patient educated to monitor BP at home and to contact physicians if high BP readings persist   9/20/24: progress made since the first visit, continued plan of care, possibility of finishing PT prior to the projected end date - patient verbalizes agreement and understanding  10/1/24: encouraged again to let primary care doctor know of readings.   11/5/24: Patient is advised to call PCP to inform as well as schedule appt.  Y             Daily task training Changing bed sheets - plus blanket  Carrying objects - crate with small weights through gym, up and down steps with CGA      Carrying basket around the gym and up the stairs 2 lap with mobility belt  - stairs, 2 trials    Simulated vacuuming with hockey stick Walking forward, backward, reaching under furniture with mobility belt , 2 2# weights for heavy vacuum    Picking up small dumbbells from various heights, then completing FF of steps with 1 railing Placed 2, 3, and 5 lb dumbbell at various heights    Picking up small dumbells from floor, then returning them 2 sets (1 and 2# weights) with mobility belt    Transfer training Floor transfers tall kneel to stand without UE support: x 1 rep, supervision    GROUP (92323)  Not performed    THER EX (20850) TOTAL TIME FOR SESSION 23-37 Minutes    HEP 9/3/24: modified BIG exercises     CARDIOVASCULAR      Nu Step Level 3, 10 min, seat 8 and UE 9    STRENGTHENING     BIG Sit to stand 1 x 10 w/ Airex under feet  Y   Heel rise 2 x 10    Toe walk     Squat with UE pull upward on band 10 reps B with double green band anchored to small kettle bell  (pulling weeds) - use mobility belt for safety -smaller LOB than previous session    1 kg ball transfer from floor to rolling table 10 reps    LAQs, seated marching 2.5#. 2 x10 ea Y   STRETCHING/ROM     Calf stretch on wedge 2 x 30s    Open book in s/l X10 x5s hold ea Y        NEURO RE-ED (04550) TOTAL TIME FOR SESSION 8-22 Minutes    Floor to ceiling 10 reps with modeling, finger flicks seated on green PB Y   Side to side 10 reps with modeling/ shaping, initially seated on green PB -regressed to chair 2o posture Y   Forward step and reach 10 reps, guarding with mobility belt no LOB(resume 2.5 # cuff weights)    Side step and reach 10 reps, min cues, guarding with mobility belt LOB regained with stepping strategy (resume 2.5# cuff weights)    Backward step and reach 10 reps, min to mod cues, guarding with mobility belt, LOB regained with stepping strategy ( resume 2.5 # cuff weights)    Forward rock and reach 10 reps, min cues, guarding with mobility belt, LOB regained with stepping strategy (resume 2.5# cuff weights)    Sideways rock and reach 10 reps, min to mod cues, guarding with mobility belt, ( resume 2.5# cuff weights)    Big sit to stand 1 x 10 from 16 inch box    Dynamic training Agility ladder - retro walking with dual task - subs with trunk extension, gait speed noticeably slows    Obstacle course Stepping over bar cones, reciprocal x 4 each way forward, then laterally     Uneven surfaces progressed with curb/threshold negotiation of varying sizes,posterior LOB x2    RB + HT - using Spot it cards for dual task challenge - many  LOB        GAIT TRAINING (57896) TOTAL TIME FOR SESSION Not performed    BIG walking Focusing on maintaining arm swing  and big step length; carrying shopping bag with 5 lb for 200 feet and FF of stairs with unilateral railing; progressed to 10 lb for 200 feet and FF of stairs with unilateral railing to simulate shopping/housemaking tasks    Stairs      Y = yes; N = no; G = group; NV = next visit; H = hold

## 2024-11-07 ENCOUNTER — HOSPITAL ENCOUNTER (OUTPATIENT)
Dept: PHYSICAL THERAPY | Age: 84
Setting detail: THERAPIES SERIES
Discharge: HOME | End: 2024-11-07
Attending: PSYCHIATRY & NEUROLOGY
Payer: MEDICARE

## 2024-11-07 DIAGNOSIS — G20.A1 PARKINSON'S DISEASE WITHOUT DYSKINESIA OR FLUCTUATING MANIFESTATIONS (CMS/HCC): Primary | ICD-10-CM

## 2024-11-07 DIAGNOSIS — R26.89 DECREASED FUNCTIONAL MOBILITY: ICD-10-CM

## 2024-11-07 PROCEDURE — 97110 THERAPEUTIC EXERCISES: CPT | Mod: GP,KX

## 2024-11-07 PROCEDURE — 97530 THERAPEUTIC ACTIVITIES: CPT | Mod: GP,KX

## 2024-11-07 ASSESSMENT — GAIT ASSESSMENTS
TUG TIME: 8.4
TUG TIME: 9.2
TUG TIME: 10.3
TUG TIME: 8.9

## 2024-11-07 NOTE — OP PT TREATMENT LOG
PT FLOWSHEET    Exercises Current Session Time Completed (Y/N/G)   MODALITIES- HEAT/ICE (91008) TOTAL TIME FOR SESSION Not performed    Heat     Ice     THER ACT (74671) TOTAL TIME FOR SESSION 38-52 Minutes    Assessment  See progress note for objective and subjective outcomes Y   Vitals/pain See note - take BP (manually) Y   Education 8/23/24: results of exam, areas that PT will be able to focus, plan of care, possibility of participating in OT after this episode ends to focus more on fine motor coordination - patient verbalizes agreement and understanding  8/27/24: patient educated to monitor BP at home and to contact physicians if high BP readings persist   9/20/24: progress made since the first visit, continued plan of care, possibility of finishing PT prior to the projected end date - patient verbalizes agreement and understanding  10/1/24: encouraged again to let primary care doctor know of readings.   11/5/24: Patient is advised to call PCP to inform as well as schedule appt.   11/7/24: progress made since first assessment, decision to discharge to a home program, recommendation to participate in OT as needed in the future - patient verbalizes agreement and understanding Y             Daily task training Changing bed sheets - plus blanket  Carrying objects - crate with small weights through gym, up and down steps with CGA      Carrying basket around the gym and up the stairs 2 lap with mobility belt  - stairs, 2 trials    Simulated vacuuming with hockey stick Walking forward, backward, reaching under furniture with mobility belt , 2 2# weights for heavy vacuum    Picking up small dumbbells from various heights, then completing FF of steps with 1 railing Placed 2, 3, and 5 lb dumbbell at various heights    Picking up small dumbells from floor, then returning them 2 sets (1 and 2# weights) with mobility belt    Transfer training Floor transfers tall kneel to stand without UE support: x 1 rep, supervision     GROUP (33237)  Not performed    THER EX (05690) TOTAL TIME FOR SESSION 8-22 Minutes    HEP 9/3/24: modified BIG exercises    CARDIOVASCULAR      Nu Step Level 3, 10 min, seat 8 and UE 9 Y   STRENGTHENING     BIG Sit to stand 1 x 10 w/ Airex under feet  Y   Heel rise 2 x 10    Toe walk     Squat with UE pull upward on band 10 reps B with double green band anchored to small kettle bell  (pulling weeds) - use mobility belt for safety -smaller LOB than previous session    1 kg ball transfer from floor to rolling table 10 reps    LAQs, seated marching 2.5#. 2 x10 ea    STRETCHING/ROM     Calf stretch on wedge 2 x 30s    Open book in s/l X10 x5s hold ea         NEURO RE-ED (37841) TOTAL TIME FOR SESSION Not performed    Floor to ceiling 10 reps with modeling, finger flicks seated on green PB    Side to side 10 reps with modeling/ shaping, initially seated on green PB -regressed to chair 2o posture    Forward step and reach 10 reps, guarding with mobility belt no LOB(resume 2.5 # cuff weights)    Side step and reach 10 reps, min cues, guarding with mobility belt LOB regained with stepping strategy (resume 2.5# cuff weights)    Backward step and reach 10 reps, min to mod cues, guarding with mobility belt, LOB regained with stepping strategy ( resume 2.5 # cuff weights)    Forward rock and reach 10 reps, min cues, guarding with mobility belt, LOB regained with stepping strategy (resume 2.5# cuff weights)    Sideways rock and reach 10 reps, min to mod cues, guarding with mobility belt, ( resume 2.5# cuff weights)    Big sit to stand 1 x 10 from 16 inch box    Dynamic training Agility ladder - retro walking with dual task - subs with trunk extension, gait speed noticeably slows    Obstacle course Stepping over bar cones, reciprocal x 4 each way forward, then laterally     Uneven surfaces progressed with curb/threshold negotiation of varying sizes,posterior LOB x2    RB + HT - using Spot it cards for dual task challenge -  many  LOB        GAIT TRAINING (96607) TOTAL TIME FOR SESSION Not performed    BIG walking Focusing on maintaining arm swing and big step length; carrying shopping bag with 5 lb for 200 feet and FF of stairs with unilateral railing; progressed to 10 lb for 200 feet and FF of stairs with unilateral railing to simulate shopping/housemaking tasks    Stairs      Y = yes; N = no; G = group; NV = next visit; H = hold

## 2024-11-07 NOTE — PROGRESS NOTES
Physical Therapy Discharge      PT DISCHARGE NOTE FOR OUTPATIENT THERAPY    Patient: Orly Cobian MRN: [id number]  : 1940 84 y.o.  Referring Physician: Laine Ariza MD  Date of Visit: 2024      Certification Dates: 24 through 11/15/24    Total Visit Count: 21    Diagnosis:   1. Parkinson's disease without dyskinesia or fluctuating manifestations (CMS/HCC)    2. Decreased functional mobility        Chief Complaints:  No chief complaint on file.      Precautions:  Existing Precautions/Restrictions: cardiac, other (see comments)  Precautions comments: A-fib, Ca, osteoporosis      TODAY'S VISIT:    Time In Session:  Start Time: 1100  Stop Time: 1200  Time Calculation (min): 60 min   General Information - 24 1100          Session Details    Document Type discharge evaluation     Mode of Treatment individual therapy        General Information    Referring Physician Laine Ariza MD     History of present illness/functional impairment Orly Cobian was diagnosed with Parkinson's Disease in  and has been noting a loss of fine motor coordination. She is previously active in walking two to three miles per day. Currently, she has difficulty completing two miles secondary to reduced endurance. After attempting to walk longer distances, she notes more significant limits the next day. The patient reports that she has lost some mobility, especially rising from the floor. She feels pain in her legs while rising. Standing from a chair is also somewhat difficult. The patient lives in a three story home, and negotiating the stairs can be limited. She fell in  and fractured a vertebra. Orly has not fallen in the last three years.     Patient/Family/Caregiver Comments/Observations Orly reports overall good progress since starting physical therapy. She states others have noticed that she is not showing obvious signs of Parkinson's.     Existing Precautions/Restrictions cardiac;other (see  comments)     Precautions comments A-fib, Ca, osteoporosis         Services    Do You Speak a Language Other Than English at Home? no                    Daily Falls Screen - 11/07/24 1100          Daily Falls Assessment    Patient reported fall since last visit No                    Pain/Vitals - 11/07/24 1100          Pain Assessment    Currently in pain No/Denies        Pre Activity Vital Signs    /78     BP Location Left upper arm     BP Method Manual     Patient Position Sitting        Post Activity Vital Signs    Post Activity Oxygen Therapy None (Room air)                    PT - 11/07/24 1100          Physical Therapy    Physical Therapy Specialty Traditional Neuro PT        PT Plan    Frequency of treatment 2 times/week     PT Duration 3 months     PT Cert From 08/23/24     PT Cert To 11/15/24     Date PT POC was sent to provider 08/23/24     Signed PT Plan of Care received?  Yes                    Assessment and Plan - 11/07/24 1100          Assessment    Clinical Assessment Orly Cobian completed twenty-one physical therapy visits for Parkinson's Disease and mobility dysfunction. She demonstrates increased functional strength, more efficient transfers, improved gait mechanics, improved dynamic balance, and increased endurance to activity. The TUG and FGA show minimal falls risk at this time. The PDQ-39 decreased from 34% disability to 13%, and the functional tasks recording form improved from 52% to 17%. All functional goals have been met, and the patient is ready to transition to independence.     Plan and Recommendations Discharge PT. The patient may begin OT in the near future.                     OBJECTIVE MEASUREMENTS/DATA:    Gait and Mobility    Gait and Mobility - 11/07/24 1100          Gait Training    [place], Gait independent     Assistive Device none     Comment The patient demonstrates good heel strike, good stride length, and symmetrical arm swing.        Stairs  Assessment    [place], Stairs modified independence     Assistive Device railing     Ascending Stairs Technique step-over-step     Descending Stairs Technique step-over-step                   Outcome Measures    PT Outcome Measures - 11/07/24 1100          Objective Outcome Measures    6 Minute Walk Test 1,594 feet     2 Minute Walk Test --     Gait Speed (m/sec) 1.32 m/sec     FGA Score (out of 30 total) 26     30 Second Sit to Stand Test 15 repetitions        Other Outcome Measures Used/Comments    Other outcome measure used: PDQ-39 = 13.75% limitation overall     Outcome Measures General Comments Functional tasks recording form: gardening = 2; rising from chair = 3; cooking = 1; changing bed linens = 2; watering with hose = 1; writing cursive = 5; collecting trash = 1; walking uphill = 2; driving the car = 1; kneeling = 2 (1-7 scale, 7 = unable) overall 17% limitation        Timed Up and Go Test    Trial One: Timed Up and Go Test 10.3     Trial Two: Timed Up and Go Test 8.9     Trial Three: Timed Up and Go Test 8.4     Mean of 3 Trials: Timed Up and Go Test 9.2                       Outcome Measures          8/23/2024    08:42 9/20/2024    12:58 10/17/2024    11:46 11/7/2024    11:00   PT OBJECTIVE Outcome Measures   6 Minute Walk Test 1,427 feet 1,618 feet  1,594 feet   2 Minute Walk Test  549 feet     Gait Speed (m/sec) 1.28 m/sec 1.28 m/sec  1.32 m/sec   30 Second Sit to Stand 10 repetitions       difficulty with anterior weight shift 13 repetitions  15 repetitions   FGA 22 25  26   TUG (Mean) 10.7 9.7  9.2   PT SUBJECTIVE Outcome Measures   ABC   63% - interpreted via GV, PT    Other PDQ-39 = 34% limitation total; mobility = 45%; ADL = 33%; emotional = 42%; stigma = 12.5%; social = 25%; cognitive = 37.5%; communication = 25%; bodily discomfort = 25% PDQ-39 = 13.75% limitation overall; mobility = 10%; ADL = 12.5%; emotional = 21%; stigma = 6.25%; social = 0%; cognitive = 18.75%; communication = 16.7%;  bodily discomfort = 25% PDQ 39: 4 - interpreted via GV,PT PDQ-39 = 13.75% limitation overall       Today's Treatment:    Education provided:  Yes: See treatment log for details of education provided  Methods: Discussion, Handout, and Demonstration  Readiness: acceptance  Response: Demonstrated understanding and Verbalizes understanding    PT FLOWSHEET    Exercises Current Session Time Completed (Y/N/G)   MODALITIES- HEAT/ICE (36427) TOTAL TIME FOR SESSION Not performed    Heat     Ice     THER ACT (45010) TOTAL TIME FOR SESSION 38-52 Minutes    Assessment  See progress note for objective and subjective outcomes Y   Vitals/pain See note - take BP (manually) Y   Education 8/23/24: results of exam, areas that PT will be able to focus, plan of care, possibility of participating in OT after this episode ends to focus more on fine motor coordination - patient verbalizes agreement and understanding  8/27/24: patient educated to monitor BP at home and to contact physicians if high BP readings persist   9/20/24: progress made since the first visit, continued plan of care, possibility of finishing PT prior to the projected end date - patient verbalizes agreement and understanding  10/1/24: encouraged again to let primary care doctor know of readings.   11/5/24: Patient is advised to call PCP to inform as well as schedule appt.   11/7/24: progress made since first assessment, decision to discharge to a home program, recommendation to participate in OT as needed in the future - patient verbalizes agreement and understanding Y             Daily task training Changing bed sheets - plus blanket  Carrying objects - crate with small weights through gym, up and down steps with CGA      Carrying basket around the gym and up the stairs 2 lap with mobility belt  - stairs, 2 trials    Simulated vacuuming with hockey stick Walking forward, backward, reaching under furniture with mobility belt , 2 2# weights for heavy vacuum    Picking up  small dumbbells from various heights, then completing FF of steps with 1 railing Placed 2, 3, and 5 lb dumbbell at various heights    Picking up small dumbells from floor, then returning them 2 sets (1 and 2# weights) with mobility belt    Transfer training Floor transfers tall kneel to stand without UE support: x 1 rep, supervision    GROUP (15683)  Not performed    THER EX (99848) TOTAL TIME FOR SESSION 8-22 Minutes    HEP 9/3/24: modified BIG exercises    CARDIOVASCULAR      Nu Step Level 3, 10 min, seat 8 and UE 9 Y   STRENGTHENING     BIG Sit to stand 1 x 10 w/ Airex under feet  Y   Heel rise 2 x 10    Toe walk     Squat with UE pull upward on band 10 reps B with double green band anchored to small kettle bell  (pulling weeds) - use mobility belt for safety -smaller LOB than previous session    1 kg ball transfer from floor to rolling table 10 reps    LAQs, seated marching 2.5#. 2 x10 ea    STRETCHING/ROM     Calf stretch on wedge 2 x 30s    Open book in s/l X10 x5s hold ea         NEURO RE-ED (39572) TOTAL TIME FOR SESSION Not performed    Floor to ceiling 10 reps with modeling, finger flicks seated on green PB    Side to side 10 reps with modeling/ shaping, initially seated on green PB -regressed to chair 2o posture    Forward step and reach 10 reps, guarding with mobility belt no LOB(resume 2.5 # cuff weights)    Side step and reach 10 reps, min cues, guarding with mobility belt LOB regained with stepping strategy (resume 2.5# cuff weights)    Backward step and reach 10 reps, min to mod cues, guarding with mobility belt, LOB regained with stepping strategy ( resume 2.5 # cuff weights)    Forward rock and reach 10 reps, min cues, guarding with mobility belt, LOB regained with stepping strategy (resume 2.5# cuff weights)    Sideways rock and reach 10 reps, min to mod cues, guarding with mobility belt, ( resume 2.5# cuff weights)    Big sit to stand 1 x 10 from 16 inch box    Dynamic training Agility ladder -  retro walking with dual task - subs with trunk extension, gait speed noticeably slows    Obstacle course Stepping over bar cones, reciprocal x 4 each way forward, then laterally     Uneven surfaces progressed with curb/threshold negotiation of varying sizes,posterior LOB x2    RB + HT - using Spot it cards for dual task challenge - many  LOB        GAIT TRAINING (42766) TOTAL TIME FOR SESSION Not performed    BIG walking Focusing on maintaining arm swing and big step length; carrying shopping bag with 5 lb for 200 feet and FF of stairs with unilateral railing; progressed to 10 lb for 200 feet and FF of stairs with unilateral railing to simulate shopping/housemaking tasks    Stairs      Y = yes; N = no; G = group; NV = next visit; H = hold       Goals Addressed                   This Visit's Progress     Mutually agreed upon pain goal        Mutually agreed upon pain goal: No pain - met       PT Parkinson's goals        Short Term Goals Time Frame Result Comment   30 sec STS >/= 12 4-6 weeks Met    FGA >/= 24 4-6 weeks Met    Decrease TUG </= 10 sec to decrease fall risk 4-6 weeks Met    PDQ-39 </= 26% 4-6 weeks Met    FTRF </= 40% 4-6 weeks Met    Pt will complete independent daily mobility without falls 4-6 weeks Met      Long Term Goals Time Frame Result Comment   30 sec STS >/= 14 8-12 weeks Met    FGA >/= 26 8-12 weeks Met    6 minute walk >/= 1550 feet 8-12 weeks Met    PDQ-39 </= 18% 8-12 weeks Met    FTRF </= 28% 8-12 weeks Met    Pt performs daily household chores with minimal difficulty 8-12 weeks Met    Pt will be able to walk >/= 3 miles per day consistently with independence 8-12 weeks Met    Pt will complete a floor transfer independently without use of furniture 8-12 weeks Met                    Discharge information for [facility]:    Reason for D/C: Goals met  Was care interrupted for medical reason?: No  Did the patient demonstrate increased independence: Yes  Demonstration of independence:: [place]  in HEP, Increased functional activity, Increased functional independence  Has the patient returned to productive activity?: Yes  Increased production assessment:: Increased community independence, Return to household activities, Return to participation in hobbies/leisure activities  Skin integrity: Intact - No issues

## 2024-11-11 ENCOUNTER — TELEPHONE (OUTPATIENT)
Dept: SCHEDULING | Facility: CLINIC | Age: 84
End: 2024-11-11
Payer: MEDICARE

## 2024-11-11 NOTE — TELEPHONE ENCOUNTER
bp 140-150 at physical therapy.  Pt saw pcp Dr Hamlin     It was suggested she take mtoprolol take 1/2 tab in am and whole pill in the evening.     He suggested she talk to Dr Gregorio to advise this change.    Please call her at [phone] to discuss. ty

## 2024-12-20 ENCOUNTER — TELEPHONE (OUTPATIENT)
Dept: SCHEDULING | Facility: CLINIC | Age: 84
End: 2024-12-20
Payer: MEDICARE

## 2024-12-20 RX ORDER — METOPROLOL SUCCINATE 25 MG/1
25 TABLET, EXTENDED RELEASE ORAL NIGHTLY
Qty: 90 TABLET | Refills: 3 | Status: SHIPPED | OUTPATIENT
Start: 2024-12-20

## 2024-12-20 NOTE — TELEPHONE ENCOUNTER
Pt called requests a new script for metoprolol 75mg once daily sent to the [facility] on file    Pt can be reached at [phone]

## 2025-01-07 ENCOUNTER — OFFICE VISIT (OUTPATIENT)
Dept: CARDIOLOGY | Facility: CLINIC | Age: 85
End: 2025-01-07
Payer: MEDICARE

## 2025-01-07 VITALS
BODY MASS INDEX: 18.72 KG/M2 | HEART RATE: 57 BPM | SYSTOLIC BLOOD PRESSURE: 136 MMHG | WEIGHT: 112.5 LBS | OXYGEN SATURATION: 98 % | DIASTOLIC BLOOD PRESSURE: 64 MMHG

## 2025-01-07 DIAGNOSIS — Z79.899 LONG TERM CURRENT USE OF ANTIARRHYTHMIC DRUG: ICD-10-CM

## 2025-01-07 DIAGNOSIS — Q22.5 EBSTEIN'S ANOMALY OF TRICUSPID VALVE: ICD-10-CM

## 2025-01-07 DIAGNOSIS — I45.10 RBBB: ICD-10-CM

## 2025-01-07 DIAGNOSIS — I48.0 PAROXYSMAL ATRIAL FIBRILLATION (CMS/HCC): Primary | ICD-10-CM

## 2025-01-07 DIAGNOSIS — Z79.01 CURRENT USE OF LONG TERM ANTICOAGULATION: ICD-10-CM

## 2025-01-07 DIAGNOSIS — I45.6 WPW (WOLFF-PARKINSON-WHITE SYNDROME): ICD-10-CM

## 2025-01-07 PROCEDURE — 99214 OFFICE O/P EST MOD 30 MIN: CPT | Performed by: INTERNAL MEDICINE

## 2025-01-07 PROCEDURE — 93000 ELECTROCARDIOGRAM COMPLETE: CPT | Performed by: INTERNAL MEDICINE

## 2025-01-07 RX ORDER — AMOXICILLIN 250 MG/1
250 CAPSULE ORAL 2 TIMES DAILY
COMMUNITY
End: 2025-03-25

## 2025-01-07 RX ORDER — FLECAINIDE ACETATE 50 MG/1
50 TABLET ORAL EVERY 12 HOURS
Start: 2025-01-07 | End: 2026-01-02

## 2025-01-07 NOTE — PATIENT INSTRUCTIONS
1) I decreased her dose of flecainide from 100-mg to 50-mg PO every 12 hour.   2) I ordered her an echocardiogram to be completed.

## 2025-01-07 NOTE — PROGRESS NOTES
" Madhuri Gregorio MD, [facility]  Cardiac Electrophysiology    [facility]    [facility]  [facility] [address]  [address] [place] [address]    TEL  [phone]  [facility]         Electrophysiology Progress Note           January 07, 2025  Dear Dr. Muniz:    Orly Cobian is a 84 y.o. female who comes to the office today for follow up for a history of paroxysmal atrial fibrillation with chronic right bundle branch block and she is on long-term antiarrhythmic drug therapy with flecainide and anticoagulation with Eliquis. She has a history of dyslipidemia, Parkinson 's disease, and stage II breast cancer s/p left breast lumpectomy on August 3, 2021.    In December 2019, she woke up in the middle of the night with an episode of atrial fibrillation.  She stated that her heart was beating very fast. She took a flecainide and the episode subsided several hours later.  When I last saw her in the office on 2/13/2019, she had been feeling frequent \"extra beats\". Therefore, the patient agreed to start taking flecainide 100 mg p.o. every 12 hours and to continue to take an extra dose of flecainide as needed.    She had an echocardiogram 06/06/2023 which revealed normal LV function with EF of 60-65%.    At her previous office visit on 07/2/2024, patient reported that she has no cardiac complaints at this time. No palpitations. No side effects to therapy with flecainide.Patient states that she was recently diagnosed with mild Parkinson 's disease . At this time she does not need to be on any medication for Parkinson's. Patient states that she has been experiencing difficulty in fine motor skills and she notices changes in her bowel movements. She remains physically active and walks 2-3 mils daily as recommended by her neurologist.    Patient was presented to the [facility] ED on 07/12/2024 for UTI. Initially presented with c/o fever, associated with " nausea, sinus pain, and weakness. Covid test resulted negative.  At the ED, patient's heart rate was within normal limits and EKG was unremarkable. Urinalysis is positive for leukocyte esterase, nitrates, bacteria, white blood cells consistent with a urinary tract infection. Patient was treated with one dose of Keflex in the ED and discharged with 7-day course of Keflex.     Today, patient reports that she is stable from a cardiovascular standpoint. She has no cardiac complaints at this time. She denies any recent episodes of arrhythmias. No heart palpitations. She mentions that she started amoxicillin yesterday for a UTI and reports that this is her second UTI since Christmas.  Patient mentions that she had a ruptured vessel in her right eye due to elevated  blood pressure status post she has been receiving injections and has had 3 injections so far. She does mention visual changes in her right eye for which she is following-up with her ophthalmologist.      Patient reports that she had few falls due to lost of balance but no LOC or injuries. Her Parkinson has been stable without any medications. She exercises daily on treadmill. Patient mentions that she is taking 37.5-mg of metoprolol succinate, 1/2 tablet in the morning and 25-mg in the evening increased by her PCP. No dizziness, near syncope or syncope. No chest pain. No dyspnea on exertion, PND, orthopnea or pedal edema. No bleeding complications on anticoagulation medication with Eliquis.    PROBLEM LIST:  1. Paroxysmal atrial fibrillation diagnosed on 10/11/2010, with a recent episode in May of  2017.  2. History of Jaycob-Parkinson-White syndrome, status post catheter ablation procedure  performed in 1997, without recurrence of arrhythmia.  3. Chronic right bundle branch block.  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 History of thyroid disease.  6. Dyslipidemia.  7. Stress echo obtained on June 6, 2017 revealing no evidence of myocardial ischemia  with  normal left ventricular systolic function with a left ventricular ejection fraction of 65-  70  8. Vasovagal syncope    Patient Active Problem List   Diagnosis    Paroxysmal atrial fibrillation (CMS/HCC)    RBBB    Non-rheumatic tricuspid valve insufficiency    WPW (Jaycob-Parkinson-White syndrome)    Ebstein's anomaly of tricuspid valve    Nonrheumatic pulmonary valve insufficiency    Vasovagal syncope    Dyslipidemia    Long term current use of antiarrhythmic drug    Mass of left breast    Malignant neoplasm of central portion of left female breast (CMS/HCC)    Current use of long term anticoagulation    Pleural effusion on right    Elevated blood pressure reading without diagnosis of hypertension    Traumatic closed fracture of C2 vertebra with minimal displacement, initial encounter (CMS/[facility])    Loss of vision     Past Medical History:   Diagnosis Date    A-fib (CMS/HCC)     Alcoholism (CMS/HCC)     Arrhythmia     Breast cancer (CMS/HCC)     Hypothyroidism     Osteoporosis     Parkinson's disease (CMS/HCC)     Parkinsons (CMS/HCC)      Past Surgical History   Procedure Laterality Date    Ablation of dysrhythmic focus      Breast biopsy      2 breast biospy benign    Breast lumpectomy Left 08/03/2021    Cholecystectomy      Cosmetic surgery      Eye surgery      Skin biopsy       Current Outpatient Medications   Medication Sig Dispense Refill    amoxicillin (AMOXIL) 250 mg capsule Take 250 mg by mouth 2 (two) times a day. UTI      anastrozole (ARIMIDEX) 1 mg tablet Take  1 mg daily with lunch      atorvastatin (LIPITOR) 10 mg tablet Take 1 tablet (10 mg total) by mouth every evening. 90 tablet 3    cholecalciferol, vitamin D3, 1,000 unit (25 mcg) tablet Take 1,000 Units by mouth daily with lunch.      dorzolamide (TRUSOPT) 2 % ophthalmic solution Administer 1 drop into the right eye 2 (two) times a day.      ELIQUIS 5 mg tablet take 1 tablet by mouth twice a day 180 tablet 1    flecainide (TAMBOCOR) 100 mg  tablet Take 1 tablet (100 mg total) by mouth every 12 (twelve) hours.      latanoprost (XALATAN) 0.005 % ophthalmic solution Administer 1 drop into both eyes nightly.      levothyroxine (SYNTHROID) 75 mcg tablet Take 75 mcg by mouth daily.      lutein-zeaxanthin 25-5 mg capsule Take 1 capsule by mouth daily. ocuvit      MAGNESIUM GLYCINATE, BULK, MISC Take by mouth See admin instr. CAPSULE      metoprolol succinate XL (TOPROL-XL) 25 mg 24 hr tablet Take 1 tablet (25 mg total) by mouth nightly. (Patient taking differently: Take 75 mg by mouth nightly. 1/2 tablet in the morning and 25 mg in the evening) 90 tablet 3    LORazepam (ATIVAN) 0.5 mg tablet take 1 tablet by mouth once daily if needed       No current facility-administered medications for this visit.     ALLERGIES:Erythromycin, Erythromycin base, and Bactrim [sulfamethoxazole-trimethoprim]    REVIEW OF SYSTEMS: As in HPI.  All other other systems were reviewed and are negative.     Vitals:    01/07/25 1345   BP: 136/64   BP Location: Left upper arm   Patient Position: Sitting   Pulse: (!) 57   SpO2: 98%   Weight: 51 kg (112 lb 8 oz)       PHYSICAL EXAMINATION: Pleasant and alert, in no acute distress.  Neck: No jugular venous distention or carotid bruits. Lungs were clear to auscultation without rales or wheezes. Cardiovascular exam revealed normal S1, S2, regular rhythm with a 2/6 systolic murmur best heard at the lower left sternal border. Abdomen was soft and nontender.Extremities revealed no edema or cyanosis. She was alert and oriented x 3 with no focal deficits on gross neurologic exam.  Lab Results   Component Value Date    HGB 14.4 08/16/2024     08/16/2024    CHOL 162 08/16/2024    TRIG 85 08/16/2024    HDL 68 08/16/2024    ALT 18 08/16/2024    AST 19 08/16/2024     08/16/2024    K 4.8 08/16/2024    CREATININE 0.8 08/16/2024    TSH 4.07 08/16/2024    LDLCALC 77 08/16/2024     DATA REVIEW:    ECG 1/7/25: I personally reviewed her 12-lead  EKG performed today which reveals sinus bradycardia with first-degree AV block right bundle branch block      ECG 07/2/2024: I personally reviewed her 12-lead EKG performed today which reveals Sinus Bradycardia -First degree A-V block   -Right bundle branch block with left anterior fascicular block.    Echocardiogram:   Cardiac Imaging    TRANSTHORACIC ECHO (TTE) COMPLETE 06/06/2023    Interpretation Summary  1.  Normal left ventricular function with an estimated ejection fraction of 60 to 65%.  2.  Mild biatrial enlargement.  3.  Mildly thickened aortic valve without any significant abnormality by Doppler.  4.  Displaced tricuspid valve with evidence of probable Ebstein's anomaly.  5.  Mild to moderate tricuspid insufficiency with mildly elevated pulmonary pressures at 37 mmHg.  6.  No significant change from the previous study of March 2022    Cardiac Imaging    TRANSTHORACIC ECHO (TTE) COMPLETE 03/21/2022    Interpretation Summary  1.  Normal left ventricular function with an estimated ejection fraction of 60%.  2.  No segmental wall motion abnormalities.  3.  Thickened aortic root.  4.  Thickened aortic valve leaflets without any restriction of motion.  5.  Mild aortic insufficiency.  6.  Thickened mitral valve leaflets without any restriction of motion.  7.  Mild mitral regurgitation.  8.  Mild tricuspid insufficiency with normal pulmonary pressures.  9.  Mildly dilated right atrium and right ventricle.  10.  Unchanged from April 2021    Cardiac Imaging    TRANSTHORACIC ECHO (TTE) COMPLETE 04/14/2021    Interpretation Summary  · Normal-sized LV. Normal LV wall thickness. Preserved LV systolic function. Estimated EF 60%. No regional wall motion abnormalities.  · Tricuspid aortic valve. Sclerotic aortic valve leaflets. Trace aortic valve regurgitation. Aortic root sclerotic.  · Trace mitral valve regurgitation. Moderately dilated LA.  · Mildly dilated RV. Normal RV systolic function. Mild to moderate tricuspid  valve regurgitation. Estimated RVSP = 30 mmHg. Moderately dilated RA.  · Normal-sized IVC. IVC collapses >50% during inspiration.  · Compared with June 2017, the RV is now mildly dilated.    Assessment/Plan        WPW (Jaycob-Parkinson-White syndrome)    She is status post catheter ablation procedure in 1997.  No recurrence.    Paroxysmal atrial fibrillation (CMS/HCC) (HCC)  No symptoms of recurrence of atrial fibrillation on flecainide. She is tolerating the medication without side effects.  The twelve-lead EKG in the office today reveals sinus rhythm but the QRS duration of chronic right bundle branch block increased.therefore I decreased her flecainide from 100-mg to 50-mg PO every 12 hours. She may take an extra flecainide 50 mg x 1 if she has a sustained episode of atrial fibrillation.   She will continue taking metoprolol succinate 37.5 mg once a day; 12.5 in the morning and 25-mg in the evening.     She will continue long-term anticoagulation on Eliquis 5 mg p.o. every 12 hours. No bleeding complications.The [id number] score is 4.    Ebstein's anomaly of tricuspid valve  No symptoms of right heart failure.  Her most recent echocardiogram obtained this in June 2023 reveals displaced tricuspid valve with evidence of Nimo anomaly with mild to moderate tricuspid regurgitation, mildly elevated pulmonary artery pressure.   I ordered an echocardiogram to evaluate her tricuspid valve and severity of tricuspid regurgitation.    Long term current use of antiarrhythmic drug  As noted above decreased her flecainide form 100-mg to 50-mg PO every 12 hours due to increase in her QRS duration and chronic right bundle branch block as compared to her previous EKG.        RBBB  Chronic RBBB.  As discussed above.   I will continue to monitor.    Return in about 6 months (around 7/7/2025).    I will contact her with the results of an echocardiogram upon completion.     Thank you for allowing me to participate in the care of  your patient.  Please do not hesitate to contact me if you have any questions regarding my recommendations and management.    Sincerely;    Madhuri Cardoza M.D., FAC , FACP    This document was generated utilizing voice recognition technology. A reasonable attempt at proofreading has been made to minimize errors but please excuse any typographical errors which may be present. Please call with any questions.    By signing my name below, I, Brigid Cheung, attest that this documentation has been prepared under the direction and in the presence of Madhuri Gregorio MD Electronically signed: Pippa Meyers. 1/7/2025 1:54 PM     I, Madhuri Gregorio MD, personally performed the services described in this documentation. All medical record entries made by the scribe were at my direction and in my presence. I have reviewed the chart and agree that the record reflects my personal performance and is accurate and complete. Madhuri Gregorio MD. 1/7/2025. 1:54 PM.

## 2025-01-07 NOTE — ASSESSMENT & PLAN NOTE
No symptoms of right heart failure.  Her most recent echocardiogram obtained this in June 2023 reveals displaced tricuspid valve with evidence of Nimo anomaly with mild to moderate tricuspid regurgitation, mildly elevated pulmonary artery pressure.   I ordered an echocardiogram to evaluate her tricuspid valve and severity of tricuspid regurgitation.

## 2025-01-07 NOTE — ASSESSMENT & PLAN NOTE
No symptoms of recurrence of atrial fibrillation on flecainide. She is tolerating the medication without side effects.  The twelve-lead EKG in the office today reveals sinus rhythm but the QRS duration of chronic right bundle branch block increased.therefore I decreased her flecainide from 100-mg to 50-mg PO every 12 hours. She may take an extra flecainide 50 mg x 1 if she has a sustained episode of atrial fibrillation.   She will continue taking metoprolol succinate 37.5 mg once a day; 12.5 in the morning and 25-mg in the evening.     She will continue long-term anticoagulation on Eliquis 5 mg p.o. every 12 hours. No bleeding complications.The [id number] score is 4.

## 2025-01-07 NOTE — LETTER
"    Anton Muniz MD  [address] [facility] [place] [address]    Patient: Orly Cobian  YOB: 1940  Date of Visit: 1/7/2025      Dear Dr. Muniz:    Thank you for referring Orly Cobian to me for evaluation. Below are my notes for this consultation.    If you have questions, please do not hesitate to call me. I look forward to following your patient along with you.         Sincerely,        Madhuri Gregorio MD        CC: Madhuri Feliciano MD  2/2/2025  5:35 PM  Sign when Signing Visit   Madhuri Gregorio MD, [facility]  Cardiac Electrophysiology    [facility]  [facility]  [address] [place] [address]    TEL  [phone]  [facility]         Electrophysiology Progress Note           January 07, 2025  Dear Dr. Muniz:    Orly Cobian is a 84 y.o. female who comes to the office today for follow up for a history of paroxysmal atrial fibrillation with chronic right bundle branch block and she is on long-term antiarrhythmic drug therapy with flecainide and anticoagulation with Eliquis. She has a history of dyslipidemia, Parkinson 's disease, and stage II breast cancer s/p left breast lumpectomy on August 3, 2021.    In December 2019, she woke up in the middle of the night with an episode of atrial fibrillation.  She stated that her heart was beating very fast. She took a flecainide and the episode subsided several hours later.  When I last saw her in the office on 2/13/2019, she had been feeling frequent \"extra beats\". Therefore, the patient agreed to start taking flecainide 100 mg p.o. every 12 hours and to continue to take an extra dose of flecainide as needed.    She had an echocardiogram 06/06/2023 which revealed normal LV function with EF of 60-65%.    At her previous office visit on 07/2/2024, patient reported that she has no cardiac complaints at this time. No palpitations. No side effects " to therapy with flecainide.Patient states that she was recently diagnosed with mild Parkinson 's disease . At this time she does not need to be on any medication for Parkinson's. Patient states that she has been experiencing difficulty in fine motor skills and she notices changes in her bowel movements. She remains physically active and walks 2-3 mils daily as recommended by her neurologist.    Patient was presented to the [facility] ED on 07/12/2024 for UTI. Initially presented with c/o fever, associated with nausea, sinus pain, and weakness. Covid test resulted negative.  At the ED, patient's heart rate was within normal limits and EKG was unremarkable. Urinalysis is positive for leukocyte esterase, nitrates, bacteria, white blood cells consistent with a urinary tract infection. Patient was treated with one dose of Keflex in the ED and discharged with 7-day course of Keflex.     Today, patient reports that she is stable from a cardiovascular standpoint. She has no cardiac complaints at this time. She denies any recent episodes of arrhythmias. No heart palpitations. She mentions that she started amoxicillin yesterday for a UTI and reports that this is her second UTI since Christmas.  Patient mentions that she had a ruptured vessel in her right eye due to elevated  blood pressure status post she has been receiving injections and has had 3 injections so far. She does mention visual changes in her right eye for which she is following-up with her ophthalmologist.      Patient reports that she had few falls due to lost of balance but no LOC or injuries. Her Parkinson has been stable without any medications. She exercises daily on treadmill. Patient mentions that she is taking 37.5-mg of metoprolol succinate, 1/2 tablet in the morning and 25-mg in the evening increased by her PCP. No dizziness, near syncope or syncope. No chest pain. No dyspnea on exertion, PND, orthopnea or pedal edema. No bleeding complications on  anticoagulation medication with Eliquis.    PROBLEM LIST:  1. Paroxysmal atrial fibrillation diagnosed on 10/11/2010, with a recent episode in May of  2017.  2. History of Jaycob-Parkinson-White syndrome, status post catheter ablation procedure  performed in 1997, without recurrence of arrhythmia.  3. Chronic right bundle branch block.  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 History of thyroid disease.  6. Dyslipidemia.  7. Stress echo obtained on June 6, 2017 revealing no evidence of myocardial ischemia with  normal left ventricular systolic function with a left ventricular ejection fraction of 65-  70  8. Vasovagal syncope    Patient Active Problem List   Diagnosis   • Paroxysmal atrial fibrillation (CMS/HCC)   • RBBB   • Non-rheumatic tricuspid valve insufficiency   • WPW (Jaycob-Parkinson-White syndrome)   • Ebstein's anomaly of tricuspid valve   • Nonrheumatic pulmonary valve insufficiency   • Vasovagal syncope   • Dyslipidemia   • Long term current use of antiarrhythmic drug   • Mass of left breast   • Malignant neoplasm of central portion of left female breast (CMS/[facility])   • Current use of long term anticoagulation   • Pleural effusion on right   • Elevated blood pressure reading without diagnosis of hypertension   • Traumatic closed fracture of C2 vertebra with minimal displacement, initial encounter (CMS/[facility])   • Loss of vision     Past Medical History:   Diagnosis Date   • A-fib (CMS/[facility])    • Alcoholism (CMS/[facility])    • Arrhythmia    • Breast cancer (CMS/[facility])    • Hypothyroidism    • Osteoporosis    • Parkinson's disease (CMS/[facility])    • Parkinsons (CMS/[facility])      Past Surgical History   Procedure Laterality Date   • Ablation of dysrhythmic focus     • Breast biopsy      2 breast biospy benign   • Breast lumpectomy Left 08/03/2021   • Cholecystectomy     • Cosmetic surgery     • Eye surgery     • Skin biopsy       Current Outpatient Medications   Medication Sig Dispense Refill   • amoxicillin (AMOXIL) 250 mg  capsule Take 250 mg by mouth 2 (two) times a day. UTI     • anastrozole (ARIMIDEX) 1 mg tablet Take  1 mg daily with lunch     • atorvastatin (LIPITOR) 10 mg tablet Take 1 tablet (10 mg total) by mouth every evening. 90 tablet 3   • cholecalciferol, vitamin D3, 1,000 unit (25 mcg) tablet Take 1,000 Units by mouth daily with lunch.     • dorzolamide (TRUSOPT) 2 % ophthalmic solution Administer 1 drop into the right eye 2 (two) times a day.     • ELIQUIS 5 mg tablet take 1 tablet by mouth twice a day 180 tablet 1   • flecainide (TAMBOCOR) 100 mg tablet Take 1 tablet (100 mg total) by mouth every 12 (twelve) hours.     • latanoprost (XALATAN) 0.005 % ophthalmic solution Administer 1 drop into both eyes nightly.     • levothyroxine (SYNTHROID) 75 mcg tablet Take 75 mcg by mouth daily.     • lutein-zeaxanthin 25-5 mg capsule Take 1 capsule by mouth daily. ocuvit     • MAGNESIUM GLYCINATE, BULK, MISC Take by mouth See admin instr. CAPSULE     • metoprolol succinate XL (TOPROL-XL) 25 mg 24 hr tablet Take 1 tablet (25 mg total) by mouth nightly. (Patient taking differently: Take 75 mg by mouth nightly. 1/2 tablet in the morning and 25 mg in the evening) 90 tablet 3   • LORazepam (ATIVAN) 0.5 mg tablet take 1 tablet by mouth once daily if needed       No current facility-administered medications for this visit.     ALLERGIES:Erythromycin, Erythromycin base, and Bactrim [sulfamethoxazole-trimethoprim]    REVIEW OF SYSTEMS: As in HPI.  All other other systems were reviewed and are negative.     Vitals:    01/07/25 1345   BP: 136/64   BP Location: Left upper arm   Patient Position: Sitting   Pulse: (!) 57   SpO2: 98%   Weight: 51 kg (112 lb 8 oz)       PHYSICAL EXAMINATION: Pleasant and alert, in no acute distress.  Neck: No jugular venous distention or carotid bruits. Lungs were clear to auscultation without rales or wheezes. Cardiovascular exam revealed normal S1, S2, regular rhythm with a 2/6 systolic murmur best heard at  the lower left sternal border. Abdomen was soft and nontender.Extremities revealed no edema or cyanosis. She was alert and oriented x 3 with no focal deficits on gross neurologic exam.  Lab Results   Component Value Date    HGB 14.4 08/16/2024     08/16/2024    CHOL 162 08/16/2024    TRIG 85 08/16/2024    HDL 68 08/16/2024    ALT 18 08/16/2024    AST 19 08/16/2024     08/16/2024    K 4.8 08/16/2024    CREATININE 0.8 08/16/2024    TSH 4.07 08/16/2024    LDLCALC 77 08/16/2024     DATA REVIEW:    ECG 1/7/25: I personally reviewed her 12-lead EKG performed today which reveals sinus bradycardia with first-degree AV block right bundle branch block      ECG 07/2/2024: I personally reviewed her 12-lead EKG performed today which reveals Sinus Bradycardia -First degree A-V block   -Right bundle branch block with left anterior fascicular block.    Echocardiogram:   Cardiac Imaging    TRANSTHORACIC ECHO (TTE) COMPLETE 06/06/2023    Interpretation Summary  1.  Normal left ventricular function with an estimated ejection fraction of 60 to 65%.  2.  Mild biatrial enlargement.  3.  Mildly thickened aortic valve without any significant abnormality by Doppler.  4.  Displaced tricuspid valve with evidence of probable Ebstein's anomaly.  5.  Mild to moderate tricuspid insufficiency with mildly elevated pulmonary pressures at 37 mmHg.  6.  No significant change from the previous study of March 2022    Cardiac Imaging    TRANSTHORACIC ECHO (TTE) COMPLETE 03/21/2022    Interpretation Summary  1.  Normal left ventricular function with an estimated ejection fraction of 60%.  2.  No segmental wall motion abnormalities.  3.  Thickened aortic root.  4.  Thickened aortic valve leaflets without any restriction of motion.  5.  Mild aortic insufficiency.  6.  Thickened mitral valve leaflets without any restriction of motion.  7.  Mild mitral regurgitation.  8.  Mild tricuspid insufficiency with normal pulmonary pressures.  9.  Mildly  dilated right atrium and right ventricle.  10.  Unchanged from April 2021    Cardiac Imaging    TRANSTHORACIC ECHO (TTE) COMPLETE 04/14/2021    Interpretation Summary  · Normal-sized LV. Normal LV wall thickness. Preserved LV systolic function. Estimated EF 60%. No regional wall motion abnormalities.  · Tricuspid aortic valve. Sclerotic aortic valve leaflets. Trace aortic valve regurgitation. Aortic root sclerotic.  · Trace mitral valve regurgitation. Moderately dilated LA.  · Mildly dilated RV. Normal RV systolic function. Mild to moderate tricuspid valve regurgitation. Estimated RVSP = 30 mmHg. Moderately dilated RA.  · Normal-sized IVC. IVC collapses >50% during inspiration.  · Compared with June 2017, the RV is now mildly dilated.    Assessment/Plan       WPW (Jaycob-Parkinson-White syndrome)    She is status post catheter ablation procedure in 1997.  No recurrence.    Paroxysmal atrial fibrillation (CMS/HCC) (HCC)  No symptoms of recurrence of atrial fibrillation on flecainide. She is tolerating the medication without side effects.  The twelve-lead EKG in the office today reveals sinus rhythm but the QRS duration of chronic right bundle branch block increased.therefore I decreased her flecainide from 100-mg to 50-mg PO every 12 hours. She may take an extra flecainide 50 mg x 1 if she has a sustained episode of atrial fibrillation.   She will continue taking metoprolol succinate 37.5 mg once a day; 12.5 in the morning and 25-mg in the evening.     She will continue long-term anticoagulation on Eliquis 5 mg p.o. every 12 hours. No bleeding complications.The [id number] score is 4.    Ebstein's anomaly of tricuspid valve  No symptoms of right heart failure.  Her most recent echocardiogram obtained this in June 2023 reveals displaced tricuspid valve with evidence of Nimo anomaly with mild to moderate tricuspid regurgitation, mildly elevated pulmonary artery pressure.   I ordered an echocardiogram to evaluate her  tricuspid valve and severity of tricuspid regurgitation.    Long term current use of antiarrhythmic drug  As noted above decreased her flecainide form 100-mg to 50-mg PO every 12 hours due to increase in her QRS duration and chronic right bundle branch block as compared to her previous EKG.        RBBB  Chronic RBBB.  As discussed above.   I will continue to monitor.    Return in about 6 months (around 7/7/2025).    I will contact her with the results of an echocardiogram upon completion.     Thank you for allowing me to participate in the care of your patient.  Please do not hesitate to contact me if you have any questions regarding my recommendations and management.    Sincerely;    Madhuri Cardoza M.D., [facility] , FACP    This document was generated utilizing voice recognition technology. A reasonable attempt at proofreading has been made to minimize errors but please excuse any typographical errors which may be present. Please call with any questions.    By signing my name below, I, Brigid Cheung, attest that this documentation has been prepared under the direction and in the presence of Madhuri Gregorio MD Electronically signed: Pippa Meyers. 1/7/2025 1:54 PM     IMadhuri MD, personally performed the services described in this documentation. All medical record entries made by the scribe were at my direction and in my presence. I have reviewed the chart and agree that the record reflects my personal performance and is accurate and complete. Madhuri Gregorio MD. 1/7/2025. 1:54 PM.

## 2025-01-07 NOTE — ASSESSMENT & PLAN NOTE
As noted above decreased her flecainide form 100-mg to 50-mg PO every 12 hours due to increase in her QRS duration and chronic right bundle branch block as compared to her previous EKG.

## 2025-01-21 ENCOUNTER — TELEPHONE (OUTPATIENT)
Dept: SCHEDULING | Facility: CLINIC | Age: 85
End: 2025-01-21
Payer: MEDICARE

## 2025-01-21 DIAGNOSIS — Q21.10: Primary | ICD-10-CM

## 2025-01-21 DIAGNOSIS — Q22.5: Primary | ICD-10-CM

## 2025-01-21 NOTE — TELEPHONE ENCOUNTER
Dr Gregorio patient called stating on 1/7/2025 and echo was ordered.    Dr gregorio wants a [employer] tech to do the study in [facility].    Can you check with Dr Gregorio an schedule patient for echo with the tech requested.    Please call her at [phone] when completed to advise date and time. ty

## 2025-01-27 NOTE — TELEPHONE ENCOUNTER
I called and left a  message [facility] phone number and adv to have the echo done at [facility]

## 2025-01-27 NOTE — TELEPHONE ENCOUNTER
Pt's retina specialist wants her tested for carotid artery disease. Please c/b to advise, pt can be reached at [phone]

## 2025-01-27 NOTE — TELEPHONE ENCOUNTER
Octavia - Central scheduling called requests pt's echo be reinstated, Octavia states pt's echo is in the finalized requests tab    Octavia can be reached at   Pt can be reached at [phone]

## 2025-01-27 NOTE — TELEPHONE ENCOUNTER
Noted. Thanks. Of note, she will have an echocardiogram done at [facility] in near future.  If carotid US needed, should try to schedule the same day.

## 2025-01-27 NOTE — TELEPHONE ENCOUNTER
Patient calling in because central scheduling said the order is finalized and they need a new order for the echo.    Please call patient when complete.    Patient can be reached at [phone]

## 2025-01-28 ENCOUNTER — TELEPHONE (OUTPATIENT)
Dept: SCHEDULING | Facility: CLINIC | Age: 85
End: 2025-01-28
Payer: MEDICARE

## 2025-01-28 DIAGNOSIS — I48.0 PAROXYSMAL ATRIAL FIBRILLATION (CMS/HCC): Primary | ICD-10-CM

## 2025-01-28 DIAGNOSIS — R03.0 ELEVATED BLOOD PRESSURE READING WITHOUT DIAGNOSIS OF HYPERTENSION: ICD-10-CM

## 2025-01-28 DIAGNOSIS — E78.5 DYSLIPIDEMIA: ICD-10-CM

## 2025-01-28 DIAGNOSIS — H54.7 LOSS OF VISION: ICD-10-CM

## 2025-01-28 DIAGNOSIS — I67.2 CEREBRAL ATHEROSCLEROSIS: ICD-10-CM

## 2025-01-28 NOTE — TELEPHONE ENCOUNTER
I spoke to patient. Ophthalmologist would like her to have carotid US. I will enter order. Patient is scheduled for echocardiogram on 01/31/25. Can carotid US be coordinated on the same day? Please call patient to help schedule. TY.

## 2025-01-28 NOTE — TELEPHONE ENCOUNTER
I called and left the pt a  message that there is a new order and she is able to schedule the echo by call cs at [phone]

## 2025-01-28 NOTE — TELEPHONE ENCOUNTER
Patient Name: Orly Cobian    Caller name: Orly Cobian      Relationship: self    Reason for call: States she was told by opthamologst she has ruptured blood vessel behind eye was told this could be cardiac issue wants call back to discuss      Callback number: [phone]

## 2025-01-31 ENCOUNTER — HOSPITAL ENCOUNTER (OUTPATIENT)
Dept: CARDIOLOGY | Facility: HOSPITAL | Age: 85
Discharge: HOME | End: 2025-01-31
Attending: INTERNAL MEDICINE
Payer: MEDICARE

## 2025-01-31 VITALS
BODY MASS INDEX: 18.72 KG/M2 | SYSTOLIC BLOOD PRESSURE: 140 MMHG | DIASTOLIC BLOOD PRESSURE: 70 MMHG | HEART RATE: 61 BPM | HEIGHT: 65 IN

## 2025-01-31 DIAGNOSIS — Q21.10: ICD-10-CM

## 2025-01-31 DIAGNOSIS — Q22.5: ICD-10-CM

## 2025-01-31 LAB
AORTIC ROOT ANNULUS - M-MODE: 2.7 CM
AORTIC VALVE MEAN VELOCITY: 1.03 M/S
AORTIC VALVE VELOCITY TIME INTEGRAL: 34.5 CM
ASCENDING AORTA: 3.4 CM
AV MEAN GRADIENT: 5 MMHG
AV PEAK GRADIENT: 8 MMHG
AV PEAK VELOCITY-S: 1.41 M/S
AV VALVE AREA: 2.01 CM2
AV VELOCITY RATIO: 0.62
AVA (VTI): 2.35 CM2
CUSP SEPARATION: 1.4 CM
DOP CALC LVOT STROKE VOLUME: 80.93 CM3
E WAVE DECELERATION TIME: 218 MS
E/A RATIO: 1.2
E/E' RATIO: 20.1
E/LAT E' RATIO: 11.2
EDV (BP): 50.9 CM3
EF (A4C): 73.7 %
EF A2C: 61.1 %
EJECTION FRACTION: 68 %
ESV (BP): 16.3 CM3
FRACTIONAL SHORTENING: 43.98 %
INTERVENTRICULAR SEPTUM: 0.69 CM
LA ESV (BP): 59.2 CM3
LAAS-AP2: 20.2 CM2
LAAS-AP4: 20.5 CM2
LAD 2D: 3.8 CM
LALD A4C: 5.76 CM
LALD A4C: 5.94 CM
LAV-S: 59.7 CM3
LEFT ATRIUM VOLUME: 58.6 CM3
LEFT INTERNAL DIMENSION IN SYSTOLE: 2.42 CM
LEFT VENTRICLE DIASTOLIC VOLUME: 45.8 CM3
LEFT VENTRICLE SYSTOLIC VOLUME: 12 CM3
LEFT VENTRICULAR INTERNAL DIMENSION IN DIASTOLE: 4.32 CM
LEFT VENTRICULAR POSTERIOR WALL IN END DIASTOLE: 0.72 CM
LV DIASTOLIC VOLUME: 55.2 CM3
LV ESV (APICAL 2 CHAMBER): 21.5 CM3
LVAD-AP2: 21.1 CM2
LVAD-AP4: 19.5 CM2
LVAS-AP2: 11.7 CM2
LVAS-AP4: 8.75 CM2
LVLD-AP2: 6.83 CM
LVLD-AP4: 7.05 CM
LVLS-AP2: 5.49 CM
LVLS-AP4: 5.75 CM
LVOT 2D: 2.2 CM
LVOT A: 3.8 CM2
LVOT MG: 2 MMHG
LVOT MV: 0.66 M/S
LVOT PEAK VELOCITY: 0.95 M/S
LVOT PG: 4 MMHG
LVOT VTI: 21.3 CM
MV E'TISSUE VEL-LAT: 0.08 M/S
MV E'TISSUE VEL-MED: 0.04 M/S
MV PEAK A VEL: 0.71 M/S
MV PEAK E VEL: 0.85 M/S
MV STENOSIS PRESSURE HALF TIME: 64 MS
MV VALVE AREA P 1/2 METHOD: 3.44 CM2
POSTERIOR WALL: 0.72 CM
PULMONARY REGURGITATION LATE DIASTOLIC VELOCITY: 0.91 M/S
RVOT VMAX: 0.71 M/S
RVOT VTI: 16.6 CM
SEPTAL TISSUE DOPPLER FREE WALL LATE DIA VELOCITY (APICAL 4 CHAMBER VIEW): 0.11 M/S
TAPSE: 2.65 CM
TR MAX PG: 25.4 MMHG
TRICUSPID VALVE PEAK REGURGITATION VELOCITY: 2.52 M/S
TV COMP DIAMETER: 4 CM

## 2025-01-31 PROCEDURE — 93306 TTE W/DOPPLER COMPLETE: CPT

## 2025-01-31 PROCEDURE — 93306 TTE W/DOPPLER COMPLETE: CPT | Mod: 26 | Performed by: INTERNAL MEDICINE

## 2025-01-31 NOTE — TELEPHONE ENCOUNTER
Please call the patient and let her know that I reviewed the results of the echocardiogram.  There are no changes as compared to her previous echocardiogram.  Her heart function remains normal and her known tract congenital tricuspid valve and normally, Ebstein anomaly is stable.

## 2025-02-02 LAB
ATRIAL RATE: 56
P AXIS: 80
PR INTERVAL: 240
QRS DURATION: 198
QT INTERVAL: 538
QTC CALCULATION(BAZETT): 519
R AXIS: 52
T WAVE AXIS: 66
VENTRICULAR RATE: 56

## 2025-02-02 ASSESSMENT — CHADS2 SCORE
CHF: NO
PRIOR STROKE OR TIA OR THROMBOEMBOLISM: NO
VASCULAR DISEASE: NO
HYPERTENSION: NO
CHADS2 SCORE: 3
DIABETES: NO
AGE: 75+ (+2 PT.)
SEX: FEMALE (+1PT.)

## 2025-02-10 ENCOUNTER — TELEPHONE (OUTPATIENT)
Dept: CARDIOLOGY | Facility: CLINIC | Age: 85
End: 2025-02-10
Payer: MEDICARE

## 2025-02-10 ENCOUNTER — HOSPITAL ENCOUNTER (OUTPATIENT)
Dept: VASCULAR SURGERY | Facility: CLINIC | Age: 85
Discharge: HOME | End: 2025-02-10
Attending: INTERNAL MEDICINE
Payer: MEDICARE

## 2025-02-10 DIAGNOSIS — I67.2 CEREBRAL ATHEROSCLEROSIS: ICD-10-CM

## 2025-02-10 DIAGNOSIS — R03.0 ELEVATED BLOOD PRESSURE READING WITHOUT DIAGNOSIS OF HYPERTENSION: ICD-10-CM

## 2025-02-10 DIAGNOSIS — H54.7 LOSS OF VISION: ICD-10-CM

## 2025-02-10 DIAGNOSIS — E78.5 DYSLIPIDEMIA: ICD-10-CM

## 2025-02-10 DIAGNOSIS — I48.0 PAROXYSMAL ATRIAL FIBRILLATION (CMS/HCC): ICD-10-CM

## 2025-02-10 LAB
LEFT CCA DIST DIAS: 24.3 CM/S
LEFT CCA DIST SYS: 83.9 CM/S
LEFT CCA MID DIAS: 22.7 CM/S
LEFT CCA MID SYS: 90.2 CM/S
LEFT CCA PROX DIAS: 20.4 CM/S
LEFT CCA PROX SYS: 85.5 CM/S
LEFT ICA DIST DIAS: 32.1 CM/S
LEFT ICA DIST SYS: 98.8 CM/S
LEFT ICA MID DIAS: 27.4 CM/S
LEFT ICA MID SYS: 74.5 CM/S
LEFT ICA PROX DIAS: 18.8 CM/S
LEFT ICA PROX SYS: 65.1 CM/S
LEFT ICA/CCA SYS: 1.18
LEFT SUBCLAVIAN SYS: 112 CM/S
LEFT VERTEBRAL SYS: 83.9 CM/S
LT ECA PROX EDV: 17.2 CM/S
LT ECA PROX PSV: 85.5 CM/S
LT SUBCLAVIAN PROX PSV: 112 CM/S
LT VERTEBRAL PROX EDV: 22.7 CM/S
LT VERTEBRAL PROX PSV: 83.9 CM/S
RIGHT CCA DIST DIAS: 19.3 CM/S
RIGHT CCA DIST SYS: 64.6 CM/S
RIGHT CCA MID DIAS: 23 CM/S
RIGHT CCA MID SYS: 93.8 CM/S
RIGHT CCA PROX DIAS: 21.1 CM/S
RIGHT CCA PROX SYS: 98.2 CM/S
RIGHT ECA SYS: 69 CM/S
RIGHT ICA DIST DIAS: 24.1 CM/S
RIGHT ICA DIST SYS: 77.3 CM/S
RIGHT ICA MID DIAS: 20.2 CM/S
RIGHT ICA MID SYS: 58 CM/S
RIGHT ICA PROX DIAS: 14.5 CM/S
RIGHT ICA PROX SYS: 43 CM/S
RIGHT ICA/CCA SYS: 1.2
RIGHT SUBCLAVIAN SYS: 123 CM/S
RT ECA PROC EDV: 10.6 CM/S
RT SUBCLAVIAN PROX PSV: 123 CM/S

## 2025-02-10 PROCEDURE — 93880 EXTRACRANIAL BILAT STUDY: CPT

## 2025-02-10 PROCEDURE — 93880 EXTRACRANIAL BILAT STUDY: CPT | Mod: 26 | Performed by: INTERNAL MEDICINE

## 2025-02-10 NOTE — TELEPHONE ENCOUNTER
Please call the patient and let her know that the carotid US showed plaque but no significant stenosi. She needs to continue atorvastatin.  I am not sure if she was called about her echocardiogramresult. Her echocardiogram remains stable  without significant change.and the tricuspid valve with congenital Ebstein Anomaly is working properly, moderate TR, stable.

## 2025-02-11 NOTE — TELEPHONE ENCOUNTER
I reviewed results with patient. She was aware of echo results as VM message was left (see other telephone encounter).

## 2025-02-27 ENCOUNTER — OFFICE VISIT (OUTPATIENT)
Dept: WOUND CARE | Facility: HOSPITAL | Age: 85
End: 2025-02-27
Payer: MEDICARE

## 2025-02-27 VITALS — TEMPERATURE: 97.7 F | DIASTOLIC BLOOD PRESSURE: 69 MMHG | SYSTOLIC BLOOD PRESSURE: 173 MMHG | HEART RATE: 60 BPM

## 2025-02-27 DIAGNOSIS — R60.0 LOCALIZED EDEMA: Primary | ICD-10-CM

## 2025-02-27 DIAGNOSIS — S81.802A OPEN WOUND OF LEFT LOWER LEG, INITIAL ENCOUNTER: ICD-10-CM

## 2025-02-27 PROCEDURE — 87205 SMEAR GRAM STAIN: CPT | Performed by: PODIATRIST

## 2025-02-27 PROCEDURE — G0463 HOSPITAL OUTPT CLINIC VISIT: HCPCS

## 2025-02-27 PROCEDURE — 27200111 HC AQUACEL SILVER 2X3

## 2025-02-27 RX ORDER — GENTAMICIN SULFATE 1 MG/G
OINTMENT TOPICAL
Qty: 30 G | Refills: 6 | Status: SHIPPED | OUTPATIENT
Start: 2025-02-27

## 2025-03-04 LAB
GRAM STN SPEC: NORMAL
GRAM STN SPEC: NORMAL
MICROORGANISM SPEC CULT: NORMAL

## 2025-03-06 ENCOUNTER — OFFICE VISIT (OUTPATIENT)
Dept: WOUND CARE | Facility: HOSPITAL | Age: 85
End: 2025-03-06
Payer: MEDICARE

## 2025-03-06 VITALS — RESPIRATION RATE: 18 BRPM | HEART RATE: 57 BPM | OXYGEN SATURATION: 99 % | TEMPERATURE: 97.3 F

## 2025-03-06 DIAGNOSIS — S81.802D OPEN WOUND OF LEFT LOWER LEG, SUBSEQUENT ENCOUNTER: Primary | ICD-10-CM

## 2025-03-06 PROCEDURE — G0463 HOSPITAL OUTPT CLINIC VISIT: HCPCS

## 2025-03-06 PROCEDURE — 27200112 HC AQUACELL SILVER 4X4

## 2025-03-06 PROCEDURE — G0463 HOSPITAL OUTPT CLINIC VISIT: HCPCS | Performed by: PODIATRIST

## 2025-03-06 NOTE — ASSESSMENT & PLAN NOTE
Most the fibrosis has dissolved with pretty good vascular base.  No clinical signs of infection.  And I would continue the medicinal honey and the alginate and a well-padded dry sterile dressing at least 3 times a week.  The wound was mechanically stimulated with cleaning (chlorhexidine scrub/ brush) and any superficial slough/ fibrin was removed in this fashion, non-excisionally. Hemostasis was maintained and the area was then dressed.  Full wound care lecture given again today.  Discussed appropriate nutritional supplementation for wound healing.  Continue to maintain watchful eye on the area for any significant increase in pain or any fevers or chills they are to call immediately for further evaluation and recommendations.

## 2025-03-06 NOTE — PROGRESS NOTES
Patient ID: Orly Cobian                            : 1940  MRN: [id number]                                            Visit Date: 3/6/2025  Encounter Provider: Monty Jansen  Referring Provider: No ref. provider found    Subjective      HPI  Orly is a 84 y.o. old female with a chief compliant of Traumatic Wound (Cat scratch)  She presents today for follow-up of the wound to the left anterior leg.  States she is doing fine.  Is been using the manuka honey and a well-padded dry sterile dressing of every other day.  They have been compliant with course of treatment.  Denies any fevers chills night sweats.  Has no significant pain from the area.    She was initially seen and treated for cat bite that resulted in no wound on her left leg.  She has been on various oral antibiotics with good clearing of the infection.  Family is    The following have been reviewed and updated as appropriate in this visit:        Past Medical History:  has a past medical history of A-fib (CMS/[facility]), Alcoholism (CMS/[facility]), Arrhythmia, Breast cancer (CMS/HCC), Hypothyroidism, Osteoporosis, Parkinson's disease (CMS/HCC), and Parkinsons (CMS/HCC).  Past Surgical History:  has a past surgical history that includes Cholecystectomy; Cosmetic surgery; Eye surgery; Skin biopsy; Ablation of dysrhythmic focus; Breast biopsy; and Breast lumpectomy (Left, 2021).  Social History:   Social History     Tobacco Use    Smoking status: Former     Current packs/day: 0.00     Types: Cigarettes     Quit date:      Years since quittin.2    Smokeless tobacco: Never   Vaping Use    Vaping status: Never Used   Substance Use Topics    Alcohol use: No    Drug use: No     Family History: family history includes Anemia in her biological sister; Clotting disorder in her biological sister; Fainting in her biological sister; Heart attack in her biological father; Heart disease in her biological father and biological mother; Heart failure in her  biological father; Hypertension in her biological sister.  Medications:   Current Outpatient Medications:     amoxicillin (AMOXIL) 250 mg capsule, Take 250 mg by mouth 2 (two) times a day. UTI, Disp: , Rfl:     anastrozole (ARIMIDEX) 1 mg tablet, Take  1 mg daily with lunch, Disp: , Rfl:     atorvastatin (LIPITOR) 10 mg tablet, Take 1 tablet (10 mg total) by mouth every evening., Disp: 90 tablet, Rfl: 3    cholecalciferol, vitamin D3, 1,000 unit (25 mcg) tablet, Take 1,000 Units by mouth daily with lunch., Disp: , Rfl:     dorzolamide (TRUSOPT) 2 % ophthalmic solution, Administer 1 drop into the right eye 2 (two) times a day., Disp: , Rfl:     ELIQUIS 5 mg tablet, take 1 tablet by mouth twice a day, Disp: 180 tablet, Rfl: 1    flecainide (TAMBOCOR) 50 mg tablet, Take 1 tablet (50 mg total) by mouth every 12 (twelve) hours., Disp: , Rfl:     gentamicin (GARAMYCIN) 0.1 % ointment, Apply to wound as directed, Disp: 30 g, Rfl: 6    latanoprost (XALATAN) 0.005 % ophthalmic solution, Administer 1 drop into both eyes nightly., Disp: , Rfl:     levothyroxine (SYNTHROID) 75 mcg tablet, Take 75 mcg by mouth daily., Disp: , Rfl:     LORazepam (ATIVAN) 0.5 mg tablet, take 1 tablet by mouth once daily if needed, Disp: , Rfl:     lutein-zeaxanthin 25-5 mg capsule, Take 1 capsule by mouth daily. ocuvit, Disp: , Rfl:     MAGNESIUM GLYCINATE, BULK, MISC, Take by mouth See admin instr. CAPSULE, Disp: , Rfl:     metoprolol succinate XL (TOPROL-XL) 25 mg 24 hr tablet, Take 1 tablet (25 mg total) by mouth nightly. (Patient taking differently: Take 75 mg by mouth nightly. 1/2 tablet in the morning and 25 mg in the evening), Disp: 90 tablet, Rfl: 3    Allergies: is allergic to erythromycin, erythromycin base, and bactrim [sulfamethoxazole-trimethoprim].     Review of Systems   All other systems reviewed and are negative.    Objective   Visit Vitals  Pulse (!) 57   Temp 36.3 °C (97.3 °F)   Resp 18   SpO2 99%       Physical Exam  Vitals and  nursing note reviewed.       The DP and PT pulse are palpable.  Turgor texture of skin is reduced and slightly fragile.  Epicritic sensation manual muscle strength intact.  No caron pedal deformities present.  The wound is on the anterior aspect of the left mid leg.  The leg is mildly  swollen today.  Is diffusely tender around the margins but not fluctuant.  Size of the wound is significantly smaller and is still slough fibrotic but after mechanical stimulation and cleansing there is mostly good vascular tissue.  Full-thickness into the subcutaneous tissue.  No increase in temperature.  Some moderate serous drainage no pus.  Size the wound is down to 4 cm x 1 cm x 0.2 cm  Assessment/Plan    Diagnosis Plan   1. Open wound of left lower leg, subsequent encounter          Problem List Items Addressed This Visit          Musculoskeletal    Open wound of left lower leg - Primary     Most the fibrosis has dissolved with pretty good vascular base.  No clinical signs of infection.  And I would continue the medicinal honey and the alginate and a well-padded dry sterile dressing at least 3 times a week.  The wound was mechanically stimulated with cleaning (chlorhexidine scrub/ brush) and any superficial slough/ fibrin was removed in this fashion, non-excisionally. Hemostasis was maintained and the area was then dressed.  Full wound care lecture given again today.  Discussed appropriate nutritional supplementation for wound healing.  Continue to maintain watchful eye on the area for any significant increase in pain or any fevers or chills they are to call immediately for further evaluation and recommendations.            No follow-ups on file.     Monty Jansen DPM

## 2025-03-06 NOTE — PATIENT INSTRUCTIONS
Wound Healing Center Instructions    MEDICATIONS     Medication Note  Continue present medications as prescribed by the [facility]ound Healing Center or other physicians you see. To avoid any problems keep the [facility]aling Center informed each visit of any medications changes that occur.     WOUND CARE     Clean Wound with: Acetic Acid (1/4 strength vinegar water)   Treatment 1   Location: Left leg  Dressing: Apply Medihoney to Adaptic and cover the wound. Cover with Aquacel Ag and gauze, secure with mg. Wear tubigrip D stocking during the day.  Dressing Care Frequency: Every other day  Care Provider: Self     EAT A HIGH PROTEIN DIET.  ELEVATE YOUR LEGS FREQUENTLY THROUGHOUT THE DAY    Basic Principles      Wash your hands thoroughly with soap and water and after each dressing change. If someone other than the patient changes the dressing, its best to wear disposable gloves.   Do not get the wound or dressing wet.   To shower: remove the dressing, shower with soap and water (including washing the wound - do not use a washcloth), air dry, then redress the wound.  Do not take a tub bath  Keep all your dressings in a clean, covered container at home to avoid dust and contamination.  Discard used dressings in a plastic bag or covered trash container.  Check your wound and the surrounding skin at each dressing change for redness, warmth, swelling, increased pain, foul odor, fever, pus or abnormal drainage or discharge.  Notify Wound Healing Center if any of these changes occur - [phone].        Nutrition  Eat a well, balanced diet with adequate protein to support wound healing.   Take a multivitamin every day. Adequate nutrition supports healing and new tissue growth.  All diabetic patients should strive to keep blood sugars within a normal, practical range.   Elevated blood sugars can delay your wound healing.      COMPRESSION THERAPY     Tubigrip Stockings Size D  Left Leg  Apply Tubigrips (cotton/compression  stockinette) DAILY.  Apply stockings upon arising in the morning to obtain the best results.  Remove stockings at bedtime once your legs are elevated.  Do not allow the stockinette to roll down as it can reduce or interrupt the blood flow in your limb.  Always wear the appropriate size that is recommended for you at the [facility].  Tubigrips can be washed by hand with soap and water and hang to dry overnight.

## 2025-03-20 ENCOUNTER — OFFICE VISIT (OUTPATIENT)
Dept: WOUND CARE | Facility: HOSPITAL | Age: 85
End: 2025-03-20
Payer: MEDICARE

## 2025-03-20 VITALS — TEMPERATURE: 97.2 F | HEART RATE: 58 BPM | RESPIRATION RATE: 18 BRPM

## 2025-03-20 DIAGNOSIS — S81.802D OPEN WOUND OF LEFT LOWER LEG, SUBSEQUENT ENCOUNTER: Primary | ICD-10-CM

## 2025-03-20 PROCEDURE — G0463 HOSPITAL OUTPT CLINIC VISIT: HCPCS | Performed by: PODIATRIST

## 2025-03-20 PROCEDURE — G0463 HOSPITAL OUTPT CLINIC VISIT: HCPCS

## 2025-03-20 NOTE — PROGRESS NOTES
Patient ID: Orly Cobian                            : 1940  MRN: [id number]                                            Visit Date: 3/20/2025  Encounter Provider: Monty Jansen  Referring Provider: No ref. provider found    Subjective      HPI  Orly is a 85 y.o. old female with a chief compliant of No chief complaint on file.  She presents today for follow-up of the wound to the left anterior leg.  States she is doing fine.  Is been using the manuka honey the Adaptic, alginate and a well-padded dry sterile dressing of every other day.  They have been compliant with course of treatment.  Denies any fevers chills night sweats.  Has no significant pain from the area.    She was initially seen and treated for cat bite that resulted in no wound on her left leg.  She has been on various oral antibiotics with good clearing of the infection.  Family is    The following have been reviewed and updated as appropriate in this visit:        Past Medical History:  has a past medical history of A-fib (CMS/[facility]), Alcoholism (CMS/[facility]), Arrhythmia, Breast cancer (CMS/[facility]), Hypothyroidism, Osteoporosis, Parkinson's disease (CMS/[facility]), and Parkinsons (CMS/[facility]).  Past Surgical History:  has a past surgical history that includes Cholecystectomy; Cosmetic surgery; Eye surgery; Skin biopsy; Ablation of dysrhythmic focus; Breast biopsy; and Breast lumpectomy (Left, 2021).  Social History:   Social History     Tobacco Use    Smoking status: Former     Current packs/day: 0.00     Types: Cigarettes     Quit date:      Years since quittin.2    Smokeless tobacco: Never   Vaping Use    Vaping status: Never Used   Substance Use Topics    Alcohol use: No    Drug use: No     Family History: family history includes Anemia in her biological sister; Clotting disorder in her biological sister; Fainting in her biological sister; Heart attack in her biological father; Heart disease in her biological father and biological mother;  Heart failure in her biological father; Hypertension in her biological sister.  Medications:   Current Outpatient Medications:     amoxicillin (AMOXIL) 250 mg capsule, Take 250 mg by mouth 2 (two) times a day. UTI, Disp: , Rfl:     anastrozole (ARIMIDEX) 1 mg tablet, Take  1 mg daily with lunch, Disp: , Rfl:     atorvastatin (LIPITOR) 10 mg tablet, Take 1 tablet (10 mg total) by mouth every evening., Disp: 90 tablet, Rfl: 3    cholecalciferol, vitamin D3, 1,000 unit (25 mcg) tablet, Take 1,000 Units by mouth daily with lunch., Disp: , Rfl:     dorzolamide (TRUSOPT) 2 % ophthalmic solution, Administer 1 drop into the right eye 2 (two) times a day., Disp: , Rfl:     ELIQUIS 5 mg tablet, take 1 tablet by mouth twice a day, Disp: 180 tablet, Rfl: 1    flecainide (TAMBOCOR) 50 mg tablet, Take 1 tablet (50 mg total) by mouth every 12 (twelve) hours., Disp: , Rfl:     gentamicin (GARAMYCIN) 0.1 % ointment, Apply to wound as directed, Disp: 30 g, Rfl: 6    latanoprost (XALATAN) 0.005 % ophthalmic solution, Administer 1 drop into both eyes nightly., Disp: , Rfl:     levothyroxine (SYNTHROID) 75 mcg tablet, Take 75 mcg by mouth daily., Disp: , Rfl:     LORazepam (ATIVAN) 0.5 mg tablet, take 1 tablet by mouth once daily if needed, Disp: , Rfl:     lutein-zeaxanthin 25-5 mg capsule, Take 1 capsule by mouth daily. ocuvit, Disp: , Rfl:     MAGNESIUM GLYCINATE, BULK, MISC, Take by mouth See admin instr. CAPSULE, Disp: , Rfl:     metoprolol succinate XL (TOPROL-XL) 25 mg 24 hr tablet, Take 1 tablet (25 mg total) by mouth nightly. (Patient taking differently: Take 75 mg by mouth nightly. 1/2 tablet in the morning and 25 mg in the evening), Disp: 90 tablet, Rfl: 3    Allergies: is allergic to erythromycin, erythromycin base, and bactrim [sulfamethoxazole-trimethoprim].     Review of Systems   All other systems reviewed and are negative.    Objective   Visit Vitals  Pulse (!) 58   Temp 36.2 °C (97.2 °F)   Resp 18       Physical  Exam  Vitals and nursing note reviewed.       The DP and PT pulse are palpable.  Turgor texture of skin is reduced and slightly fragile.  Epicritic sensation manual muscle strength intact.  No caron pedal deformities present.  The wound is on the anterior aspect of the left mid leg.  The leg is mildly  swollen today.  Is diffusely tender around the margins but not fluctuant.  Size of the wound is significantly smaller and  still has some slough , but after mechanical stimulation and cleansing there is mostly good vascular tissue.  Full-thickness into the subcutaneous tissue.  No increase in temperature.  Some moderate serous drainage no pus.  Size the wound is down to 3cm x 1 cm x 0.2 cm  Assessment/Plan    Diagnosis Plan   1. Open wound of left lower leg, subsequent encounter          Problem List Items Addressed This Visit          Musculoskeletal    Open wound of left lower leg - Primary    No clinical signs of infection.  The area is healing nicely.  Will continue with current course of treatment with the medicinal honey alginate and a well-padded dry sterile dressing 3 times a week.  Hopefully in the next few weeks we can discontinue the larger dressings and move onto something quite small.  The wound was mechanically stimulated with cleaning (chlorhexidine scrub/ brush) and any superficial slough/ fibrin was removed in this fashion, non-excisionally. Hemostasis was maintained and the area was then dressed.  Full wound care lecture given again today.  Discussed appropriate nutritional supplementation for wound healing.  Continue to maintain watchful eye on the area for any significant increase in pain or any fevers or chills they are to call immediately for further evaluation and recommendations.            Return in about 2 weeks (around 4/3/2025).     Monty Jansen DPM

## 2025-03-20 NOTE — PATIENT INSTRUCTIONS
Wound Healing Center Instructions    MEDICATIONS     Medication Note  Continue present medications as prescribed by the Wound Healing Center or other physicians you see. To avoid any problems keep the [facility] Healing Center informed each visit of any medications changes that occur.     WOUND CARE     Clean Wound with: Acetic Acid (1/4 strength vinegar water)   Treatment 1   Location: Left leg  Dressing: Apply Medihoney to Adaptic and cover the wound. Cover with Aquacel Ag and gauze, secure with mg. Wear tubigrip D stocking during the day.  Dressing Care Frequency: Every other day  Care Provider: Self     EAT A HIGH PROTEIN DIET.  ELEVATE YOUR LEGS FREQUENTLY THROUGHOUT THE DAY    Basic Principles      Wash your hands thoroughly with soap and water and after each dressing change. If someone other than the patient changes the dressing, its best to wear disposable gloves.   Do not get the wound or dressing wet.   To shower: remove the dressing, shower with soap and water (including washing the wound - do not use a washcloth), air dry, then redress the wound.  Do not take a tub bath  Keep all your dressings in a clean, covered container at home to avoid dust and contamination.  Discard used dressings in a plastic bag or covered trash container.  Check your wound and the surrounding skin at each dressing change for redness, warmth, swelling, increased pain, foul odor, fever, pus or abnormal drainage or discharge.  Notify Wound Healing Center if any of these changes occur - [phone].        Nutrition  Eat a well, balanced diet with adequate protein to support wound healing.   Take a multivitamin every day. Adequate nutrition supports healing and new tissue growth.  All diabetic patients should strive to keep blood sugars within a normal, practical range.   Elevated blood sugars can delay your wound healing.      COMPRESSION THERAPY     Tubigrip Stockings Size D  Left Leg  Apply Tubigrips (cotton/compression  stockinette) DAILY.  Apply stockings upon arising in the morning to obtain the best results.  Remove stockings at bedtime once your legs are elevated.  Do not allow the stockinette to roll down as it can reduce or interrupt the blood flow in your limb.  Always wear the appropriate size that is recommended for you at the [facility].  Tubigrips can be washed by hand with soap and water and hang to dry overnight.

## 2025-03-20 NOTE — ASSESSMENT & PLAN NOTE
No clinical signs of infection.  The area is healing nicely.  Will continue with current course of treatment with the medicinal honey alginate and a well-padded dry sterile dressing 3 times a week.  Hopefully in the next few weeks we can discontinue the larger dressings and move onto something quite small.  The wound was mechanically stimulated with cleaning (chlorhexidine scrub/ brush) and any superficial slough/ fibrin was removed in this fashion, non-excisionally. Hemostasis was maintained and the area was then dressed.  Full wound care lecture given again today.  Discussed appropriate nutritional supplementation for wound healing.  Continue to maintain watchful eye on the area for any significant increase in pain or any fevers or chills they are to call immediately for further evaluation and recommendations.

## 2025-03-25 ENCOUNTER — OFFICE VISIT (OUTPATIENT)
Dept: NEUROLOGY | Facility: CLINIC | Age: 85
End: 2025-03-25
Payer: MEDICARE

## 2025-03-25 VITALS
SYSTOLIC BLOOD PRESSURE: 130 MMHG | OXYGEN SATURATION: 98 % | HEIGHT: 65 IN | HEART RATE: 62 BPM | WEIGHT: 112 LBS | DIASTOLIC BLOOD PRESSURE: 70 MMHG | BODY MASS INDEX: 18.66 KG/M2 | RESPIRATION RATE: 12 BRPM

## 2025-03-25 DIAGNOSIS — G20.A1 PARKINSON'S DISEASE WITHOUT DYSKINESIA OR FLUCTUATING MANIFESTATIONS (CMS/HCC): Primary | ICD-10-CM

## 2025-03-25 PROCEDURE — 99214 OFFICE O/P EST MOD 30 MIN: CPT | Performed by: PSYCHIATRY & NEUROLOGY

## 2025-03-25 RX ORDER — SILVER SULFADIAZINE 10 G/1000G
CREAM TOPICAL
COMMUNITY
Start: 2025-02-17 | End: 2025-03-25

## 2025-03-25 NOTE — PROGRESS NOTES
Patient ID: Orly Cobian                              : 1940  MRN: [id number]                                            VISIT DATE: 3/25/2025  ENCOUNTER PROVIDER: Laine Ariza  REFERRING PROVIDER: No ref. provider found    REASON FOR VISIT: Orly Cobian is an 85 year old amibdextrous woman with PMH breast cancer, afib on eliquis who follows up for tremor, suspect underlying Parkinson's disease. She was last seen 6 months ago.  She is accompanied by her .     INTERIM HISTORY:  She had called in the interim that she wanted to stop the Sinemet due to nausea. She did not notice any worsening of her symptoms. She continues to walk on the treadmill. People don't believe she has PD.    She is dealing with issues separate from PD.  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 She had injections  She is dealing with a wound in her left leg.    PAST MEDICAL HISTORY:  has a past medical history of A-fib (CMS/[facility]), Alcoholism (CMS/[facility]), Arrhythmia, Breast cancer (CMS/[facility]), Hypothyroidism, Osteoporosis, Parkinson's disease (CMS/H[facility]), and Parkinsons (CMS/HCC). C2 fracture.    PAST SURGICAL HISTORY:  has a past surgical history that includes Cholecystectomy; Cosmetic surgery; Eye surgery; Skin biopsy; Ablation of dysrhythmic focus; Breast biopsy; and Breast lumpectomy (Left, 2021).    SOCIAL HISTORY:   Social History     Tobacco Use    Smoking status: Former     Current packs/day: 0.00     Types: Cigarettes     Quit date:      Years since quittin.2    Smokeless tobacco: Never   Vaping Use    Vaping status: Never Used   Substance Use Topics    Alcohol use: No    Drug use: No     FAMILY HISTORY: family history includes Anemia in her biological sister; Clotting disorder in her biological sister; Fainting in her biological sister; Heart attack in her biological father; Heart disease in her biological father and biological mother; Heart failure in her biological father; Hypertension in her  biological sister.    MEDICATIONS:   Current Outpatient Medications:     anastrozole (ARIMIDEX) 1 mg tablet, Take  1 mg daily with lunch, Disp: , Rfl:     atorvastatin (LIPITOR) 10 mg tablet, Take 1 tablet (10 mg total) by mouth every evening., Disp: 90 tablet, Rfl: 3    cholecalciferol, vitamin D3, 1,000 unit (25 mcg) tablet, Take 1,000 Units by mouth daily with lunch., Disp: , Rfl:     dorzolamide (TRUSOPT) 2 % ophthalmic solution, Administer 1 drop into the right eye 2 (two) times a day., Disp: , Rfl:     ELIQUIS 5 mg tablet, take 1 tablet by mouth twice a day, Disp: 180 tablet, Rfl: 1    flecainide (TAMBOCOR) 50 mg tablet, Take 1 tablet (50 mg total) by mouth every 12 (twelve) hours., Disp: , Rfl:     gentamicin (GARAMYCIN) 0.1 % ointment, Apply to wound as directed, Disp: 30 g, Rfl: 6    latanoprost (XALATAN) 0.005 % ophthalmic solution, Administer 1 drop into both eyes nightly., Disp: , Rfl:     levothyroxine (SYNTHROID) 75 mcg tablet, Take 75 mcg by mouth daily., Disp: , Rfl:     LORazepam (ATIVAN) 0.5 mg tablet, take 1 tablet by mouth once daily if needed, Disp: , Rfl:     lutein-zeaxanthin 25-5 mg capsule, Take 1 capsule by mouth daily. ocuvit, Disp: , Rfl:     MAGNESIUM GLYCINATE, BULK, MISC, Take by mouth See admin instr. CAPSULE, Disp: , Rfl:     metoprolol succinate XL (TOPROL-XL) 25 mg 24 hr tablet, Take 1 tablet (25 mg total) by mouth nightly. (Patient taking differently: Take 75 mg by mouth nightly. 1/2 tablet in the morning and 25 mg in the evening), Disp: 90 tablet, Rfl: 3    amoxicillin (AMOXIL) 250 mg capsule, Take 250 mg by mouth 2 (two) times a day. UTI (Patient not taking: Reported on 3/25/2025), Disp: , Rfl:     SSD 1 % cream, apply to affected area once daily as directed (Patient not taking: Reported on 3/25/2025), Disp: , Rfl:     ALLERGIES: is allergic to erythromycin, erythromycin base, and bactrim [sulfamethoxazole-trimethoprim].     REVIEW OF SYSTEMS:  All other systems reviewed and  "negative except as noted in the HPI.    PHYSICAL EXAM:  Visit Vitals  /70 (BP Location: Left upper arm, Patient Position: Sitting)   Pulse 62   Resp 12   Ht 1.651 m (5' 5\")   Wt 50.8 kg (112 lb)   SpO2 98%   BMI 18.64 kg/m²       GENERAL APPEARANCE: Well appearing, well nourished and well developed.  Not in acute distress.  Appears stated age.  HEAD: Normocephalic, atraumatic.  EYES:  Sclerae white. Conjunctivae clear.  NECK:  Neck was supple.  CARDIOVASCULAR:  Regular rate and rhythm.  EXTREMITIES: No clubbing, no cyanosis nor edema.  SKIN:  Dry, intact. No rashes noted. Warm to touch.  PSYCHIATRIC: Calm and cooperative with appropriate insight    NEUROLOGICAL EXAM:  MENTAL STATUS: Awake and alert with fluent language. Attention, memory, and executive function were intact.  CRANIAL NERVES: PERRL. Extraocular movements are intact. Normal pursuits and saccades. No nystagmus. Facial strength and sensation intact. Hearing grossly intact. Uvula and tongue are midline. No dysarthria.  MOTOR:  Normal muscle bulk. Full strength in the arms and legs proximally and distally.  Good facial expression  No rigidity  No resting tremor. No postural and action tremor.  No bradykinesia on hand opening-closing, hand supination-pronation, finger taps.  Mild bradykinesia on toe taps on the left, normal heel taps  COORDINATION: No ataxia on finger-to-nose  GAIT: Able to stand without pushing up from the chair. Gait was normal based with good strides and heel clearance. Arm swing was mildly reduced on the right. Turn was normal.    LABORATORY STUDIES:  Reviewed in chart    IMAGING STUDIES:   I personally reviewed the [facility] 6/2022: no acute intracranial change.    IMPRESSION:  Orly Cobian is an 85 year old amibdextrous woman with PMH breast cancer, afib on eliquis who follows up for tremor, suspect underlying Parkinson's disease. She is doing well with PT and initiation of levodopa, however she has since stopped Sinemet without " change in symptoms.Discussed DaTscan, skin biopsy for PD. Will monitor clinically as she is doing well.     RECOMMENDATIONS:  -Continue to monitor off levodopa. If progression of symptoms, would next try Crexont  -Exercise    Follow-up will be scheduled in 6 months, sooner if needed.    Thank you for allowing me to participate in the care of your patient.  Please feel free to contact me at any time if you have any further questions.      Laine Ariza MD  Movement Disorders

## 2025-04-10 ENCOUNTER — OFFICE VISIT (OUTPATIENT)
Dept: WOUND CARE | Facility: HOSPITAL | Age: 85
End: 2025-04-10
Payer: MEDICARE

## 2025-04-10 VITALS — TEMPERATURE: 96.4 F

## 2025-04-10 DIAGNOSIS — S81.801D WOUND OF RIGHT LEG, SUBSEQUENT ENCOUNTER: Primary | ICD-10-CM

## 2025-04-10 PROCEDURE — G0463 HOSPITAL OUTPT CLINIC VISIT: HCPCS | Performed by: PODIATRIST

## 2025-04-10 PROCEDURE — A6212 FOAM DRG <=16 SQ IN W/BORDER: HCPCS

## 2025-04-10 PROCEDURE — G0463 HOSPITAL OUTPT CLINIC VISIT: HCPCS

## 2025-04-10 NOTE — ASSESSMENT & PLAN NOTE
No clinical signs of infection.  K I will see next week Kathya Yoandykeke to listen for the growling she will show up the area is healed nicely.  Still some fragile skin.  I would recommend Mepilex to be utilized for the next week until the skin gets a little stronger.  Will follow-up as needed.

## 2025-04-10 NOTE — PATIENT INSTRUCTIONS
Wound Healing Center Instructions    MEDICATIONS     Medication Note  Continue present medications as prescribed by the Wound Healing Center or other physicians you see. To avoid any problems keep the Wound Healing Center informed each visit of any medications changes that occur.     WOUND CARE     Clean Wound with: Soap and Water and Showering   Treatment 1   Location: left leg  Dressing: Wear bordered silicone dressing for protection  Dressing Care Frequency: Weekly  Care Provider: Self       Basic Principles      Wash your hands thoroughly with soap and water and after each dressing change. If someone other than the patient changes the dressing, its best to wear disposable gloves.   Do not get the wound or dressing wet.   To shower: remove the dressing, shower with soap and water (including washing the wound - do not use a washcloth), air dry, then redress the wound.  Do not take a tub bath  Keep all your dressings in a clean, covered container at home to avoid dust and contamination.  Discard used dressings in a plastic bag or covered trash container.  Check your wound and the surrounding skin at each dressing change for redness, warmth, swelling, increased pain, foul odor, fever, pus or abnormal drainage or discharge.  Notify Wound Healing Center if any of these changes occur - [phone].        Nutrition  Eat a well, balanced diet with adequate protein to support wound healing.   Take a multivitamin every day. Adequate nutrition supports healing and new tissue growth.  All diabetic patients should strive to keep blood sugars within a normal, practical range.   Elevated blood sugars can delay your wound healing.        ACTIVITY     Elevate legs above level of heart   Elevate your legs above the level of your heart for specific time periods during the day on several firm pillows or a foam leg wedge  Use of a recliner chair at home can often help in the appropriate elevation of your legs above the level of  your heart  Normal activity then rest and elevation  May shower  May excercise  May walk    MOBILITY     independent

## 2025-04-10 NOTE — PROGRESS NOTES
Patient ID: Orly Cobian                            : 1940  MRN: [id number]                                            Visit Date: 4/10/2025  Encounter Provider: Monty Jansen  Referring Provider: No ref. provider found    Subjective      HPI  Orly is a 85 y.o. old female with a chief compliant of Chronic Non-healing Wound  She presents today for follow-up of the wound to the left anterior leg.  States she is doing fine.  Is been using the manuka honey the Adaptic, alginate and a well-padded dry sterile dressing of every other day.  They have been compliant with course of treatment.  Denies any fevers chills night sweats.  Has no significant pain from the area.    She was initially seen and treated for cat bite that resulted in no wound on her left leg.  She has been on various oral antibiotics with good clearing of the infection.  Family is    The following have been reviewed and updated as appropriate in this visit:   Allergies  Meds  Problems       Past Medical History:  has a past medical history of A-fib (CMS/[facility]), Alcoholism (CMS/[facility]), Arrhythmia, Breast cancer (CMS/[facility]), Hypothyroidism, Osteoporosis, Parkinson's disease (CMS/[facility]), and Parkinsons (CMS/[facility]).  Past Surgical History:  has a past surgical history that includes Cholecystectomy; Cosmetic surgery; Eye surgery; Skin biopsy; Ablation of dysrhythmic focus; Breast biopsy; and Breast lumpectomy (Left, 2021).  Social History:   Social History     Tobacco Use    Smoking status: Former     Current packs/day: 0.00     Types: Cigarettes     Quit date:      Years since quittin.3    Smokeless tobacco: Never   Vaping Use    Vaping status: Never Used   Substance Use Topics    Alcohol use: No    Drug use: No     Family History: family history includes Anemia in her biological sister; Clotting disorder in her biological sister; Fainting in her biological sister; Heart attack in her biological father; Heart disease in her biological  father and biological mother; Heart failure in her biological father; Hypertension in her biological sister.  Medications:   Current Outpatient Medications:     anastrozole (ARIMIDEX) 1 mg tablet, Take  1 mg daily with lunch, Disp: , Rfl:     atorvastatin (LIPITOR) 10 mg tablet, Take 1 tablet (10 mg total) by mouth every evening., Disp: 90 tablet, Rfl: 3    cholecalciferol, vitamin D3, 1,000 unit (25 mcg) tablet, Take 1,000 Units by mouth daily with lunch., Disp: , Rfl:     dorzolamide (TRUSOPT) 2 % ophthalmic solution, Administer 1 drop into the right eye 2 (two) times a day., Disp: , Rfl:     ELIQUIS 5 mg tablet, take 1 tablet by mouth twice a day, Disp: 180 tablet, Rfl: 1    flecainide (TAMBOCOR) 50 mg tablet, Take 1 tablet (50 mg total) by mouth every 12 (twelve) hours., Disp: , Rfl:     flecainide (TAMBOCOR) 50 mg tablet, Take 1 tablet (50 mg total) by mouth every 12 (twelve) hours., Disp: 180 tablet, Rfl: 3    gentamicin (GARAMYCIN) 0.1 % ointment, Apply to wound as directed, Disp: 30 g, Rfl: 6    latanoprost (XALATAN) 0.005 % ophthalmic solution, Administer 1 drop into both eyes nightly., Disp: , Rfl:     levothyroxine (SYNTHROID) 75 mcg tablet, Take 75 mcg by mouth daily., Disp: , Rfl:     LORazepam (ATIVAN) 0.5 mg tablet, take 1 tablet by mouth once daily if needed, Disp: , Rfl:     lutein-zeaxanthin 25-5 mg capsule, Take 1 capsule by mouth daily. ocuvit, Disp: , Rfl:     MAGNESIUM GLYCINATE, BULK, MISC, Take by mouth See admin instr. CAPSULE, Disp: , Rfl:     metoprolol succinate XL (TOPROL-XL) 25 mg 24 hr tablet, Take 1 tablet (25 mg total) by mouth nightly. (Patient taking differently: Take 25 mg by mouth daily.), Disp: 90 tablet, Rfl: 3    Allergies: is allergic to erythromycin, erythromycin base, and bactrim [sulfamethoxazole-trimethoprim].     Review of Systems   All other systems reviewed and are negative.    Objective   Visit Vitals  Temp (!) 35.8 °C (96.4 °F) (Temporal)         Physical  Exam  Vitals and nursing note reviewed.       The DP and PT pulse are palpable.  Turgor texture of skin is reduced and slightly fragile.  Epicritic sensation manual muscle strength intact.  No caron pedal deformities present.  The wound is on the anterior aspect of the left mid leg.  Leg is no longer swollen.  The wound has resolved the skin is somewhat fragile.      Take care I would whispering good luck with wound thank you probably have 1 now that he said it Assessment/Plan    Diagnosis Plan   1. Wound of right leg, subsequent encounter            Problem List Items Addressed This Visit          Musculoskeletal    Wound of right leg, subsequent encounter - Primary    No clinical signs of infection.  K I will see next week Kathya Montes to listen for the growling she will show up the area is healed nicely.  Still some fragile skin.  I would recommend Mepilex to be utilized for the next week until the skin gets a little stronger.  Will follow-up as needed.                Return if symptoms worsen or fail to improve.     Monty Jansen, JESSICA

## 2025-07-07 ENCOUNTER — PROCEDURE VISIT (OUTPATIENT)
Dept: OTOLARYNGOLOGY | Facility: CLINIC | Age: 85
End: 2025-07-07
Payer: MEDICARE

## 2025-07-07 ENCOUNTER — OFFICE VISIT (OUTPATIENT)
Dept: OTOLARYNGOLOGY | Facility: CLINIC | Age: 85
End: 2025-07-07
Payer: MEDICARE

## 2025-07-07 VITALS
SYSTOLIC BLOOD PRESSURE: 124 MMHG | WEIGHT: 112 LBS | DIASTOLIC BLOOD PRESSURE: 66 MMHG | HEART RATE: 57 BPM | TEMPERATURE: 97 F | HEIGHT: 65 IN | BODY MASS INDEX: 18.66 KG/M2 | OXYGEN SATURATION: 97 %

## 2025-07-07 DIAGNOSIS — H90.3 SENSORINEURAL HEARING LOSS (SNHL) OF BOTH EARS: Primary | ICD-10-CM

## 2025-07-07 DIAGNOSIS — H61.23 IMPACTED CERUMEN, BILATERAL: ICD-10-CM

## 2025-07-07 DIAGNOSIS — H90.3 BILATERAL SENSORINEURAL HEARING LOSS: Primary | ICD-10-CM

## 2025-07-07 PROCEDURE — 99213 OFFICE O/P EST LOW 20 MIN: CPT | Mod: 25 | Performed by: OTOLARYNGOLOGY

## 2025-07-07 PROCEDURE — 99999 PR OFFICE/OUTPT VISIT,PROCEDURE ONLY: CPT | Performed by: AUDIOLOGIST

## 2025-07-07 PROCEDURE — 92557 COMPREHENSIVE HEARING TEST: CPT | Performed by: AUDIOLOGIST

## 2025-07-07 PROCEDURE — 92567 TYMPANOMETRY: CPT | Performed by: AUDIOLOGIST

## 2025-07-07 PROCEDURE — 69210 REMOVE IMPACTED EAR WAX UNI: CPT | Performed by: OTOLARYNGOLOGY

## 2025-07-07 ASSESSMENT — ENCOUNTER SYMPTOMS
MYALGIAS: 0
SLEEP DISTURBANCE: 0
FREQUENCY: 0
VOMITING: 0
NERVOUS/ANXIOUS: 0
WEAKNESS: 0
WHEEZING: 0
SHORTNESS OF BREATH: 0
ARTHRALGIAS: 0
SINUS PRESSURE: 0
PALPITATIONS: 0
DIZZINESS: 0
COUGH: 0
TROUBLE SWALLOWING: 0
NAUSEA: 0
FEVER: 0
DYSPHORIC MOOD: 0
RHINORRHEA: 0
BACK PAIN: 0
ADENOPATHY: 0
POLYPHAGIA: 0
HEADACHES: 0
VOICE CHANGE: 0
FATIGUE: 0

## 2025-07-07 NOTE — PROGRESS NOTES
[facility]  Otolaryngology - Head & Neck Surgery   [address], [address]   [place] [address]  Phone: [phone]  Fax: [phone]     HISTORY OF PRESENT ILLNESS   Chief Complaint   Patient presents with    Cerumen Impaction    Hearing Loss       Orly Cobian returns to the office after 1 year interval accompanied by her  Pedro.  She has a history of hearing loss and purchased hearing aids with the help of audiologist Misty Abad.  Orly has been using the aids now for about a year.  She reports difficulty with the left ear because the canal collapses.  She has hard full mold on the left which is uncomfortable.  She plans to return to her audiologist for further alterations if possible.  No ear pain, drainage or vertigo.  Her neurologic status with regard to Parkinson's disease is stable and followed by Dr. Ariza.  No nose or throat concerns today.             PAST MEDICAL, SURGICAL AND SOCIAL HISTORY   Past Medical History:   Diagnosis Date    A-fib (CMS/[facility])     Alcoholism (CMS/[facility])     Arrhythmia     Breast cancer (CMS/[facility])     Hypothyroidism     Osteoporosis     Parkinson's disease (CMS/[facility])     Parkinsons (CMS/[facility])        Past Surgical History   Procedure Laterality Date    Ablation of dysrhythmic focus      Breast biopsy      2 breast biospy benign    Breast lumpectomy Left 2021    Cholecystectomy      Cosmetic surgery      Eye surgery      Skin biopsy         Social History     Tobacco Use    Smoking status: Former     Current packs/day: 0.00     Types: Cigarettes     Quit date:      Years since quittin.5    Smokeless tobacco: Never   Vaping Use    Vaping status: Never Used   Substance Use Topics    Alcohol use: No    Drug use: No        FAMILY HISTORY   Family History   Problem Relation Name Age of Onset    Heart disease Biological Mother      Heart disease Biological Father      Heart attack Biological Father       Heart failure Biological Father      Anemia Biological Sister      Clotting disorder Biological Sister      Fainting Biological Sister      Hypertension Biological Sister          MEDICATIONS     Current Outpatient Medications:     anastrozole (ARIMIDEX) 1 mg tablet, Take  1 mg daily with lunch, Disp: , Rfl:     atorvastatin (LIPITOR) 10 mg tablet, take 1 tablet by mouth every evening, Disp: 90 tablet, Rfl: 3    cholecalciferol, vitamin D3, 1,000 unit (25 mcg) tablet, Take 1,000 Units by mouth daily with lunch., Disp: , Rfl:     dorzolamide (TRUSOPT) 2 % ophthalmic solution, Administer 1 drop into the right eye 2 (two) times a day., Disp: , Rfl:     ELIQUIS 5 mg tablet, take 1 tablet by mouth twice a day, Disp: 180 tablet, Rfl: 1    flecainide (TAMBOCOR) 50 mg tablet, Take 1 tablet (50 mg total) by mouth every 12 (twelve) hours., Disp: , Rfl:     flecainide (TAMBOCOR) 50 mg tablet, Take 1 tablet (50 mg total) by mouth every 12 (twelve) hours., Disp: 180 tablet, Rfl: 3    gentamicin (GARAMYCIN) 0.1 % ointment, Apply to wound as directed, Disp: 30 g, Rfl: 6    latanoprost (XALATAN) 0.005 % ophthalmic solution, Administer 1 drop into both eyes nightly., Disp: , Rfl:     levothyroxine (SYNTHROID) 75 mcg tablet, Take 75 mcg by mouth daily., Disp: , Rfl:     LORazepam (ATIVAN) 0.5 mg tablet, take 1 tablet by mouth once daily if needed, Disp: , Rfl:     lutein-zeaxanthin 25-5 mg capsule, Take 1 capsule by mouth daily. ocuvit, Disp: , Rfl:     MAGNESIUM GLYCINATE, BULK, MISC, Take by mouth See admin instr. CAPSULE, Disp: , Rfl:     metoprolol succinate XL (TOPROL-XL) 25 mg 24 hr tablet, Take 1 tablet (25 mg total) by mouth nightly. (Patient taking differently: Take 25 mg by mouth daily.), Disp: 90 tablet, Rfl: 3     ALLERGIES   Erythromycin, Erythromycin base, and Bactrim [sulfamethoxazole-trimethoprim]     REVIEW OF SYSTEMS   Review of Systems   Constitutional:  Negative for fatigue and fever.   HENT:  Positive for hearing  "loss. Negative for congestion, nosebleeds, postnasal drip, rhinorrhea, sinus pressure, tinnitus, trouble swallowing and voice change.    Eyes:  Negative for visual disturbance.   Respiratory:  Negative for cough, shortness of breath and wheezing.    Cardiovascular:  Negative for chest pain and palpitations.   Gastrointestinal:  Negative for nausea and vomiting.   Endocrine: Negative for polyphagia and polyuria.   Genitourinary:  Negative for frequency.   Musculoskeletal:  Negative for arthralgias, back pain, gait problem and myalgias.   Skin:  Negative for rash.   Allergic/Immunologic: Negative for environmental allergies.   Neurological:  Negative for dizziness, weakness and headaches.   Hematological:  Negative for adenopathy.   Psychiatric/Behavioral:  Negative for dysphoric mood and sleep disturbance. The patient is not nervous/anxious.         PHYSICAL EXAMINATION   Visit Vitals  /66 (BP Location: Left upper arm, Patient Position: Sitting)   Pulse (!) 57   Temp 36.1 °C (97 °F) (Temporal)   Ht 1.651 m (5' 5\")   Wt 50.8 kg (112 lb)   SpO2 97%   BMI 18.64 kg/m²       Physical Exam    GENERAL APPEARANCE: in no acute distress, well developed, well nourished, thin elder female.         VOICE: without hoarseness, breathiness or stridor.         HEAD: normocephalic, atraumatic.          EYES: extraocular movement full and smooth, pupils equal, round, reactive to light.          EARS normally formed auricles, external auditory canals clear after cerumen removal bilaterally, left EAC does collapse posterior to anterior, tympanic membranes intact, clear, no TM retractions, no perforations, middle ears free of effusion, air conduction greater than bone conduction at 512 Hz, Velasquez test midline.          NOSE no external nasal skin lesion, narrow dorsum, no external nasal deformity, nasal cavity clear to anterior rhinoscopy, patent nasal airways, no polyp, no discharge.          ORAL CAVITY: mucosa moist, no lesions, " palate normal, tongue in midline, good dentition, regular mandibular excursion, FOM normal.          THROAT: clear, pharynx normal, uvula midline, tonsils not inflamed.          NECK/THYROID: neck supple, fair range of motion, no thyromegaly.          LYMPH NODES: no cervical adenopathy.          SKIN: warm and dry.          NEUROLOGIC: nonfocal, cranial nerves 2-12 grossly intact, no tremor, normal station and gait.              PROCEDURES     Procedures    AUDIOGRAM:        Pure tone audiometry: Bilateral moderate sensorineural hearing loss       Speech reception threshold: 40 dB each ear       Word recognition score: 80% both ears       Tympanometry: Type a bilaterally     IMAGING        ASSESSMENT AND PLAN     Problem List Items Addressed This Visit          Ears/Nose/Throat    Bilateral sensorineural hearing loss - Primary    Continue use of amplification.  Optimize hearing aid use.  Audiometry in follow-up.         Impacted cerumen, bilateral    Ear hygiene reviewed.         Relevant Orders    Cerumen removal       Return in about 1 year (around 7/7/2026).       Gaurav Brantley Jr., MD  7/7/2025      NOTE: Some or all of this document was created with voice-to-text dictation. Please excuse any errors in spelling or grammar. Please contact me directly if clarification for any portion of this document is required.

## 2025-07-07 NOTE — PROCEDURES
Cerumen removal    Date/Time: 7/7/2025 1:52 PM    Performed by: Gaurav Brantley MD  Authorized by: Gaurav Brantley MD    Consent:     Consent obtained:  Verbal    Risks discussed:  Bleeding, incomplete removal and pain  Universal protocol:     Procedure explained and questions answered to patient or proxy's satisfaction: yes      Patient identity confirmed:  Verbally with patient  Procedure details:     Location:  L ear and R ear    Procedure type: curette      Procedure type comment:  Microsuction    Procedure outcomes: cerumen removed    Post-procedure details:     Inspection:  Ear canal clear    Hearing quality:  Improved    Procedure completion:  Tolerated well, no immediate complications  Comments:      Performed with operating otoscope

## 2025-07-15 ENCOUNTER — OFFICE VISIT (OUTPATIENT)
Dept: CARDIOLOGY | Facility: CLINIC | Age: 85
End: 2025-07-15
Attending: INTERNAL MEDICINE
Payer: MEDICARE

## 2025-07-15 VITALS
SYSTOLIC BLOOD PRESSURE: 118 MMHG | HEIGHT: 65 IN | BODY MASS INDEX: 18.83 KG/M2 | WEIGHT: 113 LBS | DIASTOLIC BLOOD PRESSURE: 68 MMHG | OXYGEN SATURATION: 98 % | HEART RATE: 61 BPM

## 2025-07-15 DIAGNOSIS — I48.0 PAROXYSMAL ATRIAL FIBRILLATION (CMS/HCC): Primary | ICD-10-CM

## 2025-07-15 DIAGNOSIS — Z79.899 LONG TERM CURRENT USE OF ANTIARRHYTHMIC DRUG: ICD-10-CM

## 2025-07-15 DIAGNOSIS — Q22.5 EBSTEIN'S ANOMALY OF TRICUSPID VALVE: ICD-10-CM

## 2025-07-15 DIAGNOSIS — I45.6 WPW (WOLFF-PARKINSON-WHITE SYNDROME): ICD-10-CM

## 2025-07-15 DIAGNOSIS — I45.10 RBBB: ICD-10-CM

## 2025-07-15 DIAGNOSIS — E78.5 DYSLIPIDEMIA: ICD-10-CM

## 2025-07-15 LAB
ATRIAL RATE: 59
P AXIS: 75
PR INTERVAL: 180
QRS DURATION: 178
QT INTERVAL: 506
QTC CALCULATION(BAZETT): 500
R AXIS: 35
T WAVE AXIS: 41
VENTRICULAR RATE: 59

## 2025-07-15 PROCEDURE — 99214 OFFICE O/P EST MOD 30 MIN: CPT | Performed by: INTERNAL MEDICINE

## 2025-07-15 PROCEDURE — 93000 ELECTROCARDIOGRAM COMPLETE: CPT | Performed by: INTERNAL MEDICINE

## 2025-07-15 RX ORDER — AMOXICILLIN 500 MG/1
TABLET, FILM COATED ORAL
COMMUNITY
Start: 2025-05-02